# Patient Record
Sex: FEMALE | Race: WHITE | Employment: OTHER | URBAN - METROPOLITAN AREA
[De-identification: names, ages, dates, MRNs, and addresses within clinical notes are randomized per-mention and may not be internally consistent; named-entity substitution may affect disease eponyms.]

---

## 2019-05-09 RX ORDER — LISINOPRIL 5 MG/1
5 TABLET ORAL NIGHTLY
COMMUNITY
End: 2021-03-29 | Stop reason: SDUPTHER

## 2019-05-09 NOTE — H&P
Not on file    Transportation needs:     Medical: Not on file     Non-medical: Not on file   Tobacco Use    Smoking status: Current Every Day Smoker     Packs/day: 1.00    Smokeless tobacco: Never Used   Substance and Sexual Activity    Alcohol use: Yes     Comment: moderate    Drug use: No     Types: Marijuana    Sexual activity: Not on file   Lifestyle    Physical activity:     Days per week: Not on file     Minutes per session: Not on file    Stress: Not on file   Relationships    Social connections:     Talks on phone: Not on file     Gets together: Not on file     Attends Worship service: Not on file     Active member of club or organization: Not on file     Attends meetings of clubs or organizations: Not on file     Relationship status: Not on file    Intimate partner violence:     Fear of current or ex partner: Not on file     Emotionally abused: Not on file     Physically abused: Not on file     Forced sexual activity: Not on file   Other Topics Concern    Not on file   Social History Narrative    Not on file       Review of Systems:   CONSTITUTIONAL: Oriented to person, place and time  EYES:  Mature right cataract with decreased vision effecting reading, driving and all routine home activities. Visual acuity is 20/60  HEENT:  negative  RESPIRATORY:  negative  CARDIOVASCULAR:  negative  GASTROINTESTINAL:  negative  GENITOURINARY:  negative  INTEGUMENT/BREAST:  negative  HEMATOLOGIC/LYMPHATIC:  negative  ALLERGIC/IMMUNOLOGIC:  negative  ENDOCRINE:  negative  MUSCULOSKELETAL:  negative  NEUROLOGICAL:  negative  BEHAVIOR/PSYCH:  negative    Physical Exam:  There were no vitals filed for this visit.     CONSTITUTIONAL:  awake, alert, cooperative, no apparent distress, and appears stated age  EYES:  Lids and lashes normal, pupils equal, round and reactive to light, extra ocular muscles intact, sclera clear, conjunctiva normal  ENT:  Normocephalic, without obvious abnormality, atraumatic, sinuses nontender on palpation, external ears without lesions, oral pharynx with moist mucus membranes, tonsils without erythema or exudates, gums normal and good dentition. NECK:  Supple, symmetrical, trachea midline, no adenopathy, thyroid symmetric, not enlarged and no tenderness, skin normal  HEMATOLOGIC/LYMPHATICS:  no cervical lymphadenopathy and no supraclavicular lymphadenopathy  BACK:  Symmetric, no curvature, spinous processes are non-tender on palpation, paraspinous muscles are non-tender on palpation, no costal vertebral tenderness  LUNGS:  No increased work of breathing, good air exchange, clear to auscultation bilaterally, no crackles or wheezing  CARDIOVASCULAR:  Normal apical impulse, regular rate and rhythm, normal S1 and S2, no S3 or S4, and no murmur noted  ABDOMEN:  No scars, normal bowel sounds, soft, non-distended, non-tender, no masses palpated, no hepatosplenomegally  CHEST/BREASTS:  Breasts symmetrical, skin without lesion(s), no nipple retraction or dimpling, no nipple discharge, no masses palpated, no axillary or supraclavicular adenopathy  GENITAL/URINARY: Deferred   MUSCULOSKELETAL:  There is no redness, warmth, or swelling of the joints. Full range of motion noted. Motor strength is 5 out of 5 all extremities bilaterally. Tone is normal.  NEUROLOGIC:  Awake, alert, oriented to name, place and time. Cranial nerves II-XII are grossly intact. Motor is 5 out of 5 bilaterally. Cerebellar finger to nose, heel to shin intact. Sensory is intact. Babinski down going, Romberg negative, and gait is normal.  SKIN:  no bruising or bleeding, normal skin color, texture, turgor and no redness, warmth, or swelling    Assessment:  Active Problems:    * No active hospital problems. *  Resolved Problems:    * No resolved hospital problems. *      Plan:  1.  Right cataract extraction with intra ocular lens implant    Electronically signed by Reed Soto MD on 5/8/19 at 8:15 PM

## 2019-05-10 ENCOUNTER — ANESTHESIA EVENT (OUTPATIENT)
Dept: OPERATING ROOM | Age: 64
End: 2019-05-10
Payer: MEDICARE

## 2019-05-10 ASSESSMENT — LIFESTYLE VARIABLES: SMOKING_STATUS: 1

## 2019-05-10 NOTE — ANESTHESIA PRE PROCEDURE
Department of Anesthesiology  Preprocedure Note       Name:  Yolanda Kirk   Age:  71 y.o.  :  1955                                          MRN:  [id number]         Date:  5/10/2019      Surgeon: Quinton Davidsonal):  Juan Mack MD    Procedure: RIGHT EYE CATARACT EMULSIFICATION IOL IMPLANT (Right )    Medications prior to admission:   Prior to Admission medications    Medication Sig Start Date End Date Taking? Authorizing Provider   lisinopril (PRINIVIL;ZESTRIL) 5 MG tablet Take 5 mg by mouth nightly    Yes Historical Provider, MD   rOPINIRole (REQUIP) 0.25 MG tablet Take 1 tablet by mouth daily  Patient taking differently: Take 0.5 mg by mouth nightly  9/18/15  Yes Balbir Cram, DO   simvastatin (ZOCOR) 40 MG tablet Take 1 tablet by mouth nightly 9/18/15  Yes Balbir Cram, DO   ibuprofen (ADVIL;MOTRIN) 800 MG tablet Take 1 tablet by mouth every 8 hours as needed for Pain 9/18/15  Yes Balbir Cram, DO   metoprolol (LOPRESSOR) 50 MG tablet Take 1 tablet by mouth daily  Patient taking differently: Take 50 mg by mouth 2 times daily Takes in the afternoon & in the PM 9/18/15  Yes Balbir Cram, DO   Cholecalciferol (VITAMIN D) 2000 UNITS CAPS capsule Take  by mouth daily. Yes Historical Provider, MD   aspirin 81 MG tablet Take 81 mg by mouth daily. Yes Historical Provider, MD       Current medications:    No current facility-administered medications for this encounter.       Current Outpatient Medications   Medication Sig Dispense Refill    lisinopril (PRINIVIL;ZESTRIL) 5 MG tablet Take 5 mg by mouth nightly       rOPINIRole (REQUIP) 0.25 MG tablet Take 1 tablet by mouth daily (Patient taking differently: Take 0.5 mg by mouth nightly ) 90 tablet 3    simvastatin (ZOCOR) 40 MG tablet Take 1 tablet by mouth nightly 90 tablet 3    ibuprofen (ADVIL;MOTRIN) 800 MG tablet Take 1 tablet by mouth every 8 hours as needed for Pain 30 tablet 3    metoprolol (LOPRESSOR) 50 MG tablet Take 1 tablet by mouth daily (Patient taking differently: Take 50 mg by mouth 2 times daily Takes in the afternoon & in the PM) 90 tablet 3    Cholecalciferol (VITAMIN D) 2000 UNITS CAPS capsule Take  by mouth daily.  aspirin 81 MG tablet Take 81 mg by mouth daily. Allergies: Allergies   Allergen Reactions    Biaxin [Clarithromycin] Nausea And Vomiting    Naproxen Nausea And Vomiting       Problem List:    Patient Active Problem List   Diagnosis Code    Memory loss R41.3    Cerebral atherosclerosis I67.2    Hypercholesteremia improved E78.00       Past Medical History:        Diagnosis Date    Hyperlipidemia     Hypertension     Sleep apnea     doesnt use cpap       Past Surgical History:        Procedure Laterality Date    CAROTID ENDARTERECTOMY Right      SECTION      COLONOSCOPY         Social History:    Social History     Tobacco Use    Smoking status: Current Every Day Smoker     Packs/day: 2.00     Years: 40.00     Pack years: 80.00     Types: Cigarettes    Smokeless tobacco: Never Used   Substance Use Topics    Alcohol use: Yes     Comment: beer or wine daily                                Ready to quit: Not Answered  Counseling given: Not Answered      Vital Signs (Current):   Vitals:    19 1317   Weight: 185 lb (83.9 kg)   Height: 5' 5\" (1.651 m)                                              BP Readings from Last 3 Encounters:   09/18/15 120/80   03/19/15 (!) 147/91   01/14/15 (!) 144/92       NPO Status:  >8.H                                                                               BMI:   Wt Readings from Last 3 Encounters:   09/18/15 176 lb (79.8 kg)   03/19/15 170 lb (77.1 kg)   01/14/15 172 lb (78 kg)     Body mass index is 30.79 kg/m².     CBC:   Lab Results   Component Value Date    WBC 5.8 09/10/2015    RBC 4.84 09/10/2015    HGB 15.1 09/10/2015    HCT 46.5 09/10/2015    MCV 96.2 09/10/2015    RDW 13.4 09/10/2015     09/10/2015       CMP:   Lab Results   Component Value Date     09/10/2015    K 4.6 09/10/2015     09/10/2015    CO2 30 09/10/2015    BUN 15 09/10/2015    CREATININE 0.7 09/10/2015    GFRAA >60 09/10/2015    LABGLOM >60 09/10/2015    GLUCOSE 101 09/10/2015    GLUCOSE 87 01/26/2012    PROT 7.1 09/10/2015    CALCIUM 9.6 09/10/2015    BILITOT 0.4 09/10/2015    ALKPHOS 67 09/10/2015    AST 17 09/10/2015    ALT 17 09/10/2015       POC Tests: No results for input(s): POCGLU, POCNA, POCK, POCCL, POCBUN, POCHEMO, POCHCT in the last 72 hours. Coags: No results found for: PROTIME, INR, APTT    HCG (If Applicable): No results found for: PREGTESTUR, PREGSERUM, HCG, HCGQUANT     ABGs: No results found for: PHART, PO2ART, RSP0KJW, WDW5GBC, BEART, L1XENCXM     Type & Screen (If Applicable):  No results found for: [facility]    Anesthesia Evaluation  Patient summary reviewed no history of anesthetic complications:   Airway: Mallampati: III  TM distance: >3 FB   Neck ROM: full  Mouth opening: > = 3 FB Dental:    (+) upper dentures, lower dentures and edentulous      Pulmonary: breath sounds clear to auscultation  (+) sleep apnea: on noncompliant,  current smoker (80 pk yrs)          Patient did not smoke on day of surgery. Cardiovascular:  Exercise tolerance: good (>4 METS),   (+) hypertension:, hyperlipidemia        Rhythm: regular  Rate: normal           Beta Blocker:  Dose within 24 Hrs         Neuro/Psych:               GI/Hepatic/Renal:             Endo/Other:                     Abdominal:   (+) obese,         Vascular:   + PVD, aortic or cerebral, . ROS comment: Hx CEA. Anesthesia Plan      MAC     ASA 3       Induction: intravenous. Anesthetic plan and risks discussed with patient. Plan discussed with CRNA.                 Neida Carter MD   5/10/2019

## 2019-05-14 ENCOUNTER — ANESTHESIA (OUTPATIENT)
Dept: OPERATING ROOM | Age: 64
End: 2019-05-14
Payer: MEDICARE

## 2019-05-14 ENCOUNTER — HOSPITAL ENCOUNTER (OUTPATIENT)
Age: 64
Setting detail: OUTPATIENT SURGERY
Discharge: HOME | End: 2019-05-14
Attending: OPHTHALMOLOGY | Admitting: OPHTHALMOLOGY
Payer: MEDICARE

## 2019-05-14 VITALS
WEIGHT: 190 LBS | TEMPERATURE: 98 F | OXYGEN SATURATION: 96 % | HEIGHT: 65 IN | SYSTOLIC BLOOD PRESSURE: 124 MMHG | RESPIRATION RATE: 16 BRPM | HEART RATE: 70 BPM | DIASTOLIC BLOOD PRESSURE: 76 MMHG | BODY MASS INDEX: 31.65 KG/M2

## 2019-05-14 VITALS — SYSTOLIC BLOOD PRESSURE: 118 MMHG | DIASTOLIC BLOOD PRESSURE: 64 MMHG | OXYGEN SATURATION: 99 %

## 2019-05-14 PROBLEM — H26.9 RIGHT CATARACT: Status: ACTIVE | Noted: 2019-05-14

## 2019-05-14 PROCEDURE — 2500000003 HC RX 250 WO HCPCS: Performed by: OPHTHALMOLOGY

## 2019-05-14 PROCEDURE — 6370000000 HC RX 637 (ALT 250 FOR IP): Performed by: OPHTHALMOLOGY

## 2019-05-14 PROCEDURE — 2500000003 HC RX 250 WO HCPCS: Performed by: NURSE ANESTHETIST, CERTIFIED REGISTERED

## 2019-05-14 PROCEDURE — 3600000003 HC SURGERY LEVEL 3 BASE: Performed by: OPHTHALMOLOGY

## 2019-05-14 PROCEDURE — V2632 POST CHMBR INTRAOCULAR LENS: HCPCS | Performed by: OPHTHALMOLOGY

## 2019-05-14 PROCEDURE — 2580000003 HC RX 258: Performed by: ANESTHESIOLOGY

## 2019-05-14 PROCEDURE — 6360000002 HC RX W HCPCS: Performed by: NURSE ANESTHETIST, CERTIFIED REGISTERED

## 2019-05-14 PROCEDURE — 3700000000 HC ANESTHESIA ATTENDED CARE: Performed by: OPHTHALMOLOGY

## 2019-05-14 PROCEDURE — 2709999900 HC NON-CHARGEABLE SUPPLY: Performed by: OPHTHALMOLOGY

## 2019-05-14 PROCEDURE — 7100000010 HC PHASE II RECOVERY - FIRST 15 MIN: Performed by: OPHTHALMOLOGY

## 2019-05-14 PROCEDURE — 7100000011 HC PHASE II RECOVERY - ADDTL 15 MIN: Performed by: OPHTHALMOLOGY

## 2019-05-14 DEVICE — LENS INTOCU +18.0 DIOPT A CONSTANT 118.8 L13MM DIA6MM 0DEG: Type: IMPLANTABLE DEVICE | Site: EYE | Status: FUNCTIONAL

## 2019-05-14 RX ORDER — ALFENTANIL HYDROCHLORIDE 500 UG/ML
INJECTION INTRAVENOUS PRN
Status: DISCONTINUED | OUTPATIENT
Start: 2019-05-14 | End: 2019-05-14 | Stop reason: SDUPTHER

## 2019-05-14 RX ORDER — HYDROMORPHONE HYDROCHLORIDE 1 MG/ML
0.25 INJECTION, SOLUTION INTRAMUSCULAR; INTRAVENOUS; SUBCUTANEOUS EVERY 5 MIN PRN
Status: DISCONTINUED | OUTPATIENT
Start: 2019-05-14 | End: 2019-05-14 | Stop reason: HOSPADM

## 2019-05-14 RX ORDER — PROMETHAZINE HYDROCHLORIDE 25 MG/ML
25 INJECTION, SOLUTION INTRAMUSCULAR; INTRAVENOUS
Status: DISCONTINUED | OUTPATIENT
Start: 2019-05-14 | End: 2019-05-14 | Stop reason: HOSPADM

## 2019-05-14 RX ORDER — FENTANYL CITRATE 50 UG/ML
50 INJECTION, SOLUTION INTRAMUSCULAR; INTRAVENOUS EVERY 5 MIN PRN
Status: DISCONTINUED | OUTPATIENT
Start: 2019-05-14 | End: 2019-05-14 | Stop reason: HOSPADM

## 2019-05-14 RX ORDER — CYCLOPENTOLATE HYDROCHLORIDE 10 MG/ML
1 SOLUTION/ DROPS OPHTHALMIC
Status: COMPLETED | OUTPATIENT
Start: 2019-05-14 | End: 2019-05-14

## 2019-05-14 RX ORDER — BALANCED SALT SOLUTION 6.4; .75; .48; .3; 3.9; 1.7 MG/ML; MG/ML; MG/ML; MG/ML; MG/ML; MG/ML
SOLUTION OPHTHALMIC PRN
Status: DISCONTINUED | OUTPATIENT
Start: 2019-05-14 | End: 2019-05-14 | Stop reason: ALTCHOICE

## 2019-05-14 RX ORDER — DIPHENHYDRAMINE HYDROCHLORIDE 50 MG/ML
12.5 INJECTION INTRAMUSCULAR; INTRAVENOUS
Status: DISCONTINUED | OUTPATIENT
Start: 2019-05-14 | End: 2019-05-14 | Stop reason: HOSPADM

## 2019-05-14 RX ORDER — HYDROCODONE BITARTRATE AND ACETAMINOPHEN 5; 325 MG/1; MG/1
2 TABLET ORAL PRN
Status: DISCONTINUED | OUTPATIENT
Start: 2019-05-14 | End: 2019-05-14 | Stop reason: HOSPADM

## 2019-05-14 RX ORDER — TETRACAINE HYDROCHLORIDE 5 MG/ML
SOLUTION OPHTHALMIC PRN
Status: DISCONTINUED | OUTPATIENT
Start: 2019-05-14 | End: 2019-05-14 | Stop reason: ALTCHOICE

## 2019-05-14 RX ORDER — PHENYLEPHRINE HCL 2.5 %
1 DROPS OPHTHALMIC (EYE)
Status: COMPLETED | OUTPATIENT
Start: 2019-05-14 | End: 2019-05-14

## 2019-05-14 RX ORDER — HYDROCODONE BITARTRATE AND ACETAMINOPHEN 5; 325 MG/1; MG/1
1 TABLET ORAL PRN
Status: DISCONTINUED | OUTPATIENT
Start: 2019-05-14 | End: 2019-05-14 | Stop reason: HOSPADM

## 2019-05-14 RX ORDER — MIDAZOLAM HYDROCHLORIDE 1 MG/ML
INJECTION INTRAMUSCULAR; INTRAVENOUS PRN
Status: DISCONTINUED | OUTPATIENT
Start: 2019-05-14 | End: 2019-05-14 | Stop reason: SDUPTHER

## 2019-05-14 RX ORDER — MEPERIDINE HYDROCHLORIDE 50 MG/ML
12.5 INJECTION INTRAMUSCULAR; INTRAVENOUS; SUBCUTANEOUS EVERY 5 MIN PRN
Status: DISCONTINUED | OUTPATIENT
Start: 2019-05-14 | End: 2019-05-14 | Stop reason: HOSPADM

## 2019-05-14 RX ORDER — FLURBIPROFEN SODIUM 0.3 MG/ML
1 SOLUTION/ DROPS OPHTHALMIC
Status: COMPLETED | OUTPATIENT
Start: 2019-05-14 | End: 2019-05-14

## 2019-05-14 RX ORDER — PHENYLEPHRINE HYDROCHLORIDE 100 MG/ML
1 SOLUTION/ DROPS OPHTHALMIC PRN
Status: DISCONTINUED | OUTPATIENT
Start: 2019-05-14 | End: 2019-05-14 | Stop reason: HOSPADM

## 2019-05-14 RX ORDER — LABETALOL HYDROCHLORIDE 5 MG/ML
5 INJECTION, SOLUTION INTRAVENOUS EVERY 10 MIN PRN
Status: DISCONTINUED | OUTPATIENT
Start: 2019-05-14 | End: 2019-05-14 | Stop reason: HOSPADM

## 2019-05-14 RX ORDER — SODIUM CHLORIDE, SODIUM LACTATE, POTASSIUM CHLORIDE, CALCIUM CHLORIDE 600; 310; 30; 20 MG/100ML; MG/100ML; MG/100ML; MG/100ML
INJECTION, SOLUTION INTRAVENOUS CONTINUOUS
Status: DISCONTINUED | OUTPATIENT
Start: 2019-05-14 | End: 2019-05-14 | Stop reason: HOSPADM

## 2019-05-14 RX ORDER — MORPHINE SULFATE 2 MG/ML
1 INJECTION, SOLUTION INTRAMUSCULAR; INTRAVENOUS EVERY 5 MIN PRN
Status: DISCONTINUED | OUTPATIENT
Start: 2019-05-14 | End: 2019-05-14 | Stop reason: HOSPADM

## 2019-05-14 RX ORDER — HYDRALAZINE HYDROCHLORIDE 20 MG/ML
5 INJECTION INTRAMUSCULAR; INTRAVENOUS EVERY 10 MIN PRN
Status: DISCONTINUED | OUTPATIENT
Start: 2019-05-14 | End: 2019-05-14 | Stop reason: HOSPADM

## 2019-05-14 RX ORDER — TETRACAINE HYDROCHLORIDE 5 MG/ML
1 SOLUTION OPHTHALMIC ONCE
Status: COMPLETED | OUTPATIENT
Start: 2019-05-14 | End: 2019-05-14

## 2019-05-14 RX ADMIN — CYCLOPENTOLATE HYDROCHLORIDE 1 DROP: 10 SOLUTION/ DROPS OPHTHALMIC at 06:40

## 2019-05-14 RX ADMIN — SODIUM CHLORIDE, POTASSIUM CHLORIDE, SODIUM LACTATE AND CALCIUM CHLORIDE: 600; 310; 30; 20 INJECTION, SOLUTION INTRAVENOUS at 06:55

## 2019-05-14 RX ADMIN — MIDAZOLAM 1 MG: 1 INJECTION INTRAMUSCULAR; INTRAVENOUS at 07:35

## 2019-05-14 RX ADMIN — MIDAZOLAM 1 MG: 1 INJECTION INTRAMUSCULAR; INTRAVENOUS at 07:37

## 2019-05-14 RX ADMIN — ALFENTANIL HYDROCHLORIDE 250 MCG: 500 INJECTION INTRAVENOUS at 07:35

## 2019-05-14 RX ADMIN — FLURBIPROFEN SODIUM 1 DROP: 0.3 SOLUTION/ DROPS OPHTHALMIC at 06:50

## 2019-05-14 RX ADMIN — ALFENTANIL HYDROCHLORIDE 250 MCG: 500 INJECTION INTRAVENOUS at 07:37

## 2019-05-14 RX ADMIN — CYCLOPENTOLATE HYDROCHLORIDE 1 DROP: 10 SOLUTION/ DROPS OPHTHALMIC at 06:45

## 2019-05-14 RX ADMIN — ALFENTANIL HYDROCHLORIDE 250 MCG: 500 INJECTION INTRAVENOUS at 07:43

## 2019-05-14 RX ADMIN — PHENYLEPHRINE HYDROCHLORIDE 1 DROP: 25 SOLUTION/ DROPS OPHTHALMIC at 06:40

## 2019-05-14 RX ADMIN — FLURBIPROFEN SODIUM 1 DROP: 0.3 SOLUTION/ DROPS OPHTHALMIC at 06:40

## 2019-05-14 RX ADMIN — TETRACAINE HYDROCHLORIDE 1 DROP: 25 LIQUID OPHTHALMIC at 07:00

## 2019-05-14 RX ADMIN — PHENYLEPHRINE HYDROCHLORIDE 1 DROP: 25 SOLUTION/ DROPS OPHTHALMIC at 06:45

## 2019-05-14 RX ADMIN — FLURBIPROFEN SODIUM 1 DROP: 0.3 SOLUTION/ DROPS OPHTHALMIC at 06:45

## 2019-05-14 RX ADMIN — PHENYLEPHRINE HYDROCHLORIDE 1 DROP: 25 SOLUTION/ DROPS OPHTHALMIC at 06:50

## 2019-05-14 RX ADMIN — CYCLOPENTOLATE HYDROCHLORIDE 1 DROP: 10 SOLUTION/ DROPS OPHTHALMIC at 06:50

## 2019-05-14 ASSESSMENT — PULMONARY FUNCTION TESTS
PIF_VALUE: 0
PIF_VALUE: 1
PIF_VALUE: 0
PIF_VALUE: 0

## 2019-05-14 ASSESSMENT — PAIN - FUNCTIONAL ASSESSMENT: PAIN_FUNCTIONAL_ASSESSMENT: 0-10

## 2019-05-14 ASSESSMENT — PAIN SCALES - GENERAL
PAINLEVEL_OUTOF10: 0
PAINLEVEL_OUTOF10: 0

## 2019-05-14 NOTE — ANESTHESIA POSTPROCEDURE EVALUATION
Department of Anesthesiology  Postprocedure Note    Patient: Tyler Rapp  MRN: [id number]  YOB: 1955  Date of evaluation: 5/14/2019  Time:  10:36 AM     Procedure Summary     Date:  05/14/19 Room / Location:  [address] [place] [address] 02 / [facility]    Anesthesia Start:  5476 Anesthesia Stop:  8369    Procedure:  RIGHT EYE CATARACT EMULSIFICATION IOL IMPLANT (Right Eye) Diagnosis:  (RIGHT EYE CATARACT)    Surgeon:  Geo Robledo MD Responsible Provider:  Wilberto Pendleton MD    Anesthesia Type:  MAC ASA Status:  3          Anesthesia Type: MAC    Jose Miguel Phase I: Jose Miguel Score: 10    Jose Miguel Phase II: Jose Miguel Score: 10    Last vitals: Reviewed and per EMR flowsheets.        Anesthesia Post Evaluation    Patient location during evaluation: PACU  Patient participation: complete - patient participated  Level of consciousness: awake and alert  Airway patency: patent  Nausea & Vomiting: no nausea and no vomiting  Complications: no  Cardiovascular status: hemodynamically stable  Respiratory status: room air and spontaneous ventilation  Hydration status: stable

## 2019-05-14 NOTE — H&P
Update History & Physical    The patient's History and Physical of 5 / 8 / 2019 was reviewed with the patient and there were no significant changes. I examined the patient and there were no significant changes from the previous History and Physical.    Plan: The risk, benefits, expected outcome, and alternative to the recommended procedure have been discussed with the patient. Patient understands and wants to proceed with the procedure.     Electronically signed by Donna Collins MD on 5/14/19 at 7:41 AM

## 2019-05-14 NOTE — OP NOTE
PREOPERATIVE DIAGNOSIS:  Right Cataract    POSTOPERATIVE DIAGNOSIS:  Right Cataract    PROCEDURE:  Right phacoemulsification with intraocular lens implant. ANESTHESIA:  Local Mac    ESTIMATED BLOOD LOSS:  Minimal    COMPLICATIONS:  None    DESCRIPTION OF PROCEDURE:  The patient was brought into the operating room. The operative eye was marked, which was the right eye. The right eye was then prepped with a full-strength Betadine preparation. The periorbital area was copiously washed with Betadine. The lashes were washed with Betadine. Dilute Betadine was then also put in the inferior and superior fornices and left in place for approximately a minute or 2. This was then irrigated with sterile water. The face was then wiped and the preparation was repeated 1 more time. The drape was then placed over the operative eye with the sticky adhesive placed at the lid margins so that it draped the lashes out of the operative field superiorly and inferiorly, as well as isolated the meibomian glands behind the drape. This cleared the operative field of any meibomian gland secretions and eyelashes. Next, a lid speculum was positioned in the right eye. A super sharp blade was used to make a side-port incision at the 2 o'clock position. A clear corneal incision was fashioned over the 12 o;clock meridian with a 2.3mm keratome at the limbus. Healon was used to reform the anterior chamber. A continuous tear anterior capsulotomy was then performed with a bent needle cystotome. Hydrodissection was performed by irrigating with balanced salt solution through a syringe underneath the anterior capsular flap to loosen and allow free rotation of the lens nucleus. Phacoemulsification was used and the entire lens nucleus was removed. The I A unit was instilled in the eye, and the remaining cortex was removed. Healon was used to separate the anterior and posterior capsular flaps.   The PCBOO 18.0 diopter lens was then instilled into the capsular bag and dialed to 3 and 9 o'clock positions. Healon was removed from in front of and behind the lens. The lens was found to be well centered, well positioned in the bag and stable. The wound was checked and found to be airtight and watertight. The lid speculum was removed and the drape was removed. The patient was brought to the recovery room in excellent condition, given postoperative instructions, and discharge in excellent condition.

## 2019-10-14 ENCOUNTER — ANESTHESIA EVENT (OUTPATIENT)
Dept: OPERATING ROOM | Age: 64
End: 2019-10-14
Payer: MEDICARE

## 2019-10-14 ASSESSMENT — LIFESTYLE VARIABLES: SMOKING_STATUS: 1

## 2019-10-15 ENCOUNTER — ANESTHESIA (OUTPATIENT)
Dept: OPERATING ROOM | Age: 64
End: 2019-10-15
Payer: MEDICARE

## 2019-10-15 ENCOUNTER — HOSPITAL ENCOUNTER (OUTPATIENT)
Age: 64
Setting detail: OUTPATIENT SURGERY
Discharge: HOME | End: 2019-10-15
Attending: OPHTHALMOLOGY | Admitting: OPHTHALMOLOGY
Payer: MEDICARE

## 2019-10-15 VITALS
TEMPERATURE: 98 F | OXYGEN SATURATION: 95 % | WEIGHT: 180 LBS | HEIGHT: 65 IN | HEART RATE: 77 BPM | RESPIRATION RATE: 16 BRPM | SYSTOLIC BLOOD PRESSURE: 126 MMHG | BODY MASS INDEX: 29.99 KG/M2 | DIASTOLIC BLOOD PRESSURE: 74 MMHG

## 2019-10-15 VITALS
DIASTOLIC BLOOD PRESSURE: 72 MMHG | SYSTOLIC BLOOD PRESSURE: 126 MMHG | OXYGEN SATURATION: 100 % | RESPIRATION RATE: 19 BRPM

## 2019-10-15 PROBLEM — H26.9 LEFT CATARACT: Status: ACTIVE | Noted: 2019-10-15

## 2019-10-15 PROCEDURE — 2500000003 HC RX 250 WO HCPCS: Performed by: NURSE ANESTHETIST, CERTIFIED REGISTERED

## 2019-10-15 PROCEDURE — 3700000000 HC ANESTHESIA ATTENDED CARE: Performed by: OPHTHALMOLOGY

## 2019-10-15 PROCEDURE — 2580000003 HC RX 258: Performed by: ANESTHESIOLOGY

## 2019-10-15 PROCEDURE — 2500000003 HC RX 250 WO HCPCS: Performed by: OPHTHALMOLOGY

## 2019-10-15 PROCEDURE — 7100000011 HC PHASE II RECOVERY - ADDTL 15 MIN: Performed by: OPHTHALMOLOGY

## 2019-10-15 PROCEDURE — 6370000000 HC RX 637 (ALT 250 FOR IP): Performed by: OPHTHALMOLOGY

## 2019-10-15 PROCEDURE — 2709999900 HC NON-CHARGEABLE SUPPLY: Performed by: OPHTHALMOLOGY

## 2019-10-15 PROCEDURE — V2632 POST CHMBR INTRAOCULAR LENS: HCPCS | Performed by: OPHTHALMOLOGY

## 2019-10-15 PROCEDURE — 3600000003 HC SURGERY LEVEL 3 BASE: Performed by: OPHTHALMOLOGY

## 2019-10-15 PROCEDURE — 7100000010 HC PHASE II RECOVERY - FIRST 15 MIN: Performed by: OPHTHALMOLOGY

## 2019-10-15 PROCEDURE — 6360000002 HC RX W HCPCS: Performed by: NURSE ANESTHETIST, CERTIFIED REGISTERED

## 2019-10-15 DEVICE — LENS INTOCU +18.5 DIOPT A CONSTANT 118.8 L13MM DIA6MM 0DEG: Type: IMPLANTABLE DEVICE | Site: EYE | Status: FUNCTIONAL

## 2019-10-15 RX ORDER — ALFENTANIL HYDROCHLORIDE 500 UG/ML
INJECTION INTRAVENOUS PRN
Status: DISCONTINUED | OUTPATIENT
Start: 2019-10-15 | End: 2019-10-15 | Stop reason: SDUPTHER

## 2019-10-15 RX ORDER — BALANCED SALT SOLUTION 6.4; .75; .48; .3; 3.9; 1.7 MG/ML; MG/ML; MG/ML; MG/ML; MG/ML; MG/ML
SOLUTION OPHTHALMIC PRN
Status: DISCONTINUED | OUTPATIENT
Start: 2019-10-15 | End: 2019-10-15 | Stop reason: ALTCHOICE

## 2019-10-15 RX ORDER — PHENYLEPHRINE HCL 2.5 %
1 DROPS OPHTHALMIC (EYE)
Status: COMPLETED | OUTPATIENT
Start: 2019-10-15 | End: 2019-10-15

## 2019-10-15 RX ORDER — TETRACAINE HYDROCHLORIDE 5 MG/ML
1 SOLUTION OPHTHALMIC ONCE
Status: COMPLETED | OUTPATIENT
Start: 2019-10-15 | End: 2019-10-15

## 2019-10-15 RX ORDER — TETRACAINE HYDROCHLORIDE 5 MG/ML
SOLUTION OPHTHALMIC PRN
Status: DISCONTINUED | OUTPATIENT
Start: 2019-10-15 | End: 2019-10-15 | Stop reason: ALTCHOICE

## 2019-10-15 RX ORDER — CYCLOPENTOLATE HYDROCHLORIDE 10 MG/ML
1 SOLUTION/ DROPS OPHTHALMIC
Status: COMPLETED | OUTPATIENT
Start: 2019-10-15 | End: 2019-10-15

## 2019-10-15 RX ORDER — MEPERIDINE HYDROCHLORIDE 50 MG/ML
12.5 INJECTION INTRAMUSCULAR; INTRAVENOUS; SUBCUTANEOUS EVERY 5 MIN PRN
Status: DISCONTINUED | OUTPATIENT
Start: 2019-10-15 | End: 2019-10-15 | Stop reason: HOSPADM

## 2019-10-15 RX ORDER — PROMETHAZINE HYDROCHLORIDE 25 MG/ML
6.25 INJECTION, SOLUTION INTRAMUSCULAR; INTRAVENOUS
Status: DISCONTINUED | OUTPATIENT
Start: 2019-10-15 | End: 2019-10-15 | Stop reason: HOSPADM

## 2019-10-15 RX ORDER — FLURBIPROFEN SODIUM 0.3 MG/ML
1 SOLUTION/ DROPS OPHTHALMIC
Status: COMPLETED | OUTPATIENT
Start: 2019-10-15 | End: 2019-10-15

## 2019-10-15 RX ORDER — MIDAZOLAM HYDROCHLORIDE 1 MG/ML
INJECTION INTRAMUSCULAR; INTRAVENOUS PRN
Status: DISCONTINUED | OUTPATIENT
Start: 2019-10-15 | End: 2019-10-15 | Stop reason: SDUPTHER

## 2019-10-15 RX ORDER — SODIUM CHLORIDE, SODIUM LACTATE, POTASSIUM CHLORIDE, CALCIUM CHLORIDE 600; 310; 30; 20 MG/100ML; MG/100ML; MG/100ML; MG/100ML
INJECTION, SOLUTION INTRAVENOUS CONTINUOUS
Status: DISCONTINUED | OUTPATIENT
Start: 2019-10-15 | End: 2019-10-15 | Stop reason: HOSPADM

## 2019-10-15 RX ORDER — PHENYLEPHRINE HYDROCHLORIDE 100 MG/ML
1 SOLUTION/ DROPS OPHTHALMIC PRN
Status: DISCONTINUED | OUTPATIENT
Start: 2019-10-15 | End: 2019-10-15 | Stop reason: HOSPADM

## 2019-10-15 RX ORDER — LABETALOL 20 MG/4 ML (5 MG/ML) INTRAVENOUS SYRINGE
5 EVERY 10 MIN PRN
Status: DISCONTINUED | OUTPATIENT
Start: 2019-10-15 | End: 2019-10-15 | Stop reason: HOSPADM

## 2019-10-15 RX ORDER — HYDRALAZINE HYDROCHLORIDE 20 MG/ML
5 INJECTION INTRAMUSCULAR; INTRAVENOUS EVERY 10 MIN PRN
Status: DISCONTINUED | OUTPATIENT
Start: 2019-10-15 | End: 2019-10-15 | Stop reason: HOSPADM

## 2019-10-15 RX ADMIN — PHENYLEPHRINE HYDROCHLORIDE 1 DROP: 25 SOLUTION/ DROPS OPHTHALMIC at 05:50

## 2019-10-15 RX ADMIN — FLURBIPROFEN SODIUM 1 DROP: 0.3 SOLUTION/ DROPS OPHTHALMIC at 05:50

## 2019-10-15 RX ADMIN — ALFENTANIL HYDROCHLORIDE 250 MCG: 500 INJECTION INTRAVENOUS at 06:40

## 2019-10-15 RX ADMIN — CYCLOPENTOLATE HYDROCHLORIDE 1 DROP: 10 SOLUTION/ DROPS OPHTHALMIC at 05:40

## 2019-10-15 RX ADMIN — FLURBIPROFEN SODIUM 1 DROP: 0.3 SOLUTION/ DROPS OPHTHALMIC at 05:45

## 2019-10-15 RX ADMIN — MIDAZOLAM 2 MG: 1 INJECTION INTRAMUSCULAR; INTRAVENOUS at 06:34

## 2019-10-15 RX ADMIN — ALFENTANIL HYDROCHLORIDE 250 MCG: 500 INJECTION INTRAVENOUS at 06:35

## 2019-10-15 RX ADMIN — PHENYLEPHRINE HYDROCHLORIDE 1 DROP: 25 SOLUTION/ DROPS OPHTHALMIC at 05:45

## 2019-10-15 RX ADMIN — ALFENTANIL HYDROCHLORIDE 250 MCG: 500 INJECTION INTRAVENOUS at 06:42

## 2019-10-15 RX ADMIN — ALFENTANIL HYDROCHLORIDE 250 MCG: 500 INJECTION INTRAVENOUS at 06:37

## 2019-10-15 RX ADMIN — TETRACAINE HYDROCHLORIDE 1 DROP: 5 SOLUTION OPHTHALMIC at 06:15

## 2019-10-15 RX ADMIN — CYCLOPENTOLATE HYDROCHLORIDE 1 DROP: 10 SOLUTION/ DROPS OPHTHALMIC at 05:50

## 2019-10-15 RX ADMIN — SODIUM CHLORIDE, POTASSIUM CHLORIDE, SODIUM LACTATE AND CALCIUM CHLORIDE: 600; 310; 30; 20 INJECTION, SOLUTION INTRAVENOUS at 06:12

## 2019-10-15 RX ADMIN — FLURBIPROFEN SODIUM 1 DROP: 0.3 SOLUTION/ DROPS OPHTHALMIC at 05:40

## 2019-10-15 RX ADMIN — CYCLOPENTOLATE HYDROCHLORIDE 1 DROP: 10 SOLUTION/ DROPS OPHTHALMIC at 05:45

## 2019-10-15 RX ADMIN — PHENYLEPHRINE HYDROCHLORIDE 1 DROP: 25 SOLUTION/ DROPS OPHTHALMIC at 05:40

## 2019-10-15 ASSESSMENT — PAIN SCALES - GENERAL
PAINLEVEL_OUTOF10: 0

## 2019-10-15 ASSESSMENT — PULMONARY FUNCTION TESTS
PIF_VALUE: 0

## 2019-10-15 ASSESSMENT — PAIN - FUNCTIONAL ASSESSMENT: PAIN_FUNCTIONAL_ASSESSMENT: 0-10

## 2020-02-10 ENCOUNTER — HOSPITAL ENCOUNTER (OUTPATIENT)
Dept: NON INVASIVE DIAGNOSTICS | Age: 65
Discharge: HOME | End: 2020-02-10
Payer: MEDICARE

## 2020-02-10 LAB
LV EF: 78 %
LVEF MODALITY: NORMAL

## 2020-02-10 PROCEDURE — 93306 TTE W/DOPPLER COMPLETE: CPT

## 2020-03-09 ENCOUNTER — TELEPHONE (OUTPATIENT)
Dept: ADMINISTRATIVE | Age: 65
End: 2020-03-09

## 2020-04-15 ENCOUNTER — TELEPHONE (OUTPATIENT)
Dept: CARDIOLOGY CLINIC | Age: 65
End: 2020-04-15

## 2020-04-22 ENCOUNTER — VIRTUAL VISIT (OUTPATIENT)
Dept: CARDIOLOGY CLINIC | Age: 65
End: 2020-04-22
Payer: MEDICARE

## 2020-04-22 VITALS
DIASTOLIC BLOOD PRESSURE: 80 MMHG | WEIGHT: 178 LBS | HEIGHT: 65 IN | SYSTOLIC BLOOD PRESSURE: 136 MMHG | BODY MASS INDEX: 29.66 KG/M2

## 2020-04-22 PROCEDURE — 99442 PR PHYS/QHP TELEPHONE EVALUATION 11-20 MIN: CPT | Performed by: INTERNAL MEDICINE

## 2020-04-22 RX ORDER — POTASSIUM CHLORIDE 750 MG/1
10 TABLET, EXTENDED RELEASE ORAL DAILY
Qty: 90 TABLET | Refills: 1 | Status: SHIPPED
Start: 2020-04-22 | End: 2020-10-12 | Stop reason: SDUPTHER

## 2020-04-22 RX ORDER — FUROSEMIDE 20 MG/1
20 TABLET ORAL DAILY
Qty: 60 TABLET | Refills: 3 | Status: SHIPPED
Start: 2020-04-22 | End: 2020-05-05 | Stop reason: SDUPTHER

## 2020-04-22 NOTE — PROGRESS NOTES
activity: Not on file   Other Topics Concern    Not on file   Social History Narrative    Not on file       Family History   Problem Relation Age of Onset    Other Mother         complication of surgery       REVIEW OF SYSTEMS:     CONSTITUTIONAL:  negative for  fevers, chills, sweats, + fatigue  EYES:  negative for  double vision, blurred vision and blind spots  HEENT:  negative for  tinnitus, earaches, nasal congestion and epistaxis  RESPIRATORY:  negative for  dry cough, cough with sputum, wheezing and hemoptysis  CARDIOVASCULAR: as per HPI  GASTROINTESTINAL:  negative for nausea, vomiting, diarrhea, constipation, pruritus and jaundice  GENITOURINARY:  negative for frequency, dysuria, nocturia, urinary incontinence and hesitancy  HEMATOLOGIC/LYMPHATIC:  negative for easy bruising, bleeding, lymphadenopathy and petechiae  ALLERGIC/IMMUNOLOGIC:  negative for urticaria, hay fever and angioedema  ENDOCRINE:  negative for heat intolerance, cold intolerance, tremor, hair loss and diabetic symptoms including neither polyuria nor polydipsia nor blurred vision  MUSCULOSKELETAL:  negative for  myalgias, arthralgias, joint swelling, stiff joints and decreased range of motion  NEUROLOGICAL:  negative for memory problems, speech problems, visual disturbance, dysphagia, weakness and numbness      PHYSICAL EXAMINATION:   Was not performed since it was a phone visit    /80 (Site: Left Upper Arm, Position: Sitting, Cuff Size: Medium Adult)   Ht 5' 5\" (1.651 m)   Wt 178 lb (80.7 kg)   BMI 29.62 kg/m²     DATA:   EKG 1/16/20 Sinus rhythm Anteroseptal infarct age undetermined    ECHO: 2/10/20 Summary   Mild left ventricular concentric hypertrophy with septal thickening   causing mild LV outflow tract obstruction. Ejection fraction is visually estimated at 78%. Overall ejection fraction is normal .   The left atrium is mildly dilated. Borderline dilated right ventricle. Mildly enlarged right atrium size.    Trace Hypertension:  5. Tobacco abuse      RECOMMENDATIONS:   1. Lasix 20 mg daily  2. CTA of the chest (if not done already) to rule out chronic pulmonary emboli  3. Sleep apnea studies  4. Continue current treatment  5. Smoking cessation  6. Follow-up both Dr. Mo Grewal as scheduled  7. Follow-up with Dr. Eva East in 1 month, sooner if symptomatic for any reason  I have reviewed my findings and recommendations with patient    Thank you for the consult  Electronically signed by India Salmon MD on 4/22/2020 at 11:47 AM  NOTE: This report was transcribed using voice recognition software.  Every effort was made to ensure accuracy; however, inadvertent computerized transcription errors may be present

## 2020-04-29 ENCOUNTER — HOSPITAL ENCOUNTER (OUTPATIENT)
Age: 65
Discharge: HOME | End: 2020-04-29
Payer: MEDICARE

## 2020-04-29 ENCOUNTER — HOSPITAL ENCOUNTER (OUTPATIENT)
Dept: CT IMAGING | Age: 65
Discharge: HOME | End: 2020-04-29
Payer: MEDICARE

## 2020-04-29 LAB
BUN BLDV-MCNC: 12 MG/DL (ref 8–23)
CREAT SERPL-MCNC: 0.7 MG/DL (ref 0.5–1)
GFR AFRICAN AMERICAN: >60
GFR NON-AFRICAN AMERICAN: >60 ML/MIN/1.73

## 2020-04-29 PROCEDURE — 6360000004 HC RX CONTRAST MEDICATION: Performed by: RADIOLOGY

## 2020-04-29 PROCEDURE — 84520 ASSAY OF UREA NITROGEN: CPT

## 2020-04-29 PROCEDURE — 82565 ASSAY OF CREATININE: CPT

## 2020-04-29 PROCEDURE — 71275 CT ANGIOGRAPHY CHEST: CPT

## 2020-04-29 PROCEDURE — 36415 COLL VENOUS BLD VENIPUNCTURE: CPT

## 2020-04-29 RX ADMIN — IOPAMIDOL 75 ML: 755 INJECTION, SOLUTION INTRAVENOUS at 13:16

## 2020-05-01 ENCOUNTER — TELEPHONE (OUTPATIENT)
Dept: CARDIOLOGY CLINIC | Age: 65
End: 2020-05-01

## 2020-05-01 NOTE — TELEPHONE ENCOUNTER
Patient called in for results of  CT chest you ordered    She is still having the shortness of breath, she has taken the lasix you prescribed for about 8 days and has not noticed a difference in the swelling

## 2020-05-04 NOTE — TELEPHONE ENCOUNTER
Patient called in with an update - she is 80% better with the swelling and her shortness of breath has improved.   She still has a little bit of swelling, please advise on lasix

## 2020-05-06 RX ORDER — FUROSEMIDE 20 MG/1
TABLET ORAL
Qty: 90 TABLET | Refills: 1 | Status: SHIPPED
Start: 2020-05-06 | End: 2020-06-01

## 2020-05-30 NOTE — PROGRESS NOTES
no crackles or wheezing  CARDIOVASCULAR:  Normal apical impulse, regular rate and rhythm, normal S1 and S2, no S3 or S4, and no murmur noted and no JVD, no carotid bruit, no pedal edema, good carotid upstroke bilaterally. ABDOMEN:  Soft, nontender, no masses, no hepatomegaly or splenomegaly, BS+  CHEST: nontender to palpation, expands symmetrically  MUSCULOSKELETAL:  No clubbing no cyanosis. there is no redness, warmth, or swelling of the joints  full range of motion noted  NEUROLOGIC:  Alert, awake,oriented x3. SKIN:  no bruising or bleeding, normal skin color, texture, turgor and no redness, warmth, or swelling      /72 (Site: Left Upper Arm, Position: Sitting, Cuff Size: Large Adult)   Pulse 83   Ht 5' 5\" (1.651 m)   Wt 174 lb (78.9 kg)   BMI 28.96 kg/m²     DATA:   I personally reviewed the visit EKG with the following interpretation: Sinus rhythm with PACs, possible old septal wall MI age undetermined    ECHO: 2/10/20 Summary   Mild left ventricular concentric hypertrophy with septal thickening   causing mild LV outflow tract obstruction. Ejection fraction is visually estimated at 78%. Overall ejection fraction is normal .   The left atrium is mildly dilated. Borderline dilated right ventricle. Mildly enlarged right atrium size. Trace to mild mitral regurgitation is present. Mild to moderate tricuspid regurgitation. There is moderate to severe pulmonary hypertension. A pericardial clear space is present suggesting a pericardial fat pad or   hemodynamically insignificant pericardial effusion.     Cardiology Labs: BMP:    Lab Results   Component Value Date     09/10/2015    K 4.6 09/10/2015     09/10/2015    CO2 30 09/10/2015    BUN 12 04/29/2020    CREATININE 0.7 04/29/2020     CMP:    Lab Results   Component Value Date     09/10/2015    K 4.6 09/10/2015     09/10/2015    CO2 30 09/10/2015    BUN 12 04/29/2020    CREATININE 0.7 04/29/2020    PROT 7.1 09/10/2015

## 2020-06-01 ENCOUNTER — OFFICE VISIT (OUTPATIENT)
Dept: CARDIOLOGY CLINIC | Age: 65
End: 2020-06-01
Payer: MEDICARE

## 2020-06-01 VITALS
DIASTOLIC BLOOD PRESSURE: 72 MMHG | HEIGHT: 65 IN | WEIGHT: 174 LBS | BODY MASS INDEX: 28.99 KG/M2 | SYSTOLIC BLOOD PRESSURE: 124 MMHG | HEART RATE: 83 BPM

## 2020-06-01 PROCEDURE — 99203 OFFICE O/P NEW LOW 30 MIN: CPT | Performed by: INTERNAL MEDICINE

## 2020-06-01 PROCEDURE — 93000 ELECTROCARDIOGRAM COMPLETE: CPT | Performed by: INTERNAL MEDICINE

## 2020-06-01 RX ORDER — FUROSEMIDE 20 MG/1
TABLET ORAL
Qty: 270 TABLET | Refills: 1 | Status: SHIPPED
Start: 2020-06-01 | End: 2021-03-29 | Stop reason: DRUGHIGH

## 2020-06-29 ENCOUNTER — HOSPITAL ENCOUNTER (OUTPATIENT)
Dept: NON INVASIVE DIAGNOSTICS | Age: 65
Discharge: HOME | End: 2020-06-29
Payer: MEDICARE

## 2020-06-29 ENCOUNTER — HOSPITAL ENCOUNTER (OUTPATIENT)
Dept: NUCLEAR MEDICINE | Age: 65
Discharge: HOME | End: 2020-06-29
Payer: MEDICARE

## 2020-06-29 VITALS — HEIGHT: 65 IN | BODY MASS INDEX: 27.82 KG/M2 | WEIGHT: 167 LBS

## 2020-06-29 LAB
LV EF: 79 %
LVEF MODALITY: NORMAL

## 2020-06-29 PROCEDURE — 78452 HT MUSCLE IMAGE SPECT MULT: CPT

## 2020-06-29 PROCEDURE — A9500 TC99M SESTAMIBI: HCPCS | Performed by: RADIOLOGY

## 2020-06-29 PROCEDURE — 93018 CV STRESS TEST I&R ONLY: CPT | Performed by: INTERNAL MEDICINE

## 2020-06-29 PROCEDURE — 93016 CV STRESS TEST SUPVJ ONLY: CPT | Performed by: INTERNAL MEDICINE

## 2020-06-29 PROCEDURE — 6360000002 HC RX W HCPCS: Performed by: INTERNAL MEDICINE

## 2020-06-29 PROCEDURE — 3430000000 HC RX DIAGNOSTIC RADIOPHARMACEUTICAL: Performed by: RADIOLOGY

## 2020-06-29 PROCEDURE — 93017 CV STRESS TEST TRACING ONLY: CPT

## 2020-06-29 RX ADMIN — Medication 30 MILLICURIE: at 14:20

## 2020-06-29 RX ADMIN — REGADENOSON 0.4 MG: 0.08 INJECTION, SOLUTION INTRAVENOUS at 13:20

## 2020-06-29 RX ADMIN — Medication 10 MILLICURIE: at 11:33

## 2020-06-29 NOTE — PROCEDURES
Lexiscan Stress Test:    Lexiscan stress completed. No chest pain, no ischemia on ECG. occ PACs noted at rest and during stress. Nuclear SPECT images pending.        Electronically signed by Ginny Herndon MD on 6/29/2020 at 1:16 PM

## 2020-07-01 ENCOUNTER — TELEPHONE (OUTPATIENT)
Dept: CARDIOLOGY CLINIC | Age: 65
End: 2020-07-01

## 2020-07-16 NOTE — PROGRESS NOTES
[facility] Cardiology consult  Dr. Nelson Martinez      Reason for Consult: Abnormal echocardiogram  Referring Physician: Gaurav Cleveland DO     CHIEF COMPLAINT:   Chief Complaint   Patient presents with    Shortness of Breath     Still having shortness of breath with activity. Does not have any chest pain. Some swelling in feet and ankles and states that they are somewhat better with the Lasix.  Hypertension       HISTORY OF PRESENT ILLNESS:   Patient is 76years old female with history of hypertension, hyperlipidemia, smoking, who was referred to cardiology because of abnormal echocardiogram.  shortness of breath is better after she changed the type cigarettes to smoke, occasional pedal edema, denies any PND orthopnea, no palpitations, no lightheadedness or dizziness, no syncope, no presyncopal episodes. Prior to Visit Medications    Medication Sig Taking?  Authorizing Provider   furosemide (LASIX) 20 MG tablet Take one tablet by mouth twice daily except on M+F take one tablet three times daily Yes Nelson Martinez MD   Multiple Vitamins-Minerals (SENIOR MULTIVITAMIN PLUS PO) Take 1 tablet by mouth daily Yes Historical Provider, MD   potassium chloride (KLOR-CON M) 10 MEQ extended release tablet Take 1 tablet by mouth daily Take with Lasix Yes Nelson Martinez MD   lisinopril (PRINIVIL;ZESTRIL) 5 MG tablet Take 5 mg by mouth nightly  Yes Historical Provider, MD   rOPINIRole (REQUIP) 0.25 MG tablet Take 1 tablet by mouth daily  Patient taking differently: Take 0.5 mg by mouth nightly  Yes Gaurav Cleveland DO   simvastatin (ZOCOR) 40 MG tablet Take 1 tablet by mouth nightly Yes Gaurav Cleveland DO   ibuprofen (ADVIL;MOTRIN) 800 MG tablet Take 1 tablet by mouth every 8 hours as needed for Pain Yes Gaurav Cleveland DO   metoprolol (LOPRESSOR) 50 MG tablet Take 1 tablet by mouth daily  Patient taking differently: Take 50 mg by mouth daily  Yes Gaurav Cleveland DO   Cholecalciferol (VITAMIN D) 2000 UNITS CAPS capsule Take  by mouth daily. Yes Historical Provider, MD   aspirin 81 MG tablet Take 81 mg by mouth 2 times daily  Yes Historical Provider, MD       Social History     Tobacco Use    Smoking status: Current Every Day Smoker     Packs/day: 1.50     Years: 40.00     Pack years: 60.00     Types: Cigarettes    Smokeless tobacco: Never Used    Tobacco comment:  she has decreased to 1ppd   Substance Use Topics    Alcohol use:  Yes     Alcohol/week: 10.0 standard drinks     Types: 10 Glasses of wine per week     Comment: 2-3 glasses wine every other day; 4-5 cups coffee daily    Drug use: No          Past Medical History:   Diagnosis Date    Cerebral artery occlusion with cerebral infarction ([facility] [address]     \"mini stroke\" 10/2014    H/O cardiovascular stress test 2020    Lexiscan    Hyperlipidemia     Hypertension     Sleep apnea     doesnt use cpap         Past Surgical History:   Procedure Laterality Date    CAROTID ENDARTERECTOMY Right     CATARACT REMOVAL WITH IMPLANT Right 2019    intraocular lens    CATARACT REMOVAL WITH IMPLANT Left 10/15/2019     SECTION      COLONOSCOPY      INTRACAPSULAR CATARACT EXTRACTION Right 2019    RIGHT EYE CATARACT EMULSIFICATION IOL IMPLANT performed by Irina Benson MD at [place] [id number] Left 10/15/2019    LEFT EYE CATARACT EMULSIFICATION IOL IMPLANT performed by Irina Benson MD at [address] [place] [address]         Current Outpatient Medications   Medication Sig Dispense Refill    furosemide (LASIX) 20 MG tablet Take one tablet by mouth twice daily except on M+F take one tablet three times daily 270 tablet 1    Multiple Vitamins-Minerals (SENIOR MULTIVITAMIN PLUS PO) Take 1 tablet by mouth daily      potassium chloride (KLOR-CON M) 10 MEQ extended release tablet Take 1 tablet by mouth daily Take with Lasix 90 tablet 1    lisinopril (PRINIVIL;ZESTRIL) 5 MG tablet Take 5 mg by mouth nightly       rOPINIRole (REQUIP) 0.25 MG Talks on phone: Not on file     Gets together: Not on file     Attends [employer] service: Not on file     Active member of club or organization: Not on file     Attends meetings of clubs or organizations: Not on file     Relationship status: Not on file    Intimate partner violence     Fear of current or ex partner: Not on file     Emotionally abused: Not on file     Physically abused: Not on file     Forced sexual activity: Not on file   Other Topics Concern    Not on file   Social History Narrative    Not on file       Family History   Problem Relation Age of Onset    Other Mother         complication of surgery    Heart Disease Brother        REVIEW OF SYSTEMS:     CONSTITUTIONAL:  negative for  fevers, chills, sweats, + fatigue  EYES:  negative for  double vision, blurred vision and blind spots  HEENT:  negative for  tinnitus, earaches, nasal congestion and epistaxis  RESPIRATORY:  negative for  dry cough, cough with sputum, wheezing and hemoptysis  CARDIOVASCULAR: as per HPI  GASTROINTESTINAL:  negative for nausea, vomiting, diarrhea, constipation, pruritus and jaundice  GENITOURINARY:  negative for frequency, dysuria, nocturia, urinary incontinence and hesitancy  HEMATOLOGIC/LYMPHATIC:  negative for easy bruising, bleeding, lymphadenopathy and petechiae  ALLERGIC/IMMUNOLOGIC:  negative for urticaria, hay fever and angioedema  ENDOCRINE:  negative for heat intolerance, cold intolerance, tremor, hair loss and diabetic symptoms including neither polyuria nor polydipsia nor blurred vision  MUSCULOSKELETAL:  negative for  myalgias, arthralgias, joint swelling, stiff joints and decreased range of motion  NEUROLOGICAL:  negative for memory problems, speech problems, visual disturbance, dysphagia, weakness and numbness      PHYSICAL EXAMINATION:   CONSTITUTIONAL:  awake, alert, cooperative, no apparent distress, and appears stated age  HEAD:  normocepalic, without obvious abnormality, atraumatic  NECK:  Supple, symmetrical, trachea midline, no adenopathy, thyroid symmetric, not enlarged and no tenderness, skin normal  LUNGS:  No increased work of breathing, decreased air exchange, scattered wheezing  CARDIOVASCULAR:  Normal apical impulse, regular rate and rhythm, normal S1 and S2, no S3 or S4, 3/6 systolic murmur at the left lower sternal border, no edema, no JVD, no carotid bruit. ABDOMEN:  Soft, nontender, no masses, no hepatomegaly, no splenomegaly, BS+  MUSCULOSKELETAL:  No clubbing no cyanosis. there is no redness, warmth, or swelling of the joints  full range of motion noted  NEUROLOGIC:  Alert, awake,oriented x3  SKIN:  no bruising or bleeding, normal skin color, texture, turgor and no redness, warmth, or swelling    /64   Pulse 94   Ht 5' 5\" (1.651 m)   Wt 165 lb 12.8 oz (75.2 kg)   SpO2 98%   BMI 27.59 kg/m²     DATA:   I personally reviewed the visit EKG with the following interpretation: Sinus rhythm, PACs, old septal wall MI age undetermined    EKG 6/1/20 Sinus rhythm with PACs, possible old septal wall MI age undetermined    ECHO: 2/10/20 Summary   Mild left ventricular concentric hypertrophy with septal thickening   causing mild LV outflow tract obstruction. Ejection fraction is visually estimated at 78%. Overall ejection fraction is normal .   The left atrium is mildly dilated. Borderline dilated right ventricle. Mildly enlarged right atrium size. Trace to mild mitral regurgitation is present. Mild to moderate tricuspid regurgitation. There is moderate to severe pulmonary hypertension. A pericardial clear space is present suggesting a pericardial fat pad or   hemodynamically insignificant pericardial effusion. Stress Test:  6/29/20        FINDINGS:    There is normal accumulation of tracer throughout the myocardium on rest    images and stress images.  There are no fixed or reversible defects.         End diastolic volume is 37 ml.  The ejection fraction equals 79% with no focal wall motion abnormalities. There is no left ventricular dilatation.              Impression    1. No pharmacologically induced reversible perfusion defects to suggest    ischemia    2. Ejection fraction of 79%    3. No focal wall motion abnormality         Cardiology Labs: BMP:    Lab Results   Component Value Date     09/10/2015    K 4.6 09/10/2015     09/10/2015    CO2 30 09/10/2015    BUN 12 04/29/2020    CREATININE 0.7 04/29/2020     CMP:    Lab Results   Component Value Date     09/10/2015    K 4.6 09/10/2015     09/10/2015    CO2 30 09/10/2015    BUN 12 04/29/2020    CREATININE 0.7 04/29/2020    PROT 7.1 09/10/2015     CBC:    Lab Results   Component Value Date    WBC 5.8 09/10/2015    RBC 4.84 09/10/2015    HGB 15.1 09/10/2015    HCT 46.5 09/10/2015    MCV 96.2 09/10/2015    RDW 13.4 09/10/2015     09/10/2015     PT/INR:  No results found for: PTINR  PT/INR Warfarin:  No components found for: PTPATWAR, PTINRWAR  PTT:  No results found for: APTT  PTT Heparin:  No components found for: APTTHEP  Magnesium:  No results found for: MG  TSH:    Lab Results   Component Value Date    TSH 3.030 09/10/2015     TROPONIN:  No components found for: TROP  BNP:  No results found for: BNP  FASTING LIPID PANEL:    Lab Results   Component Value Date    CHOL 217 09/10/2015    HDL 73 09/10/2015    TRIG 117 09/10/2015     No orders to display     I have personally reviewed the laboratory, cardiac diagnostic and radiographic testing as outlined above:      ASSESSMENT:  1. Shortness of breath: Suspect mostly secondary to COPD, patient still smokes, stated her shortness of breath might be low but better after she switched to different kind of cigarettes. 2.  Chronic diastolic congestive heart failure: Compensated, will continue current treatment  3.   Moderate to severe pulmonary hypertension: She stated she had CAT scan of the chest done in December of last year, will try to obtain records, will

## 2020-07-17 ENCOUNTER — OFFICE VISIT (OUTPATIENT)
Dept: CARDIOLOGY CLINIC | Age: 65
End: 2020-07-17
Payer: MEDICARE

## 2020-07-17 VITALS
BODY MASS INDEX: 27.63 KG/M2 | HEIGHT: 65 IN | DIASTOLIC BLOOD PRESSURE: 64 MMHG | WEIGHT: 165.8 LBS | HEART RATE: 94 BPM | OXYGEN SATURATION: 98 % | SYSTOLIC BLOOD PRESSURE: 122 MMHG

## 2020-07-17 PROCEDURE — 99214 OFFICE O/P EST MOD 30 MIN: CPT | Performed by: INTERNAL MEDICINE

## 2020-07-17 PROCEDURE — 93000 ELECTROCARDIOGRAM COMPLETE: CPT | Performed by: INTERNAL MEDICINE

## 2020-10-12 RX ORDER — POTASSIUM CHLORIDE 750 MG/1
10 TABLET, EXTENDED RELEASE ORAL DAILY
Qty: 90 TABLET | Refills: 1 | Status: SHIPPED
Start: 2020-10-12 | End: 2021-03-29 | Stop reason: SDUPTHER

## 2021-01-22 NOTE — PROGRESS NOTES
status: Former Smoker     Packs/day: 1.50     Years: 40.00     Pack years: 60.00     Types: Cigarettes     Quit date: 2020     Years since quittin.4    Smokeless tobacco: Never Used    Tobacco comment:  she has decreased to 1ppd   Substance Use Topics    Alcohol use: Not Currently     Alcohol/week: 10.0 standard drinks     Types: 10 Glasses of wine per week     Comment: green tea 2 cups a day     Drug use: No          Past Medical History:   Diagnosis Date    Cerebral artery occlusion with cerebral infarction ([address]     \"mini stroke\" 10/2014    H/O cardiovascular stress test 2020    Lexiscan    Hyperlipidemia     Hypertension     Sleep apnea     doesnt use cpap         Past Surgical History:   Procedure Laterality Date    CAROTID ENDARTERECTOMY Right     CATARACT REMOVAL WITH IMPLANT Right 2019    intraocular lens    CATARACT REMOVAL WITH IMPLANT Left 10/15/2019     SECTION      COLONOSCOPY      INTRACAPSULAR CATARACT EXTRACTION Right 2019    RIGHT EYE CATARACT EMULSIFICATION IOL IMPLANT performed by Jose Enrique Delacruz MD at [place] [address] Left 10/15/2019    LEFT EYE CATARACT EMULSIFICATION IOL IMPLANT performed by Jose Enrique Delacruz MD at [address] [place] [address]         Current Outpatient Medications   Medication Sig Dispense Refill    levothyroxine (SYNTHROID) 25 MCG tablet Take 25 mcg by mouth Daily      potassium chloride (KLOR-CON M) 10 MEQ extended release tablet Take 1 tablet by mouth daily Take with Lasix 90 tablet 1    furosemide (LASIX) 20 MG tablet Take one tablet by mouth twice daily except on M+F take one tablet three times daily 270 tablet 1    Multiple Vitamins-Minerals (SENIOR MULTIVITAMIN PLUS PO) Take 1 tablet by mouth daily      lisinopril (PRINIVIL;ZESTRIL) 5 MG tablet Take 5 mg by mouth nightly       rOPINIRole (REQUIP) 0.25 MG tablet Take 1 tablet by mouth daily (Patient taking differently: Take 0.5 mg by mouth nightly ) 90 tablet 3    simvastatin (ZOCOR) 40 MG tablet Take 1 tablet by mouth nightly 90 tablet 3    metoprolol (LOPRESSOR) 50 MG tablet Take 1 tablet by mouth daily (Patient taking differently: Take 50 mg by mouth daily ) 90 tablet 3    Cholecalciferol (VITAMIN D) 2000 UNITS CAPS capsule Take  by mouth daily.  aspirin 81 MG tablet Take 81 mg by mouth 2 times daily        No current facility-administered medications for this visit.           Allergies as of 2021 - Review Complete 2020   Allergen Reaction Noted    Biaxin [clarithromycin] Nausea And Vomiting 2012    Naproxen Nausea And Vomiting 2012       Social History     Socioeconomic History    Marital status:      Spouse name: Not on file    Number of children: Not on file    Years of education: Not on file    Highest education level: Not on file   Occupational History    Not on file   Social Needs    Financial resource strain: Not on file    Food insecurity     Worry: Not on file     Inability: Not on file   [employer] needs     Medical: Not on file     Non-medical: Not on file   Tobacco Use    Smoking status: Former Smoker     Packs/day: 1.50     Years: 40.00     Pack years: 60.00     Types: Cigarettes     Quit date: 2020     Years since quittin.4    Smokeless tobacco: Never Used    Tobacco comment:  she has decreased to 1ppd   Substance and Sexual Activity    Alcohol use: Not Currently     Alcohol/week: 10.0 standard drinks     Types: 10 Glasses of wine per week     Comment: green tea 2 cups a day     Drug use: No    Sexual activity: Not on file   Lifestyle    Physical activity     Days per week: Not on file     Minutes per session: Not on file    Stress: Not on file   Relationships    Social connections     Talks on phone: Not on file     Gets together: Not on file     Attends Roman [employer] service: Not on file     Active member of club or organization: Not on file     Attends meetings of clubs or organizations: Not on file     Relationship status: Not on file    Intimate partner violence     Fear of current or ex partner: Not on file     Emotionally abused: Not on file     Physically abused: Not on file     Forced sexual activity: Not on file   Other Topics Concern    Not on file   Social History Narrative    Not on file       Family History   Problem Relation Age of Onset    Other Mother         complication of surgery    Heart Disease Brother        REVIEW OF SYSTEMS:     CONSTITUTIONAL:  negative for  fevers, chills, sweats, + fatigue  EYES:  negative for  double vision, blurred vision and blind spots  HEENT:  negative for  tinnitus, earaches, nasal congestion and epistaxis  RESPIRATORY:  negative for  dry cough, cough with sputum, wheezing and hemoptysis  CARDIOVASCULAR: as per HPI  GASTROINTESTINAL:  negative for nausea, vomiting, diarrhea, constipation, pruritus and jaundice  GENITOURINARY:  negative for frequency, dysuria, nocturia, urinary incontinence and hesitancy  HEMATOLOGIC/LYMPHATIC:  negative for easy bruising, bleeding, lymphadenopathy and petechiae  ALLERGIC/IMMUNOLOGIC:  negative for urticaria, hay fever and angioedema  ENDOCRINE:  negative for heat intolerance, cold intolerance, tremor, hair loss and diabetic symptoms including neither polyuria nor polydipsia nor blurred vision  MUSCULOSKELETAL:  negative for  myalgias, arthralgias, joint swelling, stiff joints and decreased range of motion  NEUROLOGICAL:  negative for memory problems, speech problems, visual disturbance, dysphagia, weakness and numbness      PHYSICAL EXAMINATION:   CONSTITUTIONAL:  awake, alert, cooperative, no apparent distress, and appears stated age  HEAD:  normocepalic, without obvious abnormality, atraumatic  NECK:  Supple, symmetrical, trachea midline, no adenopathy, thyroid symmetric, not enlarged and no tenderness, skin normal  LUNGS:  No increased work of breathing, decreased air exchange, scattered wheezing  CARDIOVASCULAR:  Normal apical impulse, regular rate and rhythm, normal S1 and S2, no S3 or S4, 3/6 systolic murmur at the left lower sternal border, no edema, no JVD, no carotid bruit. ABDOMEN:  Soft, nontender, no masses, no hepatomegaly, no splenomegaly, BS+  MUSCULOSKELETAL:  No clubbing no cyanosis. there is no redness, warmth, or swelling of the joints  full range of motion noted  NEUROLOGIC:  Alert, awake,oriented x3  SKIN:  no bruising or bleeding, normal skin color, texture, turgor and no redness, warmth, or swelling    /66 (Site: Right Upper Arm, Position: Sitting, Cuff Size: Large Adult)   Pulse 86   Ht 5' 5\" (1.651 m)   Wt 166 lb (75.3 kg)   BMI 27.62 kg/m²     DATA:   I personally reviewed the visit EKG with the following interpretation: Sinus rhythm with PACs, old septal wall MI age undetermined, normal axis. EKG 7/17/20 Sinus rhythm, PACs, old septal wall MI age undetermined    ECHO: 2/10/20 Summary   Mild left ventricular concentric hypertrophy with septal thickening   causing mild LV outflow tract obstruction. Ejection fraction is visually estimated at 78%. Overall ejection fraction is normal .   The left atrium is mildly dilated. Borderline dilated right ventricle. Mildly enlarged right atrium size. Trace to mild mitral regurgitation is present. Mild to moderate tricuspid regurgitation. There is moderate to severe pulmonary hypertension. A pericardial clear space is present suggesting a pericardial fat pad or   hemodynamically insignificant pericardial effusion. Stress Test:  6/29/20        FINDINGS:    There is normal accumulation of tracer throughout the myocardium on rest    images and stress images.  There are no fixed or reversible defects.         End diastolic volume is 37 ml. The ejection fraction equals 79% with no focal    wall motion abnormalities. There is no left ventricular dilatation.              Impression    1.  No more than 2-3 pounds in 24 hours, compliance with diuretics, low-salt diet were all advised. 3.  Echocardiogram to reevaluate pulmonary hypertension  4. Follow-up both Dr. Monse Alonzo as scheduled  5. Follow-up with Dr. Neal Griffith in 6 month, sooner if symptomatic for any reason    I have reviewed my findings and recommendations with patient      Electronically signed by Carolyn Hilliard MD on 2/14/2021 at 6:59 PM  NOTE: This report was transcribed using voice recognition software.  Every effort was made to ensure accuracy; however, inadvertent computerized transcription errors may be present

## 2021-02-02 ENCOUNTER — OFFICE VISIT (OUTPATIENT)
Dept: CARDIOLOGY CLINIC | Age: 66
End: 2021-02-02
Payer: MEDICARE

## 2021-02-02 VITALS
SYSTOLIC BLOOD PRESSURE: 128 MMHG | HEIGHT: 65 IN | BODY MASS INDEX: 27.66 KG/M2 | DIASTOLIC BLOOD PRESSURE: 66 MMHG | HEART RATE: 86 BPM | WEIGHT: 166 LBS

## 2021-02-02 DIAGNOSIS — I27.20 PULMONARY HTN (HCC): ICD-10-CM

## 2021-02-02 DIAGNOSIS — R06.09 DOE (DYSPNEA ON EXERTION): Primary | ICD-10-CM

## 2021-02-02 PROCEDURE — 93000 ELECTROCARDIOGRAM COMPLETE: CPT | Performed by: INTERNAL MEDICINE

## 2021-02-02 PROCEDURE — G8484 FLU IMMUNIZE NO ADMIN: HCPCS | Performed by: INTERNAL MEDICINE

## 2021-02-02 PROCEDURE — G8427 DOCREV CUR MEDS BY ELIG CLIN: HCPCS | Performed by: INTERNAL MEDICINE

## 2021-02-02 PROCEDURE — G8417 CALC BMI ABV UP PARAM F/U: HCPCS | Performed by: INTERNAL MEDICINE

## 2021-02-02 PROCEDURE — 1036F TOBACCO NON-USER: CPT | Performed by: INTERNAL MEDICINE

## 2021-02-02 PROCEDURE — 4040F PNEUMOC VAC/ADMIN/RCVD: CPT | Performed by: INTERNAL MEDICINE

## 2021-02-02 PROCEDURE — 1090F PRES/ABSN URINE INCON ASSESS: CPT | Performed by: INTERNAL MEDICINE

## 2021-02-02 PROCEDURE — 1123F ACP DISCUSS/DSCN MKR DOCD: CPT | Performed by: INTERNAL MEDICINE

## 2021-02-02 PROCEDURE — 99214 OFFICE O/P EST MOD 30 MIN: CPT | Performed by: INTERNAL MEDICINE

## 2021-02-02 PROCEDURE — G8400 PT W/DXA NO RESULTS DOC: HCPCS | Performed by: INTERNAL MEDICINE

## 2021-02-02 PROCEDURE — 3017F COLORECTAL CA SCREEN DOC REV: CPT | Performed by: INTERNAL MEDICINE

## 2021-02-02 RX ORDER — LEVOTHYROXINE SODIUM 0.03 MG/1
25 TABLET ORAL DAILY
COMMUNITY
End: 2021-12-13

## 2021-02-23 ENCOUNTER — HOSPITAL ENCOUNTER (OUTPATIENT)
Dept: ULTRASOUND IMAGING | Age: 66
Discharge: HOME | End: 2021-02-23
Payer: MEDICARE

## 2021-02-23 DIAGNOSIS — J37.0 CHRONIC LARYNGITIS: ICD-10-CM

## 2021-02-23 PROCEDURE — 76536 US EXAM OF HEAD AND NECK: CPT

## 2021-02-25 ENCOUNTER — HOSPITAL ENCOUNTER (OUTPATIENT)
Dept: CARDIOLOGY | Age: 66
Discharge: HOME | End: 2021-02-25
Payer: MEDICARE

## 2021-02-25 DIAGNOSIS — I27.20 PULMONARY HTN (HCC): ICD-10-CM

## 2021-02-25 LAB
LV EF: 65 %
LVEF MODALITY: NORMAL

## 2021-02-25 PROCEDURE — 93306 TTE W/DOPPLER COMPLETE: CPT

## 2021-03-02 ENCOUNTER — TELEPHONE (OUTPATIENT)
Dept: CARDIOLOGY CLINIC | Age: 66
End: 2021-03-02

## 2021-03-28 NOTE — PROGRESS NOTES
[address] [facility] Cardiology consult  Dr. Diego Howard      Reason for Consult: Abnormal echocardiogram  Referring Physician: Harvey Ivan DO     CHIEF COMPLAINT:   Chief Complaint   Patient presents with    Results     Talk re:  echo       HISTORY OF PRESENT ILLNESS:   Patient is 78years old female with history of congestive heart failure, pulmonary hypertension, hypertension, hyperlipidemia, is here for follow-up appointment. Shortness of breath is at baseline, occasional pedal edema, no chest pain, no palpitations, no syncope, no presyncopal episodes. Functional capacity is at baseline      Prior to Visit Medications    Medication Sig Taking? Authorizing Provider   furosemide (LASIX) 20 MG tablet Take one tablet by mouth daily Yes Diego Howard MD   metoprolol tartrate (LOPRESSOR) 50 MG tablet Take 1 tablet by mouth daily Yes Diego Howard MD   potassium chloride (KLOR-CON M) 10 MEQ extended release tablet Take 1 tablet by mouth daily Take with Lasix Yes Diego Howard MD   simvastatin (ZOCOR) 40 MG tablet Take 1 tablet by mouth nightly Yes Diego Howard MD   lisinopril (PRINIVIL;ZESTRIL) 5 MG tablet Take 1 tablet by mouth nightly Yes Diego Howard MD   levothyroxine (SYNTHROID) 25 MCG tablet Take 25 mcg by mouth Daily Yes Historical Provider, MD   Multiple Vitamins-Minerals (SENIOR MULTIVITAMIN PLUS PO) Take 1 tablet by mouth daily Yes Historical Provider, MD   rOPINIRole (REQUIP) 0.25 MG tablet Take 1 tablet by mouth daily  Patient taking differently: Take 0.5 mg by mouth nightly  Yes Harvey Ivan DO   Cholecalciferol (VITAMIN D) 2000 UNITS CAPS capsule Take  by mouth daily.  Yes Historical Provider, MD   aspirin 81 MG tablet Take 81 mg by mouth 2 times daily  Yes Historical Provider, MD       Social History     Tobacco Use    Smoking status: Former Smoker     Packs/day: 1.50     Years: 40.00     Pack years: 60.00     Types: Cigarettes     Quit date: 2020     Years since quittin.5    Smokeless tobacco: Never Used    Tobacco comment:  she has decreased to 1ppd   Substance Use Topics    Alcohol use: Not Currently     Alcohol/week: 10.0 standard drinks     Types: 10 Glasses of wine per week     Comment: green tea 2 cups a day     Drug use: No          Past Medical History:   Diagnosis Date    Cerebral artery occlusion with cerebral infarction ([address]     \"mini stroke\" 10/2014    H/O cardiovascular stress test 2020    Lexiscan    Hyperlipidemia     Hypertension     Sleep apnea     doesnt use cpap         Past Surgical History:   Procedure Laterality Date    CAROTID ENDARTERECTOMY Right     CATARACT REMOVAL WITH IMPLANT Right 2019    intraocular lens    CATARACT REMOVAL WITH IMPLANT Left 10/15/2019     SECTION      COLONOSCOPY      INTRACAPSULAR CATARACT EXTRACTION Right 2019    RIGHT EYE CATARACT EMULSIFICATION IOL IMPLANT performed by Aneta Sanchez MD at [place] [id number] Left 10/15/2019    LEFT EYE CATARACT EMULSIFICATION IOL IMPLANT performed by Aneta Sanchez MD at [address] [place] [address]         Current Outpatient Medications   Medication Sig Dispense Refill    furosemide (LASIX) 20 MG tablet Take one tablet by mouth daily 90 tablet 1    metoprolol tartrate (LOPRESSOR) 50 MG tablet Take 1 tablet by mouth daily 90 tablet 2    potassium chloride (KLOR-CON M) 10 MEQ extended release tablet Take 1 tablet by mouth daily Take with Lasix 90 tablet 1    simvastatin (ZOCOR) 40 MG tablet Take 1 tablet by mouth nightly 90 tablet 3    lisinopril (PRINIVIL;ZESTRIL) 5 MG tablet Take 1 tablet by mouth nightly 90 tablet 2    levothyroxine (SYNTHROID) 25 MCG tablet Take 25 mcg by mouth Daily      Multiple Vitamins-Minerals (SENIOR MULTIVITAMIN PLUS PO) Take 1 tablet by mouth daily      rOPINIRole (REQUIP) 0.25 MG tablet Take 1 tablet by mouth daily (Patient taking differently: Take 0.5 mg by mouth nightly ) 90 tablet 3    Cholecalciferol (VITAMIN D) 2000 UNITS CAPS capsule Take  by mouth daily.  aspirin 81 MG tablet Take 81 mg by mouth 2 times daily        No current facility-administered medications for this visit.           Allergies as of 2021 - Review Complete 2021   Allergen Reaction Noted    Biaxin [clarithromycin] Nausea And Vomiting 2012    Naproxen Nausea And Vomiting 2012       Social History     Socioeconomic History    Marital status:      Spouse name: Not on file    Number of children: Not on file    Years of education: Not on file    Highest education level: Not on file   Occupational History    Not on file   Social Needs    Financial resource strain: Not on file    Food insecurity     Worry: Not on file     Inability: Not on file   [employer] needs     Medical: Not on file     Non-medical: Not on file   Tobacco Use    Smoking status: Former Smoker     Packs/day: 1.50     Years: 40.00     Pack years: 60.00     Types: Cigarettes     Quit date: 2020     Years since quittin.5    Smokeless tobacco: Never Used    Tobacco comment:  she has decreased to 1ppd   Substance and Sexual Activity    Alcohol use: Not Currently     Alcohol/week: 10.0 standard drinks     Types: 10 Glasses of wine per week     Comment: green tea 2 cups a day     Drug use: No    Sexual activity: Not on file   Lifestyle    Physical activity     Days per week: Not on file     Minutes per session: Not on file    Stress: Not on file   Relationships    Social connections     Talks on phone: Not on file     Gets together: Not on file     Attends Gnosticism service: Not on file     Active member of club or organization: Not on file     Attends meetings of clubs or organizations: Not on file     Relationship status: Not on file    Intimate partner violence     Fear of current or ex partner: Not on file     Emotionally abused: Not on file     Physically abused: Not on file     Forced sexual activity: Not on file   Other Topics Concern    Not on file   Social History Narrative    Not on file       Family History   Problem Relation Age of Onset    Other Mother         complication of surgery    Heart Disease Brother        REVIEW OF SYSTEMS:     CONSTITUTIONAL:  negative for  fevers, chills, sweats, + fatigue  HEENT:  negative for  tinnitus, earaches, nasal congestion and epistaxis  RESPIRATORY:  negative for  dry cough, cough with sputum, wheezing and hemoptysis  GASTROINTESTINAL:  negative for nausea, vomiting, diarrhea, constipation, pruritus and jaundice  HEMATOLOGIC/LYMPHATIC:  negative for easy bruising, bleeding, lymphadenopathy and petechiae  ENDOCRINE:  negative for heat intolerance, cold intolerance, tremor, hair loss and diabetic symptoms including neither polyuria nor polydipsia nor blurred vision  MUSCULOSKELETAL:  negative for  myalgias, arthralgias, joint swelling, stiff joints and decreased range of motion  NEUROLOGICAL:  negative for memory problems, speech problems, visual disturbance, dysphagia, weakness and numbness      PHYSICAL EXAMINATION:   CONSTITUTIONAL:  awake, alert, cooperative, no apparent distress, and appears stated age  HEAD:  normocepalic, without obvious abnormality, atraumatic  NECK:  Supple, symmetrical, trachea midline, no adenopathy, thyroid symmetric, not enlarged and no tenderness, skin normal  LUNGS:  No increased work of breathing, decreased air exchange, no rales no wheezing  CARDIOVASCULAR:  Normal apical impulse, regular rate and rhythm, normal S1 and S2, no S3 or S4, 3/6 systolic murmur at the left lower sternal border, no edema, no JVD, no carotid bruit. ABDOMEN:  Soft, nontender, no masses, no hepatomegaly, no splenomegaly, BS+  MUSCULOSKELETAL:  No clubbing no cyanosis. there is no redness, warmth, or swelling of the joints  full range of motion noted  NEUROLOGIC:  Alert, awake,oriented x3  SKIN:  no bruising or bleeding, normal skin color, texture, turgor and no redness, warmth, or swelling    /64 (Site: Left Upper Arm, Position: Sitting, Cuff Size: Medium Adult)   Pulse 88   Ht 5' 5\" (1.651 m)   Wt 162 lb (73.5 kg)   SpO2 98%   BMI 26.96 kg/m²     DATA:   I personally reviewed the visit EKG with the following interpretation:     EKG 2/2/21 Sinus rhythm with PACs, old septal wall MI age undetermined, normal axis. ECHO: 2/25/21 Summary   No comparison study available. Technically adequate study. Left ventricle size is normal.   Mild asymmetric septal hypertrophy. Ejection fraction is visually estimated at 65%. Overall ejection fraction increased (hyperdynamic), with complete   obliteration of the LV cavity during systole. No regional wall motion abnormalities seen. There is doppler evidence of stage III diastolic dysfunction. Mildly enlarged right ventricle cavity. Right ventricle global systolic function is moderately reduced . Moderate tricuspid regurgitation. RVSP is 72 mmHg. Pulmonary hypertension is severe . Stress Test:  6/29/20        FINDINGS:    There is normal accumulation of tracer throughout the myocardium on rest    images and stress images.  There are no fixed or reversible defects.         End diastolic volume is 37 ml. The ejection fraction equals 79% with no focal    wall motion abnormalities. There is no left ventricular dilatation.              Impression    1. No pharmacologically induced reversible perfusion defects to suggest    ischemia    2. Ejection fraction of 79%    3.  No focal wall motion abnormality         Cardiology Labs: BMP:    Lab Results   Component Value Date     09/10/2015    K 4.6 09/10/2015     09/10/2015    CO2 30 09/10/2015    BUN 12 04/29/2020    CREATININE 0.7 04/29/2020     CMP:    Lab Results   Component Value Date     09/10/2015    K 4.6 09/10/2015     09/10/2015    CO2 30 09/10/2015    BUN 12 04/29/2020    CREATININE 0.7 04/29/2020    PROT 7.1 09/10/2015     CBC: Lab Results   Component Value Date    WBC 5.8 09/10/2015    RBC 4.84 09/10/2015    HGB 15.1 09/10/2015    HCT 46.5 09/10/2015    MCV 96.2 09/10/2015    RDW 13.4 09/10/2015     09/10/2015     PT/INR:  No results found for: PTINR  PT/INR Warfarin:  No components found for: PTPATWAR, PTINRWAR  PTT:  No results found for: APTT  PTT Heparin:  No components found for: APTTHEP  Magnesium:  No results found for: MG  TSH:    Lab Results   Component Value Date    TSH 3.030 09/10/2015     TROPONIN:  No components found for: TROP  BNP:  No results found for: BNP  FASTING LIPID PANEL:    Lab Results   Component Value Date    CHOL 217 09/10/2015    HDL 73 09/10/2015    TRIG 117 09/10/2015     XR CHEST STANDARD (2 VW)    (Results Pending)     I have personally reviewed the laboratory, cardiac diagnostic and radiographic testing as outlined above:      ASSESSMENT:  1. Shortness of breath: Suspect mostly secondary to COPD, possibly pulmonary hypertension, has severe pulmonary hypertension on echocardiogram, will schedule for right heart catheterization for more definitive evaluation, procedure, alternatives, risks, benefits, discussed with her, she is willing to proceed  2. Chronic diastolic congestive heart failure: Compensated, will continue current treatment  3. Severe pulmonary hypertension: Will repeat echocardiogram for further evaluation  4. Moderate tricuspid valve regurgitation  5. Hypertension: Controlled    RECOMMENDATIONS:   1. Right heart catheterization for evaluation of pulmonary hypertension  2. Continue current treatment  3. CHF: Daily weight, take an extra Lasix for weight gain of more than 2-3 pounds in 24 hours, compliance with diuretics, low-salt diet were all advised. 4.  Follow-up both Dr. Burnadette Claude as scheduled  5.   Follow-up with Dr. David Wilson after right heart catheterization    I have reviewed my findings and recommendations with patient      Electronically signed by Bj Wilkerson MD on 3/29/2021 at 7:14 PM  NOTE: This report was transcribed using voice recognition software.  Every effort was made to ensure accuracy; however, inadvertent computerized transcription errors may be present

## 2021-03-29 ENCOUNTER — OFFICE VISIT (OUTPATIENT)
Dept: CARDIOLOGY CLINIC | Age: 66
End: 2021-03-29
Payer: MEDICARE

## 2021-03-29 VITALS
BODY MASS INDEX: 26.99 KG/M2 | DIASTOLIC BLOOD PRESSURE: 64 MMHG | HEIGHT: 65 IN | WEIGHT: 162 LBS | SYSTOLIC BLOOD PRESSURE: 126 MMHG | OXYGEN SATURATION: 98 % | HEART RATE: 88 BPM

## 2021-03-29 DIAGNOSIS — R06.02 SHORTNESS OF BREATH: Primary | ICD-10-CM

## 2021-03-29 DIAGNOSIS — I10 ESSENTIAL HYPERTENSION: ICD-10-CM

## 2021-03-29 DIAGNOSIS — I10 HYPERTENSION, UNSPECIFIED TYPE: ICD-10-CM

## 2021-03-29 DIAGNOSIS — E78.5 HYPERLIPIDEMIA, UNSPECIFIED HYPERLIPIDEMIA TYPE: ICD-10-CM

## 2021-03-29 DIAGNOSIS — I27.20 PULMONARY HTN (HCC): ICD-10-CM

## 2021-03-29 PROCEDURE — G8417 CALC BMI ABV UP PARAM F/U: HCPCS | Performed by: INTERNAL MEDICINE

## 2021-03-29 PROCEDURE — 1123F ACP DISCUSS/DSCN MKR DOCD: CPT | Performed by: INTERNAL MEDICINE

## 2021-03-29 PROCEDURE — G8484 FLU IMMUNIZE NO ADMIN: HCPCS | Performed by: INTERNAL MEDICINE

## 2021-03-29 PROCEDURE — G8427 DOCREV CUR MEDS BY ELIG CLIN: HCPCS | Performed by: INTERNAL MEDICINE

## 2021-03-29 PROCEDURE — 3017F COLORECTAL CA SCREEN DOC REV: CPT | Performed by: INTERNAL MEDICINE

## 2021-03-29 PROCEDURE — G8400 PT W/DXA NO RESULTS DOC: HCPCS | Performed by: INTERNAL MEDICINE

## 2021-03-29 PROCEDURE — 99214 OFFICE O/P EST MOD 30 MIN: CPT | Performed by: INTERNAL MEDICINE

## 2021-03-29 PROCEDURE — 1036F TOBACCO NON-USER: CPT | Performed by: INTERNAL MEDICINE

## 2021-03-29 PROCEDURE — 4040F PNEUMOC VAC/ADMIN/RCVD: CPT | Performed by: INTERNAL MEDICINE

## 2021-03-29 PROCEDURE — 1090F PRES/ABSN URINE INCON ASSESS: CPT | Performed by: INTERNAL MEDICINE

## 2021-03-29 RX ORDER — SIMVASTATIN 40 MG
40 TABLET ORAL NIGHTLY
Qty: 90 TABLET | Refills: 3 | Status: SHIPPED | OUTPATIENT
Start: 2021-03-29

## 2021-03-29 RX ORDER — LISINOPRIL 5 MG/1
5 TABLET ORAL NIGHTLY
Qty: 90 TABLET | Refills: 2 | Status: SHIPPED
Start: 2021-03-29 | End: 2021-09-20

## 2021-03-29 RX ORDER — POTASSIUM CHLORIDE 750 MG/1
10 TABLET, EXTENDED RELEASE ORAL DAILY
Qty: 90 TABLET | Refills: 1 | Status: SHIPPED
Start: 2021-03-29 | End: 2021-09-24 | Stop reason: SDUPTHER

## 2021-03-29 RX ORDER — FUROSEMIDE 20 MG/1
TABLET ORAL
Qty: 90 TABLET | Refills: 1 | Status: SHIPPED
Start: 2021-03-29 | End: 2021-03-30 | Stop reason: SDUPTHER

## 2021-03-29 RX ORDER — METOPROLOL TARTRATE 50 MG/1
50 TABLET, FILM COATED ORAL DAILY
Qty: 90 TABLET | Refills: 2 | Status: SHIPPED
Start: 2021-03-29 | End: 2021-12-15 | Stop reason: DRUGHIGH

## 2021-03-30 DIAGNOSIS — R06.02 SHORTNESS OF BREATH: ICD-10-CM

## 2021-03-30 RX ORDER — FUROSEMIDE 20 MG/1
TABLET ORAL
Qty: 90 TABLET | Refills: 1 | Status: SHIPPED
Start: 2021-03-30 | End: 2021-09-22 | Stop reason: SDUPTHER

## 2021-04-01 ENCOUNTER — HOSPITAL ENCOUNTER (OUTPATIENT)
Age: 66
Discharge: HOME | End: 2021-04-03
Payer: MEDICARE

## 2021-04-01 DIAGNOSIS — R06.02 SHORTNESS OF BREATH: ICD-10-CM

## 2021-04-01 PROCEDURE — U0005 INFEC AGEN DETEC AMPLI PROBE: HCPCS

## 2021-04-01 PROCEDURE — U0003 INFECTIOUS AGENT DETECTION BY NUCLEIC ACID (DNA OR RNA); SEVERE ACUTE RESPIRATORY SYNDROME CORONAVIRUS 2 (SARS-COV-2) (CORONAVIRUS DISEASE [COVID-19]), AMPLIFIED PROBE TECHNIQUE, MAKING USE OF HIGH THROUGHPUT TECHNOLOGIES AS DESCRIBED BY CMS-2020-01-R: HCPCS

## 2021-04-02 LAB — SARS-COV-2, PCR: NOT DETECTED

## 2021-04-05 ENCOUNTER — HOSPITAL ENCOUNTER (OUTPATIENT)
Age: 66
Discharge: HOME | End: 2021-04-05
Payer: MEDICARE

## 2021-04-05 ENCOUNTER — TELEPHONE (OUTPATIENT)
Dept: CARDIAC CATH/INVASIVE PROCEDURES | Age: 66
End: 2021-04-05

## 2021-04-05 ENCOUNTER — HOSPITAL ENCOUNTER (OUTPATIENT)
Dept: GENERAL RADIOLOGY | Age: 66
Discharge: HOME | End: 2021-04-07
Payer: MEDICARE

## 2021-04-05 ENCOUNTER — HOSPITAL ENCOUNTER (OUTPATIENT)
Age: 66
Discharge: HOME | End: 2021-04-07
Payer: MEDICARE

## 2021-04-05 DIAGNOSIS — I10 HYPERTENSION, UNSPECIFIED TYPE: ICD-10-CM

## 2021-04-05 DIAGNOSIS — R06.02 SHORTNESS OF BREATH: ICD-10-CM

## 2021-04-05 LAB
ANION GAP SERPL CALCULATED.3IONS-SCNC: 8 MMOL/L (ref 7–16)
APTT: 35.6 SEC (ref 24.5–35.1)
BACTERIA: ABNORMAL /HPF
BASOPHILS ABSOLUTE: 0.06 E9/L (ref 0–0.2)
BASOPHILS ABSOLUTE: 0.06 E9/L (ref 0–0.2)
BASOPHILS RELATIVE PERCENT: 0.9 % (ref 0–2)
BASOPHILS RELATIVE PERCENT: 0.9 % (ref 0–2)
BILIRUBIN URINE: NEGATIVE
BLOOD, URINE: NEGATIVE
BUN BLDV-MCNC: 13 MG/DL (ref 8–23)
CALCIUM SERPL-MCNC: 9.7 MG/DL (ref 8.6–10.2)
CHLORIDE BLD-SCNC: 102 MMOL/L (ref 98–107)
CLARITY: CLEAR
CO2: 28 MMOL/L (ref 22–29)
COLOR: YELLOW
CREAT SERPL-MCNC: 0.6 MG/DL (ref 0.5–1)
EOSINOPHILS ABSOLUTE: 0.2 E9/L (ref 0.05–0.5)
EOSINOPHILS ABSOLUTE: 0.21 E9/L (ref 0.05–0.5)
EOSINOPHILS RELATIVE PERCENT: 3.1 % (ref 0–6)
EOSINOPHILS RELATIVE PERCENT: 3.3 % (ref 0–6)
EPITHELIAL CELLS, UA: ABNORMAL /HPF
GFR AFRICAN AMERICAN: >60
GFR NON-AFRICAN AMERICAN: >60 ML/MIN/1.73
GLUCOSE BLD-MCNC: 103 MG/DL (ref 74–99)
GLUCOSE URINE: NEGATIVE MG/DL
HCT VFR BLD CALC: 37.5 % (ref 34–48)
HCT VFR BLD CALC: 38.2 % (ref 34–48)
HEMOGLOBIN: 12 G/DL (ref 11.5–15.5)
HEMOGLOBIN: 12.1 G/DL (ref 11.5–15.5)
IMMATURE GRANULOCYTES #: 0.01 E9/L
IMMATURE GRANULOCYTES #: 0.02 E9/L
IMMATURE GRANULOCYTES %: 0.2 % (ref 0–5)
IMMATURE GRANULOCYTES %: 0.3 % (ref 0–5)
INR BLD: 1.3
KETONES, URINE: NEGATIVE MG/DL
LEUKOCYTE ESTERASE, URINE: ABNORMAL
LYMPHOCYTES ABSOLUTE: 1.4 E9/L (ref 1.5–4)
LYMPHOCYTES ABSOLUTE: 1.48 E9/L (ref 1.5–4)
LYMPHOCYTES RELATIVE PERCENT: 22 % (ref 20–42)
LYMPHOCYTES RELATIVE PERCENT: 23.2 % (ref 20–42)
MCH RBC QN AUTO: 29.2 PG (ref 26–35)
MCH RBC QN AUTO: 29.3 PG (ref 26–35)
MCHC RBC AUTO-ENTMCNC: 31.7 % (ref 32–34.5)
MCHC RBC AUTO-ENTMCNC: 32 % (ref 32–34.5)
MCV RBC AUTO: 91.7 FL (ref 80–99.9)
MCV RBC AUTO: 92.3 FL (ref 80–99.9)
MONOCYTES ABSOLUTE: 0.57 E9/L (ref 0.1–0.95)
MONOCYTES ABSOLUTE: 0.63 E9/L (ref 0.1–0.95)
MONOCYTES RELATIVE PERCENT: 9 % (ref 2–12)
MONOCYTES RELATIVE PERCENT: 9.9 % (ref 2–12)
NEUTROPHILS ABSOLUTE: 3.99 E9/L (ref 1.8–7.3)
NEUTROPHILS ABSOLUTE: 4.11 E9/L (ref 1.8–7.3)
NEUTROPHILS RELATIVE PERCENT: 62.4 % (ref 43–80)
NEUTROPHILS RELATIVE PERCENT: 64.8 % (ref 43–80)
NITRITE, URINE: NEGATIVE
PDW BLD-RTO: 14.9 FL (ref 11.5–15)
PDW BLD-RTO: 14.9 FL (ref 11.5–15)
PH UA: 6 (ref 5–9)
PLATELET # BLD: 273 E9/L (ref 130–450)
PLATELET # BLD: 296 E9/L (ref 130–450)
PMV BLD AUTO: 10.6 FL (ref 7–12)
PMV BLD AUTO: 9.3 FL (ref 7–12)
POTASSIUM SERPL-SCNC: 4.5 MMOL/L (ref 3.5–5)
PROTEIN UA: NEGATIVE MG/DL
PROTHROMBIN TIME: 14.8 SEC (ref 9.3–12.4)
RBC # BLD: 4.09 E12/L (ref 3.5–5.5)
RBC # BLD: 4.14 E12/L (ref 3.5–5.5)
RBC UA: ABNORMAL /HPF (ref 0–2)
SODIUM BLD-SCNC: 138 MMOL/L (ref 132–146)
SPECIFIC GRAVITY UA: 1.02 (ref 1–1.03)
UROBILINOGEN, URINE: 0.2 E.U./DL
WBC # BLD: 6.4 E9/L (ref 4.5–11.5)
WBC # BLD: 6.4 E9/L (ref 4.5–11.5)
WBC UA: ABNORMAL /HPF (ref 0–5)

## 2021-04-05 PROCEDURE — 71046 X-RAY EXAM CHEST 2 VIEWS: CPT

## 2021-04-05 PROCEDURE — 85730 THROMBOPLASTIN TIME PARTIAL: CPT

## 2021-04-05 PROCEDURE — 36415 COLL VENOUS BLD VENIPUNCTURE: CPT

## 2021-04-05 PROCEDURE — 85025 COMPLETE CBC W/AUTO DIFF WBC: CPT

## 2021-04-05 PROCEDURE — 81001 URINALYSIS AUTO W/SCOPE: CPT

## 2021-04-05 PROCEDURE — 80048 BASIC METABOLIC PNL TOTAL CA: CPT

## 2021-04-05 PROCEDURE — 85610 PROTHROMBIN TIME: CPT

## 2021-04-05 NOTE — TELEPHONE ENCOUNTER
Reminded  of patient's scheduled procedure on 4/6/21. Instructions given and COVID questionnaire completed.

## 2021-04-06 ENCOUNTER — HOSPITAL ENCOUNTER (OUTPATIENT)
Dept: CARDIAC CATH/INVASIVE PROCEDURES | Age: 66
Discharge: HOME | End: 2021-04-06
Payer: MEDICARE

## 2021-04-06 VITALS
DIASTOLIC BLOOD PRESSURE: 76 MMHG | TEMPERATURE: 98.5 F | SYSTOLIC BLOOD PRESSURE: 167 MMHG | HEART RATE: 76 BPM | WEIGHT: 160 LBS | OXYGEN SATURATION: 100 % | HEIGHT: 65 IN | BODY MASS INDEX: 26.66 KG/M2 | RESPIRATION RATE: 18 BRPM

## 2021-04-06 DIAGNOSIS — I25.10 CAD IN NATIVE ARTERY: ICD-10-CM

## 2021-04-06 LAB
ABO/RH: NORMAL
ANTIBODY SCREEN: NORMAL

## 2021-04-06 PROCEDURE — 93451 RIGHT HEART CATH: CPT | Performed by: INTERNAL MEDICINE

## 2021-04-06 PROCEDURE — C1894 INTRO/SHEATH, NON-LASER: HCPCS

## 2021-04-06 PROCEDURE — C1751 CATH, INF, PER/CENT/MIDLINE: HCPCS

## 2021-04-06 PROCEDURE — 2709999900 HC NON-CHARGEABLE SUPPLY

## 2021-04-06 PROCEDURE — 6360000002 HC RX W HCPCS

## 2021-04-06 PROCEDURE — C1769 GUIDE WIRE: HCPCS

## 2021-04-06 PROCEDURE — 2500000003 HC RX 250 WO HCPCS

## 2021-04-06 RX ORDER — LEVOTHYROXINE SODIUM 0.03 MG/1
25 TABLET ORAL DAILY
Status: DISCONTINUED | OUTPATIENT
Start: 2021-04-06 | End: 2021-04-06 | Stop reason: HOSPADM

## 2021-04-06 RX ORDER — SODIUM CHLORIDE 9 MG/ML
INJECTION, SOLUTION INTRAVENOUS ONCE
Status: DISCONTINUED | OUTPATIENT
Start: 2021-04-06 | End: 2021-04-07 | Stop reason: HOSPADM

## 2021-04-06 RX ORDER — ROPINIROLE 0.5 MG/1
0.5 TABLET, FILM COATED ORAL NIGHTLY
Status: DISCONTINUED | OUTPATIENT
Start: 2021-04-06 | End: 2021-04-06 | Stop reason: HOSPADM

## 2021-04-06 RX ORDER — LISINOPRIL 5 MG/1
5 TABLET ORAL NIGHTLY
Status: DISCONTINUED | OUTPATIENT
Start: 2021-04-06 | End: 2021-04-06 | Stop reason: HOSPADM

## 2021-04-06 RX ORDER — SODIUM CHLORIDE 9 MG/ML
INJECTION, SOLUTION INTRAVENOUS CONTINUOUS
Status: CANCELLED | OUTPATIENT
Start: 2021-04-06 | End: 2021-04-07

## 2021-04-06 RX ORDER — SODIUM CHLORIDE 0.9 % (FLUSH) 0.9 %
10 SYRINGE (ML) INJECTION EVERY 12 HOURS SCHEDULED
Status: CANCELLED | OUTPATIENT
Start: 2021-04-06

## 2021-04-06 RX ORDER — FUROSEMIDE 20 MG/1
20 TABLET ORAL DAILY
Status: DISCONTINUED | OUTPATIENT
Start: 2021-04-07 | End: 2021-04-06 | Stop reason: HOSPADM

## 2021-04-06 RX ORDER — METOPROLOL TARTRATE 50 MG/1
50 TABLET, FILM COATED ORAL DAILY
Status: DISCONTINUED | OUTPATIENT
Start: 2021-04-06 | End: 2021-04-06 | Stop reason: HOSPADM

## 2021-04-06 RX ORDER — POTASSIUM CHLORIDE 20 MEQ/1
10 TABLET, EXTENDED RELEASE ORAL DAILY
Status: DISCONTINUED | OUTPATIENT
Start: 2021-04-06 | End: 2021-04-06 | Stop reason: HOSPADM

## 2021-04-06 RX ORDER — ACETAMINOPHEN 325 MG/1
650 TABLET ORAL EVERY 4 HOURS PRN
Status: CANCELLED | OUTPATIENT
Start: 2021-04-06

## 2021-04-06 RX ORDER — ALPRAZOLAM 0.25 MG/1
0.5 TABLET ORAL 4 TIMES DAILY PRN
Status: DISCONTINUED | OUTPATIENT
Start: 2021-04-06 | End: 2021-04-06 | Stop reason: HOSPADM

## 2021-04-06 RX ORDER — ATORVASTATIN CALCIUM 40 MG/1
40 TABLET, FILM COATED ORAL NIGHTLY
Status: DISCONTINUED | OUTPATIENT
Start: 2021-04-06 | End: 2021-04-06 | Stop reason: HOSPADM

## 2021-04-06 RX ORDER — SODIUM CHLORIDE 0.9 % (FLUSH) 0.9 %
10 SYRINGE (ML) INJECTION PRN
Status: CANCELLED | OUTPATIENT
Start: 2021-04-06

## 2021-04-06 NOTE — PROCEDURES
[address]                  [address] [facility]  [facility]                            CARDIAC CATHETERIZATION    PATIENT NAME: Beob Bush                    :        1955  MED REC NO:   [id number]                            ROOM:  ACCOUNT NO:   [de-identified]                           ADMIT DATE: 2021  PROVIDER:     Marito Ram MD    DATE OF PROCEDURE:  2021    PROCEDURES:  1. Right heart catheterization. 2.  Conscious sedation using Versed and fentanyl. INDICATIONS FOR PROCEDURE:  The patient is a 67-year-old female with  shortness of breath, who was found to have elevated RVSP on  echocardiogram.  The patient was brought to the cath lab for right heart  catheterization for further evaluation. DESCRIPTION OF PROCEDURE:  After the appropriate informed consent, the  right groin area was prepped and draped in the usual sterile fashion. A  timeout was called. A Cook needle was used to access the right common  femoral vein under real-time ultrasound guidance. Attempts to advance  catheter into the pulmonary artery and pulmonary wedge pressure was  difficult and we were able to do that with support of floppy PCI wire. Pressures were measured. In the wedge position, pulmonary artery RV and  RA and blood samples were obtained for O2 saturation. Thermodilution  evaluation of cardiac output and cardiac index was not performed due to  instability of the catheter in the pulmonary artery. At the end of the procedure, the catheter was pulled out from the body. The sheath was pulled out from the body and manual pressure was applied  to the right groin area until effective hemostasis was achieved. COMPLICATIONS:  None. ESTIMATED TOTAL BLOOD LOSS:  10 to 15 mL. CONSCIOUS SEDATION:  We used Versed and fentanyl for conscious sedation. MEASUREMENTS:  1.  RA 12/12 (9). 2.  RV 68/3 (12).   3.  Pulmonary artery pressure 63/23 (41).  4.  Pulmonary wedge pressure 14/15 (13). 5.  Pulmonary capillary wedge pressure saturation 70.9.  6.  Pulmonary artery O2 saturation was 62.6.  7.  RA saturation was 65.5.  8.  RV saturation was 62.9.  9.  Cardiac output by Charles was 3.29, with cardiac index of 1.8. IMPRESSION:  Moderately elevated pulmonary arterial pressure with normal  LV filling pressures. RECOMMENDATIONS:  The patient will be observed for three hours,  discharge home later on today if she meets discharge criteria.         Mayte Bruno MD    D: 04/06/2021 14:45:14       T: 04/06/2021 16:54:03     [email or web address]  Job#: [id number]     Doc#: [id number]    CC:

## 2021-04-07 ENCOUNTER — TELEPHONE (OUTPATIENT)
Dept: CARDIOLOGY CLINIC | Age: 66
End: 2021-04-07

## 2021-04-13 ENCOUNTER — TELEPHONE (OUTPATIENT)
Dept: CARDIOLOGY CLINIC | Age: 66
End: 2021-04-13

## 2021-07-23 ENCOUNTER — OFFICE VISIT (OUTPATIENT)
Dept: PHYSICAL MEDICINE AND REHAB | Age: 66
End: 2021-07-23
Payer: MEDICARE

## 2021-07-23 VITALS
HEIGHT: 64 IN | SYSTOLIC BLOOD PRESSURE: 125 MMHG | DIASTOLIC BLOOD PRESSURE: 76 MMHG | BODY MASS INDEX: 27.83 KG/M2 | WEIGHT: 163 LBS | HEART RATE: 84 BPM

## 2021-07-23 DIAGNOSIS — R53.1 WEAKNESS: ICD-10-CM

## 2021-07-23 DIAGNOSIS — R29.6 FALLS FREQUENTLY: ICD-10-CM

## 2021-07-23 DIAGNOSIS — M62.81 PROXIMAL LIMB MUSCLE WEAKNESS: Primary | ICD-10-CM

## 2021-07-23 DIAGNOSIS — M25.511 CHRONIC PAIN OF BOTH SHOULDERS: ICD-10-CM

## 2021-07-23 DIAGNOSIS — M25.559 HIP PAIN: ICD-10-CM

## 2021-07-23 DIAGNOSIS — M25.512 CHRONIC PAIN OF BOTH SHOULDERS: ICD-10-CM

## 2021-07-23 DIAGNOSIS — G89.29 CHRONIC PAIN OF BOTH SHOULDERS: ICD-10-CM

## 2021-07-23 LAB
FOLATE: 12.7 NG/ML (ref 4.8–24.2)
LACTATE DEHYDROGENASE: 255 U/L (ref 135–214)
TOTAL CK: 132 U/L (ref 20–180)
VITAMIN B-12: 341 PG/ML (ref 211–946)

## 2021-07-23 PROCEDURE — 99204 OFFICE O/P NEW MOD 45 MIN: CPT | Performed by: PHYSICAL MEDICINE & REHABILITATION

## 2021-07-23 PROCEDURE — G8417 CALC BMI ABV UP PARAM F/U: HCPCS | Performed by: PHYSICAL MEDICINE & REHABILITATION

## 2021-07-23 PROCEDURE — 1090F PRES/ABSN URINE INCON ASSESS: CPT | Performed by: PHYSICAL MEDICINE & REHABILITATION

## 2021-07-23 PROCEDURE — G8427 DOCREV CUR MEDS BY ELIG CLIN: HCPCS | Performed by: PHYSICAL MEDICINE & REHABILITATION

## 2021-07-23 NOTE — PROGRESS NOTES
Willam Montenegro D.O. [facility]   [address] [facility] [place]  Phone: [phone]  Fax: [phone]      PCP: Manjeet Snowden DO  Date of visit: 21    Chief Complaint   Patient presents with    Extremity Weakness     arms and legs       Dear Dr. Joni Blackman,    As you know,  Lennie Taveras is a 78 y.o.  right hand dominant woman with gradual onset of arm and leg weakness after no injury about a year ago. She has difficulty rising from a chair, climbing stairs, getting up off floor. She has fallen twice. She feels like she is more sedentary since she retired. She can't walk farther than 1/3 of mile. Now, the pain is intermittent and occurs daily. The pain is rated Pain Score:   0 - No pain, is described as heavy, and is located in the bilateral arms and legs without radiation. The symptoms have been worse since onset. The pain is better with rest.  The pain is worse with activity. There is no associated paresthesias. The prior workup has included: none. The prior treatment has included: none.      Past Medical History:   Diagnosis Date    CAD in native artery 2021    Cerebral artery occlusion with cerebral infarction ([address]     \"mini stroke\" 10/2014    H/O cardiovascular stress test 2020    Lexiscan    Hyperlipidemia     Hypertension     Sleep apnea     doesnt use cpap     Past Surgical History:   Procedure Laterality Date    CARDIAC CATHETERIZATION  2012    Right heart cath by Dr Chun Appl Right 2019    intraocular lens    CATARACT REMOVAL WITH IMPLANT Left 10/15/2019     SECTION      COLONOSCOPY      INTRACAPSULAR CATARACT EXTRACTION Right 2019    RIGHT EYE CATARACT EMULSIFICATION IOL IMPLANT performed by Godfrey Allen MD at [place] [address] Left 10/15/2019    LEFT EYE CATARACT EMULSIFICATION IOL IMPLANT performed by Fadumo Avitia MD at [address] History     Tobacco Use    Smoking status: Former Smoker     Packs/day: 1.50     Years: 40.00     Pack years: 60.00     Types: Cigarettes     Quit date: 2020     Years since quittin.8    Smokeless tobacco: Never Used    Tobacco comment:  she has decreased to 1ppd   Vaping Use    Vaping Use: Never assessed   Substance Use Topics    Alcohol use: Not Currently     Alcohol/week: 10.0 standard drinks     Types: 10 Glasses of wine per week     Comment: green tea 2 cups a day     Drug use: No   Retired . Family History   Problem Relation Age of Onset    Other Mother         complication of surgery    Heart Disease Brother      Allergies   Allergen Reactions    Biaxin [Clarithromycin] Nausea And Vomiting    Naproxen Nausea And Vomiting       Current Outpatient Medications   Medication Sig Dispense Refill    furosemide (LASIX) 20 MG tablet Take one tablet by mouth daily 90 tablet 1    metoprolol tartrate (LOPRESSOR) 50 MG tablet Take 1 tablet by mouth daily 90 tablet 2    potassium chloride (KLOR-CON M) 10 MEQ extended release tablet Take 1 tablet by mouth daily Take with Lasix 90 tablet 1    simvastatin (ZOCOR) 40 MG tablet Take 1 tablet by mouth nightly 90 tablet 3    lisinopril (PRINIVIL;ZESTRIL) 5 MG tablet Take 1 tablet by mouth nightly 90 tablet 2    levothyroxine (SYNTHROID) 25 MCG tablet Take 25 mcg by mouth Daily      rOPINIRole (REQUIP) 0.25 MG tablet Take 1 tablet by mouth daily (Patient taking differently: Take 0.5 mg by mouth nightly ) 90 tablet 3    Cholecalciferol (VITAMIN D) 2000 UNITS CAPS capsule Take  by mouth daily.  aspirin 81 MG tablet Take 81 mg by mouth 2 times daily       Multiple Vitamins-Minerals (SENIOR MULTIVITAMIN PLUS PO) Take 1 tablet by mouth daily (Patient not taking: Reported on 2021)       No current facility-administered medications for this visit. Plan:   Orders Placed This Encounter   Procedures    XR HIP BILATERAL W AP PELVIS (2 VIEWS)     Standing Status:   Future     Number of Occurrences:   1     Standing Expiration Date:   7/24/2022     Order Specific Question:   Reason for exam:     Answer:   hip pain    XR SHOULDER LEFT (MIN 2 VIEWS)     Standing Status:   Future     Number of Occurrences:   1     Standing Expiration Date:   7/24/2022     Order Specific Question:   Reason for exam:     Answer:   left shoulder pain    XR SHOULDER RIGHT (MIN 2 VIEWS)     Standing Status:   Future     Number of Occurrences:   1     Standing Expiration Date:   7/24/2022     Order Specific Question:   Reason for exam:     Answer:   right shoulder pain    Aldolase     Standing Status:   Future     Number of Occurrences:   1     Standing Expiration Date:   7/24/2022    CK     Standing Status:   Future     Number of Occurrences:   1     Standing Expiration Date:   7/24/2022    Lactate Dehydrogenase     Standing Status:   Future     Number of Occurrences:   1     Standing Expiration Date:   7/24/2022    Vitamin B12 & Folate     Standing Status:   Future     Number of Occurrences:   1     Standing Expiration Date:   7/24/2022    Methylmalonic Acid, Serum     Standing Status:   Future     Number of Occurrences:   1     Standing Expiration Date:   7/24/2022    EMG     Order Specific Question:   Which body part? Answer:   one arm and one leg for myopathy     Discussed empiric steroids pending testing. Patient wants to wait for definitive diagnosis.  The patient was educated about the diagnosis, prognosis, indications, risks and benefits of treatment. An opportunity to ask questions was given to the patient and questions were answered. The patient agreed to proceed with the recommended treatment as described above.  Follow up at EMG    Thank you for the consultation and for allowing me to participate in the care of this patient.       Sincerely, James Snyder D.O., P.T.   Board Certified Physical Medicine and Rehabilitation  Board Certified Electrodiagnostic Medicine

## 2021-07-26 ENCOUNTER — TELEPHONE (OUTPATIENT)
Dept: PHYSICAL MEDICINE AND REHAB | Age: 66
End: 2021-07-26

## 2021-07-26 LAB — ALDOLASE: 3.5 U/L (ref 1.5–8.1)

## 2021-07-26 NOTE — TELEPHONE ENCOUNTER
Called and left message on patient voicemail the I was calling regarding her xray results. Will wait for return call from patient.

## 2021-07-26 NOTE — TELEPHONE ENCOUNTER
----- Message from Lauren Foss DO sent at 7/23/2021  4:52 PM EDT -----  Reviewed test abnormal, inform patient that it showed very mild arthritis left AC joint and left hip. This would not explain the extent of weakness she is experiencing. Await EMG.

## 2021-07-28 ENCOUNTER — TELEPHONE (OUTPATIENT)
Dept: PHYSICAL MEDICINE AND REHAB | Age: 66
End: 2021-07-28

## 2021-07-28 LAB — METHYLMALONIC ACID: 0.37 UMOL/L (ref 0–0.4)

## 2021-07-28 NOTE — TELEPHONE ENCOUNTER
----- Message from [employer], DO sent at 7/28/2021  3:54 PM EDT -----  LDH was elevated, everything else so far looks good. Await EMG.

## 2021-07-29 ENCOUNTER — OFFICE VISIT (OUTPATIENT)
Dept: PHYSICAL MEDICINE AND REHAB | Age: 66
End: 2021-07-29
Payer: MEDICARE

## 2021-07-29 VITALS — WEIGHT: 162 LBS | HEIGHT: 64 IN | BODY MASS INDEX: 27.66 KG/M2

## 2021-07-29 DIAGNOSIS — G72.9 MYOPATHY: Primary | ICD-10-CM

## 2021-07-29 PROCEDURE — 95886 MUSC TEST DONE W/N TEST COMP: CPT | Performed by: PHYSICAL MEDICINE & REHABILITATION

## 2021-07-29 PROCEDURE — 95912 NRV CNDJ TEST 11-12 STUDIES: CPT | Performed by: PHYSICAL MEDICINE & REHABILITATION

## 2021-07-29 NOTE — Clinical Note
Hello, referring you this patient. Looks like a myopathic process clinically and by EMG. I started some basic labs but her CK is not elevated. I was empirically going to start her on Prednisone but she wants a definitive diagnosis first.   Appreciate your opinion.

## 2021-07-29 NOTE — PATIENT INSTRUCTIONS
[facility]  Accredited [facility] the [facility] with Exempl[facility] status  RACHID Villa D.O. [facility]  [address] [facility] [place]  Phone: [phone]  Fax: [phone]        Today you had an electrodiagnostic exam which included nerve conduction studies (NCS) and electromyography (EMG). This test evaluated the electrical activity of your nerves and muscles to help determine if you have a nerve or muscle disease. This test can help determine the location and type of a nerve or muscle problem. This will help your referring doctor diagnose your condition and determine the appropriate next step in your treatment plan. After your test:    1. There are no long lasting side effects of the test.     2. You may resume your normal activities without restrictions. 3.  Resume any medications that were stopped for the test.     4  If you have sore areas or bruising in your muscles where the needle was placed, apply a cold pack to the sore area for 15-20 minutes three to four times a day as needed for pain. The soreness should go away in about 1-2 days. 5. Your results were provided  Briefly at the end of your test and the final detailed report will be provided to your referring physician, and/or primary care physician and any other parties you requested within 1-2 days of the examination. You may wish to contact your referring provider after a few days to determine what they would like you to do next. 6.  Please call [phone] with any questions or concerns and if you develop increased body temperature/fever, swelling, tenderness, increased pain and/or drainage from the sites where the needle was placed. Thank you for choosing us for your health care needs.

## 2021-07-29 NOTE — PROGRESS NOTES
[address] [facility]  Electrodiagnostic Laboratory  *Accredited by the [address] [place] [address] with exemplary status  [address] [facility] [facility]  Phone: [phone]  Fax: [phone]    Referring Provider: Abiel Maqruez DO  Primary Care Physician: Edwin Mullins DO  Patient Name: Giorgi Cousin  Patient YOB: 1955  Gender: female  BMI: Body mass index is 27.81 kg/m². Height 5' 4\" (1.626 m), weight 162 lb (73.5 kg), not currently breastfeeding. 7/29/2021    Description of clinical problem:   Chief Complaint   Patient presents with    Extremity Pain     when using there is pain in arms and legs (ex. walking) 5/10 pain. 10/10 pain when getting up from a squatting position. 1+ year of symp    Numbness     none.  Extremity Weakness     muscle weakness mostly in legs but arms are well. Sensory NCS      Nerve / Sites Rec. Site Peak Lat PP Amp Segments Distance Velocity Temp. ms µV  cm m/s °C   L Median - Digit II (Antidromic)      Palm Dig II 2.24 87.3 Palm - Dig II 7 48 32      Wrist Dig II 3.59 68.5 Wrist - Dig II 14 50 32   L Ulnar - Digit V (Antidromic)      Wrist Dig V 3.91 42.9 Wrist - Dig V 14 46 32   L Radial - Anatomical snuff box (Forearm)      Forearm Wrist 2.60 21.4 Forearm - Wrist 10 51 32   L Sural - Ankle (Calf)      Calf Ankle 4.27 10.7 Calf - Ankle 14 41 31   L Superficial peroneal - Ankle      Lat leg Ankle 2.76 5.0 Lat leg - Ankle 10 43 31.1       Motor NCS      Nerve / Sites Muscle Onset Amplitude Segments Distance Velocity Temp.     ms mV  cm m/s °C   L Median - APB      Palm APB 1.67 5.2 Palm - APB   32      Wrist APB 3.28 4.4 Wrist - Palm 8 50 32      Elbow APB 6.93 3.9 Elbow - Wrist 19 52 32   L Ulnar - ADM      Wrist ADM 3.23 8.0 Wrist - ADM 8  32      B. Elbow ADM 7.03 4.9 B. Elbow - Wrist 20 53 32      A. Elbow ADM 9.06 4.7 A. Elbow - B. Elbow 10 49 32   L Peroneal - EDB      Ankle EDB 4.27 3.0 Ankle - EDB 8  31.1      Fib head EDB 11.77 3.1 Fib head - Ankle 29 39 31.1      Above Knee EDB 14.01 2.9 Above Knee - Fib head 10 45 31.1   L Tibial - AH      Ankle AH 5.52 12.1 Ankle - AH 8  31.2      Pop fossa AH 15.36 7.6 Pop fossa - Ankle 42 43 31.2       F Wave      Nerve Fmin % F    ms %   L Median - APB 25.83 100   L Ulnar - ADM 29.95 80   L Peroneal - EDB 51.30 90   L Tibial - AH 46.82 100       H Reflex      Nerve H Lat    ms   L Tibial - Soleus 34.48   R Tibial - Soleus 34.11       EMG      EMG Summary Table     Spontaneous MUAP Recruitment   Muscle Nerve Roots IA Fib PSW Fasc Amp Dur. PPP Pattern   L. Deltoid Axillary C5-C6 N None None None 1- 1- 1+ 1+   L. Biceps brachii Musculocutaneous C5-C6 N None None None 1- 1- 1+ 1+   L. Triceps brachii Radial C6-C8 N None None None N N N N   L. Pronator teres Median C6-C7 N None None None N N N N   L. First dorsal interosseous Ulnar C8-T1 N None None None N N N N   L. Abductor pollicis brevis Median S5-J8 N None None None N N N N   L. Vastus medialis Femoral L2-L4 N 1+ 1+ None N N N N   L. Tibialis anterior Deep peroneal (Fibular) L4-L5 N None None None N N N N   L. Gastrocnemius (Medial head) Tibial S1-S2 N None None None N N N N   L. Extensor hallucis longus Deep peroneal (Fibular) L5-S1 N None None None N N N 1+   L. Cervical paraspinals (low)  - N None None None N N N N   L. Cervical paraspinals (mid)  - N None None None N N N N   L. Lumbar paraspinals (low)  - N None None None N N N N   L. Lumbar paraspinals (mid)  - N None None None N N N N   L. Gluteus jerzy Inferior gluteal L5-S2 N None None None N N N N   L. Gluteus medius Superior gluteal L4-S1 N None None None N N 1+ 1+     Study Limitations:  none    Summary of Findings:   Nerve conduction studies:   · All nerve conduction studies listed in the table above were normal in latency, amplitude and conduction velocity. Needle EMG:   · Needle EMG was performed using a concentric needle.   · The following abnormalities were seen on needle EMG: early recruitment of low amplitude short duration polyphasic motor units particularly in the proximal upper extremity. There was also early recruitment int he left gluteus medius and left extensor hallucis longus. Polyphasic motor units were present in the left gluteus medius. Positive sharp waves and fibrillation potentials were present in the left vastus medialis.  All other muscles tested, as listed in the table above demonstrated normal amplitude, duration, phases and recruitment and no active denervation signs were seen.  The right side was not studied due to possibility of requiring a muscle biopsy based on EMG results. Diagnostic Interpretation: This study was abnormal.     · Electrodiagnosis: There is electrodiagnostic evidence of a myopathic process affecting the proximal muscles in the upper greater than lower extremities with fibrillations and positive sharp waves in the left vastus medialis. Previous Study: no      Follow up EMG is recommended if clinically indicated. Recommend neurology consultation. Technologist: Jimenez Levy  Physician:  Ezekiel Balalrd DO      Nerve conduction studies and electromyography were performed according to our laboratory policies and procedures which can be provided upon request. All abnormal values are identified in the table.  Laboratory normal values can also be provided upon request.       Cc: DO Tommy Holt DO

## 2021-09-20 ENCOUNTER — OFFICE VISIT (OUTPATIENT)
Dept: NEUROLOGY | Age: 66
End: 2021-09-20
Payer: MEDICARE

## 2021-09-20 VITALS
WEIGHT: 162 LBS | TEMPERATURE: 98.1 F | DIASTOLIC BLOOD PRESSURE: 79 MMHG | RESPIRATION RATE: 16 BRPM | SYSTOLIC BLOOD PRESSURE: 141 MMHG | HEART RATE: 82 BPM | BODY MASS INDEX: 27.66 KG/M2 | HEIGHT: 64 IN

## 2021-09-20 DIAGNOSIS — K70.0 ALCOHOLIC FATTY LIVER: ICD-10-CM

## 2021-09-20 DIAGNOSIS — R79.89 ABNORMAL LFTS: ICD-10-CM

## 2021-09-20 DIAGNOSIS — M62.81 MUSCLE WEAKNESS: ICD-10-CM

## 2021-09-20 DIAGNOSIS — G72.9 MYOPATHY: Primary | ICD-10-CM

## 2021-09-20 PROCEDURE — G8427 DOCREV CUR MEDS BY ELIG CLIN: HCPCS | Performed by: PSYCHIATRY & NEUROLOGY

## 2021-09-20 PROCEDURE — 99204 OFFICE O/P NEW MOD 45 MIN: CPT | Performed by: PSYCHIATRY & NEUROLOGY

## 2021-09-20 PROCEDURE — 1123F ACP DISCUSS/DSCN MKR DOCD: CPT | Performed by: PSYCHIATRY & NEUROLOGY

## 2021-09-20 PROCEDURE — 3017F COLORECTAL CA SCREEN DOC REV: CPT | Performed by: PSYCHIATRY & NEUROLOGY

## 2021-09-20 PROCEDURE — 1090F PRES/ABSN URINE INCON ASSESS: CPT | Performed by: PSYCHIATRY & NEUROLOGY

## 2021-09-20 PROCEDURE — 1036F TOBACCO NON-USER: CPT | Performed by: PSYCHIATRY & NEUROLOGY

## 2021-09-20 PROCEDURE — G8417 CALC BMI ABV UP PARAM F/U: HCPCS | Performed by: PSYCHIATRY & NEUROLOGY

## 2021-09-20 PROCEDURE — 4040F PNEUMOC VAC/ADMIN/RCVD: CPT | Performed by: PSYCHIATRY & NEUROLOGY

## 2021-09-20 PROCEDURE — G8400 PT W/DXA NO RESULTS DOC: HCPCS | Performed by: PSYCHIATRY & NEUROLOGY

## 2021-09-20 RX ORDER — SILDENAFIL CITRATE 20 MG/1
TABLET ORAL
COMMUNITY
Start: 2021-09-10 | End: 2021-12-13

## 2021-09-20 ASSESSMENT — ENCOUNTER SYMPTOMS
PHOTOPHOBIA: 0
SHORTNESS OF BREATH: 0
VOMITING: 0
TROUBLE SWALLOWING: 0
NAUSEA: 0

## 2021-09-20 NOTE — PROGRESS NOTES
COLONOSCOPY      INTRACAPSULAR CATARACT EXTRACTION Right 2019    RIGHT EYE CATARACT EMULSIFICATION IOL IMPLANT performed by Hima Meredith MD at [place] [address] Left 10/15/2019    LEFT EYE CATARACT EMULSIFICATION IOL IMPLANT performed by Hima Meredith MD at [address] [place] [address] St:   Family History   Problem Relation Age of Onset    Other Mother         complication of surgery    Heart Disease Brother      SOCIAL HISTORY:   Social History     Socioeconomic History    Marital status:      Spouse name: None    Number of children: None    Years of education: None    Highest education level: None   Occupational History    None   Tobacco Use    Smoking status: Former Smoker     Packs/day: 1.50     Years: 40.00     Pack years: 60.00     Types: Cigarettes     Quit date: 2020     Years since quittin.0    Smokeless tobacco: Never Used    Tobacco comment:  she has decreased to 1ppd   Vaping Use    Vaping Use: Never used   Substance and Sexual Activity    Alcohol use: Not Currently     Alcohol/week: 10.0 standard drinks     Types: 10 Glasses of wine per week     Comment: green tea 2 cups a day     Drug use: No    Sexual activity: None   Other Topics Concern    None   Social History Narrative    None     Social Determinants of Health     Financial Resource Strain:     Difficulty of Paying Living Expenses:    Food Insecurity:     Worried About Running Out of Food in the Last Year:     Ran Out of Food in the Last Year:    Transportation Needs:     Lack of Transportation (Medical):      Lack of Transportation (Non-Medical):    Physical Activity:     Days of Exercise per Week:     Minutes of Exercise per Session:    Stress:     Feeling of Stress :    Social Connections:     Frequency of Communication with Friends and Family:     Frequency of Social Gatherings with Friends and Family:     Attends Hoahaoism Services:  Active Member of Clubs or Organizations:     Attends Club or Organization Meetings:     Marital Status:    Intimate Partner Violence:     Fear of Current or Ex-Partner:     Emotionally Abused:     Physically Abused:     Sexually Abused:       E-Cigarettes/Vaping Use     Questions Responses    E-Cigarette/Vaping Use Never User    Start Date     Passive Exposure     Quit Date     Counseling Given     Comments          Allergy:   Allergies   Allergen Reactions    Biaxin [Clarithromycin] Nausea And Vomiting    Naproxen Nausea And Vomiting     MEDS:   Current Outpatient Medications:     sildenafil (REVATIO) 20 MG tablet, TAKE ONE TABLET BY MOUTH THREE TIMES A DAY, Disp: , Rfl:     furosemide (LASIX) 20 MG tablet, Take one tablet by mouth daily, Disp: 90 tablet, Rfl: 1    metoprolol tartrate (LOPRESSOR) 50 MG tablet, Take 1 tablet by mouth daily, Disp: 90 tablet, Rfl: 2    potassium chloride (KLOR-CON M) 10 MEQ extended release tablet, Take 1 tablet by mouth daily Take with Lasix, Disp: 90 tablet, Rfl: 1    simvastatin (ZOCOR) 40 MG tablet, Take 1 tablet by mouth nightly, Disp: 90 tablet, Rfl: 3    levothyroxine (SYNTHROID) 25 MCG tablet, Take 25 mcg by mouth Daily, Disp: , Rfl:     rOPINIRole (REQUIP) 0.25 MG tablet, Take 1 tablet by mouth daily (Patient taking differently: Take 0.5 mg by mouth nightly ), Disp: 90 tablet, Rfl: 3    Cholecalciferol (VITAMIN D) 2000 UNITS CAPS capsule, Take  by mouth daily. , Disp: , Rfl:     aspirin 81 MG tablet, Take 81 mg by mouth 2 times daily , Disp: , Rfl:     REVIEW OF SYSTEMS  Review of Systems   Constitutional: Negative for appetite change, fatigue and unexpected weight change. HENT: Negative for drooling, hearing loss, tinnitus and trouble swallowing. Eyes: Negative for photophobia and visual disturbance. Respiratory: Negative for shortness of breath. Cardiovascular: Negative for palpitations. Gastrointestinal: Negative for nausea and vomiting. Endocrine: Negative for polyuria. Genitourinary: Negative for flank pain. Musculoskeletal: Negative for neck pain and neck stiffness. Skin: Negative for rash. Allergic/Immunologic: Negative for food allergies. Neurological: Positive for weakness. Negative for dizziness, tremors, seizures, syncope, speech difficulty, light-headedness, numbness and headaches. Hematological: Negative for adenopathy. Psychiatric/Behavioral: Negative for agitation, behavioral problems and sleep disturbance. PHYSICAL EXAM:   BP (!) 141/79   Pulse 82   Temp 98.1 °F (36.7 °C) (Temporal)   Resp 16   Ht 5' 4\" (1.626 m)   Wt 162 lb (73.5 kg)   BMI 27.81 kg/m²     Neurological examination     MENTAL STATUS: Patient awake and oriented to time, place, and person. Recent/remote memory normal. Attention span/concentration normal. Speech fluent. Good comprehension, naming, and repetition. Fund of knowledge appropriate for patient's level of education. Affect normal.    CRANIAL NERVES:  CN I: Not tested. CN II: Fundoscopic exam not performed. CN III, IV, VI: Pupils equal, round and reactive to light; extra ocular movements full and intact. CN V: Facial sensation normal.  CN VII: No facial asymmetry. CN VIII:  Hearing grossly normal bilaterally. No pathologic nystagmus or skew deviation. CN IX, X: Palate elevates symmetrically. CN XI: Shoulder shrug and chin rotation equal with intact strength. CN XII: Tongue protrusion midline. MOTOR: Normal bulk. Tone normal and symmetric throughout. Bicep Tricep Delt Grasp IPsoas Quads Hams DFlex PFlex EHL   Left 5 5 4- 5 4- 4- 5 5 5 5   Right 5 5 4- 5 4- 4- 5 5 5 5       ABNORMAL MOVEMENTS/TREMORS: No     REFLEXES: DTRs 2+; normal and symmetric throughout. Plantar response downgoing. SENSATION: Sensation grossly intact to fine touch, pain/temperature, vibration and position. COORDINATION: Finger-to-nose and heel to shin normal for age and symmetric.  Finger tapping and alternating movements normal.    STATION: Negative Romberg. GAIT:  Normal heel, toe and tandem; no ataxia. DIAGNOSTIC TESTS:     I have personally reviewed the most recent lab results:    Sodium   Date Value Ref Range Status   04/05/2021 138 132 - 146 mmol/L Final   09/10/2015 140 132 - 146 mmol/L Final   08/15/2014 140 132 - 146 mmol/L Final     Potassium   Date Value Ref Range Status   04/05/2021 4.5 3.5 - 5.0 mmol/L Final   09/10/2015 4.6 3.5 - 5.0 mmol/L Final   08/15/2014 4.5 3.5 - 5.0 mmol/L Final     Chloride   Date Value Ref Range Status   04/05/2021 102 98 - 107 mmol/L Final   09/10/2015 101 98 - 107 mmol/L Final   08/15/2014 104 98 - 107 mmol/L Final     CO2   Date Value Ref Range Status   04/05/2021 28 22 - 29 mmol/L Final   09/10/2015 30 (H) 22 - 29 mmol/L Final   08/15/2014 25 22 - 29 mmol/L Final     BUN   Date Value Ref Range Status   04/05/2021 13 8 - 23 mg/dL Final   04/29/2020 12 8 - 23 mg/dL Final   09/10/2015 15 8 - 23 mg/dL Final     CREATININE   Date Value Ref Range Status   04/05/2021 0.6 0.5 - 1.0 mg/dL Final   04/29/2020 0.7 0.5 - 1.0 mg/dL Final   09/10/2015 0.7 0.5 - 1.0 mg/dL Final     GFR Non-   Date Value Ref Range Status   04/05/2021 >60 >=60 mL/min/1.73 Final     Comment:     Chronic Kidney Disease: less than 60 ml/min/1.73 sq.m. Kidney Failure: less than 15 ml/min/1.73 sq.m. Results valid for patients 18 years and older. 04/29/2020 >60 >=60 mL/min/1.73 Final     Comment:     Chronic Kidney Disease: less than 60 ml/min/1.73 sq.m. Kidney Failure: less than 15 ml/min/1.73 sq.m. Results valid for patients 18 years and older. 09/10/2015 >60 >=60 mL/min/1.73 Final     Comment:     Chronic Kidney Disease: less than 60 ml/min/1.73 sq.m. Kidney Failure: less than 15 ml/min/1.73 sq.m. Results valid for patients 18 years and older.        Calcium   Date Value Ref Range Status   04/05/2021 9.7 8.6 - 10.2 mg/dL Final   09/10/2015 9.6 8.6 - 10.2 mg/dL Final   08/15/2014 9.8 8.6 - 10.2 mg/dL Final     WBC   Date Value Ref Range Status   04/05/2021 6.4 4.5 - 11.5 E9/L Final   04/05/2021 6.4 4.5 - 11.5 E9/L Final   09/10/2015 5.8 4.5 - 11.5 E9/L Final     Hemoglobin   Date Value Ref Range Status   04/05/2021 12.0 11.5 - 15.5 g/dL Final   04/05/2021 12.1 11.5 - 15.5 g/dL Final   09/10/2015 15.1 11.5 - 15.5 g/dL Final     Hematocrit   Date Value Ref Range Status   04/05/2021 37.5 34.0 - 48.0 % Final   04/05/2021 38.2 34.0 - 48.0 % Final   09/10/2015 46.5 34.0 - 48.0 % Final     Platelets   Date Value Ref Range Status   04/05/2021 273 130 - 450 E9/L Final   04/05/2021 296 130 - 450 E9/L Final   09/10/2015 233 130 - 450 E9/L Final     Neutrophils %   Date Value Ref Range Status   04/05/2021 64.8 43.0 - 80.0 % Final   04/05/2021 62.4 43.0 - 80.0 % Final   09/10/2015 62 43 - 80 % Final     Monocytes %   Date Value Ref Range Status   04/05/2021 9.0 2.0 - 12.0 % Final   04/05/2021 9.9 2.0 - 12.0 % Final   09/10/2015 6 2 - 12 % Final     Total Protein   Date Value Ref Range Status   09/10/2015 7.1 6.4 - 8.3 g/dL Final   08/15/2014 7.0 6.4 - 8.3 g/dL Final   02/10/2014 7.0 6.4 - 8.3 g/dL Final     Total Bilirubin   Date Value Ref Range Status   09/10/2015 0.4 0.0 - 1.2 mg/dL Final   08/15/2014 0.4 0.0 - 1.2 mg/dL Final   02/10/2014 0.3 0.0 - 1.2 mg/dL Final     Alkaline Phosphatase   Date Value Ref Range Status   09/10/2015 67 35 - 104 U/L Final   08/15/2014 74 35 - 104 U/L Final   02/10/2014 65 35 - 104 U/L Final     ALT   Date Value Ref Range Status   09/10/2015 17 0 - 32 U/L Final   08/15/2014 35 (H) 0 - 32 U/L Final   02/10/2014 21 0 - 32 U/L Final     AST   Date Value Ref Range Status   09/10/2015 17 0 - 31 U/L Final   08/15/2014 25 0 - 31 U/L Final   02/10/2014 18 0 - 31 U/L Final     Lab Results   Component Value Date    INR 1.3 04/05/2021     Lab Results   Component Value Date    TRIG 117 09/10/2015    HDL 73 09/10/2015     No components found for: HGBA1C  No results found for: PROTEINCSF, GLUCCSF, WBCCSF    Controlled Substance Monitoring:    Acute and Chronic Pain Monitoring:   No flowsheet data found. MEDICAL DECISION MAKING  ASSESSMENT/PLAN    Lisa Wheat was seen today for extremity weakness. Diagnoses and all orders for this visit:    Myopathy    Muscle weakness    -     CK; Future  -     EMG; Future  -     Sedimentation Rate; Future  -     C-Reactive Protein; Future  -     GAMMA GT; Future  -     Hepatic Function Panel; Future  -     MISCELLANEOUS SENDOUT 1; Future  -     MISCELLANEOUS SENDOUT 2; Future  -     MISCELLANEOUS SENDOUT 3; Future  -     TSH without Reflex; Future  -     ANTI-SCLERODERMA ANTIBODY; Future  -     ANTI-RNP (SUKI AB); Future       · The patient is coming in with ongoing muscle weakness getting worse for about 1 year. EMG done about 2 months ago revealed myopathic potentials. Currently she is being worked up for scleroderma and pulmonary fibrosis. The patient had a CK level done which was normal, and had elevated LDH. She also has a history of alcohol use in the past about 6 oz, 6 drinks, 3 times a week for 40 years quit about 1 year ago. At this time I recommend doing a repeat EMG to evaluate for evolution of the myopathic changes. I also recommend doing a repeat CK level and myopathy work-up including liver function tests and GGT levels. We will also check scleroderma antibodies, anti-Malinda 1 antibody, inclusion body myositis antibody and GAA enzyme activity. Given the normal CK levels in the past, I suspect underlying myopathy in the setting of scleroderma versus secondary to chronic alcohol use. · Etiology management diagnosis natural history course of disease were discussed with the patient. All questions were answered. Return in about 8 weeks (around 11/15/2021). Thank you for involving me in the care of your patient.       Patient's current medication list, allergies, problem list and results of all previously ordered testing and scans were reviewed at today's visit.       Bryan Juárez MD    [facility]  [address] [place] [address]

## 2021-09-22 DIAGNOSIS — I10 ESSENTIAL HYPERTENSION: ICD-10-CM

## 2021-09-22 DIAGNOSIS — R06.02 SHORTNESS OF BREATH: ICD-10-CM

## 2021-09-24 RX ORDER — FUROSEMIDE 20 MG/1
TABLET ORAL
Qty: 124 TABLET | Refills: 1 | Status: SHIPPED
Start: 2021-09-24 | End: 2021-11-15 | Stop reason: SDUPTHER

## 2021-09-24 RX ORDER — POTASSIUM CHLORIDE 750 MG/1
10 TABLET, EXTENDED RELEASE ORAL DAILY
Qty: 90 TABLET | Refills: 1 | Status: SHIPPED | OUTPATIENT
Start: 2021-09-24

## 2021-09-28 ENCOUNTER — HOSPITAL ENCOUNTER (OUTPATIENT)
Age: 66
Discharge: HOME | End: 2021-09-28
Payer: MEDICARE

## 2021-09-28 LAB
ALBUMIN SERPL-MCNC: 4.1 G/DL (ref 3.5–5.2)
ALP BLD-CCNC: 88 U/L (ref 35–104)
ALT SERPL-CCNC: 19 U/L (ref 0–32)
AST SERPL-CCNC: 21 U/L (ref 0–31)
BILIRUB SERPL-MCNC: 0.4 MG/DL (ref 0–1.2)
BILIRUBIN DIRECT: <0.2 MG/DL (ref 0–0.3)
BILIRUBIN, INDIRECT: NORMAL MG/DL (ref 0–1)
C-REACTIVE PROTEIN: 0.3 MG/DL (ref 0–0.4)
GAMMA GLUTAMYL TRANSFERASE: 39 U/L (ref 6–42)
SEDIMENTATION RATE, ERYTHROCYTE: 23 MM/HR (ref 0–20)
TOTAL CK: 120 U/L (ref 20–180)
TOTAL PROTEIN: 7.9 G/DL (ref 6.4–8.3)
TSH SERPL DL<=0.05 MIU/L-ACNC: 6.45 UIU/ML (ref 0.27–4.2)

## 2021-09-28 PROCEDURE — 84443 ASSAY THYROID STIM HORMONE: CPT

## 2021-09-28 PROCEDURE — 80076 HEPATIC FUNCTION PANEL: CPT

## 2021-09-28 PROCEDURE — 86235 NUCLEAR ANTIGEN ANTIBODY: CPT

## 2021-09-28 PROCEDURE — 82657 ENZYME CELL ACTIVITY: CPT

## 2021-09-28 PROCEDURE — 86140 C-REACTIVE PROTEIN: CPT

## 2021-09-28 PROCEDURE — 83516 IMMUNOASSAY NONANTIBODY: CPT

## 2021-09-28 PROCEDURE — 82550 ASSAY OF CK (CPK): CPT

## 2021-09-28 PROCEDURE — 36415 COLL VENOUS BLD VENIPUNCTURE: CPT

## 2021-09-28 PROCEDURE — 85651 RBC SED RATE NONAUTOMATED: CPT

## 2021-09-28 PROCEDURE — 82977 ASSAY OF GGT: CPT

## 2021-09-29 ENCOUNTER — TELEPHONE (OUTPATIENT)
Dept: NEUROLOGY | Age: 66
End: 2021-09-29

## 2021-09-30 ENCOUNTER — OFFICE VISIT (OUTPATIENT)
Dept: NEUROLOGY | Age: 66
End: 2021-09-30
Payer: MEDICARE

## 2021-09-30 VITALS
RESPIRATION RATE: 16 BRPM | DIASTOLIC BLOOD PRESSURE: 71 MMHG | TEMPERATURE: 98.2 F | HEART RATE: 65 BPM | WEIGHT: 162 LBS | BODY MASS INDEX: 27.66 KG/M2 | SYSTOLIC BLOOD PRESSURE: 127 MMHG | HEIGHT: 64 IN

## 2021-09-30 DIAGNOSIS — G72.9 MYOPATHY: Primary | ICD-10-CM

## 2021-09-30 LAB
ANTI JO-1 IGG: NEGATIVE
ENA TO SSA (RO) ANTIBODY: NEGATIVE
ENA TO SSB (LA) ANTIBODY: POSITIVE

## 2021-09-30 PROCEDURE — 95885 MUSC TST DONE W/NERV TST LIM: CPT | Performed by: PSYCHIATRY & NEUROLOGY

## 2021-09-30 PROCEDURE — 95913 NRV CNDJ TEST 13/> STUDIES: CPT | Performed by: PSYCHIATRY & NEUROLOGY

## 2021-09-30 PROCEDURE — 95886 MUSC TEST DONE W/N TEST COMP: CPT | Performed by: PSYCHIATRY & NEUROLOGY

## 2021-09-30 NOTE — PROGRESS NOTES
[address]  Electrodiagnostic Laboratory  *Accredited by the [address] [place] [address] with exemplary status  [address] [facility], [facility]  Phone: [phone]  Fax: [phone]    Referring Provider: Beau Beaulieu MD  Primary Care Physician: Ligia Mcdonald DO  Patient Name: Inez Soares  Patient YOB: 1955  Gender: female  BMI: Body mass index is 27.81 kg/m². Blood pressure 127/71, pulse 65, temperature 98.2 °F (36.8 °C), temperature source Temporal, resp. rate 16, height 5' 4\" (1.626 m), weight 162 lb (73.5 kg), not currently breastfeeding. 9/30/2021    Description of clinical problem: The patient is coming with complaints of generalized weakness and fatigue. Chief Complaint   Patient presents with    Procedure     EMG all fours     Pain No   ; Numbness/tingling  No; Weakness  Yes       Brief physical exam:   Sensory deficit No; Weakness Yes; Atrophy  No; Reflex abnormality No      Nerve / Sites Latency Amplitude Amp. 1-2 Distance Lat Diff Velocity Temp.    ms mV % cm ms m/s °C   R Median - APB      Wrist 3.33 4.6 100 8   32.5      Elbow 7.34 2.8 62.2 21 4.01 52 32.5   R Ulnar - ADM      Wrist 3.28 6.6 100 8   32.4      B. Elbow 6.88 5.3 80.3 19 3.59 53 32.4      A. Elbow 8.44 5.5 82.3 10 1.56 64 32.4   R Peroneal - EDB      Ankle 4.38 0.6 100 8   31.7      Fib head 11.72 0.3 46.4 28 7.34 38 31      Pop fossa 13.65 0.7 133 10 1.93 52 31   L Peroneal - EDB      Ankle 5.10 2.4 100 8   31      Fib head 10.89 2.4 100 26 5.78 45 31      Pop fossa 13.33 2.3 96.9 10 2.45 41 31   R Tibial - AH      Ankle 4.17 8.9 100 8   31.6      Pop fossa 13.96 6.4 71.5 40 9.79 41 31.7     Sensory NCS      Nerve / Sites Onset Lat Peak Lat PP Amp Amp. 1-2 Distance Velocity Temp.    ms ms µV % cm m/s °C   R Median - Digit II (Antidromic)      Mid Palm 1.46 2.24 41.2 100 7 48 32.4      Wrist 2.92 3.96 34.7 91.5 14 48 32.4   R Ulnar - Digit V (Antidromic)      Wrist 3.02 3.85 23.8 100 14 46 32.4   R Radial - Anatomical  (Forearm)      Forearm 1.82 2.45 26.3 100 10 55 32.5   R Sural - Ankle (Calf)      Calf 3.49 4.32 6.4 100 14 40 31.6   L Sural - Ankle (Calf)      Calf 3.70 4.38 10.5 100 14 48 31.4   R Superficial peroneal - Ankle      Lat leg NR NR NR NR 10 NR 31.4   L Superficial peroneal - Ankle      Lat leg NR NR NR NR 10 NR 30.3     F  Wave      Nerve F Lat M Lat F-M Lat    ms ms ms   R Tibial - AH 60.3 4.6 55.7   L Peroneal - EDB 46.0 4.5 41.6   R Median - APB 26.7 3.3 23.4   R Ulnar - ADM 30.6 3.1 27.4       H Reflex      Nerve Lat Hmax    ms   R Tibial - Soleus 32.0   L Tibial - Soleus 33. 1       EMG         EMG Summary Table     Spontaneous MUAP Recruitment   Muscle IA Fib PSW Fasc H.F. Amp Dur. PPP Pattern   R. Vastus lateralis Normal None None None None Normal Normal None Normal   R. Vastus medialis Normal None None None None Normal Normal None Normal   R. Tibialis anterior Normal None None None None Normal Normal None Normal   R. Peroneus longus Normal None None None None Normal Normal None Normal   R. Gastrocnemius (Medial head) Normal None None None None Normal Normal None Normal   R. First dorsal interosseous Normal None None None None Normal Normal None Normal   R. Pronator teres Normal None None None None Normal Normal None Normal   R. Biceps brachii Normal None None None None Normal Normal None Normal   R. Triceps brachii Normal None None None None Normal Normal None Normal   R. Deltoid Normal None None None None Normal Normal None Normal   L. Vastus medialis Normal None None None None Normal Normal None Normal     Study Limitations:  None    Summary of Findings:   Nerve conduction studies:   · The following nerve conduction studies were abnormal:   · Mildly reduced conduction velocity was seen in the right median sensory nerve. · There were absent responses noted in the bilateral superficial peroneal nerves, this was due to technical reasons.   · All other nerve conduction studies listed in the table above were normal in latency, amplitude and conduction velocity. Needle EMG:   · Needle EMG was performed using a concentric needle.  All the muscles tested, as listed in the table above demonstrated normal amplitude, duration, phases and recruitment and no active denervation signs were seen. Diagnostic Interpretation: This study was abnormal.     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 There is no evidence of an underlying myopathy, radiculopathy or a large fiber polyneuropathy. Previous Study: NA      Follow up EMG is recommended if clinically indicated. Technologist: [address]    Physician: Rachel Rasmussen MD    Nerve conduction studies and electromyography were performed according to our laboratory policies and procedures which can be provided upon request. All abnormal values are identified in the table.  Laboratory normal values can also be provided upon request.       Cc: MD Enrique Goins DO

## 2021-10-01 ENCOUNTER — TELEPHONE (OUTPATIENT)
Dept: NEUROLOGY | Age: 66
End: 2021-10-01

## 2021-10-01 NOTE — TELEPHONE ENCOUNTER
Left message for patient and informed her of her positive Sjogren's antibody. Results forwarded to PCP.

## 2021-10-01 NOTE — TELEPHONE ENCOUNTER
----- Message from Josie Grove MD sent at 9/30/2021  1:46 PM EDT -----  Please inform the patient that her Sjogren's antibody is positive.   Follow-up with rheumatology

## 2021-10-08 LAB
Lab: NORMAL
REPORT: NORMAL
THIS TEST SENT TO: NORMAL

## 2021-10-12 LAB
Lab: NORMAL
REPORT: NORMAL
THIS TEST SENT TO: NORMAL

## 2021-11-15 ENCOUNTER — OFFICE VISIT (OUTPATIENT)
Dept: NEUROLOGY | Age: 66
End: 2021-11-15
Payer: MEDICARE

## 2021-11-15 VITALS
TEMPERATURE: 97.6 F | WEIGHT: 160 LBS | DIASTOLIC BLOOD PRESSURE: 73 MMHG | HEART RATE: 60 BPM | SYSTOLIC BLOOD PRESSURE: 121 MMHG | BODY MASS INDEX: 27.31 KG/M2 | HEIGHT: 64 IN | OXYGEN SATURATION: 96 %

## 2021-11-15 DIAGNOSIS — G72.9 MYOPATHY: Primary | ICD-10-CM

## 2021-11-15 DIAGNOSIS — R06.02 SHORTNESS OF BREATH: ICD-10-CM

## 2021-11-15 DIAGNOSIS — M62.81 MUSCLE WEAKNESS: ICD-10-CM

## 2021-11-15 PROCEDURE — 1036F TOBACCO NON-USER: CPT | Performed by: PSYCHIATRY & NEUROLOGY

## 2021-11-15 PROCEDURE — 1090F PRES/ABSN URINE INCON ASSESS: CPT | Performed by: PSYCHIATRY & NEUROLOGY

## 2021-11-15 PROCEDURE — 4040F PNEUMOC VAC/ADMIN/RCVD: CPT | Performed by: PSYCHIATRY & NEUROLOGY

## 2021-11-15 PROCEDURE — G8427 DOCREV CUR MEDS BY ELIG CLIN: HCPCS | Performed by: PSYCHIATRY & NEUROLOGY

## 2021-11-15 PROCEDURE — 99214 OFFICE O/P EST MOD 30 MIN: CPT | Performed by: PSYCHIATRY & NEUROLOGY

## 2021-11-15 PROCEDURE — G8417 CALC BMI ABV UP PARAM F/U: HCPCS | Performed by: PSYCHIATRY & NEUROLOGY

## 2021-11-15 PROCEDURE — G8400 PT W/DXA NO RESULTS DOC: HCPCS | Performed by: PSYCHIATRY & NEUROLOGY

## 2021-11-15 PROCEDURE — 1123F ACP DISCUSS/DSCN MKR DOCD: CPT | Performed by: PSYCHIATRY & NEUROLOGY

## 2021-11-15 PROCEDURE — 3017F COLORECTAL CA SCREEN DOC REV: CPT | Performed by: PSYCHIATRY & NEUROLOGY

## 2021-11-15 PROCEDURE — G8484 FLU IMMUNIZE NO ADMIN: HCPCS | Performed by: PSYCHIATRY & NEUROLOGY

## 2021-11-15 RX ORDER — MYCOPHENOLATE MOFETIL 500 MG/1
1000 TABLET ORAL 2 TIMES DAILY
COMMUNITY
Start: 2021-11-12 | End: 2022-05-10

## 2021-11-15 RX ORDER — LISINOPRIL 5 MG/1
5 TABLET ORAL DAILY
COMMUNITY
Start: 2021-09-27

## 2021-11-15 RX ORDER — AMLODIPINE BESYLATE 5 MG/1
5 TABLET ORAL DAILY
COMMUNITY
Start: 2021-10-05 | End: 2022-05-10

## 2021-11-15 ASSESSMENT — ENCOUNTER SYMPTOMS
VOMITING: 0
NAUSEA: 0
PHOTOPHOBIA: 0
SHORTNESS OF BREATH: 0
TROUBLE SWALLOWING: 0

## 2021-11-15 NOTE — PROGRESS NOTES
NEUROLOGY FOLLOW UP NOTE     Date: 11/15/2021  Name: Anthony Vasques  MRN: <[id number]>  Patient's PCP: Arlington Canavan, DO     REASON FOR VISIT/CHIEF COMPLAINT: Muscle weakness     Interval history:  I have personally reviewed her medical records. Clinic  The patient is coming in for follow-up visit reports generalized muscle aches and weakness  Weakness are mostly located in the proximal arm and leg muscles. She has difficulty getting up from the chair, combing hair, walking, difficulty going up and down the stairs. No falls however stumbles while walking. Denies any blurred vision, speech or swallowing problems. The patient is currently undergoing evaluation for interstitial fibrosis  Musculoskeletal MRI of the bilateral upper leg and arm showed mild edema/myositis in abductors and vastus musculature bilaterally, myositis in the biceps brachii brachialis and deltoid and subscapularis muscle bilaterally.   Extensive evaluation for myopathy has been normal including HMG CR antibody, GAA enzyme activity, anti-Malinda 1 antibody, NT 5 C1 a antibody  Ck Levels have been normal  TSH levels are high, follows up with primary care physician, currently on Synthroid  PAST MEDICAL HISTORY:   Past Medical History:   Diagnosis Date    CAD in native artery 2021    Cerebral artery occlusion with cerebral infarction ([address]     \"mini stroke\" 10/2014    H/O cardiovascular stress test 2020    Lexiscan    Hyperlipidemia     Hypertension     Sleep apnea     doesnt use cpap     PAST SURGICAL HISTORY:   Past Surgical History:   Procedure Laterality Date    CARDIAC CATHETERIZATION  2012    Right heart cath by Dr Radha Keita Right 2019    intraocular lens    CATARACT REMOVAL WITH IMPLANT Left 10/15/2019     SECTION      COLONOSCOPY      INTRACAPSULAR CATARACT EXTRACTION Right 2019    RIGHT EYE CATARACT EMULSIFICATION IOL IMPLANT performed by Carlo Desai MD at [facility] Left 10/15/2019    LEFT EYE CATARACT EMULSIFICATION IOL IMPLANT performed by Carlo Desai MD at [facility] [address]:   Family History   Problem Relation Age of Onset    Other Mother         complication of surgery    Heart Disease Brother      SOCIAL HISTORY:   Social History     Socioeconomic History    Marital status:      Spouse name: None    Number of children: None    Years of education: None    Highest education level: None   Occupational History    None   Tobacco Use    Smoking status: Former Smoker     Packs/day: 1.50     Years: 40.00     Pack years: 60.00     Types: Cigarettes     Quit date: 2020     Years since quittin.1    Smokeless tobacco: Never Used    Tobacco comment:  she has decreased to 1ppd   Vaping Use    Vaping Use: Never used   Substance and Sexual Activity    Alcohol use: Not Currently     Alcohol/week: 10.0 standard drinks     Types: 10 Glasses of wine per week     Comment: green tea 2 cups a day     Drug use: No    Sexual activity: None   Other Topics Concern    None   Social History Narrative    None     Social Determinants of Health     Financial Resource Strain:     Difficulty of Paying Living Expenses: Not on file   Food Insecurity:     Worried About Running Out of Food in the Last Year: Not on file    Ion of Food in the Last Year: Not on file   Transportation Needs:     Lack of Transportation (Medical): Not on file    Lack of Transportation (Non-Medical):  Not on file   Physical Activity:     Days of Exercise per Week: Not on file    Minutes of Exercise per Session: Not on file   Stress:     Feeling of Stress : Not on file   Social Connections:     Frequency of Communication with Friends and Family: Not on file    Frequency of Social Gatherings with Friends and Family: Not on file    Attends Catholic Services: Not on file   [facility] or Organizations: Not on file    Attends Club or Organization Meetings: Not on file    Marital Status: Not on file   Intimate Partner Violence:     Fear of Current or Ex-Partner: Not on file    Emotionally Abused: Not on file    Physically Abused: Not on file    Sexually Abused: Not on file   Housing Stability:     Unable to Pay for Housing in the Last Year: Not on file    Number of [place] in the Last Year: Not on file    Unstable Housing in the Last Year: Not on file      E-Cigarettes/Vaping Use     Questions Responses    E-Cigarette/Vaping Use Never User    Start Date     Passive Exposure     Quit Date     Counseling Given     Comments          Allergy:   Allergies   Allergen Reactions    Biaxin [Clarithromycin] Nausea And Vomiting    Naproxen Nausea And Vomiting     MEDS:   Current Outpatient Medications:     lisinopril (PRINIVIL;ZESTRIL) 5 MG tablet, Take 5 mg by mouth daily, Disp: , Rfl:     amLODIPine (NORVASC) 5 MG tablet, Take 5 mg by mouth daily, Disp: , Rfl:     mycophenolate (CELLCEPT) 500 MG tablet, Take 1,000 mg by mouth 2 times daily, Disp: , Rfl:     potassium chloride (KLOR-CON M) 10 MEQ extended release tablet, Take 1 tablet by mouth daily Take with Lasix, Disp: 90 tablet, Rfl: 1    furosemide (LASIX) 20 MG tablet, Take one tablet by mouth twice daily except M + F take one tablet three times daily, Disp: 124 tablet, Rfl: 1    metoprolol tartrate (LOPRESSOR) 50 MG tablet, Take 1 tablet by mouth daily, Disp: 90 tablet, Rfl: 2    simvastatin (ZOCOR) 40 MG tablet, Take 1 tablet by mouth nightly, Disp: 90 tablet, Rfl: 3    levothyroxine (SYNTHROID) 25 MCG tablet, Take 25 mcg by mouth Daily, Disp: , Rfl:     rOPINIRole (REQUIP) 0.25 MG tablet, Take 1 tablet by mouth daily (Patient taking differently: Take 0.5 mg by mouth nightly ), Disp: 90 tablet, Rfl: 3    Cholecalciferol (VITAMIN D) 2000 UNITS CAPS capsule, Take  by mouth daily. , Disp: , Rfl:   aspirin 81 MG tablet, Take 81 mg by mouth 2 times daily , Disp: , Rfl:     sildenafil (REVATIO) 20 MG tablet, TAKE ONE TABLET BY MOUTH THREE TIMES A DAY (Patient not taking: Reported on 11/15/2021), Disp: , Rfl:     REVIEW OF SYSTEMS  Review of Systems   Constitutional: Negative for appetite change, fatigue and unexpected weight change. HENT: Negative for drooling, hearing loss, tinnitus and trouble swallowing. Eyes: Negative for photophobia and visual disturbance. Respiratory: Negative for shortness of breath. Cardiovascular: Negative for palpitations. Gastrointestinal: Negative for nausea and vomiting. Endocrine: Negative for polyuria. Genitourinary: Negative for flank pain. Musculoskeletal: Negative for neck pain and neck stiffness. Skin: Negative for rash. Allergic/Immunologic: Negative for food allergies. Neurological: Positive for weakness. Negative for dizziness, tremors, seizures, syncope, speech difficulty, light-headedness, numbness and headaches. Hematological: Negative for adenopathy. Psychiatric/Behavioral: Negative for agitation, behavioral problems and sleep disturbance. PHYSICAL EXAM:   /73 (Site: Right Upper Arm)   Pulse 60   Temp 97.6 °F (36.4 °C)   Ht 5' 4\" (1.626 m)   Wt 160 lb (72.6 kg)   SpO2 96%   BMI 27.46 kg/m²     Neurological examination       MOTOR: Normal bulk. Tone normal and symmetric throughout. Bicep Tricep Delt Grasp IPsoas Quads Hams DFlex PFlex EHL   Left 5 5 4- 5 4- 4- 5 5 5 5   Right 5 5 4- 5 4- 4- 5 5 5 5     Difficulty getting up from the chair,    Pushes her torso up with the bilateral arms to get up from the chair.     DIAGNOSTIC TESTS:     I have personally reviewed the most recent lab results:    Sodium   Date Value Ref Range Status   04/05/2021 138 132 - 146 mmol/L Final   09/10/2015 140 132 - 146 mmol/L Final   08/15/2014 140 132 - 146 mmol/L Final     Potassium   Date Value Ref Range Status   04/05/2021 4.5 3.5 - 5.0 mmol/L Final   09/10/2015 4.6 3.5 - 5.0 mmol/L Final   08/15/2014 4.5 3.5 - 5.0 mmol/L Final     Chloride   Date Value Ref Range Status   04/05/2021 102 98 - 107 mmol/L Final   09/10/2015 101 98 - 107 mmol/L Final   08/15/2014 104 98 - 107 mmol/L Final     CO2   Date Value Ref Range Status   04/05/2021 28 22 - 29 mmol/L Final   09/10/2015 30 (H) 22 - 29 mmol/L Final   08/15/2014 25 22 - 29 mmol/L Final     BUN   Date Value Ref Range Status   04/05/2021 13 8 - 23 mg/dL Final   04/29/2020 12 8 - 23 mg/dL Final   09/10/2015 15 8 - 23 mg/dL Final     CREATININE   Date Value Ref Range Status   04/05/2021 0.6 0.5 - 1.0 mg/dL Final   04/29/2020 0.7 0.5 - 1.0 mg/dL Final   09/10/2015 0.7 0.5 - 1.0 mg/dL Final     GFR Non-   Date Value Ref Range Status   04/05/2021 >60 >=60 mL/min/1.73 Final     Comment:     Chronic Kidney Disease: less than 60 ml/min/1.73 sq.m. Kidney Failure: less than 15 ml/min/1.73 sq.m. Results valid for patients 18 years and older. 04/29/2020 >60 >=60 mL/min/1.73 Final     Comment:     Chronic Kidney Disease: less than 60 ml/min/1.73 sq.m. Kidney Failure: less than 15 ml/min/1.73 sq.m. Results valid for patients 18 years and older. 09/10/2015 >60 >=60 mL/min/1.73 Final     Comment:     Chronic Kidney Disease: less than 60 ml/min/1.73 sq.m. Kidney Failure: less than 15 ml/min/1.73 sq.m. Results valid for patients 18 years and older.        Calcium   Date Value Ref Range Status   04/05/2021 9.7 8.6 - 10.2 mg/dL Final   09/10/2015 9.6 8.6 - 10.2 mg/dL Final   08/15/2014 9.8 8.6 - 10.2 mg/dL Final     WBC   Date Value Ref Range Status   04/05/2021 6.4 4.5 - 11.5 E9/L Final   04/05/2021 6.4 4.5 - 11.5 E9/L Final   09/10/2015 5.8 4.5 - 11.5 E9/L Final     Hemoglobin   Date Value Ref Range Status   04/05/2021 12.0 11.5 - 15.5 g/dL Final   04/05/2021 12.1 11.5 - 15.5 g/dL Final   09/10/2015 15.1 11.5 - 15.5 g/dL Final     Hematocrit   Date Value Ref Range Status   04/05/2021 37.5 34.0 - 48.0 % Final   04/05/2021 38.2 34.0 - 48.0 % Final   09/10/2015 46.5 34.0 - 48.0 % Final     Platelets   Date Value Ref Range Status   04/05/2021 273 130 - 450 E9/L Final   04/05/2021 296 130 - 450 E9/L Final   09/10/2015 233 130 - 450 E9/L Final     Neutrophils %   Date Value Ref Range Status   04/05/2021 64.8 43.0 - 80.0 % Final   04/05/2021 62.4 43.0 - 80.0 % Final   09/10/2015 62 43 - 80 % Final     Monocytes %   Date Value Ref Range Status   04/05/2021 9.0 2.0 - 12.0 % Final   04/05/2021 9.9 2.0 - 12.0 % Final   09/10/2015 6 2 - 12 % Final     Total Protein   Date Value Ref Range Status   09/28/2021 7.9 6.4 - 8.3 g/dL Final   09/10/2015 7.1 6.4 - 8.3 g/dL Final   08/15/2014 7.0 6.4 - 8.3 g/dL Final     Total Bilirubin   Date Value Ref Range Status   09/28/2021 0.4 0.0 - 1.2 mg/dL Final   09/10/2015 0.4 0.0 - 1.2 mg/dL Final   08/15/2014 0.4 0.0 - 1.2 mg/dL Final     Alkaline Phosphatase   Date Value Ref Range Status   09/28/2021 88 35 - 104 U/L Final   09/10/2015 67 35 - 104 U/L Final   08/15/2014 74 35 - 104 U/L Final     ALT   Date Value Ref Range Status   09/28/2021 19 0 - 32 U/L Final   09/10/2015 17 0 - 32 U/L Final   08/15/2014 35 (H) 0 - 32 U/L Final     AST   Date Value Ref Range Status   09/28/2021 21 0 - 31 U/L Final   09/10/2015 17 0 - 31 U/L Final   08/15/2014 25 0 - 31 U/L Final     Lab Results   Component Value Date    INR 1.3 04/05/2021     Lab Results   Component Value Date    TRIG 117 09/10/2015    HDL 73 09/10/2015     No components found for: HGBA1C  No results found for: PROTEINCSF, GLUCCSF, WBCCSF    Controlled Substance Monitoring:    Acute and Chronic Pain Monitoring:   No flowsheet data found. MEDICAL DECISION MAKING  ASSESSMENT/PLAN    Geraldine Lovell was seen today for extremity weakness.     Diagnoses and all orders for this visit:    Myopathy    Muscle weakness       · Extensive work-up including [facility] in [facility] clinic has been nonrevealing, currently she was diagnosed with scleroderma and is going to start treatment with mycophenolate. · MRI of the muscles showed edema and myositis in multiple muscles in upper and lower extremities. · At this time the myositis could be secondary to underlying scleroderma however myositis secondary to chronic alcohol use versus hypothyroidism cannot be entirely ruled out. · Follows up with primary care physician for management of hypothyroidism currently on Synthroid. · Patient is scheduled to undergo muscle biopsy at the [facility] clinic. Will await results. · U3 RNP fibrillation antibody antibody: Positive  · Follow-up after muscle biopsy. Thank you for involving me in the care of your patient. Patient's current medication list, allergies, problem list and results of all previously ordered testing and scans were reviewed at today's visit.       Ines Venegas MD    [facility]  [address] [place] [address]

## 2021-11-16 RX ORDER — FUROSEMIDE 20 MG/1
TABLET ORAL
Qty: 204 TABLET | Refills: 3 | Status: SHIPPED
Start: 2021-11-16 | End: 2022-02-08 | Stop reason: SDUPTHER

## 2021-12-12 NOTE — PROGRESS NOTES
[id number] [facility] Cardiology consult  Dr. Angle Montgomery      Reason for Consult: Abnormal echocardiogram  Referring Physician: Naresh Gonzalez DO     CHIEF COMPLAINT:   Chief Complaint   Patient presents with    Shortness of Breath     6 MONTH CHECK . Patient is having CP and SOb on exertion. Jing Gil had a right heart cath at [facility] from her [facility] hypertension physician       HISTORY OF PRESENT ILLNESS:   Patient is 78years old female with history of paroxysmal atrial fibrillation, congestive heart failure, pulmonary hypertension, hypertension, hyperlipidemia, is here for follow-up appointment. Shortness of breath is at baseline, occasional pedal edema, no chest pain, no palpitations, no syncope, no presyncopal episodes. Functional capacity is at baseline      Prior to Visit Medications    Medication Sig Taking? Authorizing Provider   levothyroxine (SYNTHROID) 50 MCG tablet TAKE ONE TABLET BY MOUTH EVERY DAY Yes Historical Provider, MD   furosemide (LASIX) 20 MG tablet Take one tablet by mouth twice daily except M + F take one tablet three times daily Yes Angle Montgomery MD   lisinopril (PRINIVIL;ZESTRIL) 5 MG tablet Take 5 mg by mouth daily Yes Historical Provider, MD   amLODIPine (NORVASC) 5 MG tablet Take 5 mg by mouth daily Yes Historical Provider, MD   mycophenolate (CELLCEPT) 500 MG tablet Take 1,000 mg by mouth 2 times daily Yes Historical Provider, MD   potassium chloride (KLOR-CON M) 10 MEQ extended release tablet Take 1 tablet by mouth daily Take with Lasix Yes Angle Montgomery MD   metoprolol tartrate (LOPRESSOR) 50 MG tablet Take 1 tablet by mouth daily Yes Angle Montgomery MD   simvastatin (ZOCOR) 40 MG tablet Take 1 tablet by mouth nightly Yes Angle Montgomery MD   rOPINIRole (REQUIP) 0.25 MG tablet Take 1 tablet by mouth daily  Patient taking differently: Take 0.5 mg by mouth nightly  Yes Naresh Gonzalez DO   Cholecalciferol (VITAMIN D) 2000 UNITS CAPS capsule Take  by mouth daily.  Yes Historical Provider, MD aspirin 81 MG tablet Take 81 mg by mouth 2 times daily  Yes Historical Provider, MD       Social History     Tobacco Use    Smoking status: Former Smoker     Packs/day: 1.50     Years: 40.00     Pack years: 60.00     Types: Cigarettes     Quit date: 2020     Years since quittin.2    Smokeless tobacco: Never Used    Tobacco comment:  she has decreased to 1ppd   Vaping Use    Vaping Use: Never used   Substance Use Topics    Alcohol use: Not Currently     Alcohol/week: 10.0 standard drinks     Types: 10 Glasses of wine per week     Comment: green tea 2 cups a day     Drug use: No          Past Medical History:   Diagnosis Date    CAD in native artery 2021    Cerebral artery occlusion with cerebral infarction ([facility] [address]     \"mini stroke\" 10/2014    H/O cardiovascular stress test 2020    Lexiscan    Hyperlipidemia     Hypertension     Sleep apnea     doesnt use cpap         Past Surgical History:   Procedure Laterality Date    CARDIAC CATHETERIZATION  2012    Right heart cath by Dr Vandana Alcantar Right 2019    intraocular lens    CATARACT REMOVAL WITH IMPLANT Left 10/15/2019     SECTION      COLONOSCOPY      INTRACAPSULAR CATARACT EXTRACTION Right 2019    RIGHT EYE CATARACT EMULSIFICATION IOL IMPLANT performed by Adam Jain MD at [place] [address] Left 10/15/2019    LEFT EYE CATARACT EMULSIFICATION IOL IMPLANT performed by Adam Jain MD at [address] [place] [address]         Current Outpatient Medications   Medication Sig Dispense Refill    levothyroxine (SYNTHROID) 50 MCG tablet TAKE ONE TABLET BY MOUTH EVERY DAY      furosemide (LASIX) 20 MG tablet Take one tablet by mouth twice daily except M + F take one tablet three times daily 204 tablet 3    lisinopril (PRINIVIL;ZESTRIL) 5 MG tablet Take 5 mg by mouth daily      amLODIPine (NORVASC) 5 MG tablet Take 5 mg by mouth daily      mycophenolate (CELLCEPT) 500 MG tablet Take 1,000 mg by mouth 2 times daily      potassium chloride (KLOR-CON M) 10 MEQ extended release tablet Take 1 tablet by mouth daily Take with Lasix 90 tablet 1    metoprolol tartrate (LOPRESSOR) 50 MG tablet Take 1 tablet by mouth daily 90 tablet 2    simvastatin (ZOCOR) 40 MG tablet Take 1 tablet by mouth nightly 90 tablet 3    rOPINIRole (REQUIP) 0.25 MG tablet Take 1 tablet by mouth daily (Patient taking differently: Take 0.5 mg by mouth nightly ) 90 tablet 3    Cholecalciferol (VITAMIN D) 2000 UNITS CAPS capsule Take  by mouth daily.  aspirin 81 MG tablet Take 81 mg by mouth 2 times daily        No current facility-administered medications for this visit.          Allergies as of 2021 - Fully Reviewed 2021   Allergen Reaction Noted    Biaxin [clarithromycin] Nausea And Vomiting 2012    Naproxen Nausea And Vomiting 2012       Social History     Socioeconomic History    Marital status:      Spouse name: Not on file    Number of children: Not on file    Years of education: Not on file    Highest education level: Not on file   Occupational History    Not on file   Tobacco Use    Smoking status: Former Smoker     Packs/day: 1.50     Years: 40.00     Pack years: 60.00     Types: Cigarettes     Quit date: 2020     Years since quittin.2    Smokeless tobacco: Never Used    Tobacco comment:  she has decreased to 1ppd   Vaping Use    Vaping Use: Never used   Substance and Sexual Activity    Alcohol use: Not Currently     Alcohol/week: 10.0 standard drinks     Types: 10 Glasses of wine per week     Comment: green tea 2 cups a day     Drug use: No    Sexual activity: Not on file   Other Topics Concern    Not on file   Social History Narrative    Not on file     Social Determinants of Health     Financial Resource Strain:     Difficulty of Paying Living Expenses: Not on file   Food Insecurity:     Worried About Running Out of Food in the Last Year: Not on file    Ion of Food in the Last Year: Not on file   Transportation Needs:     Lack of Transportation (Medical): Not on file    Lack of Transportation (Non-Medical):  Not on file   Physical Activity:     Days of Exercise per Week: Not on file    Minutes of Exercise per Session: Not on file   Stress:     Feeling of Stress : Not on file   Social Connections:     Frequency of Communication with Friends and Family: Not on file    Frequency of Social Gatherings with Friends and Family: Not on file    Attends Faith Services: Not on file    Active Member of [address] [place] [address] [employer] or Organizations: Not on file    Attends Club or Organization Meetings: Not on file    Marital Status: Not on file   Intimate Partner Violence:     Fear of Current or Ex-Partner: Not on file    Emotionally Abused: Not on file    Physically Abused: Not on file    Sexually Abused: Not on file   Housing Stability:     Unable to Pay for Housing in the Last Year: Not on file    Number of [place] in the Last Year: Not on file    Unstable Housing in the Last Year: Not on file       Family History   Problem Relation Age of Onset    Other Mother         complication of surgery    Heart Disease Brother        REVIEW OF SYSTEMS:     CONSTITUTIONAL:  negative for  fevers, chills, sweats, + fatigue  HEENT:  negative for  tinnitus, earaches, nasal congestion and epistaxis  RESPIRATORY:  negative for  dry cough, cough with sputum, wheezing and hemoptysis  GASTROINTESTINAL:  negative for nausea, vomiting, diarrhea, constipation, pruritus and jaundice  HEMATOLOGIC/LYMPHATIC:  negative for easy bruising, bleeding, lymphadenopathy and petechiae  ENDOCRINE:  negative for heat intolerance, cold intolerance, tremor, hair loss and diabetic symptoms including neither polyuria nor polydipsia nor blurred vision  MUSCULOSKELETAL:  negative for  myalgias, arthralgias, joint swelling, stiff joints and decreased range of motion  NEUROLOGICAL:  negative for memory problems, speech problems, visual disturbance, dysphagia, weakness and numbness      PHYSICAL EXAMINATION:   CONSTITUTIONAL:  awake, alert, cooperative, no apparent distress, and appears stated age  HEAD:  normocepalic, without obvious abnormality, atraumatic  NECK:  Supple, symmetrical, trachea midline, no adenopathy, thyroid symmetric, not enlarged and no tenderness, skin normal  LUNGS:  No increased work of breathing, decreased air exchange, no rales no wheezing  CARDIOVASCULAR:  Normal apical impulse, irregularly irregular, normal S1 and S2, no S3 or S4, 3/6 systolic murmur at the left lower sternal border, no edema, no JVD, no carotid bruit. ABDOMEN:  Soft, nontender, no masses, no hepatomegaly, no splenomegaly, BS+  MUSCULOSKELETAL:  No clubbing no cyanosis. there is no redness, warmth, or swelling of the joints  full range of motion noted  NEUROLOGIC:  Alert, awake,oriented x3  SKIN:  no bruising or bleeding, normal skin color, texture, turgor and no redness, warmth, or swelling    /68 (Site: Right Upper Arm, Position: Sitting, Cuff Size: Medium Adult)   Pulse 110   Ht 5' 4\" (1.626 m)   Wt 164 lb (74.4 kg)   BMI 28.15 kg/m²     DATA:   I personally reviewed the visit EKG with the following interpretation: Atrial fibrillation/flutter with rapid ventricular response, nonspecific T wave changes, PVCs versus aberrantly conducted beats    EKG 2/2/21 Sinus rhythm with PACs, old septal wall MI age undetermined, normal axis. ECHO: 2/25/21 Summary   No comparison study available. Technically adequate study. Left ventricle size is normal.   Mild asymmetric septal hypertrophy. Ejection fraction is visually estimated at 65%. Overall ejection fraction increased (hyperdynamic), with complete   obliteration of the LV cavity during systole. No regional wall motion abnormalities seen.    There is doppler evidence of stage III diastolic dysfunction. Mildly enlarged right ventricle cavity. Right ventricle global systolic function is moderately reduced . Moderate tricuspid regurgitation. RVSP is 72 mmHg. Pulmonary hypertension is severe . Stress Test:  6/29/20        FINDINGS:    There is normal accumulation of tracer throughout the myocardium on rest    images and stress images.  There are no fixed or reversible defects.         End diastolic volume is 37 ml. The ejection fraction equals 79% with no focal    wall motion abnormalities. There is no left ventricular dilatation.              Impression    1. No pharmacologically induced reversible perfusion defects to suggest    ischemia    2. Ejection fraction of 79%    3. No focal wall motion abnormality     Angiography: 4/6/21 IMPRESSION:  Moderately elevated pulmonary arterial pressure with normal  LV filling pressures.     Cardiology Labs: BMP:    Lab Results   Component Value Date     04/05/2021    K 4.5 04/05/2021     04/05/2021    CO2 28 04/05/2021    BUN 13 04/05/2021    CREATININE 0.6 04/05/2021     CMP:    Lab Results   Component Value Date     04/05/2021    K 4.5 04/05/2021     04/05/2021    CO2 28 04/05/2021    BUN 13 04/05/2021    CREATININE 0.6 04/05/2021    PROT 7.9 09/28/2021     CBC:    Lab Results   Component Value Date    WBC 6.4 04/05/2021    WBC 6.4 04/05/2021    RBC 4.09 04/05/2021    RBC 4.14 04/05/2021    HGB 12.0 04/05/2021    HGB 12.1 04/05/2021    HCT 37.5 04/05/2021    HCT 38.2 04/05/2021    MCV 91.7 04/05/2021    MCV 92.3 04/05/2021    RDW 14.9 04/05/2021    RDW 14.9 04/05/2021     04/05/2021     04/05/2021     PT/INR:  No results found for: PTINR  PT/INR Warfarin:  No components found for: PTPATWAR, PTINRWAR  PTT:    Lab Results   Component Value Date    APTT 35.6 04/05/2021     PTT Heparin:  No components found for: APTTHEP  Magnesium:  No results found for: MG  TSH:    Lab Results   Component Value Date    TSH 6.450 09/28/2021     TROPONIN:  No components found for: TROP  BNP:  No results found for: BNP  FASTING LIPID PANEL:    Lab Results   Component Value Date    CHOL 217 09/10/2015    HDL 73 09/10/2015    TRIG 117 09/10/2015     No orders to display     I have personally reviewed the laboratory, cardiac diagnostic and radiographic testing as outlined above:      ASSESSMENT:  1. Atrial fibrillation/flutter: Rapid ventricular response, will start on Toprol-XL 50 mg by mouth daily, [id number] score of 3, will benefit from chronic anticoagulation for primary prevention against a thromboembolic event, will start on Eliquis  2. Chronic diastolic congestive heart failure: Compensated, will continue current treatment  3. Severe pulmonary hypertension: Follows with pulmonary hypertension clinic at [facility]  4. Moderate tricuspid valve regurgitation  5. Hypertension: Controlled    RECOMMENDATIONS:   1. Toprol-XL 50 mg 1 by mouth daily  2.  will change Eliquis to Coumadin since it is not covered by her health insurance plan  3. Continue the rest of treatment  4. CHF: Daily weight, take an extra Lasix for weight gain of more than 2-3 pounds in 24 hours, compliance with diuretics, low-salt diet were all advised. 5.  Follow-up both Dr. Yehuda Cortez as scheduled  6. Follow-up with Dr. Renea Fontaine in 1 year, sooner if symptomatic for any reason    I have reviewed my findings and recommendations with patient      Electronically signed by Leonardo Boyd MD on 12/13/2021 at 4:33 PM   NOTE: This report was transcribed using voice recognition software.  Every effort was made to ensure accuracy; however, inadvertent computerized transcription errors may be present

## 2021-12-13 ENCOUNTER — OFFICE VISIT (OUTPATIENT)
Dept: CARDIOLOGY CLINIC | Age: 66
End: 2021-12-13
Payer: MEDICARE

## 2021-12-13 VITALS
DIASTOLIC BLOOD PRESSURE: 68 MMHG | HEIGHT: 64 IN | WEIGHT: 164 LBS | SYSTOLIC BLOOD PRESSURE: 128 MMHG | BODY MASS INDEX: 28 KG/M2 | HEART RATE: 110 BPM

## 2021-12-13 DIAGNOSIS — R06.02 SHORTNESS OF BREATH: Primary | ICD-10-CM

## 2021-12-13 DIAGNOSIS — I10 ESSENTIAL HYPERTENSION: ICD-10-CM

## 2021-12-13 PROCEDURE — 1123F ACP DISCUSS/DSCN MKR DOCD: CPT | Performed by: INTERNAL MEDICINE

## 2021-12-13 PROCEDURE — 1090F PRES/ABSN URINE INCON ASSESS: CPT | Performed by: INTERNAL MEDICINE

## 2021-12-13 PROCEDURE — 93000 ELECTROCARDIOGRAM COMPLETE: CPT | Performed by: INTERNAL MEDICINE

## 2021-12-13 PROCEDURE — 99214 OFFICE O/P EST MOD 30 MIN: CPT | Performed by: INTERNAL MEDICINE

## 2021-12-13 PROCEDURE — 3017F COLORECTAL CA SCREEN DOC REV: CPT | Performed by: INTERNAL MEDICINE

## 2021-12-13 PROCEDURE — 4040F PNEUMOC VAC/ADMIN/RCVD: CPT | Performed by: INTERNAL MEDICINE

## 2021-12-13 PROCEDURE — G8484 FLU IMMUNIZE NO ADMIN: HCPCS | Performed by: INTERNAL MEDICINE

## 2021-12-13 PROCEDURE — G8417 CALC BMI ABV UP PARAM F/U: HCPCS | Performed by: INTERNAL MEDICINE

## 2021-12-13 PROCEDURE — 1036F TOBACCO NON-USER: CPT | Performed by: INTERNAL MEDICINE

## 2021-12-13 PROCEDURE — G8427 DOCREV CUR MEDS BY ELIG CLIN: HCPCS | Performed by: INTERNAL MEDICINE

## 2021-12-13 PROCEDURE — G8400 PT W/DXA NO RESULTS DOC: HCPCS | Performed by: INTERNAL MEDICINE

## 2021-12-13 RX ORDER — LEVOTHYROXINE SODIUM 0.07 MG/1
75 TABLET ORAL DAILY
COMMUNITY
Start: 2021-12-01

## 2021-12-15 ENCOUNTER — TELEPHONE (OUTPATIENT)
Dept: CARDIOLOGY CLINIC | Age: 66
End: 2021-12-15

## 2021-12-15 RX ORDER — METOPROLOL TARTRATE 50 MG/1
50 TABLET, FILM COATED ORAL 2 TIMES DAILY
Qty: 180 TABLET | Refills: 2 | Status: SHIPPED | OUTPATIENT
Start: 2021-12-15

## 2021-12-15 NOTE — TELEPHONE ENCOUNTER
Patient wants to have a repeat EKG prior to starting on a blood thinner.   She wasn't comfortable with starting a new medication stating when they were trying to perform the EKG they were having a lot of problems in the room    She did price the Eliquis and it's going to cost her over $300 per month - she asked why you picked the Eliquis vs coumadin

## 2021-12-16 NOTE — TELEPHONE ENCOUNTER
Can stop aspirin while on Eliquis
Since patient was put on Eliquis - is she to remain on aspirin?
ideal

## 2021-12-16 NOTE — TELEPHONE ENCOUNTER
Eliquis is more convenient and some insurance companies cover it, however, it is not worth $300 a month for that convenience, I think she would do well with the warfarin

## 2021-12-22 ENCOUNTER — NURSE ONLY (OUTPATIENT)
Dept: CARDIOLOGY CLINIC | Age: 66
End: 2021-12-22
Payer: MEDICARE

## 2021-12-22 DIAGNOSIS — I25.10 CAD IN NATIVE ARTERY: Primary | ICD-10-CM

## 2021-12-22 PROCEDURE — 93000 ELECTROCARDIOGRAM COMPLETE: CPT | Performed by: INTERNAL MEDICINE

## 2021-12-23 RX ORDER — WARFARIN SODIUM 5 MG/1
5 TABLET ORAL DAILY
Qty: 30 TABLET | Refills: 3 | Status: SHIPPED
Start: 2021-12-23 | End: 2022-02-08 | Stop reason: SDUPTHER

## 2021-12-23 NOTE — TELEPHONE ENCOUNTER
Information left on voicemail. INR visit scheduled for Tuesday.   RX routed to Dr Eva Voss for coumadin

## 2021-12-27 ENCOUNTER — TELEPHONE (OUTPATIENT)
Dept: CARDIOLOGY CLINIC | Age: 66
End: 2021-12-27

## 2021-12-27 NOTE — TELEPHONE ENCOUNTER
Joel Montejo got the VM that was left for her. She had a coupon for Eliquis and has enough until the end of January. Wants to wait to start Coumadin. INR appt canceled. Also for her PHTN, Lasix was increased to 3 daily and in January will restart sildenafil 20 mg 3x daily. She would like to speak with Jonna Varela before starting Coumadin. Advised to call Jonna Varela 1/10/22.

## 2022-02-08 ENCOUNTER — ANTI-COAG VISIT (OUTPATIENT)
Dept: CARDIOLOGY CLINIC | Age: 67
End: 2022-02-08
Payer: MEDICARE

## 2022-02-08 DIAGNOSIS — I48.91 ATRIAL FIBRILLATION, UNSPECIFIED TYPE (HCC): ICD-10-CM

## 2022-02-08 DIAGNOSIS — R06.02 SHORTNESS OF BREATH: ICD-10-CM

## 2022-02-08 LAB
INTERNATIONAL NORMALIZATION RATIO, POC: 4.9
PROTHROMBIN TIME, POC: ABNORMAL

## 2022-02-08 PROCEDURE — 85610 PROTHROMBIN TIME: CPT | Performed by: INTERNAL MEDICINE

## 2022-02-08 PROCEDURE — 93793 ANTICOAG MGMT PT WARFARIN: CPT | Performed by: INTERNAL MEDICINE

## 2022-02-08 RX ORDER — WARFARIN SODIUM 5 MG/1
5 TABLET ORAL DAILY
Qty: 90 TABLET | Refills: 3 | Status: SHIPPED | OUTPATIENT
Start: 2022-02-08

## 2022-02-08 RX ORDER — FUROSEMIDE 20 MG/1
TABLET ORAL
Qty: 270 TABLET | Refills: 3 | Status: SHIPPED | OUTPATIENT
Start: 2022-02-08

## 2022-02-08 NOTE — PROGRESS NOTES
INR today in 4.9. Hold today & tomorrow, then start taking 2.5 mg daily.  Check back in 2 weeks per Dr. Jeanna Stout

## 2022-02-11 ENCOUNTER — TELEPHONE (OUTPATIENT)
Dept: CARDIOLOGY CLINIC | Age: 67
End: 2022-02-11

## 2022-02-11 NOTE — TELEPHONE ENCOUNTER
Patient will be having a muscle biopsy with Dr Santos Velasco [facility] needing an OK to hold her thinner for 5-7 days    Fax [phone]

## 2022-02-22 ENCOUNTER — ANTI-COAG VISIT (OUTPATIENT)
Dept: CARDIOLOGY CLINIC | Age: 67
End: 2022-02-22
Payer: MEDICARE

## 2022-02-22 DIAGNOSIS — I48.91 ATRIAL FIBRILLATION, UNSPECIFIED TYPE (HCC): ICD-10-CM

## 2022-02-22 LAB
INTERNATIONAL NORMALIZATION RATIO, POC: 1.5
PROTHROMBIN TIME, POC: ABNORMAL

## 2022-02-22 PROCEDURE — 85610 PROTHROMBIN TIME: CPT | Performed by: INTERNAL MEDICINE

## 2022-02-22 PROCEDURE — 93793 ANTICOAG MGMT PT WARFARIN: CPT | Performed by: INTERNAL MEDICINE

## 2022-03-15 ENCOUNTER — ANTI-COAG VISIT (OUTPATIENT)
Dept: CARDIOLOGY CLINIC | Age: 67
End: 2022-03-15
Payer: MEDICARE

## 2022-03-15 DIAGNOSIS — I48.91 ATRIAL FIBRILLATION, UNSPECIFIED TYPE (HCC): ICD-10-CM

## 2022-03-15 LAB
INTERNATIONAL NORMALIZATION RATIO, POC: 1.8
PROTHROMBIN TIME, POC: ABNORMAL

## 2022-03-15 PROCEDURE — 93793 ANTICOAG MGMT PT WARFARIN: CPT | Performed by: INTERNAL MEDICINE

## 2022-03-15 PROCEDURE — 85610 PROTHROMBIN TIME: CPT | Performed by: INTERNAL MEDICINE

## 2022-03-22 ENCOUNTER — ANTI-COAG VISIT (OUTPATIENT)
Dept: CARDIOLOGY CLINIC | Age: 67
End: 2022-03-22
Payer: MEDICARE

## 2022-03-22 DIAGNOSIS — I48.91 ATRIAL FIBRILLATION, UNSPECIFIED TYPE (HCC): ICD-10-CM

## 2022-03-22 LAB
INTERNATIONAL NORMALIZATION RATIO, POC: 3.7
PROTHROMBIN TIME, POC: ABNORMAL

## 2022-03-22 PROCEDURE — 85610 PROTHROMBIN TIME: CPT | Performed by: INTERNAL MEDICINE

## 2022-03-22 NOTE — PROGRESS NOTES
INR today in 3.7 today. Hold Coumadin for 2 days and then start alternating, 5mg, 5mg, 2.5mg every 3 days. Recheck INR in 2 weeks.

## 2022-03-22 NOTE — PROGRESS NOTES
INR today in 3.7 today. Patient is to hold for 2 days and then start alternating, 5mg, 5mg, 2.5mg every 3 days. Check back in 2 weeks.

## 2022-04-07 ENCOUNTER — ANTI-COAG VISIT (OUTPATIENT)
Dept: CARDIOLOGY CLINIC | Age: 67
End: 2022-04-07
Payer: MEDICARE

## 2022-04-07 DIAGNOSIS — I48.91 ATRIAL FIBRILLATION, UNSPECIFIED TYPE (HCC): ICD-10-CM

## 2022-04-07 LAB
INTERNATIONAL NORMALIZATION RATIO, POC: 2.6
PROTHROMBIN TIME, POC: NORMAL

## 2022-04-07 PROCEDURE — 93793 ANTICOAG MGMT PT WARFARIN: CPT | Performed by: INTERNAL MEDICINE

## 2022-04-07 PROCEDURE — 85610 PROTHROMBIN TIME: CPT | Performed by: INTERNAL MEDICINE

## 2022-04-07 RX ORDER — TORSEMIDE 20 MG/1
TABLET ORAL
COMMUNITY
Start: 2022-04-04

## 2022-04-07 RX ORDER — SPIRONOLACTONE 25 MG/1
TABLET ORAL
COMMUNITY
Start: 2022-04-04

## 2022-04-07 NOTE — PROGRESS NOTES
INR today is 2.6. Patient has been taking 5 mg daily since leaving the hospital Monday 4/4/22. Continue 5 mg daily, check back in 2 weeks.

## 2022-04-21 ENCOUNTER — ANTI-COAG VISIT (OUTPATIENT)
Dept: CARDIOLOGY CLINIC | Age: 67
End: 2022-04-21
Payer: MEDICARE

## 2022-04-21 DIAGNOSIS — I48.91 ATRIAL FIBRILLATION, UNSPECIFIED TYPE (HCC): ICD-10-CM

## 2022-04-21 LAB
INTERNATIONAL NORMALIZATION RATIO, POC: 5.3
PROTHROMBIN TIME, POC: ABNORMAL

## 2022-04-21 PROCEDURE — 85610 PROTHROMBIN TIME: CPT | Performed by: INTERNAL MEDICINE

## 2022-04-21 PROCEDURE — 93793 ANTICOAG MGMT PT WARFARIN: CPT | Performed by: INTERNAL MEDICINE

## 2022-04-21 NOTE — PROGRESS NOTES
INR today is 5.3. Patient is to hold today, tomorrow and Saturday and begin taking 2.5 mg every day, except Mon and Fri 5 mg per Dr. Sophia Aviles. Patient is to check back next Monday.

## 2022-05-02 ENCOUNTER — ANTI-COAG VISIT (OUTPATIENT)
Dept: CARDIOLOGY CLINIC | Age: 67
End: 2022-05-02
Payer: MEDICARE

## 2022-05-02 DIAGNOSIS — I48.91 ATRIAL FIBRILLATION, UNSPECIFIED TYPE (HCC): ICD-10-CM

## 2022-05-02 DIAGNOSIS — I25.10 CAD IN NATIVE ARTERY: ICD-10-CM

## 2022-05-02 LAB
INTERNATIONAL NORMALIZATION RATIO, POC: 2.7
PROTHROMBIN TIME, POC: NORMAL

## 2022-05-02 PROCEDURE — 93793 ANTICOAG MGMT PT WARFARIN: CPT | Performed by: INTERNAL MEDICINE

## 2022-05-02 PROCEDURE — 85610 PROTHROMBIN TIME: CPT | Performed by: INTERNAL MEDICINE

## 2022-05-02 NOTE — PROGRESS NOTES
INR today is 2.7. Continue taking 2.5 mg every day, except Mon and Fri 5 mg per Dr. Red Cox. Patient is to check back in 4 weeks.

## 2022-05-09 NOTE — PROGRESS NOTES
[facility] Cardiology consult  Dr. Nathaniel Schwarz      Reason for Consult: Abnormal echocardiogram  Referring Physician: Kim Lemon DO     CHIEF COMPLAINT:   Chief Complaint   Patient presents with    Coronary Artery Disease     post hospital f/u- pt has complaints of sob, dizziness       HISTORY OF PRESENT ILLNESS:   Patient is 75years old female with history of paroxysmal atrial fibrillation, congestive heart failure, pulmonary hypertension, hypertension, hyperlipidemia, is here for follow-up appointment. Shortness of breath is at baseline, occasional pedal edema, no chest pain, no palpitations, no syncope, no presyncopal episodes. Functional capacity is at baseline      Prior to Visit Medications    Medication Sig Taking?  Authorizing Provider   pantoprazole (PROTONIX) 40 MG tablet Take 40 mg by mouth daily Yes Historical Provider, MD   sildenafil (REVATIO) 20 MG tablet Take 20 mg by mouth in the morning and at bedtime Yes Historical Provider, MD   fluticasone (FLOVENT HFA) 110 MCG/ACT inhaler Inhale 1 puff into the lungs 2 times daily Yes Historical Provider, MD   Tiotropium Bromide Monohydrate (SPIRIVA RESPIMAT IN) Inhale into the lungs daily Yes Historical Provider, MD RENAE DUCKWORTH Yes Historical Provider, MD   Multiple Vitamins-Minerals (THERAPEUTIC MULTIVITAMIN-MINERALS) tablet Take 1 tablet by mouth daily Yes Historical Provider, MD   vitamin B-12 (CYANOCOBALAMIN) 1000 MCG tablet Take 1,000 mcg by mouth daily Yes Historical Provider, MD   calcium carbonate (TUMS) 500 MG chewable tablet Take 1 tablet by mouth daily Yes Historical Provider, MD   torsemide (DEMADEX) 20 MG tablet TAKE ONE TABLET BY MOUTH DAILY Yes Historical Provider, MD   spironolactone (ALDACTONE) 25 MG tablet TAKE ONE TABLET BY MOUTH DAILY Yes Historical Provider, MD   warfarin (COUMADIN) 5 MG tablet Take 1 tablet by mouth daily Yes Nathaniel Schwarz MD   metoprolol tartrate (LOPRESSOR) 50 MG tablet Take 1 tablet by mouth 2 times daily Yes Edison Becerra MD   levothyroxine (SYNTHROID) 75 MCG tablet 75 mcg Daily  Yes Historical Provider, MD   mycophenolate (CELLCEPT) 500 MG tablet Take 1,000 mg by mouth 2 times daily Yes Historical Provider, MD   potassium chloride (KLOR-CON M) 10 MEQ extended release tablet Take 1 tablet by mouth daily Take with Lasix  Patient taking differently: Take 20 mEq by mouth 2 times daily Take with Lasix Yes Edison Becerra MD   simvastatin (ZOCOR) 40 MG tablet Take 1 tablet by mouth nightly Yes Edison Becerra MD   rOPINIRole (REQUIP) 0.25 MG tablet Take 1 tablet by mouth daily  Patient taking differently: Take 0.5 mg by mouth nightly  Yes Luis Antonio Burns DO   Cholecalciferol (VITAMIN D) 2000 UNITS CAPS capsule Take  by mouth daily.  Yes Historical Provider, MD   furosemide (LASIX) 20 MG tablet take one tablet three times daily  Patient not taking: Reported on 2022  Edison Becerra MD   lisinopril (PRINIVIL;ZESTRIL) 5 MG tablet Take 5 mg by mouth daily  Patient not taking: Reported on 5/10/2022  Historical Provider, MD   amLODIPine (NORVASC) 5 MG tablet Take 5 mg by mouth daily  Patient not taking: Reported on 5/10/2022  Historical Provider, MD   aspirin 81 MG tablet Take 81 mg by mouth 2 times daily   Patient not taking: Reported on 5/10/2022  Historical Provider, MD       Social History     Tobacco Use    Smoking status: Former Smoker     Packs/day: 1.50     Years: 40.00     Pack years: 60.00     Types: Cigarettes     Quit date: 2020     Years since quittin.6    Smokeless tobacco: Never Used    Tobacco comment:  she has decreased to 1ppd   Vaping Use    Vaping Use: Never used   Substance Use Topics    Alcohol use: Not Currently     Alcohol/week: 10.0 standard drinks     Types: 10 Glasses of wine per week     Comment: green tea 2 cups a day     Drug use: No          Past Medical History:   Diagnosis Date    CAD in native artery 2021    Cerebral artery occlusion with cerebral infarction ([facility] [address] \"mini stroke\" 10/2014    H/O cardiovascular stress test 2020    Lexiscan    Hyperlipidemia     Hypertension     Sleep apnea     doesnt use cpap         Past Surgical History:   Procedure Laterality Date    CARDIAC CATHETERIZATION  2012    Right heart cath by Dr Samantha Javed Right 2019    intraocular lens    CATARACT REMOVAL WITH IMPLANT Left 10/15/2019     SECTION      COLONOSCOPY      INTRACAPSULAR CATARACT EXTRACTION Right 2019    RIGHT EYE CATARACT EMULSIFICATION IOL IMPLANT performed by Mynor Sanchez MD at [place] [address] Left 10/15/2019    LEFT EYE CATARACT EMULSIFICATION IOL IMPLANT performed by Mynor Sanchez MD at [address] [place] [address]         Current Outpatient Medications   Medication Sig Dispense Refill    pantoprazole (PROTONIX) 40 MG tablet Take 40 mg by mouth daily      sildenafil (REVATIO) 20 MG tablet Take 20 mg by mouth in the morning and at bedtime      fluticasone (FLOVENT HFA) 110 MCG/ACT inhaler Inhale 1 puff into the lungs 2 times daily      Tiotropium Bromide Monohydrate (SPIRIVA RESPIMAT IN) Inhale into the lungs daily      NONFORMULARY TREVASO      Multiple Vitamins-Minerals (THERAPEUTIC MULTIVITAMIN-MINERALS) tablet Take 1 tablet by mouth daily      vitamin B-12 (CYANOCOBALAMIN) 1000 MCG tablet Take 1,000 mcg by mouth daily      calcium carbonate (TUMS) 500 MG chewable tablet Take 1 tablet by mouth daily      torsemide (DEMADEX) 20 MG tablet TAKE ONE TABLET BY MOUTH DAILY      spironolactone (ALDACTONE) 25 MG tablet TAKE ONE TABLET BY MOUTH DAILY      warfarin (COUMADIN) 5 MG tablet Take 1 tablet by mouth daily 90 tablet 3    metoprolol tartrate (LOPRESSOR) 50 MG tablet Take 1 tablet by mouth 2 times daily 180 tablet 2    levothyroxine (SYNTHROID) 75 MCG tablet 75 mcg Daily       mycophenolate (CELLCEPT) 500 MG tablet Take 1,000 mg by mouth 2 times daily      potassium chloride (KLOR-CON M) 10 MEQ extended release tablet Take 1 tablet by mouth daily Take with Lasix (Patient taking differently: Take 20 mEq by mouth 2 times daily Take with Lasix) 90 tablet 1    simvastatin (ZOCOR) 40 MG tablet Take 1 tablet by mouth nightly 90 tablet 3    rOPINIRole (REQUIP) 0.25 MG tablet Take 1 tablet by mouth daily (Patient taking differently: Take 0.5 mg by mouth nightly ) 90 tablet 3    Cholecalciferol (VITAMIN D) 2000 UNITS CAPS capsule Take  by mouth daily.  furosemide (LASIX) 20 MG tablet take one tablet three times daily (Patient not taking: Reported on 2022) 270 tablet 3    lisinopril (PRINIVIL;ZESTRIL) 5 MG tablet Take 5 mg by mouth daily (Patient not taking: Reported on 5/10/2022)      amLODIPine (NORVASC) 5 MG tablet Take 5 mg by mouth daily (Patient not taking: Reported on 5/10/2022)      aspirin 81 MG tablet Take 81 mg by mouth 2 times daily  (Patient not taking: Reported on 5/10/2022)       No current facility-administered medications for this visit.          Allergies as of 05/10/2022 - Fully Reviewed 05/10/2022   Allergen Reaction Noted    Biaxin [clarithromycin] Nausea And Vomiting 2012    Naproxen Nausea And Vomiting 2012       Social History     Socioeconomic History    Marital status:      Spouse name: Not on file    Number of children: Not on file    Years of education: Not on file    Highest education level: Not on file   Occupational History    Not on file   Tobacco Use    Smoking status: Former Smoker     Packs/day: 1.50     Years: 40.00     Pack years: 60.00     Types: Cigarettes     Quit date: 2020     Years since quittin.6    Smokeless tobacco: Never Used    Tobacco comment:  she has decreased to 1ppd   Vaping Use    Vaping Use: Never used   Substance and Sexual Activity    Alcohol use: Not Currently     Alcohol/week: 10.0 standard drinks     Types: 10 Glasses of wine per week     Comment: green tea 2 cups a day     Drug use: No    Sexual activity: Not on file   Other Topics Concern    Not on file   Social History Narrative    Not on file     Social Determinants of Health     Financial Resource Strain:     Difficulty of Paying Living Expenses: Not on file   Food Insecurity:     Worried About Running Out of Food in the Last Year: Not on file    Ion of Food in the Last Year: Not on file   Transportation Needs:     Lack of Transportation (Medical): Not on file    Lack of Transportation (Non-Medical):  Not on file   Physical Activity:     Days of Exercise per Week: Not on file    Minutes of Exercise per Session: Not on file   Stress:     Feeling of Stress : Not on file   Social Connections:     Frequency of Communication with Friends and Family: Not on file    Frequency of Social Gatherings with Friends and Family: Not on file    Attends Evangelical Services: Not on file    Active Member of [address] [place] [address] or Organizations: Not on file    Attends Club or Organization Meetings: Not on file    Marital Status: Not on file   Intimate Partner Violence:     Fear of Current or Ex-Partner: Not on file    Emotionally Abused: Not on file    Physically Abused: Not on file    Sexually Abused: Not on file   Housing Stability:     Unable to Pay for Housing in the Last Year: Not on file    Number of [place] in the Last Year: Not on file    Unstable Housing in the Last Year: Not on file       Family History   Problem Relation Age of Onset    Other Mother         complication of surgery    Heart Disease Brother        REVIEW OF SYSTEMS:     CONSTITUTIONAL:  negative for  fevers, chills, sweats, + fatigue  HEENT:  negative for  tinnitus, earaches, nasal congestion and epistaxis  RESPIRATORY:  negative for  dry cough, cough with sputum, wheezing and hemoptysis  GASTROINTESTINAL:  negative for nausea, vomiting, diarrhea, constipation, pruritus and jaundice  HEMATOLOGIC/LYMPHATIC: negative for easy bruising, bleeding, lymphadenopathy and petechiae  ENDOCRINE:  negative for heat intolerance, cold intolerance, tremor, hair loss and diabetic symptoms including neither polyuria nor polydipsia nor blurred vision  MUSCULOSKELETAL:  negative for  myalgias, arthralgias, joint swelling, stiff joints and decreased range of motion  NEUROLOGICAL:  negative for memory problems, speech problems, visual disturbance, dysphagia, weakness and numbness      PHYSICAL EXAMINATION:   CONSTITUTIONAL:  awake, alert, cooperative, no apparent distress, and appears stated age  HEAD:  normocepalic, without obvious abnormality, atraumatic  NECK:  Supple, symmetrical, trachea midline, no adenopathy, thyroid symmetric, not enlarged and no tenderness, skin normal  LUNGS:  No increased work of breathing, decreased air exchange, no rales no wheezing  CARDIOVASCULAR:  Normal apical impulse, irregularly irregular, normal S1 and S2, no S3 or S4, 3/6 systolic murmur at the left lower sternal border, no edema, no JVD, no carotid bruit. ABDOMEN:  Soft, nontender, no masses, no hepatomegaly, no splenomegaly, BS+  MUSCULOSKELETAL:  No clubbing no cyanosis. there is no redness, warmth, or swelling of the joints  full range of motion noted  NEUROLOGIC:  Alert, awake,oriented x3  SKIN:  no bruising or bleeding, normal skin color, texture, turgor and no redness, warmth, or swelling    /80   Pulse 128   Resp 18   Ht 5' 4\" (1.626 m)   Wt 148 lb 6.4 oz (67.3 kg)   SpO2 91%   BMI 25.47 kg/m²     DATA:   I personally reviewed the visit EKG with the following interpretation: Atrial fibrillation, rapid ventricular response, right bundle branch block with right axis deviation, occasional PVCs versus aberrantly conducted beats    EKG 12/23/21 Atrial fibrillation/flutter with rapid ventricular response, nonspecific T wave changes, PVCs versus aberrantly conducted beats      ECHO: [facility] 4/1/22   3/30/22       2/25/21 Summary   No comparison study available. Technically adequate study. Left ventricle size is normal.   Mild asymmetric septal hypertrophy. Ejection fraction is visually estimated at 65%. Overall ejection fraction increased (hyperdynamic), with complete   obliteration of the LV cavity during systole. No regional wall motion abnormalities seen. There is doppler evidence of stage III diastolic dysfunction. Mildly enlarged right ventricle cavity. Right ventricle global systolic function is moderately reduced . Moderate tricuspid regurgitation. RVSP is 72 mmHg. Pulmonary hypertension is severe . Stress Test:  6/29/20        FINDINGS:    There is normal accumulation of tracer throughout the myocardium on rest    images and stress images.  There are no fixed or reversible defects.         End diastolic volume is 37 ml. The ejection fraction equals 79% with no focal    wall motion abnormalities. There is no left ventricular dilatation.              Impression    1. No pharmacologically induced reversible perfusion defects to suggest    ischemia    2. Ejection fraction of 79%    3. No focal wall motion abnormality     Angiography: 4/6/21 IMPRESSION:  Moderately elevated pulmonary arterial pressure with normal  LV filling pressures.     Cardiology Labs: BMP:    Lab Results   Component Value Date     04/05/2021    K 4.5 04/05/2021     04/05/2021    CO2 28 04/05/2021    BUN 13 04/05/2021    CREATININE 0.6 04/05/2021     CMP:    Lab Results   Component Value Date     04/05/2021    K 4.5 04/05/2021     04/05/2021    CO2 28 04/05/2021    BUN 13 04/05/2021    CREATININE 0.6 04/05/2021    PROT 7.9 09/28/2021     CBC:    Lab Results   Component Value Date    WBC 6.4 04/05/2021    WBC 6.4 04/05/2021    RBC 4.09 04/05/2021    RBC 4.14 04/05/2021    HGB 12.0 04/05/2021    HGB 12.1 04/05/2021    HCT 37.5 04/05/2021    HCT 38.2 04/05/2021    MCV 91.7 04/05/2021    MCV 92.3 04/05/2021    RDW 14.9 04/05/2021 RDW 14.9 04/05/2021     04/05/2021     04/05/2021     PT/INR:  No results found for: PTINR  PT/INR Warfarin:  No components found for: PTPATWAR, PTINRWAR  PTT:    Lab Results   Component Value Date    APTT 35.6 04/05/2021     PTT Heparin:  No components found for: APTTHEP  Magnesium:  No results found for: MG  TSH:    Lab Results   Component Value Date    TSH 6.450 09/28/2021     TROPONIN:  No components found for: TROP  BNP:  No results found for: BNP  FASTING LIPID PANEL:    Lab Results   Component Value Date    CHOL 217 09/10/2015    HDL 73 09/10/2015    TRIG 117 09/10/2015     No orders to display     I have personally reviewed the laboratory, cardiac diagnostic and radiographic testing as outlined above:      ASSESSMENT:  1. Atrial fibrillation/flutter: Rapid ventricular response, will start on Toprol-XL 50 mg by mouth daily, [id number] score of 3, will benefit from chronic anticoagulation for primary prevention against a thromboembolic event, will start on Eliquis  2. Chronic diastolic congestive heart failure: Compensated, will continue current treatment  3. Severe pulmonary hypertension: Follows with pulmonary hypertension clinic at [facility] [employer] [facility] clinic  4. Moderate tricuspid valve regurgitation  5. Hypertension: Controlled    RECOMMENDATIONS:   1. We will start amiodarone  2. We will continue the rest of medications  3. EKG in 1 week  4. CHF: Daily weight, take an extra Lasix for weight gain of more than 2-3 pounds in 24 hours, compliance with diuretics, low-salt diet were all advised. 5.  Follow-up both Dr. Sukhwinder Holland as scheduled  6. Follow-up with Dr. Sophia Aviles in 6 months, sooner if symptomatic for any reason    I have reviewed my findings and recommendations with patient      Electronically signed by Marquis Luann MD on 5/10/2022 at 12:29 PM   NOTE: This report was transcribed using voice recognition software.  Every effort was made to ensure accuracy; however, inadvertent computerized transcription errors may be present

## 2022-05-10 ENCOUNTER — OFFICE VISIT (OUTPATIENT)
Dept: CARDIOLOGY CLINIC | Age: 67
End: 2022-05-10
Payer: MEDICARE

## 2022-05-10 VITALS
SYSTOLIC BLOOD PRESSURE: 112 MMHG | WEIGHT: 148.4 LBS | HEIGHT: 64 IN | HEART RATE: 128 BPM | RESPIRATION RATE: 18 BRPM | BODY MASS INDEX: 25.33 KG/M2 | DIASTOLIC BLOOD PRESSURE: 80 MMHG | OXYGEN SATURATION: 91 %

## 2022-05-10 DIAGNOSIS — I25.10 CAD IN NATIVE ARTERY: Primary | ICD-10-CM

## 2022-05-10 PROCEDURE — 1090F PRES/ABSN URINE INCON ASSESS: CPT | Performed by: INTERNAL MEDICINE

## 2022-05-10 PROCEDURE — 3017F COLORECTAL CA SCREEN DOC REV: CPT | Performed by: INTERNAL MEDICINE

## 2022-05-10 PROCEDURE — G8427 DOCREV CUR MEDS BY ELIG CLIN: HCPCS | Performed by: INTERNAL MEDICINE

## 2022-05-10 PROCEDURE — G8400 PT W/DXA NO RESULTS DOC: HCPCS | Performed by: INTERNAL MEDICINE

## 2022-05-10 PROCEDURE — 93000 ELECTROCARDIOGRAM COMPLETE: CPT | Performed by: INTERNAL MEDICINE

## 2022-05-10 PROCEDURE — 1036F TOBACCO NON-USER: CPT | Performed by: INTERNAL MEDICINE

## 2022-05-10 PROCEDURE — G8417 CALC BMI ABV UP PARAM F/U: HCPCS | Performed by: INTERNAL MEDICINE

## 2022-05-10 PROCEDURE — 99214 OFFICE O/P EST MOD 30 MIN: CPT | Performed by: INTERNAL MEDICINE

## 2022-05-10 PROCEDURE — 1123F ACP DISCUSS/DSCN MKR DOCD: CPT | Performed by: INTERNAL MEDICINE

## 2022-05-10 RX ORDER — LANOLIN ALCOHOL/MO/W.PET/CERES
1000 CREAM (GRAM) TOPICAL DAILY
COMMUNITY

## 2022-05-10 RX ORDER — AMIODARONE HYDROCHLORIDE 200 MG/1
200 TABLET ORAL 2 TIMES DAILY
Qty: 30 TABLET | Refills: 0 | Status: SHIPPED | OUTPATIENT
Start: 2022-05-10 | End: 2022-05-17

## 2022-05-10 RX ORDER — SILDENAFIL CITRATE 20 MG/1
20 TABLET ORAL 2 TIMES DAILY
COMMUNITY

## 2022-05-10 RX ORDER — CALCIUM CARBONATE 200(500)MG
1 TABLET,CHEWABLE ORAL DAILY
COMMUNITY

## 2022-05-10 RX ORDER — M-VIT,TX,IRON,MINS/CALC/FOLIC 27MG-0.4MG
1 TABLET ORAL DAILY
COMMUNITY

## 2022-05-10 RX ORDER — PANTOPRAZOLE SODIUM 40 MG/1
40 TABLET, DELAYED RELEASE ORAL DAILY
COMMUNITY

## 2022-05-10 RX ORDER — FLUTICASONE PROPIONATE 110 UG/1
1 AEROSOL, METERED RESPIRATORY (INHALATION) 2 TIMES DAILY
COMMUNITY

## 2022-05-17 ENCOUNTER — NURSE ONLY (OUTPATIENT)
Dept: CARDIOLOGY CLINIC | Age: 67
End: 2022-05-17
Payer: MEDICARE

## 2022-05-17 DIAGNOSIS — I48.91 ATRIAL FIBRILLATION, UNSPECIFIED TYPE (HCC): Primary | ICD-10-CM

## 2022-05-17 PROCEDURE — 93000 ELECTROCARDIOGRAM COMPLETE: CPT | Performed by: INTERNAL MEDICINE

## 2022-05-31 ENCOUNTER — ANTI-COAG VISIT (OUTPATIENT)
Dept: CARDIOLOGY CLINIC | Age: 67
End: 2022-05-31
Payer: MEDICARE

## 2022-05-31 DIAGNOSIS — I48.91 ATRIAL FIBRILLATION, UNSPECIFIED TYPE (HCC): ICD-10-CM

## 2022-05-31 LAB
INTERNATIONAL NORMALIZATION RATIO, POC: 2.8
PROTHROMBIN TIME, POC: NORMAL

## 2022-05-31 PROCEDURE — 93793 ANTICOAG MGMT PT WARFARIN: CPT | Performed by: INTERNAL MEDICINE

## 2022-05-31 PROCEDURE — 85610 PROTHROMBIN TIME: CPT | Performed by: INTERNAL MEDICINE

## 2022-05-31 NOTE — PROGRESS NOTES
INR today is 2.8. Continue taking 2.5 mg every day, except Mon and Fri 5 mg per Dr. Red Cox. Patient is to check back in 4 weeks.

## 2022-07-08 ENCOUNTER — ANTI-COAG VISIT (OUTPATIENT)
Dept: CARDIOLOGY CLINIC | Age: 67
End: 2022-07-08
Payer: MEDICARE

## 2022-07-08 DIAGNOSIS — I48.91 ATRIAL FIBRILLATION, UNSPECIFIED TYPE (HCC): ICD-10-CM

## 2022-07-08 LAB
INTERNATIONAL NORMALIZATION RATIO, POC: 2.6
PROTHROMBIN TIME, POC: 0

## 2022-07-08 PROCEDURE — 93793 ANTICOAG MGMT PT WARFARIN: CPT | Performed by: INTERNAL MEDICINE

## 2022-07-08 PROCEDURE — 85610 PROTHROMBIN TIME: CPT | Performed by: INTERNAL MEDICINE

## 2022-07-08 NOTE — PROGRESS NOTES
INR today is 2.6. Continue taking 2.5 mg every day, except Mon and Fri 5 mg per Dr. Veena Mccall. Patient is to check back in 4 weeks.

## 2022-07-08 NOTE — PROGRESS NOTES
INR today is 2.6. Continue taking 2.5 mg every day, except Mon and Fri take 5 mg   Recheck INR in 4 weeks.

## 2022-08-05 ENCOUNTER — ANTI-COAG VISIT (OUTPATIENT)
Dept: CARDIOLOGY CLINIC | Age: 67
End: 2022-08-05
Payer: MEDICARE

## 2022-08-05 DIAGNOSIS — I48.91 ATRIAL FIBRILLATION, UNSPECIFIED TYPE (HCC): ICD-10-CM

## 2022-08-05 LAB
INTERNATIONAL NORMALIZATION RATIO, POC: 1.6
PROTHROMBIN TIME, POC: 0

## 2022-08-05 PROCEDURE — 93793 ANTICOAG MGMT PT WARFARIN: CPT | Performed by: INTERNAL MEDICINE

## 2022-08-05 PROCEDURE — 85610 PROTHROMBIN TIME: CPT | Performed by: INTERNAL MEDICINE

## 2022-08-05 NOTE — PROGRESS NOTES
INR today is 1.6. Take 5mg today, tomorrow, & Sunday, then continue taking 2.5 mg every day, except Mon and Fri 5 mg per Dr. Jem Pacheco. Patient is to check back in 4 weeks.

## 2022-08-17 ENCOUNTER — APPOINTMENT (OUTPATIENT)
Dept: GENERAL RADIOLOGY | Age: 67
End: 2022-08-17
Payer: MEDICARE

## 2022-08-17 ENCOUNTER — HOSPITAL ENCOUNTER (EMERGENCY)
Age: 67
Discharge: HOME | End: 2022-08-18
Attending: EMERGENCY MEDICINE
Payer: MEDICARE

## 2022-08-17 ENCOUNTER — APPOINTMENT (OUTPATIENT)
Dept: CT IMAGING | Age: 67
End: 2022-08-17
Payer: MEDICARE

## 2022-08-17 DIAGNOSIS — I50.9 ACUTE ON CHRONIC CONGESTIVE HEART FAILURE, UNSPECIFIED HEART FAILURE TYPE (HCC): Primary | ICD-10-CM

## 2022-08-17 DIAGNOSIS — J90 BILATERAL PLEURAL EFFUSION: ICD-10-CM

## 2022-08-17 LAB
ANION GAP SERPL CALCULATED.3IONS-SCNC: 12 MMOL/L (ref 7–16)
BASOPHILS ABSOLUTE: 0.05 E9/L (ref 0–0.2)
BASOPHILS RELATIVE PERCENT: 0.7 % (ref 0–2)
BILIRUBIN URINE: NEGATIVE
BLOOD, URINE: NEGATIVE
BUN BLDV-MCNC: 30 MG/DL (ref 6–23)
CALCIUM SERPL-MCNC: 10.3 MG/DL (ref 8.6–10.2)
CHLORIDE BLD-SCNC: 99 MMOL/L (ref 98–107)
CLARITY: CLEAR
CO2: 27 MMOL/L (ref 22–29)
COLOR: YELLOW
CREAT SERPL-MCNC: 0.8 MG/DL (ref 0.5–1)
EOSINOPHILS ABSOLUTE: 0.06 E9/L (ref 0.05–0.5)
EOSINOPHILS RELATIVE PERCENT: 0.8 % (ref 0–6)
GFR AFRICAN AMERICAN: >60
GFR NON-AFRICAN AMERICAN: >60 ML/MIN/1.73
GLUCOSE BLD-MCNC: 92 MG/DL (ref 74–99)
GLUCOSE URINE: NEGATIVE MG/DL
HCT VFR BLD CALC: 35.9 % (ref 34–48)
HEMOGLOBIN: 11.4 G/DL (ref 11.5–15.5)
IMMATURE GRANULOCYTES #: 0.04 E9/L
IMMATURE GRANULOCYTES %: 0.5 % (ref 0–5)
INR BLD: 2.4
KETONES, URINE: NEGATIVE MG/DL
LEUKOCYTE ESTERASE, URINE: NEGATIVE
LYMPHOCYTES ABSOLUTE: 1.05 E9/L (ref 1.5–4)
LYMPHOCYTES RELATIVE PERCENT: 14 % (ref 20–42)
MCH RBC QN AUTO: 29.5 PG (ref 26–35)
MCHC RBC AUTO-ENTMCNC: 31.8 % (ref 32–34.5)
MCV RBC AUTO: 93 FL (ref 80–99.9)
MONOCYTES ABSOLUTE: 0.67 E9/L (ref 0.1–0.95)
MONOCYTES RELATIVE PERCENT: 8.9 % (ref 2–12)
NEUTROPHILS ABSOLUTE: 5.65 E9/L (ref 1.8–7.3)
NEUTROPHILS RELATIVE PERCENT: 75.1 % (ref 43–80)
NITRITE, URINE: NEGATIVE
PDW BLD-RTO: 15.5 FL (ref 11.5–15)
PH UA: 5 (ref 5–9)
PLATELET # BLD: 268 E9/L (ref 130–450)
PMV BLD AUTO: 10.1 FL (ref 7–12)
POTASSIUM REFLEX MAGNESIUM: 4 MMOL/L (ref 3.5–5)
PRO-BNP: 1622 PG/ML (ref 0–125)
PROTEIN UA: NEGATIVE MG/DL
PROTHROMBIN TIME: 27.7 SEC (ref 9.3–12.4)
RBC # BLD: 3.86 E12/L (ref 3.5–5.5)
SARS-COV-2, NAAT: NOT DETECTED
SODIUM BLD-SCNC: 138 MMOL/L (ref 132–146)
SPECIFIC GRAVITY UA: 1.01 (ref 1–1.03)
TROPONIN, HIGH SENSITIVITY: 40 NG/L (ref 0–9)
TROPONIN, HIGH SENSITIVITY: 40 NG/L (ref 0–9)
UROBILINOGEN, URINE: 0.2 E.U./DL
WBC # BLD: 7.5 E9/L (ref 4.5–11.5)

## 2022-08-17 PROCEDURE — 94664 DEMO&/EVAL PT USE INHALER: CPT

## 2022-08-17 PROCEDURE — 81003 URINALYSIS AUTO W/O SCOPE: CPT

## 2022-08-17 PROCEDURE — 96374 THER/PROPH/DIAG INJ IV PUSH: CPT

## 2022-08-17 PROCEDURE — 71275 CT ANGIOGRAPHY CHEST: CPT

## 2022-08-17 PROCEDURE — 6360000004 HC RX CONTRAST MEDICATION: Performed by: RADIOLOGY

## 2022-08-17 PROCEDURE — 87635 SARS-COV-2 COVID-19 AMP PRB: CPT

## 2022-08-17 PROCEDURE — 80048 BASIC METABOLIC PNL TOTAL CA: CPT

## 2022-08-17 PROCEDURE — 6370000000 HC RX 637 (ALT 250 FOR IP): Performed by: STUDENT IN AN ORGANIZED HEALTH CARE EDUCATION/TRAINING PROGRAM

## 2022-08-17 PROCEDURE — 94640 AIRWAY INHALATION TREATMENT: CPT

## 2022-08-17 PROCEDURE — 2580000003 HC RX 258

## 2022-08-17 PROCEDURE — 84484 ASSAY OF TROPONIN QUANT: CPT

## 2022-08-17 PROCEDURE — 83880 ASSAY OF NATRIURETIC PEPTIDE: CPT

## 2022-08-17 PROCEDURE — 85025 COMPLETE CBC W/AUTO DIFF WBC: CPT

## 2022-08-17 PROCEDURE — 71045 X-RAY EXAM CHEST 1 VIEW: CPT

## 2022-08-17 PROCEDURE — 93005 ELECTROCARDIOGRAM TRACING: CPT | Performed by: STUDENT IN AN ORGANIZED HEALTH CARE EDUCATION/TRAINING PROGRAM

## 2022-08-17 PROCEDURE — 85610 PROTHROMBIN TIME: CPT

## 2022-08-17 PROCEDURE — 99285 EMERGENCY DEPT VISIT HI MDM: CPT

## 2022-08-17 RX ORDER — SIMVASTATIN 40 MG
40 TABLET ORAL NIGHTLY
Status: DISCONTINUED | OUTPATIENT
Start: 2022-08-17 | End: 2022-08-17 | Stop reason: CLARIF

## 2022-08-17 RX ORDER — METHYLPREDNISOLONE SODIUM SUCCINATE 125 MG/2ML
125 INJECTION, POWDER, LYOPHILIZED, FOR SOLUTION INTRAMUSCULAR; INTRAVENOUS ONCE
Status: DISCONTINUED | OUTPATIENT
Start: 2022-08-17 | End: 2022-08-18 | Stop reason: HOSPADM

## 2022-08-17 RX ORDER — ATORVASTATIN CALCIUM 20 MG/1
20 TABLET, FILM COATED ORAL NIGHTLY
Status: DISCONTINUED | OUTPATIENT
Start: 2022-08-17 | End: 2022-08-18 | Stop reason: HOSPADM

## 2022-08-17 RX ORDER — 0.9 % SODIUM CHLORIDE 0.9 %
500 INTRAVENOUS SOLUTION INTRAVENOUS ONCE
Status: DISCONTINUED | OUTPATIENT
Start: 2022-08-17 | End: 2022-08-17

## 2022-08-17 RX ORDER — POTASSIUM CHLORIDE 750 MG/1
10 TABLET, EXTENDED RELEASE ORAL ONCE
Status: COMPLETED | OUTPATIENT
Start: 2022-08-17 | End: 2022-08-17

## 2022-08-17 RX ORDER — ROPINIROLE 1 MG/1
1 TABLET, FILM COATED ORAL 3 TIMES DAILY
Status: DISCONTINUED | OUTPATIENT
Start: 2022-08-17 | End: 2022-08-18 | Stop reason: HOSPADM

## 2022-08-17 RX ORDER — FLECAINIDE ACETATE 50 MG/1
50 TABLET ORAL ONCE
Status: COMPLETED | OUTPATIENT
Start: 2022-08-17 | End: 2022-08-17

## 2022-08-17 RX ORDER — SODIUM CHLORIDE 0.9 % (FLUSH) 0.9 %
SYRINGE (ML) INJECTION
Status: DISPENSED
Start: 2022-08-17 | End: 2022-08-18

## 2022-08-17 RX ORDER — IPRATROPIUM BROMIDE AND ALBUTEROL SULFATE 2.5; .5 MG/3ML; MG/3ML
3 SOLUTION RESPIRATORY (INHALATION) ONCE
Status: COMPLETED | OUTPATIENT
Start: 2022-08-17 | End: 2022-08-17

## 2022-08-17 RX ORDER — SODIUM CHLORIDE 9 MG/ML
INJECTION, SOLUTION INTRAVENOUS
Status: COMPLETED
Start: 2022-08-17 | End: 2022-08-17

## 2022-08-17 RX ADMIN — Medication 500 ML: at 20:45

## 2022-08-17 RX ADMIN — SODIUM CHLORIDE 500 ML: 9 INJECTION, SOLUTION INTRAVENOUS at 20:45

## 2022-08-17 RX ADMIN — POTASSIUM CHLORIDE 10 MEQ: 750 TABLET, EXTENDED RELEASE ORAL at 23:16

## 2022-08-17 RX ADMIN — IPRATROPIUM BROMIDE AND ALBUTEROL SULFATE 3 AMPULE: .5; 2.5 SOLUTION RESPIRATORY (INHALATION) at 18:15

## 2022-08-17 RX ADMIN — FLECAINIDE ACETATE 50 MG: 50 TABLET ORAL at 23:16

## 2022-08-17 RX ADMIN — ROPINIROLE HYDROCHLORIDE 1 MG: 1 TABLET, FILM COATED ORAL at 23:15

## 2022-08-17 RX ADMIN — ATORVASTATIN CALCIUM 20 MG: 20 TABLET, FILM COATED ORAL at 23:16

## 2022-08-17 RX ADMIN — IOPAMIDOL 60 ML: 755 INJECTION, SOLUTION INTRAVENOUS at 18:12

## 2022-08-17 ASSESSMENT — ENCOUNTER SYMPTOMS
ABDOMINAL DISTENTION: 0
BLOOD IN STOOL: 0
PHOTOPHOBIA: 0
COLOR CHANGE: 0
CHEST TIGHTNESS: 0
VOMITING: 0
COUGH: 0
RHINORRHEA: 0
BACK PAIN: 0
SHORTNESS OF BREATH: 1
CONSTIPATION: 0
SORE THROAT: 0
ABDOMINAL PAIN: 0
DIARRHEA: 0
EYE PAIN: 0
NAUSEA: 0

## 2022-08-17 ASSESSMENT — PAIN SCALES - GENERAL: PAINLEVEL_OUTOF10: 4

## 2022-08-17 ASSESSMENT — PAIN - FUNCTIONAL ASSESSMENT: PAIN_FUNCTIONAL_ASSESSMENT: 0-10

## 2022-08-17 NOTE — ED PROVIDER NOTES
She has a 60.00 pack-year smoking history. She has never used smokeless tobacco. She reports that she does not currently use alcohol after a past usage of about 10.0 standard drinks per week. She reports that she does not use drugs. Family History: family history includes Heart Disease in her brother; Other in her mother. Allergies: Biaxin [clarithromycin] and Naproxen    The patients past medical history has been reviewed. -------------------- Current Meds --------------------  Meds: No current facility-administered medications for this encounter.     Current Outpatient Medications:     pantoprazole (PROTONIX) 40 MG tablet, Take 40 mg by mouth daily, Disp: , Rfl:     sildenafil (REVATIO) 20 MG tablet, Take 20 mg by mouth in the morning and at bedtime, Disp: , Rfl:     fluticasone (FLOVENT HFA) 110 MCG/ACT inhaler, Inhale 1 puff into the lungs 2 times daily, Disp: , Rfl:     Tiotropium Bromide Monohydrate (SPIRIVA RESPIMAT IN), Inhale into the lungs daily, Disp: , Rfl:     NONFORMULARY, TREVASO, Disp: , Rfl:     Multiple Vitamins-Minerals (THERAPEUTIC MULTIVITAMIN-MINERALS) tablet, Take 1 tablet by mouth daily, Disp: , Rfl:     vitamin B-12 (CYANOCOBALAMIN) 1000 MCG tablet, Take 1,000 mcg by mouth daily, Disp: , Rfl:     calcium carbonate (TUMS) 500 MG chewable tablet, Take 1 tablet by mouth daily, Disp: , Rfl:     amiodarone (CORDARONE) 200 MG tablet, Take 1 tablet by mouth 2 times daily for 14 doses, Disp: 30 tablet, Rfl: 0    torsemide (DEMADEX) 20 MG tablet, TAKE ONE TABLET BY MOUTH DAILY, Disp: , Rfl:     spironolactone (ALDACTONE) 25 MG tablet, TAKE ONE TABLET BY MOUTH DAILY, Disp: , Rfl:     furosemide (LASIX) 20 MG tablet, take one tablet three times daily (Patient not taking: Reported on 4/7/2022), Disp: 270 tablet, Rfl: 3    warfarin (COUMADIN) 5 MG tablet, Take 1 tablet by mouth daily, Disp: 90 tablet, Rfl: 3    metoprolol tartrate (LOPRESSOR) 50 MG tablet, Take 1 tablet by mouth 2 times daily, Disp: 180 tablet, Rfl: 2    levothyroxine (SYNTHROID) 75 MCG tablet, 75 mcg Daily , Disp: , Rfl:     lisinopril (PRINIVIL;ZESTRIL) 5 MG tablet, Take 5 mg by mouth daily (Patient not taking: Reported on 5/10/2022), Disp: , Rfl:     mycophenolate (CELLCEPT) 500 MG tablet, Take 1,000 mg by mouth 2 times daily, Disp: , Rfl:     potassium chloride (KLOR-CON M) 10 MEQ extended release tablet, Take 1 tablet by mouth daily Take with Lasix (Patient taking differently: Take 20 mEq by mouth 2 times daily Take with Lasix), Disp: 90 tablet, Rfl: 1    simvastatin (ZOCOR) 40 MG tablet, Take 1 tablet by mouth nightly, Disp: 90 tablet, Rfl: 3    rOPINIRole (REQUIP) 0.25 MG tablet, Take 1 tablet by mouth daily (Patient taking differently: Take 0.5 mg by mouth nightly ), Disp: 90 tablet, Rfl: 3    Cholecalciferol (VITAMIN D) 2000 UNITS CAPS capsule, Take  by mouth daily. , Disp: , Rfl:     aspirin 81 MG tablet, Take 81 mg by mouth 2 times daily  (Patient not taking: Reported on 5/10/2022), Disp: , Rfl:      The patients home medications have been reviewed. -------------------- ROS --------------------  Review of Systems   Constitutional:  Negative for chills, fatigue and fever. HENT:  Negative for congestion, rhinorrhea and sore throat. Eyes:  Negative for photophobia, pain and visual disturbance. Respiratory:  Positive for shortness of breath. Negative for cough and chest tightness. Cardiovascular:  Positive for chest pain. Negative for palpitations and leg swelling. Gastrointestinal:  Negative for abdominal distention, abdominal pain, blood in stool, constipation, diarrhea, nausea and vomiting. Genitourinary:  Negative for difficulty urinating, dysuria, hematuria, vaginal bleeding and vaginal discharge. Musculoskeletal:  Negative for back pain, neck pain and neck stiffness. Skin:  Negative for color change, rash and wound. Neurological:  Negative for dizziness, syncope, light-headedness and headaches. Psychiatric/Behavioral:  Negative for agitation, behavioral problems and confusion.       -------------------- PE --------------------  Physical Exam  Constitutional:       General: She is not in acute distress. Appearance: Normal appearance. She is normal weight. She is not ill-appearing, toxic-appearing or diaphoretic. HENT:      Head: Normocephalic and atraumatic. Right Ear: External ear normal.      Left Ear: External ear normal.      Nose: Nose normal. No rhinorrhea. Mouth/Throat:      Pharynx: Oropharynx is clear. Eyes:      General: No scleral icterus. Right eye: No discharge. Left eye: No discharge. Extraocular Movements: Extraocular movements intact. Conjunctiva/sclera: Conjunctivae normal.      Pupils: Pupils are equal, round, and reactive to light. Cardiovascular:      Rate and Rhythm: Normal rate and regular rhythm. Pulses: Normal pulses. Pulmonary:      Effort: Pulmonary effort is normal. No tachypnea, accessory muscle usage or respiratory distress. Breath sounds: Normal breath sounds. No stridor. No decreased breath sounds, wheezing, rhonchi or rales. Abdominal:      General: Abdomen is flat. There is no distension. Palpations: Abdomen is soft. Tenderness: There is no abdominal tenderness. There is no guarding. Musculoskeletal:         General: No swelling or tenderness. Normal range of motion. Cervical back: Normal range of motion. Right lower leg: No edema. Left lower leg: No edema. Skin:     General: Skin is warm and dry. Capillary Refill: Capillary refill takes less than 2 seconds. Coloration: Skin is not jaundiced. Findings: No erythema or rash. Neurological:      General: No focal deficit present. Mental Status: She is alert and oriented to person, place, and time.    Psychiatric:         Mood and Affect: Mood normal.         Behavior: Behavior normal.        -------------------- Procedures --------------------  Procedures     MDM  Number of Diagnoses or Management Options  Acute on chronic congestive heart failure, unspecified heart failure type (HCC)  Bilateral pleural effusion  Diagnosis management comments: Ms. Jason De La Cruz is a 30-year-old female with history of pulmonary hypertension, A. Fib, on warfarin, , COPD presents today for shortness of breath worsened on exertion as well as some mild midsternal pleuritic chest pain worse on deep breathing. Patient was recently started on flecainide last Thursday. /58 other vitals within normal limits. Respirations 18 nonlabored SPO2 97% on room air. Lung sounds clear and equal bilaterally. No lower extremity edema appreciated. Exam was rather unremarkable. Duo nebs were given. Lab work was remarkable for moderately elevated BNP, chest x-ray was negative. INR 2.4. CT scan was ordered for further evaluation and was remarkable for small bilateral pleural effusions as well as pulmonary edema however no evidence of PE. Patient elevated BNP as well as CT findings were consistent with CHF exacerbation. Her blood pressure is on the softer side therefore not given diuretics at this time. Pt stated that she spoke with her pulmonologist office this morning who recommended she come to the nearest hospital, however stated if she were to be admitted, they would prefer her at [facility] where her pulmonology and cardiology teams are. Spoke with Dr. Baljinder Bourne, pulmonologist on for Dr. Avril Green (pts pulmonologist at [facility]), who was made aware of pt and will also reach out to Dr. Avril Green about her transfer there. Spoke with Dr. Kamla Akbar, hospitalist service, who will admit pt to [facility] pulmonary team pending transfer. Pt is amenable with this plan. Pts evening medications were given, however withheld dose of warfarin as her INR is 2.4 and withheld sildinafil as she is a bit hypotensive. Pt did have meal and is tolerating po well.  No distress on re-eval. EKG Interpretation  Interpreted by emergency department physician. 8/17/22  Time: 1445    Rate: 53  Axis: normal  AZ: 204  QRS: 138  Qtc: 452  Rhythm: regular  Clinical Impression: sinus sai, RBBB  Comparison to old EKG: Was in a-flutter / a-fib 5/17/22      -------------------- ED COURSE & MEDS GIVEN --------------------  ED Course as of 08/18/22 2015   Wed Aug 17, 2022   1456 Patient is a 59-year-old female with history of CHF, COPD, emphysema, previous smoker states quit 2 years ago, A. fib who presents today for shortness of breath since Friday, 4 days ago. Patient relates she was seen at [facility] clinic on Thursday and had a cardioversion done for A. fib with RVR. She was started on flecainide Thursday which she has been taking. She states her symptoms started Friday. She is on warfarin currently and states has not missed any doses. She states her shortness of breath feels similar to a year ago when she had a pleural effusion which had to be drained. [PW]   1545 WBC: 7.5  No leukocytosis. [PW]   1545 Hemoglobin Quant(!): 11.4  Mild anemia. [PW]   0632 Troponin, High Sensitivity(!): 40  Second pending [PW]   7607 Pro-BNP(!): 1,622 [PW]   1715 XR CHEST 1 VIEW  IMPRESSION:  No acute process. [PW]   1935 INR: 2.4 [PW]   8158 Patient still feels a bit short of breath and has intermittent chest pain on deep inspiration. Lung sounds are clear and equal bilaterally. She does not appear to be in any distress. We will try some DuoNeb's as well as steroid, and further eval with [facility] pulm to assess for PE. [PW]   1907 [facility] PULMONARY W CONTRAST  IMPRESSION:  No evidence of pulmonary embolism. Cardiomegaly with pulmonary edema and bilateral small pleural effusions  suggestive of congestive heart failure. There is also reflux of injected  contrast into dilated hepatic veins, which suggest a component of right heart  dysfunction. Mild centrilobular emphysema.  [PW]   1954 Shared decision making was made with patient. From her lab work does appear she may be a bit fluid overloaded as her BNP is a bit elevated and she has small bilateral pleural effusions. She states she follows with a pulmonologist at [facility] for her pulmonary hypertension whom she speaks with regularly. Patient states she does not have any shortness of breath at this time and usually only a short of breath while active. Patient states she feels well enough to go home and follow-up with her pulmonologist.  Return precautions were discussed and she was recommended to return to the hospital should she worsen or anything change. There is also recommended that she double her dose of Lasix over the next 3 to 5 days. [PW]   1955 Last Echo  LVEF ECHO TRANSESOPHAGEAL (08/11/2022 12:28 PM EDT)  ECG Results - LVEF ECHO TRANSESOPHAGEAL (08/11/2022 12:28 PM EDT)  Component Value Ref Range Test Method Analysis Time Performed At Pathologist Signature  LV Ejection Fraction 55 %     [facility]    Comment:   (visual est.) EF > 54  An LV Ejection Fraction of > 50% is normal       [PW]   2029 Spoke with pt again and now recommended admission as her BP is a bit low presently and can no longer recommend her to increase her lasix dose at home. She is amenable to admission. [PW]   2030 500 ml fluids ordered. [PW]   2031 Spoke with Dr. Xiang Coleman, on for Dr. Krzysztof Montiel and basal.  Dr. Xiang Coleman recommended to touch base with her pulmonologist to see if she recommended possible transfer versus if she felt staying here would be okay. Consult placed for Dr. Marietta Kwan  [PW]   2052 Spoke with Dr. Felicia Emery, pulmonology on for Dr. Kimberly Vallecillo, [facility]. She was made aware of pt and will send a message to her pulmonologist as well. [PW]   2126 Call placed for transfer center. Pt is amenable to being transferred. [PW]   2134 Fluids d/lisa. Pt had sandwich and a bit to drink and BP has improved, now 105/56. Pt only received about 100 ml fluids.   [PW]   4139 Ordered patient's nighttime medicine including simvastatin, ropinirole, potassium, flecainide. Holding sildenafil at this time as her blood pressure is soft. Holding Coumadin at this time as her INR is 2.4 [PW]   1 Spoke with Dr. Alison Santana, hospitalist at [address] [place] [address] with pulmonary team, they will accept pt pending transfer via ALS transport. Pt amenable to plan. [PW]   Thu Aug 18, 2022   4274 Dr. Jesus Brooms cardiology in the ER and evaluate the patient, deems patient stable and appropriate to be admitted at [address] [place] [address]. We have checked, including clinic has no beds available and do not give us any estimated time on bed availability. [NC]   0803 Case discussed with Dr. Denis Nielson for admission, detailed overview given, he is willing to meet the patient however we are also having bed issues here and is recommending trying patient with a dose of IV Lasix and rechecking in a couple hours and assessing for improvement, decision to dispo at that time. [NC]   0365 Nursing staff reports patient up and ambulating back and forth to the bathroom on her own all morning on room air. Nursing staff ambulated the patient around the department on room air, they states she did very well with no dyspnea. Patient states that she did very well with no dyspnea. She is very comfortable in home states this is near her baseline. She has diuretics at home and will increase the use for the next day or 2 and call her pulmonologist.  I examined the patient at this time she is sitting up in the bed playing on an iPad on room air with no distress with full conversational and paragraphs. Lungs have trace basilar crackles with good air exchange and otherwise clear lung fields. She has no pretibial edema or calf pain. She does prefer to go home at this time with outpatient follow-up.  [NC]      ED Course User Index  [NC] Carmen Us,   [PW] Gregg Multani, DO        Medications   sodium chloride flush 0.9 % injection (has no administration in time range)   ipratropium-albuterol (DUONEB) nebulizer solution 3 ampule (3 ampules Inhalation Given 8/17/22 1815)   iopamidol (ISOVUE-370) 76 % injection 60 mL (60 mLs IntraVENous Given 8/17/22 1812)   potassium chloride (KLOR-CON M) extended release tablet 10 mEq (10 mEq Oral Given 8/17/22 2316)   flecainide (TAMBOCOR) tablet 50 mg (50 mg Oral Given 8/17/22 2316)   furosemide (LASIX) injection 40 mg (40 mg IntraVENous Given 8/18/22 0813)   levothyroxine (SYNTHROID) tablet 75 mcg (75 mcg Oral Given 8/18/22 0957)     -------------------- RESULTS --------------------    LABS:  Results for orders placed or performed during the hospital encounter of 08/17/22   COVID-19, Rapid    Specimen: Nasopharyngeal Swab   Result Value Ref Range    SARS-CoV-2, NAAT Not Detected Not Detected   CBC with Auto Differential   Result Value Ref Range    WBC 7.5 4.5 - 11.5 E9/L    RBC 3.86 3.50 - 5.50 E12/L    Hemoglobin 11.4 (L) 11.5 - 15.5 g/dL    Hematocrit 35.9 34.0 - 48.0 %    MCV 93.0 80.0 - 99.9 fL    MCH 29.5 26.0 - 35.0 pg    MCHC 31.8 (L) 32.0 - 34.5 %    RDW 15.5 (H) 11.5 - 15.0 fL    Platelets 722 440 - 237 E9/L    MPV 10.1 7.0 - 12.0 fL    Neutrophils % 75.1 43.0 - 80.0 %    Immature Granulocytes % 0.5 0.0 - 5.0 %    Lymphocytes % 14.0 (L) 20.0 - 42.0 %    Monocytes % 8.9 2.0 - 12.0 %    Eosinophils % 0.8 0.0 - 6.0 %    Basophils % 0.7 0.0 - 2.0 %    Neutrophils Absolute 5.65 1.80 - 7.30 E9/L    Immature Granulocytes # 0.04 E9/L    Lymphocytes Absolute 1.05 (L) 1.50 - 4.00 E9/L    Monocytes Absolute 0.67 0.10 - 0.95 E9/L    Eosinophils Absolute 0.06 0.05 - 0.50 E9/L    Basophils Absolute 0.05 0.00 - 0.20 E2/Z   Basic Metabolic Panel w/ Reflex to MG   Result Value Ref Range    Sodium 138 132 - 146 mmol/L    Potassium reflex Magnesium 4.0 3.5 - 5.0 mmol/L    Chloride 99 98 - 107 mmol/L    CO2 27 22 - 29 mmol/L    Anion Gap 12 7 - 16 mmol/L    Glucose 92 74 - 99 mg/dL    BUN 30 (H) 6 - 23 mg/dL    Creatinine 0.8 0.5 - 1.0 mg/dL GFR Non-African American >60 >=60 mL/min/1.73    GFR African American >60     Calcium 10.3 (H) 8.6 - 10.2 mg/dL   Brain Natriuretic Peptide   Result Value Ref Range    Pro-BNP 1,622 (H) 0 - 125 pg/mL   Troponin   Result Value Ref Range    Troponin, High Sensitivity 40 (H) 0 - 9 ng/L   Protime-INR   Result Value Ref Range    Protime 27.7 (H) 9.3 - 12.4 sec    INR 2.4    Urinalysis   Result Value Ref Range    Color, UA Yellow Straw/Yellow    Clarity, UA Clear Clear    Glucose, Ur Negative Negative mg/dL    Bilirubin Urine Negative Negative    Ketones, Urine Negative Negative mg/dL    Specific Gravity, UA 1.010 1.005 - 1.030    Blood, Urine Negative Negative    pH, UA 5.0 5.0 - 9.0    Protein, UA Negative Negative mg/dL    Urobilinogen, Urine 0.2 <2.0 E.U./dL    Nitrite, Urine Negative Negative    Leukocyte Esterase, Urine Negative Negative   Troponin   Result Value Ref Range    Troponin, High Sensitivity 40 (H) 0 - 9 ng/L   EKG 12 Lead   Result Value Ref Range    Ventricular Rate 53 BPM    Atrial Rate 53 BPM    P-R Interval 204 ms    QRS Duration 138 ms    Q-T Interval 482 ms    QTc Calculation (Bazett) 452 ms    P Axis 81 degrees    R Axis 62 degrees    T Axis 8 degrees       RADIOLOGY:  [facility] PULMONARY W CONTRAST   Final Result   No evidence of pulmonary embolism. Cardiomegaly with pulmonary edema and bilateral small pleural effusions   suggestive of congestive heart failure. There is also reflux of injected   contrast into dilated hepatic veins, which suggest a component of right heart   dysfunction. Mild centrilobular emphysema. XR CHEST 1 VIEW   Final Result   No acute process. -------------------- NURSING NOTES & VITALS REVIEWED --------------------    The nursing notes within the ED encounter and vital signs have been reviewed. No data found.       Oxygen Saturation Interpretation: Normal    -------------------- PROGRESS NOTES --------------------  Counseling:  I have spoken with the patient and discussed todays results, in addition to providing specific details for the plan of care and counseling regarding the diagnosis and prognosis. Their questions are answered at this time and they are agreeable with the plan of admission.    -------------------- ADDITIONAL PROVIDER NOTES --------------------    Consultations:  Time: 2210. Spoke with Dr. Michelle Goncalves, hospitalist CC, they will admit pt to pulmonary team pending [place] [facility] Discussed case. This patient's ED course included: a personal history and physicial examination, re-evaluation prior to disposition, multiple bedside re-evaluations, IV medications, cardiac monitoring, continuous pulse oximetry, and complex medical decision making and emergency management      Diagnosis:  1. Acute on chronic congestive heart failure, unspecified heart failure type ([facility] [address]    2. Bilateral pleural effusion        Disposition:  Patient's disposition: Transfer to [address] [place] [address] pending transport  Patient's condition is fair. Ernst Sanchez MD      *NOTE: This report was transcribed using voice recognition software. Every effort was made to ensure accuracy; however, inadvertent computerized transcription errors may be present.        Nay Walker DO  Resident  08/17/22 [id number]       Ernst Sanchez MD  08/18/22 2016

## 2022-08-18 VITALS
HEART RATE: 70 BPM | DIASTOLIC BLOOD PRESSURE: 55 MMHG | TEMPERATURE: 97.3 F | RESPIRATION RATE: 20 BRPM | HEIGHT: 63 IN | SYSTOLIC BLOOD PRESSURE: 117 MMHG | OXYGEN SATURATION: 100 % | WEIGHT: 154 LBS | BODY MASS INDEX: 27.29 KG/M2

## 2022-08-18 LAB
EKG ATRIAL RATE: 53 BPM
EKG P AXIS: 81 DEGREES
EKG P-R INTERVAL: 204 MS
EKG Q-T INTERVAL: 482 MS
EKG QRS DURATION: 138 MS
EKG QTC CALCULATION (BAZETT): 452 MS
EKG R AXIS: 62 DEGREES
EKG T AXIS: 8 DEGREES
EKG VENTRICULAR RATE: 53 BPM

## 2022-08-18 PROCEDURE — 6370000000 HC RX 637 (ALT 250 FOR IP): Performed by: EMERGENCY MEDICINE

## 2022-08-18 PROCEDURE — 99285 EMERGENCY DEPT VISIT HI MDM: CPT | Performed by: INTERNAL MEDICINE

## 2022-08-18 PROCEDURE — 6360000002 HC RX W HCPCS: Performed by: EMERGENCY MEDICINE

## 2022-08-18 RX ORDER — SILDENAFIL CITRATE 20 MG/1
60 TABLET ORAL 3 TIMES DAILY
Status: DISCONTINUED | OUTPATIENT
Start: 2022-08-18 | End: 2022-08-18 | Stop reason: HOSPADM

## 2022-08-18 RX ORDER — ACETAMINOPHEN 325 MG/1
650 TABLET ORAL EVERY 6 HOURS PRN
Status: DISCONTINUED | OUTPATIENT
Start: 2022-08-18 | End: 2022-08-18 | Stop reason: HOSPADM

## 2022-08-18 RX ORDER — ACETAMINOPHEN 650 MG/1
650 SUPPOSITORY RECTAL EVERY 6 HOURS PRN
Status: DISCONTINUED | OUTPATIENT
Start: 2022-08-18 | End: 2022-08-18

## 2022-08-18 RX ORDER — SILDENAFIL CITRATE 20 MG/1
20 TABLET ORAL 3 TIMES DAILY
Status: DISCONTINUED | OUTPATIENT
Start: 2022-08-18 | End: 2022-08-18

## 2022-08-18 RX ORDER — PANTOPRAZOLE SODIUM 40 MG/1
40 TABLET, DELAYED RELEASE ORAL
Status: DISCONTINUED | OUTPATIENT
Start: 2022-08-18 | End: 2022-08-18 | Stop reason: HOSPADM

## 2022-08-18 RX ORDER — FUROSEMIDE 10 MG/ML
40 INJECTION INTRAMUSCULAR; INTRAVENOUS ONCE
Status: COMPLETED | OUTPATIENT
Start: 2022-08-18 | End: 2022-08-18

## 2022-08-18 RX ORDER — FLECAINIDE ACETATE 50 MG/1
50 TABLET ORAL 2 TIMES DAILY
Status: DISCONTINUED | OUTPATIENT
Start: 2022-08-18 | End: 2022-08-18 | Stop reason: HOSPADM

## 2022-08-18 RX ADMIN — ACETAMINOPHEN 650 MG: 325 TABLET ORAL at 09:58

## 2022-08-18 RX ADMIN — SILDENAFIL CITRATE 60 MG: 20 TABLET ORAL at 09:57

## 2022-08-18 RX ADMIN — PANTOPRAZOLE SODIUM 40 MG: 40 TABLET, DELAYED RELEASE ORAL at 09:57

## 2022-08-18 RX ADMIN — LEVOTHYROXINE SODIUM 75 MCG: 25 TABLET ORAL at 09:57

## 2022-08-18 RX ADMIN — FUROSEMIDE 40 MG: 10 INJECTION, SOLUTION INTRAMUSCULAR; INTRAVENOUS at 08:13

## 2022-08-18 RX ADMIN — FLECAINIDE ACETATE 50 MG: 50 TABLET ORAL at 09:57

## 2022-08-18 ASSESSMENT — PAIN SCALES - GENERAL: PAINLEVEL_OUTOF10: 2

## 2022-08-18 ASSESSMENT — PAIN DESCRIPTION - LOCATION: LOCATION: CHEST

## 2022-08-18 NOTE — CONSULTS
INPATIENT CARDIOLOGY CONSULT    Name: Ofelia Brown    Age: 75 y.o. Date of Admission: 8/17/2022  2:41 PM    Date of Service: 8/18/2022    Reason for Consultation: CHF/pulmonary hypertension    Referring Physician: No admitting provider for patient encounter. Primary Cardiologist: Dr. Rich Ascencio, also follows with [facility] cardiology and pulmonary hypertension    History of Present Illness:   Ofelia Brown is a 75 y.o. female who presented on 8/17/2022 for further evaluation of shortness of breath. She has multifactorial pre and postcapillary pulmonary hypertension as well as paroxysmal atrial fibrillation. She just underwent DC cardioversion a week ago Thursday with initiation of flecainide. Starting the day afterward she has had shortness of breath, substernal chest tightness which is gotten worse and she came to the ER last night. She was about to be discharged home but then her blood pressure was soft so she was kept here and then decisions were made to transfer her to [facility] where she was accepted but is still awaiting a bed. Denies shortness of breath at rest but continues to have chest pain which is worse when she is lying down or resting. She has not received any diuretics due to low blood pressure. She is also on sildenafil which has not yet been given. Review of Systems:   Complete review of systems negative except as described above.     Past Medical History:  Past Medical History:   Diagnosis Date    CAD in native artery 4/6/2021    Cerebral artery occlusion with cerebral infarction ([facility] [address]     \"mini stroke\" 10/2014    H/O cardiovascular stress test 06/29/2020    Lexiscan    Hyperlipidemia     Hypertension     Sleep apnea     doesnt use cpap       Past Surgical History:  Past Surgical History:   Procedure Laterality Date    CARDIAC CATHETERIZATION  04/06/2012    Right heart cath by Dr Davina Jo Right 2019    intraocular lens    CATARACT EXTRACTION W/  INTRAOCULAR LENS IMPLANT Left 10/15/2019     SECTION      COLONOSCOPY      INTRACAPSULAR CATARACT EXTRACTION Right 2019    RIGHT EYE CATARACT EMULSIFICATION IOL IMPLANT performed by Zoya Reich MD at [facility] Left 10/15/2019    LEFT EYE CATARACT EMULSIFICATION IOL IMPLANT performed by Zoya Reich MD at [address] [place] [address]       Family History:  Family History   Problem Relation Age of Onset    Other Mother         complication of surgery    Heart Disease Brother        Social History:  Social History     Tobacco Use    Smoking status: Former     Packs/day: 1.50     Years: 40.00     Pack years: 60.00     Types: Cigarettes     Quit date: 2020     Years since quittin.9    Smokeless tobacco: Never    Tobacco comments:      she has decreased to 1ppd   Vaping Use    Vaping Use: Never used   Substance Use Topics    Alcohol use: Not Currently     Alcohol/week: 10.0 standard drinks     Types: 10 Glasses of wine per week     Comment: green tea 2 cups a day     Drug use: No       Allergies: Allergies   Allergen Reactions    Biaxin [Clarithromycin] Nausea And Vomiting    Naproxen Nausea And Vomiting       Home Medications:  Prior to Admission medications    Medication Sig Start Date End Date Taking?  Authorizing Provider   pantoprazole (PROTONIX) 40 MG tablet Take 40 mg by mouth daily    Historical Provider, MD   sildenafil (REVATIO) 20 MG tablet Take 20 mg by mouth in the morning and at bedtime    Historical Provider, MD   fluticasone (FLOVENT HFA) 110 MCG/ACT inhaler Inhale 1 puff into the lungs 2 times daily    Historical Provider, MD   Tiotropium Bromide Monohydrate (SPIRIVA RESPIMAT IN) Inhale into the lungs daily    Historical Provider, MD RENAE DUCKWORTH    Historical Provider, MD   Multiple Vitamins-Minerals (THERAPEUTIC MULTIVITAMIN-MINERALS) tablet Take 1 tablet by mouth daily    Historical Provider, MD   vitamin B-12 (CYANOCOBALAMIN) 1000 MCG tablet Take 1,000 mcg by mouth daily    Historical Provider, MD   calcium carbonate (TUMS) 500 MG chewable tablet Take 1 tablet by mouth daily    Historical Provider, MD   amiodarone (CORDARONE) 200 MG tablet Take 1 tablet by mouth 2 times daily for 14 doses 5/10/22 5/17/22  Bruce Mondragon MD   torsemide (DEMADEX) 20 MG tablet TAKE ONE TABLET BY MOUTH DAILY 4/4/22   Historical Provider, MD   spironolactone (ALDACTONE) 25 MG tablet TAKE ONE TABLET BY MOUTH DAILY 4/4/22   Historical Provider, MD   furosemide (LASIX) 20 MG tablet take one tablet three times daily  Patient not taking: Reported on 4/7/2022 2/8/22   Bruce Mondragon MD   warfarin (COUMADIN) 5 MG tablet Take 1 tablet by mouth daily 2/8/22   Bruce Mondragon MD   metoprolol tartrate (LOPRESSOR) 50 MG tablet Take 1 tablet by mouth 2 times daily 12/15/21   Bruce Mondragon MD   levothyroxine (SYNTHROID) 75 MCG tablet 75 mcg Daily  12/1/21   Historical Provider, MD   lisinopril (PRINIVIL;ZESTRIL) 5 MG tablet Take 5 mg by mouth daily  Patient not taking: Reported on 5/10/2022 9/27/21   Historical Provider, MD   mycophenolate (CELLCEPT) 500 MG tablet Take 1,000 mg by mouth 2 times daily 11/12/21 5/10/22  Historical Provider, MD   potassium chloride (KLOR-CON M) 10 MEQ extended release tablet Take 1 tablet by mouth daily Take with Lasix  Patient taking differently: Take 20 mEq by mouth 2 times daily Take with Lasix 9/24/21   Bruce Mondragon MD   simvastatin (ZOCOR) 40 MG tablet Take 1 tablet by mouth nightly 3/29/21   Bruce Mondragon MD   rOPINIRole (REQUIP) 0.25 MG tablet Take 1 tablet by mouth daily  Patient taking differently: Take 0.5 mg by mouth nightly  9/18/15   Sadia Douglas DO   Cholecalciferol (VITAMIN D) 2000 UNITS CAPS capsule Take  by mouth daily.     Historical Provider, MD   aspirin 81 MG tablet Take 81 mg by mouth 2 times daily   Patient not taking: Reported on 5/10/2022 Historical Provider, MD       Current Medications:    Current Facility-Administered Medications:     pantoprazole (PROTONIX) tablet 40 mg, 40 mg, Oral, QA AC, [address] [facility] [address], DO, 40 mg at 08/18/22 0957    flecainide (TAMBOCOR) tablet 50 mg, 50 mg, Oral, BID, Barbara Bakari Cardinal, DO, 50 mg at 08/18/22 8314    acetaminophen (TYLENOL) tablet 650 mg, 650 mg, Oral, Q6H PRN, Barbara Bakari Cardinal, DO, 650 mg at 08/18/22 2842    sildenafil (REVATIO) tablet 60 mg, 60 mg, Oral, TID, Barbara Bakari Cardinal, DO, 60 mg at 08/18/22 0957    methylPREDNISolone sodium (SOLU-MEDROL) injection 125 mg, 125 mg, IntraVENous, Once, Volney Pih, DO    rOPINIRole (REQUIP) tablet 1 mg, 1 mg, Oral, TID, VolFrankfort Pih, DO, 1 mg at 08/17/22 2315    atorvastatin (LIPITOR) tablet 20 mg, 20 mg, Oral, Nightly, Volney Pih, DO, 20 mg at 08/17/22 2316    Current Outpatient Medications:     pantoprazole (PROTONIX) 40 MG tablet, Take 40 mg by mouth daily, Disp: , Rfl:     sildenafil (REVATIO) 20 MG tablet, Take 20 mg by mouth in the morning and at bedtime, Disp: , Rfl:     fluticasone (FLOVENT HFA) 110 MCG/ACT inhaler, Inhale 1 puff into the lungs 2 times daily, Disp: , Rfl:     Tiotropium Bromide Monohydrate (SPIRIVA RESPIMAT IN), Inhale into the lungs daily, Disp: , Rfl:     NONFORMULARY, TREVASO, Disp: , Rfl:     Multiple Vitamins-Minerals (THERAPEUTIC MULTIVITAMIN-MINERALS) tablet, Take 1 tablet by mouth daily, Disp: , Rfl:     vitamin B-12 (CYANOCOBALAMIN) 1000 MCG tablet, Take 1,000 mcg by mouth daily, Disp: , Rfl:     calcium carbonate (TUMS) 500 MG chewable tablet, Take 1 tablet by mouth daily, Disp: , Rfl:     amiodarone (CORDARONE) 200 MG tablet, Take 1 tablet by mouth 2 times daily for 14 doses, Disp: 30 tablet, Rfl: 0    torsemide (DEMADEX) 20 MG tablet, TAKE ONE TABLET BY MOUTH DAILY, Disp: , Rfl:     spironolactone (ALDACTONE) 25 MG tablet, TAKE ONE TABLET BY MOUTH DAILY, Disp: , Rfl:     furosemide (LASIX) 20 MG tablet, take one tablet three times daily (Patient not taking: Reported on 4/7/2022), Disp: 270 tablet, Rfl: 3    warfarin (COUMADIN) 5 MG tablet, Take 1 tablet by mouth daily, Disp: 90 tablet, Rfl: 3    metoprolol tartrate (LOPRESSOR) 50 MG tablet, Take 1 tablet by mouth 2 times daily, Disp: 180 tablet, Rfl: 2    levothyroxine (SYNTHROID) 75 MCG tablet, 75 mcg Daily , Disp: , Rfl:     lisinopril (PRINIVIL;ZESTRIL) 5 MG tablet, Take 5 mg by mouth daily (Patient not taking: Reported on 5/10/2022), Disp: , Rfl:     mycophenolate (CELLCEPT) 500 MG tablet, Take 1,000 mg by mouth 2 times daily, Disp: , Rfl:     potassium chloride (KLOR-CON M) 10 MEQ extended release tablet, Take 1 tablet by mouth daily Take with Lasix (Patient taking differently: Take 20 mEq by mouth 2 times daily Take with Lasix), Disp: 90 tablet, Rfl: 1    simvastatin (ZOCOR) 40 MG tablet, Take 1 tablet by mouth nightly, Disp: 90 tablet, Rfl: 3    rOPINIRole (REQUIP) 0.25 MG tablet, Take 1 tablet by mouth daily (Patient taking differently: Take 0.5 mg by mouth nightly ), Disp: 90 tablet, Rfl: 3    Cholecalciferol (VITAMIN D) 2000 UNITS CAPS capsule, Take  by mouth daily. , Disp: , Rfl:     aspirin 81 MG tablet, Take 81 mg by mouth 2 times daily  (Patient not taking: Reported on 5/10/2022), Disp: , Rfl:     Physical Exam:  BP (!) 117/55   Pulse 70   Temp 97.3 °F (36.3 °C) (Oral)   Resp 20   Ht 5' 3\" (1.6 m)   Wt 154 lb (69.9 kg)   SpO2 100%   BMI 27.28 kg/m²   Wt Readings from Last 3 Encounters:   08/17/22 154 lb (69.9 kg)   05/10/22 148 lb 6.4 oz (67.3 kg)   12/13/21 164 lb (74.4 kg)     Appearance: Awake, alert, on nasal cannula  Skin: Intact, no rash  Head: Normocephalic, atraumatic  Eyes: EOMI, no conjunctival erythema  ENMT: No pharyngeal erythema, MMM, no rhinorrhea  Neck: Supple, no elevated JVP, no carotid bruits  Lungs: Diminished  Cardiac: PMI nondisplaced, regular rhythm with a bradycardic rate, normal S1 & S2, no murmurs  Abdomen: Soft, nontender, +bowel sounds  Extremities: Moves all extremities x 4, no lower extremity edema  Neurologic: No focal motor deficits apparent, normal mood and affect  Peripheral Pulses: Intact posterior tibial pulses bilaterally    Intake/Output:    Intake/Output Summary (Last 24 hours) at 8/18/2022 1116  Last data filed at 8/17/2022 2244  Gross per 24 hour   Intake 50 ml   Output --   Net 50 ml     No intake/output data recorded. Laboratory Tests:  Recent Labs     08/17/22  1501      K 4.0   CL 99   CO2 27   BUN 30*   CREATININE 0.8   GLUCOSE 92   CALCIUM 10.3*     No results found for: MG  No results for input(s): ALKPHOS, ALT, AST, PROT, BILITOT, BILIDIR, LABALBU in the last 72 hours.   Recent Labs     08/17/22  1501   WBC 7.5   RBC 3.86   HGB 11.4*   HCT 35.9   MCV 93.0   MCH 29.5   MCHC 31.8*   RDW 15.5*      MPV 10.1     Lab Results   Component Value Date    CKTOTAL 120 09/28/2021     Lab Results   Component Value Date    INR 2.4 08/17/2022    INR 1.6 08/05/2022    INR 2.6 07/08/2022    PROTIME 27.7 (H) 08/17/2022    PROTIME 0.0 08/05/2022    PROTIME 0.0 07/08/2022     Lab Results   Component Value Date    TSH 6.450 (H) 09/28/2021     Lab Results   Component Value Date    LABA1C 5.2 08/26/2013     No results found for: EAG  Lab Results   Component Value Date    CHOL 217 (H) 09/10/2015    CHOL 177 02/28/2015    CHOL 227 (H) 08/15/2014     Lab Results   Component Value Date    TRIG 117 09/10/2015    TRIG 69 02/28/2015    TRIG 140 08/15/2014     Lab Results   Component Value Date    HDL 73 09/10/2015    HDL 66 02/28/2015    HDL 61 08/15/2014     Lab Results   Component Value Date    LDLCALC 121 (H) 09/10/2015    LDLCALC 97 02/28/2015    LDLCALC 138 (H) 08/15/2014     Lab Results   Component Value Date    LABVLDL 23 09/10/2015    LABVLDL 14 02/28/2015    LABVLDL 28 08/15/2014     No results found for: CHOLHDLRATIO  Recent Labs     08/17/22  1501   PROBNP 1,622*       Cardiac Tests:  EKG: Sinus bradycardia 50 with right bundle branch block    Telemetry: Sinus 50s    Chest X-ray:   8/17/22    Impression   No acute process. CTA chest 8/17/22  Impression   No evidence of pulmonary embolism. Cardiomegaly with pulmonary edema and bilateral small pleural effusions   suggestive of congestive heart failure. There is also reflux of injected   contrast into dilated hepatic veins, which suggest a component of right heart   dysfunction. Mild centrilobular emphysema. Echocardiogram:   [facility] 8/11/22 [facility] pre [facility]  CONCLUSIONS:   - Exam indication: Pre Cardioversion, Pre AF Ablation   - The left ventricle is normal in size. Left ventricular systolic function is   normal. EF = 55 ± 5% (visual est.)   - The right ventricle is dilated. Right ventricular systolic function is   moderately decreased. - The left atrial cavity is dilated. There is no evidence of thrombus in the left   atrium or its appendage.   - The right atrial cavity is dilated. - Exam was compared with the prior  echocardiographic exam performed on   4/1/2022. There has been no significant interval change. TTE 3/30/22 [facility]  CONCLUSIONS:   - Technically difficult exam due to Tachycardia.   - Exam indication: atrial fib   - The left ventricle is small. Left ventricular systolic function is mildly   decreased. EF = 52 ± 5% (2D 4-ch.)   - The right ventricle is severely dilated. Right ventricular systolic function is severely decreased. - The left atrial cavity is dilated. - The right atrial cavity is dilated. - Exam was compared with the prior  echocardiographic exam performed on 8/25/21. RV function worse on current study     Stress test:    Pharmacologic stress 2/6776  End diastolic volume is 37 ml. The ejection fraction equals 79% with no focal   wall motion abnormalities. There is no left ventricular dilatation. Impression   1. No pharmacologically induced reversible perfusion defects to suggest   ischemia   2. Ejection fraction of 79%   3. No focal wall motion abnormality       Cardiac catheterization:   [facility] at [facility] 8/2021  Right atrial pressure (mean) 15.00 mmHg   Mean pulmonary artery pressure 48.00 mmHg   Pulmonary artery occlusion pressure (end-expiration)  19.00 mmHg   Cardiac index (thermodilution) 2.40 L/min/m2   Pulmonary vascular resistance 6.90 Wood Units   Mixed venous oxyhemoglobin 62.00 %     Summary: Mixed pre and postcapillary PH with preserved cardiac index. Negative nitric oxide response. Negative NO test; however there was drop in PVR from 6.9 to 5.35 Wood units. Minimal increase in PAWP with inhaled nitric oxide from 19 to 21 mmHg. With fluid challenge the mPAP increased from 48 to 53 and the PAWP from 19 to25 mmHg, supportive of postcapillary PH.     -------------------------------------------------------------------------------------------------------------------------------------------------------------  IMPRESSION:  Acute on chronic HFpEF/right heart failure. proBNP 1600  Multifactorial pulmonary hypertension WHO 1, 2, and 3 mixed pre and postcapillary  Paroxysmal atrial fibrillation. ELMER/DCCV 4/2022; ELMER/DCC 8/11/2022 with flecainide initiation. AC with warfarin INR 2.4  Minimally elevated hs-cTnT: 48-40 ng/L. Flat pattern not consistent with ACS. Likely chronic myocardial injury  Scleroderma  COPD  Hypertension  Hypothyroidism    RECOMMENDATIONS:    Gentle diuresis  Resume sildenafil  Continue flecainide  Resume beta-blocker at lower dose  Continue macitentan  Hold spironolactone  Okay to admit here pending transfer to [facility] to see her pulmonary hypertension and cardiology teams    Thank you for allowing me to participate in your patient's care. Please feel free to contact me if you have any questions or concerns. Nan Amezcua MD, [facility] Cardiology    NOTE: This report was transcribed using voice recognition software.  Every effort was made to ensure accuracy; however, inadvertent computerized transcription errors may be present.

## 2022-08-18 NOTE — ED NOTES
Pt states she wears 2L NC while she sleeps, put on 2L NC at this time      [address] Jose Araujo RN  08/18/22 4495

## 2022-08-18 NOTE — ED NOTES
Ambulated pt. W/ pulse ox. 02 sat 95%, HR 70. Pt.  Tolerated well with no complaints of SOB      [facility]  08/18/22 4177

## 2022-08-18 NOTE — ED PROVIDER NOTES
ED Course as of 08/18/22 1129   Wed Aug 17, 2022   3756 Patient is a 70-year-old female with history of CHF, COPD, emphysema, previous smoker states quit 2 years ago, FREDY reina who presents today for shortness of breath since Friday, 4 days ago. Patient relates she was seen at [facility] on Thursday and had a cardioversion done for GEOVANNI. latosha with RVR. She was started on flecainide Thursday which she has been taking. She states her symptoms started Friday. She is on warfarin currently and states has not missed any doses. She states her shortness of breath feels similar to a year ago when she had a pleural effusion which had to be drained. [PW]   1545 WBC: 7.5  No leukocytosis. [PW]   1545 Hemoglobin Quant(!): 11.4  Mild anemia. [PW]   8328 Troponin, High Sensitivity(!): 40  Second pending [PW]   8319 Pro-BNP(!): 1,622 [PW]   1715 XR CHEST 1 VIEW  IMPRESSION:  No acute process. [PW]   7377 INR: 2.4 [PW]   9823 Patient still feels a bit short of breath and has intermittent chest pain on deep inspiration. Lung sounds are clear and equal bilaterally. She does not appear to be in any distress. We will try some DuoNeb's as well as steroid, and further eval with CTA pulm to assess for PE. [PW]   1907 CTA PULMONARY W CONTRAST  IMPRESSION:  No evidence of pulmonary embolism.     Cardiomegaly with pulmonary edema and bilateral small pleural effusions  suggestive of congestive heart failure. There is also reflux of injected  contrast into dilated hepatic veins, which suggest a component of right heart  dysfunction.     Mild centrilobular emphysema. [PW]   1954 Shared decision making was made with patient. From her lab work does appear she may be a bit fluid overloaded as her BNP is a bit elevated and she has small bilateral pleural effusions. She states she follows with a pulmonologist at [facility] for her pulmonary hypertension whom she speaks with regularly.   Patient states she does not have any shortness of breath at this time and usually only a short of breath while active. Patient states she feels well enough to go home and follow-up with her pulmonologist.  Return precautions were discussed and she was recommended to return to the hospital should she worsen or anything change. There is also recommended that she double her dose of Lasix over the next 3 to 5 days. [PW]   1955 Last Echo  LVEF ECHO TRANSESOPHAGEAL (08/11/2022 12:28 PM EDT)  ECG Results - LVEF ECHO TRANSESOPHAGEAL (08/11/2022 12:28 PM EDT)  Component Value Ref Range Test Method Analysis Time Performed At Pathologist Signature  LV Ejection Fraction 55 %     [facility] CARDIOPULMONARY    Comment:   (visual est.) EF > 54  An LV Ejection Fraction of > 50% is normal       [PW]   2029 Spoke with pt again and now recommended admission as her BP is a bit low presently and can no longer recommend her to increase her lasix dose at home. She is amenable to admission. [PW]   2030 500 ml fluids ordered. [PW]   2031 Spoke with Dr. Derrick Hays, on for Dr. Iliana Villatoro and basal.  Dr. Derrick Hays recommended to touch base with her pulmonologist to see if she recommended possible transfer versus if she felt staying here would be okay. Consult placed for Dr. Kashmir Pruitt  [PW]   2052 Spoke with Dr. Femi García, pulmonology on for Dr. Nuzhat Nelson, [address] [place] [address]. She was made aware of pt and will send a message to her pulmonologist as well. [PW]   2126 Call placed for transfer center. Pt is amenable to being transferred. [PW]   2134 Fluids d/lisa. Pt had sandwich and a bit to drink and BP has improved, now 105/56. Pt only received about 100 ml fluids. [PW]   9168 Ordered patient's nighttime medicine including simvastatin, ropinirole, potassium, flecainide. Holding sildenafil at this time as her blood pressure is soft. Holding Coumadin at this time as her INR is 2.4 [PW]   1 Spoke with Dr. Madison Rodrigues, hospitalist at [address] [place] [address] with pulmonary team, they will accept pt pending transfer via ALS transport.  Pt amenable to plan. [PW]   Thu Aug 18, 2022   1084 Dr. Dalia Hampton cardiology in the ER and evaluate the patient, deems patient stable and appropriate to be admitted at [address] [place] [address]. We have checked, including clinic has no beds available and do not give us any estimated time on bed availability. [NC]   0803 Case discussed with Dr. Merline East for admission, detailed overview given, he is willing to meet the patient however we are also having bed issues here and is recommending trying patient with a dose of IV Lasix and rechecking in a couple hours and assessing for improvement, decision to dispo at that time. [NC]   8676 Nursing staff reports patient up and ambulating back and forth to the bathroom on her own all morning on room air. Nursing staff ambulated the patient around the department on room air, they states she did very well with no dyspnea. Patient states that she did very well with no dyspnea. She is very comfortable in home states this is near her baseline. She has diuretics at home and will increase the use for the next day or 2 and call her pulmonologist.  I examined the patient at this time she is sitting up in the bed playing on an iPad on room air with no distress with full conversational and paragraphs. Lungs have trace basilar crackles with good air exchange and otherwise clear lung fields. She has no pretibial edema or calf pain. She does prefer to go home at this time with outpatient follow-up. [NC]      ED Course User Index  [NC] Carolina Sanchez DO  [PW] Bunny Regan DO   11:28 AM EDT  ER charting from yesterday and patient's course have all been reviewed, they have also been reviewed with the patient. While patient was not particularly signed out to me and she was slated for transfer, I was asked to get involved due to lack of beds for the transferring facility and cardiology is subsequent evaluation here in the ER.   No particular signout note will be performed however I do review the previous physicians work-up. For full chief complaint, history of present nose, review of systems and evaluation please see ER work-up from same visit.    --------------------------------------------- PAST HISTORY ---------------------------------------------  Past Medical History:  has a past medical history of CAD in native artery, Cerebral artery occlusion with cerebral infarction [facility]), H/O cardiovascular stress test, Hyperlipidemia, Hypertension, and Sleep apnea. Past Surgical History:  has a past surgical history that includes Carotid endarterectomy (Right);  section; Colonoscopy; Cataract removal with implant (Right, 2019); Intracapsular cataract extraction (Right, 2019); Cataract removal with implant (Left, 10/15/2019); Intracapsular cataract extraction (Left, 10/15/2019); and Cardiac catheterization (2012). Social History:  reports that she quit smoking about 23 months ago. Her smoking use included cigarettes. She has a 60.00 pack-year smoking history. She has never used smokeless tobacco. She reports that she does not currently use alcohol after a past usage of about 10.0 standard drinks per week. She reports that she does not use drugs. Family History: family history includes Heart Disease in her brother; Other in her mother. The patients home medications have been reviewed.     Allergies: Biaxin [clarithromycin] and Naproxen    -------------------------------------------------- RESULTS -------------------------------------------------  Labs:  Results for orders placed or performed during the hospital encounter of 22   COVID-19, Rapid    Specimen: Nasopharyngeal Swab   Result Value Ref Range    SARS-CoV-2, NAAT Not Detected Not Detected   CBC with Auto Differential   Result Value Ref Range    WBC 7.5 4.5 - 11.5 E9/L    RBC 3.86 3.50 - 5.50 E12/L    Hemoglobin 11.4 (L) 11.5 - 15.5 g/dL    Hematocrit 35.9 34.0 - 48.0 %    MCV 93.0 80.0 - 99.9 fL    MCH 29.5 26.0 - 35.0 pg    MCHC 31.8 (L) 32.0 - 34.5 %    RDW 15.5 (H) 11.5 - 15.0 fL    Platelets 715 873 - 729 E9/L    MPV 10.1 7.0 - 12.0 fL    Neutrophils % 75.1 43.0 - 80.0 %    Immature Granulocytes % 0.5 0.0 - 5.0 %    Lymphocytes % 14.0 (L) 20.0 - 42.0 %    Monocytes % 8.9 2.0 - 12.0 %    Eosinophils % 0.8 0.0 - 6.0 %    Basophils % 0.7 0.0 - 2.0 %    Neutrophils Absolute 5.65 1.80 - 7.30 E9/L    Immature Granulocytes # 0.04 E9/L    Lymphocytes Absolute 1.05 (L) 1.50 - 4.00 E9/L    Monocytes Absolute 0.67 0.10 - 0.95 E9/L    Eosinophils Absolute 0.06 0.05 - 0.50 E9/L    Basophils Absolute 0.05 0.00 - 0.20 X3/J   Basic Metabolic Panel w/ Reflex to MG   Result Value Ref Range    Sodium 138 132 - 146 mmol/L    Potassium reflex Magnesium 4.0 3.5 - 5.0 mmol/L    Chloride 99 98 - 107 mmol/L    CO2 27 22 - 29 mmol/L    Anion Gap 12 7 - 16 mmol/L    Glucose 92 74 - 99 mg/dL    BUN 30 (H) 6 - 23 mg/dL    Creatinine 0.8 0.5 - 1.0 mg/dL    GFR Non-African American >60 >=60 mL/min/1.73    GFR African American >60     Calcium 10.3 (H) 8.6 - 10.2 mg/dL   Brain Natriuretic Peptide   Result Value Ref Range    Pro-BNP 1,622 (H) 0 - 125 pg/mL   Troponin   Result Value Ref Range    Troponin, High Sensitivity 40 (H) 0 - 9 ng/L   Protime-INR   Result Value Ref Range    Protime 27.7 (H) 9.3 - 12.4 sec    INR 2.4    Urinalysis   Result Value Ref Range    Color, UA Yellow Straw/Yellow    Clarity, UA Clear Clear    Glucose, Ur Negative Negative mg/dL    Bilirubin Urine Negative Negative    Ketones, Urine Negative Negative mg/dL    Specific Gravity, UA 1.010 1.005 - 1.030    Blood, Urine Negative Negative    pH, UA 5.0 5.0 - 9.0    Protein, UA Negative Negative mg/dL    Urobilinogen, Urine 0.2 <2.0 E.U./dL    Nitrite, Urine Negative Negative    Leukocyte Esterase, Urine Negative Negative   Troponin   Result Value Ref Range    Troponin, High Sensitivity 40 (H) 0 - 9 ng/L   EKG 12 Lead   Result Value Ref Range    Ventricular Rate 53 BPM    Atrial Rate 53 BPM    P-R Interval 204 ms    QRS Duration 138 ms    Q-T Interval 482 ms    QTc Calculation (Bazett) 452 ms    P Axis 81 degrees    R Axis 62 degrees    T Axis 8 degrees       Radiology:  CTA PULMONARY W CONTRAST   Final Result   No evidence of pulmonary embolism. Cardiomegaly with pulmonary edema and bilateral small pleural effusions   suggestive of congestive heart failure. There is also reflux of injected   contrast into dilated hepatic veins, which suggest a component of right heart   dysfunction. Mild centrilobular emphysema. XR CHEST 1 VIEW   Final Result   No acute process. ------------------------- NURSING NOTES AND VITALS REVIEWED ---------------------------  Date / Time Roomed:  8/17/2022  2:41 PM  ED Bed Assignment:  07/07    The nursing notes within the ED encounter and vital signs as below have been reviewed. BP (!) 117/55   Pulse 70   Temp 97.3 °F (36.3 °C) (Oral)   Resp 20   Ht 5' 3\" (1.6 m)   Wt 154 lb (69.9 kg)   SpO2 100%   BMI 27.28 kg/m²   Oxygen Saturation Interpretation: Normal      ------------------------------------------ PROGRESS NOTES ------------------------------------------  I have spoken with the patient and discussed todays results, in addition to providing specific details for the plan of care and counseling regarding the diagnosis and prognosis. Their questions are answered at this time and they are agreeable with the plan. I discussed at length with them reasons for immediate return here for re evaluation. They will followup with primary care by calling their office tomorrow. --------------------------------- ADDITIONAL PROVIDER NOTES ---------------------------------  At this time the patient is without objective evidence of an acute process requiring hospitalization or inpatient management. They have remained hemodynamically stable throughout their entire ED visit and are stable for discharge with outpatient follow-up. The plan has been discussed in detail and they are aware of the specific conditions for emergent return, as well as the importance of follow-up. New Prescriptions    No medications on file       Diagnosis:  1. Acute on chronic congestive heart failure, unspecified heart failure type ([facility] [address]    2. Bilateral pleural effusion        Disposition:  Patient's disposition: Discharge to home  Patient's condition is stable.           Ramiro Powers, DO  08/18/22 [address], DO  08/18/22 1126

## 2022-08-25 ENCOUNTER — TELEPHONE (OUTPATIENT)
Dept: CARDIOLOGY CLINIC | Age: 67
End: 2022-08-25

## 2022-11-23 ENCOUNTER — HOSPITAL ENCOUNTER (EMERGENCY)
Age: 67
Discharge: ANOTHER HOSPITAL | End: 2022-11-24
Attending: EMERGENCY MEDICINE
Payer: MEDICARE

## 2022-11-23 ENCOUNTER — APPOINTMENT (OUTPATIENT)
Dept: GENERAL RADIOLOGY | Age: 67
End: 2022-11-23
Payer: MEDICARE

## 2022-11-23 DIAGNOSIS — I44.39 HIGH DEGREE ATRIOVENTRICULAR BLOCK: Primary | ICD-10-CM

## 2022-11-23 LAB
ALBUMIN SERPL-MCNC: 4.3 G/DL (ref 3.5–5.2)
ALP BLD-CCNC: 73 U/L (ref 35–104)
ALT SERPL-CCNC: 17 U/L (ref 0–32)
ANION GAP SERPL CALCULATED.3IONS-SCNC: 13 MMOL/L (ref 7–16)
AST SERPL-CCNC: 19 U/L (ref 0–31)
BASOPHILS ABSOLUTE: 0.08 E9/L (ref 0–0.2)
BASOPHILS RELATIVE PERCENT: 1.1 % (ref 0–2)
BILIRUB SERPL-MCNC: 0.4 MG/DL (ref 0–1.2)
BUN BLDV-MCNC: 25 MG/DL (ref 6–23)
CALCIUM SERPL-MCNC: 9.6 MG/DL (ref 8.6–10.2)
CHLORIDE BLD-SCNC: 96 MMOL/L (ref 98–107)
CO2: 25 MMOL/L (ref 22–29)
CREAT SERPL-MCNC: 1 MG/DL (ref 0.5–1)
EOSINOPHILS ABSOLUTE: 0.05 E9/L (ref 0.05–0.5)
EOSINOPHILS RELATIVE PERCENT: 0.7 % (ref 0–6)
GFR SERPL CREATININE-BSD FRML MDRD: >60 ML/MIN/1.73
GLUCOSE BLD-MCNC: 108 MG/DL (ref 74–99)
HCT VFR BLD CALC: 34.2 % (ref 34–48)
HEMOGLOBIN: 10.5 G/DL (ref 11.5–15.5)
IMMATURE GRANULOCYTES #: 0.03 E9/L
IMMATURE GRANULOCYTES %: 0.4 % (ref 0–5)
INFLUENZA A BY PCR: NOT DETECTED
INFLUENZA B BY PCR: NOT DETECTED
LYMPHOCYTES ABSOLUTE: 1.04 E9/L (ref 1.5–4)
LYMPHOCYTES RELATIVE PERCENT: 13.7 % (ref 20–42)
MCH RBC QN AUTO: 30.4 PG (ref 26–35)
MCHC RBC AUTO-ENTMCNC: 30.7 % (ref 32–34.5)
MCV RBC AUTO: 99.1 FL (ref 80–99.9)
MONOCYTES ABSOLUTE: 0.8 E9/L (ref 0.1–0.95)
MONOCYTES RELATIVE PERCENT: 10.6 % (ref 2–12)
NEUTROPHILS ABSOLUTE: 5.58 E9/L (ref 1.8–7.3)
NEUTROPHILS RELATIVE PERCENT: 73.5 % (ref 43–80)
PDW BLD-RTO: 16.2 FL (ref 11.5–15)
PLATELET # BLD: 306 E9/L (ref 130–450)
PMV BLD AUTO: 9.7 FL (ref 7–12)
POTASSIUM SERPL-SCNC: 4.5 MMOL/L (ref 3.5–5)
PRO-BNP: 2972 PG/ML (ref 0–125)
RBC # BLD: 3.45 E12/L (ref 3.5–5.5)
SARS-COV-2, NAAT: NOT DETECTED
SODIUM BLD-SCNC: 134 MMOL/L (ref 132–146)
TOTAL PROTEIN: 6.9 G/DL (ref 6.4–8.3)
TROPONIN, HIGH SENSITIVITY: 42 NG/L (ref 0–9)
WBC # BLD: 7.6 E9/L (ref 4.5–11.5)

## 2022-11-23 PROCEDURE — 6360000002 HC RX W HCPCS: Performed by: STUDENT IN AN ORGANIZED HEALTH CARE EDUCATION/TRAINING PROGRAM

## 2022-11-23 PROCEDURE — 87502 INFLUENZA DNA AMP PROBE: CPT

## 2022-11-23 PROCEDURE — 87635 SARS-COV-2 COVID-19 AMP PRB: CPT

## 2022-11-23 PROCEDURE — 71046 X-RAY EXAM CHEST 2 VIEWS: CPT

## 2022-11-23 PROCEDURE — 80053 COMPREHEN METABOLIC PANEL: CPT

## 2022-11-23 PROCEDURE — 99285 EMERGENCY DEPT VISIT HI MDM: CPT

## 2022-11-23 PROCEDURE — 93005 ELECTROCARDIOGRAM TRACING: CPT | Performed by: EMERGENCY MEDICINE

## 2022-11-23 PROCEDURE — 36415 COLL VENOUS BLD VENIPUNCTURE: CPT

## 2022-11-23 PROCEDURE — 84484 ASSAY OF TROPONIN QUANT: CPT

## 2022-11-23 PROCEDURE — 93005 ELECTROCARDIOGRAM TRACING: CPT | Performed by: STUDENT IN AN ORGANIZED HEALTH CARE EDUCATION/TRAINING PROGRAM

## 2022-11-23 PROCEDURE — 96374 THER/PROPH/DIAG INJ IV PUSH: CPT

## 2022-11-23 PROCEDURE — 83880 ASSAY OF NATRIURETIC PEPTIDE: CPT

## 2022-11-23 PROCEDURE — 85025 COMPLETE CBC W/AUTO DIFF WBC: CPT

## 2022-11-23 RX ORDER — ATROPINE SULFATE 0.1 MG/ML
1 INJECTION INTRAVENOUS ONCE
Status: COMPLETED | OUTPATIENT
Start: 2022-11-23 | End: 2022-11-23

## 2022-11-23 RX ADMIN — ATROPINE SULFATE 1 MG: 0.1 INJECTION, SOLUTION INTRAVENOUS at 22:39

## 2022-11-23 ASSESSMENT — PAIN SCALES - GENERAL: PAINLEVEL_OUTOF10: 2

## 2022-11-23 ASSESSMENT — LIFESTYLE VARIABLES: HOW OFTEN DO YOU HAVE A DRINK CONTAINING ALCOHOL: NEVER

## 2022-11-23 ASSESSMENT — ENCOUNTER SYMPTOMS
COUGH: 0
VOMITING: 0
TROUBLE SWALLOWING: 0
DIARRHEA: 0
VOICE CHANGE: 0
PHOTOPHOBIA: 0
SHORTNESS OF BREATH: 1
ABDOMINAL PAIN: 0
RHINORRHEA: 0
NAUSEA: 0

## 2022-11-23 ASSESSMENT — PAIN DESCRIPTION - LOCATION: LOCATION: SHOULDER

## 2022-11-23 ASSESSMENT — PAIN - FUNCTIONAL ASSESSMENT: PAIN_FUNCTIONAL_ASSESSMENT: 0-10

## 2022-11-24 ENCOUNTER — HOSPITAL ENCOUNTER (INPATIENT)
Age: 67
LOS: 5 days | Discharge: HOME | DRG: 242 | End: 2022-11-29
Attending: FAMILY MEDICINE | Admitting: FAMILY MEDICINE
Payer: MEDICARE

## 2022-11-24 VITALS
DIASTOLIC BLOOD PRESSURE: 50 MMHG | HEART RATE: 42 BPM | WEIGHT: 148 LBS | OXYGEN SATURATION: 95 % | SYSTOLIC BLOOD PRESSURE: 124 MMHG | TEMPERATURE: 98 F | RESPIRATION RATE: 19 BRPM | BODY MASS INDEX: 26.22 KG/M2

## 2022-11-24 PROBLEM — I44.39 HIGH-GRADE ATRIOVENTRICULAR BLOCK: Status: ACTIVE | Noted: 2022-11-24

## 2022-11-24 PROBLEM — R00.1 BRADYCARDIA: Status: ACTIVE | Noted: 2022-11-24

## 2022-11-24 PROBLEM — I48.92 ATRIAL FLUTTER (HCC): Status: ACTIVE | Noted: 2022-11-24

## 2022-11-24 LAB
ALBUMIN SERPL-MCNC: 4.3 G/DL (ref 3.5–5.2)
ALP BLD-CCNC: 71 U/L (ref 35–104)
ALT SERPL-CCNC: 16 U/L (ref 0–32)
ANION GAP SERPL CALCULATED.3IONS-SCNC: 13 MMOL/L (ref 7–16)
AST SERPL-CCNC: 20 U/L (ref 0–31)
BASOPHILS ABSOLUTE: 0.07 E9/L (ref 0–0.2)
BASOPHILS RELATIVE PERCENT: 1 % (ref 0–2)
BILIRUB SERPL-MCNC: 0.5 MG/DL (ref 0–1.2)
BUN BLDV-MCNC: 25 MG/DL (ref 6–23)
CALCIUM IONIZED: 1.26 MMOL/L (ref 1.15–1.33)
CALCIUM SERPL-MCNC: 10.4 MG/DL (ref 8.6–10.2)
CHLORIDE BLD-SCNC: 98 MMOL/L (ref 98–107)
CO2: 25 MMOL/L (ref 22–29)
CREAT SERPL-MCNC: 0.8 MG/DL (ref 0.5–1)
EOSINOPHILS ABSOLUTE: 0.03 E9/L (ref 0.05–0.5)
EOSINOPHILS RELATIVE PERCENT: 0.4 % (ref 0–6)
GFR SERPL CREATININE-BSD FRML MDRD: >60 ML/MIN/1.73
GLUCOSE BLD-MCNC: 107 MG/DL (ref 74–99)
HCT VFR BLD CALC: 32.9 % (ref 34–48)
HEMOGLOBIN: 10.2 G/DL (ref 11.5–15.5)
IMMATURE GRANULOCYTES #: 0.02 E9/L
IMMATURE GRANULOCYTES %: 0.3 % (ref 0–5)
LYMPHOCYTES ABSOLUTE: 1.16 E9/L (ref 1.5–4)
LYMPHOCYTES RELATIVE PERCENT: 15.8 % (ref 20–42)
MAGNESIUM: 1.6 MG/DL (ref 1.6–2.6)
MCH RBC QN AUTO: 30.6 PG (ref 26–35)
MCHC RBC AUTO-ENTMCNC: 31 % (ref 32–34.5)
MCV RBC AUTO: 98.8 FL (ref 80–99.9)
MONOCYTES ABSOLUTE: 0.7 E9/L (ref 0.1–0.95)
MONOCYTES RELATIVE PERCENT: 9.5 % (ref 2–12)
NEUTROPHILS ABSOLUTE: 5.35 E9/L (ref 1.8–7.3)
NEUTROPHILS RELATIVE PERCENT: 73 % (ref 43–80)
PDW BLD-RTO: 16.2 FL (ref 11.5–15)
PHOSPHORUS: 3.6 MG/DL (ref 2.5–4.5)
PLATELET # BLD: 300 E9/L (ref 130–450)
PMV BLD AUTO: 9.9 FL (ref 7–12)
POTASSIUM SERPL-SCNC: 4.4 MMOL/L (ref 3.5–5)
RBC # BLD: 3.33 E12/L (ref 3.5–5.5)
SODIUM BLD-SCNC: 136 MMOL/L (ref 132–146)
T4 FREE: 1.33 NG/DL (ref 0.93–1.7)
TOTAL PROTEIN: 7.2 G/DL (ref 6.4–8.3)
TROPONIN, HIGH SENSITIVITY: 41 NG/L (ref 0–9)
TROPONIN, HIGH SENSITIVITY: 42 NG/L (ref 0–9)
TROPONIN, HIGH SENSITIVITY: 43 NG/L (ref 0–9)
TSH SERPL DL<=0.05 MIU/L-ACNC: 8.57 UIU/ML (ref 0.27–4.2)
WBC # BLD: 7.3 E9/L (ref 4.5–11.5)

## 2022-11-24 PROCEDURE — 96375 TX/PRO/DX INJ NEW DRUG ADDON: CPT

## 2022-11-24 PROCEDURE — 6370000000 HC RX 637 (ALT 250 FOR IP): Performed by: INTERNAL MEDICINE

## 2022-11-24 PROCEDURE — 83735 ASSAY OF MAGNESIUM: CPT

## 2022-11-24 PROCEDURE — 80053 COMPREHEN METABOLIC PANEL: CPT

## 2022-11-24 PROCEDURE — 6360000002 HC RX W HCPCS: Performed by: FAMILY MEDICINE

## 2022-11-24 PROCEDURE — 84439 ASSAY OF FREE THYROXINE: CPT

## 2022-11-24 PROCEDURE — 84484 ASSAY OF TROPONIN QUANT: CPT

## 2022-11-24 PROCEDURE — 84443 ASSAY THYROID STIM HORMONE: CPT

## 2022-11-24 PROCEDURE — 2000000000 HC ICU R&B

## 2022-11-24 PROCEDURE — 87081 CULTURE SCREEN ONLY: CPT

## 2022-11-24 PROCEDURE — 99291 CRITICAL CARE FIRST HOUR: CPT | Performed by: STUDENT IN AN ORGANIZED HEALTH CARE EDUCATION/TRAINING PROGRAM

## 2022-11-24 PROCEDURE — 82330 ASSAY OF CALCIUM: CPT

## 2022-11-24 PROCEDURE — 2700000000 HC OXYGEN THERAPY PER DAY

## 2022-11-24 PROCEDURE — 6370000000 HC RX 637 (ALT 250 FOR IP)

## 2022-11-24 PROCEDURE — 99291 CRITICAL CARE FIRST HOUR: CPT | Performed by: INTERNAL MEDICINE

## 2022-11-24 PROCEDURE — 6360000002 HC RX W HCPCS: Performed by: STUDENT IN AN ORGANIZED HEALTH CARE EDUCATION/TRAINING PROGRAM

## 2022-11-24 PROCEDURE — 2580000003 HC RX 258

## 2022-11-24 PROCEDURE — 85025 COMPLETE CBC W/AUTO DIFF WBC: CPT

## 2022-11-24 PROCEDURE — 94664 DEMO&/EVAL PT USE INHALER: CPT

## 2022-11-24 PROCEDURE — 6360000002 HC RX W HCPCS

## 2022-11-24 PROCEDURE — 93005 ELECTROCARDIOGRAM TRACING: CPT

## 2022-11-24 PROCEDURE — 99292 CRITICAL CARE ADDL 30 MIN: CPT | Performed by: INTERNAL MEDICINE

## 2022-11-24 PROCEDURE — 84100 ASSAY OF PHOSPHORUS: CPT

## 2022-11-24 PROCEDURE — 36415 COLL VENOUS BLD VENIPUNCTURE: CPT

## 2022-11-24 PROCEDURE — 94640 AIRWAY INHALATION TREATMENT: CPT

## 2022-11-24 RX ORDER — MAGNESIUM SULFATE IN WATER 40 MG/ML
2000 INJECTION, SOLUTION INTRAVENOUS ONCE
Status: COMPLETED | OUTPATIENT
Start: 2022-11-24 | End: 2022-11-24

## 2022-11-24 RX ORDER — POTASSIUM CHLORIDE 20 MEQ/1
20 TABLET, EXTENDED RELEASE ORAL 2 TIMES DAILY
Status: DISCONTINUED | OUTPATIENT
Start: 2022-11-24 | End: 2022-11-29 | Stop reason: HOSPADM

## 2022-11-24 RX ORDER — FLUTICASONE PROPIONATE 110 UG/1
1 AEROSOL, METERED RESPIRATORY (INHALATION) 2 TIMES DAILY
Status: DISCONTINUED | OUTPATIENT
Start: 2022-11-24 | End: 2022-11-24 | Stop reason: CLARIF

## 2022-11-24 RX ORDER — FUROSEMIDE 20 MG/1
20 TABLET ORAL DAILY
Status: DISCONTINUED | OUTPATIENT
Start: 2022-11-24 | End: 2022-11-29 | Stop reason: HOSPADM

## 2022-11-24 RX ORDER — SODIUM CHLORIDE 0.9 % (FLUSH) 0.9 %
5-40 SYRINGE (ML) INJECTION EVERY 12 HOURS SCHEDULED
Status: DISCONTINUED | OUTPATIENT
Start: 2022-11-24 | End: 2022-11-29 | Stop reason: HOSPADM

## 2022-11-24 RX ORDER — LANOLIN ALCOHOL/MO/W.PET/CERES
1000 CREAM (GRAM) TOPICAL DAILY
Status: DISCONTINUED | OUTPATIENT
Start: 2022-11-24 | End: 2022-11-29 | Stop reason: HOSPADM

## 2022-11-24 RX ORDER — BUDESONIDE 0.5 MG/2ML
0.5 INHALANT ORAL 2 TIMES DAILY
Status: DISCONTINUED | OUTPATIENT
Start: 2022-11-24 | End: 2022-11-29 | Stop reason: HOSPADM

## 2022-11-24 RX ORDER — METOCLOPRAMIDE 5 MG/1
5 TABLET ORAL 4 TIMES DAILY
COMMUNITY

## 2022-11-24 RX ORDER — SODIUM CHLORIDE 9 MG/ML
INJECTION, SOLUTION INTRAVENOUS PRN
Status: DISCONTINUED | OUTPATIENT
Start: 2022-11-24 | End: 2022-11-25 | Stop reason: SDUPTHER

## 2022-11-24 RX ORDER — ATORVASTATIN CALCIUM 40 MG/1
40 TABLET, FILM COATED ORAL DAILY
Status: DISCONTINUED | OUTPATIENT
Start: 2022-11-24 | End: 2022-11-29 | Stop reason: HOSPADM

## 2022-11-24 RX ORDER — MYCOPHENOLATE MOFETIL 250 MG/1
1500 CAPSULE ORAL 2 TIMES DAILY
Status: DISCONTINUED | OUTPATIENT
Start: 2022-11-24 | End: 2022-11-29 | Stop reason: HOSPADM

## 2022-11-24 RX ORDER — SILDENAFIL CITRATE 20 MG/1
20 TABLET ORAL 2 TIMES DAILY
Status: DISCONTINUED | OUTPATIENT
Start: 2022-11-24 | End: 2022-11-24

## 2022-11-24 RX ORDER — SILDENAFIL CITRATE 20 MG/1
60 TABLET ORAL 3 TIMES DAILY
Status: DISCONTINUED | OUTPATIENT
Start: 2022-11-24 | End: 2022-11-29 | Stop reason: HOSPADM

## 2022-11-24 RX ORDER — ONDANSETRON 2 MG/ML
4 INJECTION INTRAMUSCULAR; INTRAVENOUS ONCE
Status: COMPLETED | OUTPATIENT
Start: 2022-11-24 | End: 2022-11-24

## 2022-11-24 RX ORDER — FLECAINIDE ACETATE 50 MG/1
50 TABLET ORAL 2 TIMES DAILY
Status: ON HOLD | COMMUNITY
End: 2022-11-29 | Stop reason: HOSPADM

## 2022-11-24 RX ORDER — PANTOPRAZOLE SODIUM 40 MG/1
40 TABLET, DELAYED RELEASE ORAL DAILY
Status: DISCONTINUED | OUTPATIENT
Start: 2022-11-24 | End: 2022-11-29 | Stop reason: HOSPADM

## 2022-11-24 RX ORDER — ACETAMINOPHEN 650 MG/1
650 SUPPOSITORY RECTAL EVERY 6 HOURS PRN
Status: DISCONTINUED | OUTPATIENT
Start: 2022-11-24 | End: 2022-11-29 | Stop reason: HOSPADM

## 2022-11-24 RX ORDER — LEVOTHYROXINE SODIUM 0.07 MG/1
75 TABLET ORAL DAILY
Status: DISCONTINUED | OUTPATIENT
Start: 2022-11-24 | End: 2022-11-29 | Stop reason: HOSPADM

## 2022-11-24 RX ORDER — ACETAMINOPHEN 325 MG/1
650 TABLET ORAL EVERY 6 HOURS PRN
Status: DISCONTINUED | OUTPATIENT
Start: 2022-11-24 | End: 2022-11-25 | Stop reason: SDUPTHER

## 2022-11-24 RX ORDER — SPIRONOLACTONE 25 MG/1
25 TABLET ORAL DAILY
Status: DISCONTINUED | OUTPATIENT
Start: 2022-11-24 | End: 2022-11-29 | Stop reason: HOSPADM

## 2022-11-24 RX ORDER — MACITENTAN 10 MG/1
10 TABLET, FILM COATED ORAL DAILY
COMMUNITY

## 2022-11-24 RX ORDER — SODIUM CHLORIDE 0.9 % (FLUSH) 0.9 %
5-40 SYRINGE (ML) INJECTION PRN
Status: DISCONTINUED | OUTPATIENT
Start: 2022-11-24 | End: 2022-11-29 | Stop reason: HOSPADM

## 2022-11-24 RX ORDER — ROPINIROLE 0.5 MG/1
0.5 TABLET, FILM COATED ORAL NIGHTLY
Status: DISCONTINUED | OUTPATIENT
Start: 2022-11-24 | End: 2022-11-29 | Stop reason: HOSPADM

## 2022-11-24 RX ORDER — METOCLOPRAMIDE 5 MG/1
5 TABLET ORAL 4 TIMES DAILY
Status: DISCONTINUED | OUTPATIENT
Start: 2022-11-24 | End: 2022-11-29 | Stop reason: HOSPADM

## 2022-11-24 RX ORDER — POLYETHYLENE GLYCOL 3350 17 G/17G
17 POWDER, FOR SOLUTION ORAL DAILY PRN
Status: DISCONTINUED | OUTPATIENT
Start: 2022-11-24 | End: 2022-11-29 | Stop reason: HOSPADM

## 2022-11-24 RX ORDER — ENOXAPARIN SODIUM 100 MG/ML
40 INJECTION SUBCUTANEOUS DAILY
Status: DISCONTINUED | OUTPATIENT
Start: 2022-11-24 | End: 2022-11-24

## 2022-11-24 RX ORDER — MYCOPHENOLATE MOFETIL 250 MG/1
1000 CAPSULE ORAL 2 TIMES DAILY
Status: DISCONTINUED | OUTPATIENT
Start: 2022-11-24 | End: 2022-11-24

## 2022-11-24 RX ORDER — HYOSCYAMINE SULFATE 0.125 MG
125 TABLET ORAL EVERY 4 HOURS PRN
COMMUNITY

## 2022-11-24 RX ADMIN — METOCLOPRAMIDE 5 MG: 5 TABLET ORAL at 20:01

## 2022-11-24 RX ADMIN — SODIUM CHLORIDE, PRESERVATIVE FREE 10 ML: 5 INJECTION INTRAVENOUS at 08:06

## 2022-11-24 RX ADMIN — POTASSIUM CHLORIDE 20 MEQ: 20 TABLET, EXTENDED RELEASE ORAL at 20:01

## 2022-11-24 RX ADMIN — BUDESONIDE 500 MCG: 0.5 SUSPENSION RESPIRATORY (INHALATION) at 07:49

## 2022-11-24 RX ADMIN — IPRATROPIUM BROMIDE 0.5 MG: 0.5 SOLUTION RESPIRATORY (INHALATION) at 07:49

## 2022-11-24 RX ADMIN — ATORVASTATIN CALCIUM 40 MG: 40 TABLET, FILM COATED ORAL at 20:02

## 2022-11-24 RX ADMIN — SILDENAFIL 60 MG: 20 TABLET ORAL at 20:01

## 2022-11-24 RX ADMIN — MAGNESIUM SULFATE HEPTAHYDRATE 2000 MG: 40 INJECTION, SOLUTION INTRAVENOUS at 08:14

## 2022-11-24 RX ADMIN — FUROSEMIDE 20 MG: 40 TABLET ORAL at 10:19

## 2022-11-24 RX ADMIN — METOCLOPRAMIDE 5 MG: 5 TABLET ORAL at 18:11

## 2022-11-24 RX ADMIN — BUDESONIDE 500 MCG: 0.5 SUSPENSION RESPIRATORY (INHALATION) at 19:39

## 2022-11-24 RX ADMIN — APIXABAN 5 MG: 5 TABLET, FILM COATED ORAL at 08:05

## 2022-11-24 RX ADMIN — CYANOCOBALAMIN TAB 1000 MCG 1000 MCG: 1000 TAB at 10:19

## 2022-11-24 RX ADMIN — SILDENAFIL 60 MG: 20 TABLET ORAL at 14:47

## 2022-11-24 RX ADMIN — METOCLOPRAMIDE 5 MG: 5 TABLET ORAL at 12:34

## 2022-11-24 RX ADMIN — SPIRONOLACTONE 25 MG: 25 TABLET ORAL at 10:18

## 2022-11-24 RX ADMIN — IPRATROPIUM BROMIDE 0.5 MG: 0.5 SOLUTION RESPIRATORY (INHALATION) at 15:27

## 2022-11-24 RX ADMIN — IPRATROPIUM BROMIDE 0.5 MG: 0.5 SOLUTION RESPIRATORY (INHALATION) at 19:39

## 2022-11-24 RX ADMIN — ROPINIROLE HYDROCHLORIDE 0.5 MG: 0.5 TABLET, FILM COATED ORAL at 20:02

## 2022-11-24 RX ADMIN — LEVOTHYROXINE SODIUM 75 MCG: 0.07 TABLET ORAL at 10:19

## 2022-11-24 RX ADMIN — MYCOPHENOLATE MOFETIL 1500 MG: 250 CAPSULE ORAL at 11:34

## 2022-11-24 RX ADMIN — SODIUM CHLORIDE, PRESERVATIVE FREE 10 ML: 5 INJECTION INTRAVENOUS at 20:02

## 2022-11-24 RX ADMIN — POTASSIUM CHLORIDE 20 MEQ: 20 TABLET, EXTENDED RELEASE ORAL at 10:18

## 2022-11-24 RX ADMIN — MYCOPHENOLATE MOFETIL 1500 MG: 250 CAPSULE ORAL at 20:01

## 2022-11-24 RX ADMIN — ONDANSETRON 4 MG: 2 INJECTION INTRAMUSCULAR; INTRAVENOUS at 02:20

## 2022-11-24 RX ADMIN — PANTOPRAZOLE SODIUM 40 MG: 40 TABLET, DELAYED RELEASE ORAL at 10:19

## 2022-11-24 ASSESSMENT — PAIN SCALES - GENERAL
PAINLEVEL_OUTOF10: 0
PAINLEVEL_OUTOF10: 0

## 2022-11-24 ASSESSMENT — ENCOUNTER SYMPTOMS: BACK PAIN: 1

## 2022-11-24 NOTE — CONSULTS
Critical Care Admit/Consult Note     Patient - Siobhan Hewitt   MRN -  [id number]   Acct # - [de-identified]   - 1955      Date of Admission -  2022  4:38 AM  Date of evaluation -  2022  4402/4402-A   Hospital Day - 0    ADMIT/CONSULT DETAILS     Reason for Admit/Consult   Bradycardia, high degree heart block    Consulting Service/Physician   Consulting - Jonnie Britt DO  Primary Care Physician - Steph Calhoun DO       HPI   Ms. Justice Raymond is a 75 y.o. female who presented to the emergency department at [facility] for shortness of breath and concerns for irregular heartbeat. The patient reports that her pulse was reading extremely low and called EMS for further evaluation. The patient did take her normal medication for atrial fibrillation and took an extra flecainide the evening prior. The patient endorses shortness of breath and wears 2L NC chronically at night and also was feeling dizzy prior to hospitaliztion. While in the ED, the patient's heart rate was found to be in the 30's, 12 lead EKG demonstrated ventricular rhythm with fusion complexes, right bundle branch block, T wave abnormality, ventricular rate 37, atrial rate 37, QTc 419. Significant for this admission include proBNP 2972, troponin 42. While at [facility],[address], 1 mg of atropine with no response to the patient's heart rate. The patient was then transferred to [facility] for an electrophysiology consult. 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 Moving all extremities and requesting to get out of bed.  The patient denies any shortness of breath, chest pain, nausea, or vomiting. The patient reports she following with [facility] and wishes everything to be cleared by her cardiologist there. The patient was offered to be transferred to [facility] and declined at this time. The patient is a full code.      Past Medical History         Diagnosis Date    CAD in native artery 2021    Cerebral artery occlusion with cerebral infarction ([address]     \"mini stroke\" 10/2014    H/O cardiovascular stress test 2020    Lexiscan    Hyperlipidemia     Hypertension     Sleep apnea     doesnt use cpap        Past Surgical History           Procedure Laterality Date    CARDIAC CATHETERIZATION  2012    Right heart cath by Dr Hermelinda Hines Right 2019    intraocular lens    CATARACT REMOVAL WITH IMPLANT Left 10/15/2019     SECTION      COLONOSCOPY      INTRACAPSULAR CATARACT EXTRACTION Right 2019    RIGHT EYE CATARACT EMULSIFICATION IOL IMPLANT performed by Martínez Vigil MD at [address] [place] [address] Left 10/15/2019    LEFT EYE CATARACT EMULSIFICATION IOL IMPLANT performed by Martínez Vigil MD at [address] [place] [address]     Influenza:  Not indicated  Pneumococcal Polysaccharide:  Not indicated        Current Medications   Current Medications    sodium chloride flush  5-40 mL IntraVENous 2 times per day    enoxaparin  40 mg SubCUTAneous Daily    ipratropium  0.5 mg Nebulization 4x daily    budesonide  0.5 mg Nebulization BID     sodium chloride flush, sodium chloride, polyethylene glycol, acetaminophen **OR** acetaminophen  IV Drips/Infusions   sodium chloride       Home Medications  Medications Prior to Admission: metoclopramide (REGLAN) 5 MG tablet, Take 5 mg by mouth 4 times daily Take one tablet by mouth three times a day 30 minutes before meals  apixaban (ELIQUIS) 5 MG TABS tablet, Take 1 tablet by mouth 2 times daily  pantoprazole (PROTONIX) 40 MG tablet, Take 40 mg by mouth daily  sildenafil (REVATIO) 20 MG tablet, Take 20 mg by mouth in the morning and at bedtime  fluticasone (FLOVENT HFA) 110 MCG/ACT inhaler, Inhale 1 puff into the lungs 2 times daily  Tiotropium Bromide Monohydrate (SPIRIVA RESPIMAT IN), Inhale into the lungs daily  NONFORMULARY, TREVASO  Multiple Vitamins-Minerals (THERAPEUTIC MULTIVITAMIN-MINERALS) tablet, Take 1 tablet by mouth daily  vitamin B-12 (CYANOCOBALAMIN) 1000 MCG tablet, Take 1,000 mcg by mouth daily  calcium carbonate (TUMS) 500 MG chewable tablet, Take 1 tablet by mouth daily  amiodarone (CORDARONE) 200 MG tablet, Take 1 tablet by mouth 2 times daily for 14 doses  torsemide (DEMADEX) 20 MG tablet, TAKE ONE TABLET BY MOUTH DAILY  spironolactone (ALDACTONE) 25 MG tablet, TAKE ONE TABLET BY MOUTH DAILY  furosemide (LASIX) 20 MG tablet, take one tablet three times daily (Patient not taking: No sig reported)  warfarin (COUMADIN) 5 MG tablet, Take 1 tablet by mouth daily (Patient not taking: Reported on 11/24/2022)  metoprolol tartrate (LOPRESSOR) 50 MG tablet, Take 1 tablet by mouth 2 times daily  levothyroxine (SYNTHROID) 75 MCG tablet, 75 mcg Daily   lisinopril (PRINIVIL;ZESTRIL) 5 MG tablet, Take 5 mg by mouth daily (Patient not taking: No sig reported)  mycophenolate (CELLCEPT) 500 MG tablet, Take 1,000 mg by mouth 2 times daily  potassium chloride (KLOR-CON M) 10 MEQ extended release tablet, Take 1 tablet by mouth daily Take with Lasix (Patient taking differently: Take 20 mEq by mouth 2 times daily Take with Lasix)  simvastatin (ZOCOR) 40 MG tablet, Take 1 tablet by mouth nightly  rOPINIRole (REQUIP) 0.25 MG tablet, Take 1 tablet by mouth daily (Patient taking differently: Take 0.5 mg by mouth nightly)  Cholecalciferol (VITAMIN D) 2000 UNITS CAPS capsule, Take  by mouth daily.   aspirin 81 MG tablet, Take 81 mg by mouth 2 times daily  (Patient not taking: No sig reported)    Diet/Nutrition   Diet NPO    Allergies   Biaxin [clarithromycin] and Naproxen    Social History   Tobacco   reports that she quit smoking about 2 years ago. Her smoking use included cigarettes. She has a 60.00 pack-year smoking history. She has never used smokeless tobacco.    Alcohol     reports that she does not currently use alcohol after a past usage of about 10.0 standard drinks per week. Occupational history :    Family History         Problem Relation Age of Onset    Other Mother         complication of surgery    Heart Disease Brother        Sleep History   n/a    ROS   REVIEW OF SYSTEMS:  CONSTITUTIONAL:  negative except for  fatigue  RESPIRATORY:  negative except for  dyspnea  CARDIOVASCULAR:  negative except for  dyspnea, fatigue  GASTROINTESTINAL:  negative except for nausea  MUSCULOSKELETAL:  negative for  muscle weakness  NEUROLOGICAL:  negative except for dizziness and near syncope    Lines and Devices    Peripheral    Mechanical Ventilation Data   VENT SETTINGS (Comprehensive)     Additional Respiratory Assessments  Heart Rate: 51  Resp: 18  SpO2: 97 %    ABG  No results found for: PH, PCO2, PO2, HCO3, O2SAT  No results found for: IFIO2, MODE, SETTIDVOL, SETPEEP    Vitals    height is 5' 4\" (1.626 m) and weight is 146 lb 2.6 oz (66.3 kg). Her temporal temperature is 97.7 °F (36.5 °C). Her blood pressure is 81/64 and her pulse is 51. Her respiration is 18 and oxygen saturation is 97%.        Temperature Range: Temp: 97.7 °F (36.5 °C) Temp  Av.9 °F (36.6 °C)  Min: 97.7 °F (36.5 °C)  Max: 98 °F (36.7 °C)  BP Range:  Systolic (35BBF), VE , Min:81 , TFW:614     Diastolic (88JJZ), HJS:06, Min:50, Max:64    Pulse Range: Pulse  Av  Min: 39  Max: 52  Respiration Range: Resp  Av.5  Min: 16  Max: 19  Current Pulse Ox[de-identified]  SpO2: 97 %  24HR Pulse Ox Range:  SpO2  Av.8 %  Min: 95 %  Max: 98 %  Oxygen Amount and Delivery: O2 Flow Rate (L/min): 2 L/min      I/O (24 Hours)    Patient Vitals for the past 8 hrs: BP Temp Temp src Pulse Resp SpO2 Height Weight   11/24/22 0500 -- -- -- 51 18 97 % -- --   11/24/22 0445 81/64 97.7 °F (36.5 °C) Temporal 52 17 97 % 5' 4\" (1.626 m) 146 lb 2.6 oz (66.3 kg)     No intake or output data in the 24 hours ending 11/24/22 0521  No intake/output data recorded. Patient Vitals for the past 96 hrs (Last 3 readings):   Weight   11/24/22 0445 146 lb 2.6 oz (66.3 kg)     Drains/Tubes Outputs  N/A     Exam   PHYSICAL EXAM:  CONSTITUTIONAL: Elderly, alert and oriented, no acute distress noted  ENT:  Normocephalic, without obvious abnormality, atraumatic, sinuses nontender on palpation, external ears without lesions, oral pharynx with moist mucus membranes  CARDIOVASCULAR:  normal apical pulses, bradycardia with ectopic beats, and pulses 2 plus all extermities bilaterally  ABDOMEN:  No scars, normal bowel sounds, soft, non-distended, non-tender  MUSCULOSKELETAL:  There is no redness, warmth, or swelling of the joints. Moving all extremities   NEUROLOGIC:  Awake, alert, oriented to name, place and time.     SKIN:  Redness noted to face    Data   Old records and images have been reviewed    Lab Results   CBC     Lab Results   Component Value Date/Time    WBC 7.6 11/23/2022 10:13 PM    RBC 3.45 11/23/2022 10:13 PM    HGB 10.5 11/23/2022 10:13 PM    HCT 34.2 11/23/2022 10:13 PM     11/23/2022 10:13 PM    MCV 99.1 11/23/2022 10:13 PM    MCH 30.4 11/23/2022 10:13 PM    MCHC 30.7 11/23/2022 10:13 PM    RDW 16.2 11/23/2022 10:13 PM    SEGSPCT 57 06/26/2012 10:03 AM    LYMPHOPCT 13.7 11/23/2022 10:13 PM    MONOPCT 10.6 11/23/2022 10:13 PM    BASOPCT 1.1 11/23/2022 10:13 PM    MONOSABS 0.80 11/23/2022 10:13 PM    LYMPHSABS 1.04 11/23/2022 10:13 PM    EOSABS 0.05 11/23/2022 10:13 PM    BASOSABS 0.08 11/23/2022 10:13 PM       BMP   Lab Results   Component Value Date/Time     11/23/2022 10:13 PM    K 4.5 11/23/2022 10:13 PM    K 4.0 08/17/2022 03:01 PM    CL 96 11/23/2022 10:13 PM    CO2 25 11/23/2022 10:13 PM    BUN 25 11/23/2022 10:13 PM    CREATININE 1.0 11/23/2022 10:13 PM    GLUCOSE 108 11/23/2022 10:13 PM    GLUCOSE 87 01/26/2012 11:02 AM    CALCIUM 9.6 11/23/2022 10:13 PM       LFTS  Lab Results   Component Value Date/Time    ALKPHOS 73 11/23/2022 10:13 PM    ALT 17 11/23/2022 10:13 PM    AST 19 11/23/2022 10:13 PM    PROT 6.9 11/23/2022 10:13 PM    BILITOT 0.4 11/23/2022 10:13 PM    BILIDIR <0.2 09/28/2021 02:50 PM    IBILI see below 09/28/2021 02:50 PM    LABALBU 4.3 11/23/2022 10:13 PM    LABALBU 4.3 01/26/2012 11:02 AM       INR  No results for input(s): PROTIME, INR in the last 72 hours. APTT  No results for input(s): APTT in the last 72 hours. Lactic Acid  No results found for: LACTA     BNP   No results for input(s): BNP in the last 72 hours. Cultures   No results for input(s): BC in the last 72 hours. No results for input(s): Denys Kenton in the last 72 hours. No results for input(s): LABURIN in the last 72 hours. Radiology   CXR      CXR 11/23/22   No acute cardiopulmonary process. CT Scans  N/A    SYSTEMS ASSESSMENT AND PLAN    Neuro   No acute issues   -Patient is alert and oriented on examination   -Continue to monitor neurovascular assessment  Hx of Restless Leg Syndrome  Hx of CVA    Respiratory   COPD  JANNETH  Pulmonary HTN, RSVP 47 on Echo from [facility] 10/31/22  -Wean oxygen as tolerated.  Keep O2 sat 90-92%  -Wears 2L NC nocturnally at home  -Continue Pulmicort for Flovent Inhaler  -Continue Atrovent for Spiriva    Cardiovascular   High degree heart block  Hx atrial fibrillation on eliquis  HTN   -Electrophysiology consulted, appreciate input   -Continue to monitor hemodynamics    -Continue home Eliquis   -Home home antihypertensive medications for now    Gastrointestinal   Gastroparesis   GERD   -Continue PPI   -Nutrition: NPO    Renal   No acute issues   -Strict I's and O's     Infectious Disease   No acute issue   -Afebrile, no leukocytosis    Hematology/Oncology/Musk   Scleroderma  Raynaud's disease  Polymyositis with myopathy, on cellcept    -Continue home medication cellcept   -Consult Rheumatology    Endocrine   Hypothyroidism   -Take levothyroxine as home medication   -Obtain TSH and free T4    Social/Spiritual/DNR/Other   Code: Full  Disposition: ICU  Fluids/lytes/nutrition:none,replace as needed, NPO    Case and plan discussed with attending physician, Dr. Say Cummings, APRN - CNP    11/24/2022, 5:21 AM

## 2022-11-24 NOTE — ED NOTES
Crash cart placed in pt room as a precaution due to low HR per physician orders     Doug Mcgee RN  11/23/22 6368

## 2022-11-24 NOTE — CONSULTS
[facility]  CARDIAC ELECTROPHYSIOLOGY DEPARTMENT/DIVISION OF CARDIOLOGY  Consultation Report  PATIENT: Kin Echols RECORD NUMBER: [id number]  DATE OF SERVICE:  11/24/2022  ATTENDING ELECTROPHYSIOLOGIST:  Brooklynn Montero DO  REFERRING PHYSICIAN: Daryle Brightly, DO and Nadir Barfield DO  CHIEF COMPLAINT: AV block    HPI: Karin Robledo is a 75 y.o. female with a history of nonvalvular paroxysmal AF sp DCCV (3/2022 at [facility]), AT sp DCCV (8/11/2022 at [facility]), first-degree AV block, RBBB, HFpEF, CAD, CVA, carotid artery stenosis sp right CEA (?),  HTN, JANNETH-noncompliant with CPAP, COPD/emphysema, severe pulmonary HTN-combination of group 1 (CTD) + group 2 + group 3 (emphysema, JANNETH), scleroderma/Raynaud's/esophageal dysmotility, hypothyroidism, and cataracts sp bilateral removal (2019). She is managed by Annalise Zendejas and Jess ([facility] EP) with apixaban 5 mg twice daily, flecainide 100 mg BID, metoprolol XL 12.5 mg daily, spironolactone 75 mg daily, sildenafil 20 mg TID, simvastatin 40 mg daily, torsemide 20 mg daily, and PPI. In 2020, she was diagnosed with pulmonary hypertension, who initiated sildenafil. She was referred to rheumatology, who diagnosed the patient with scleroderma and initiated treatment with Norvasc for Raynaud's and mycophenolate for cutaneous manifestations. In 2021, she was diagnosed with nonvalvular paroxysmal AF, which was treated with [facility] and beta-blocker. In 12/2021, she was reportedly on a brief course of amiodarone, but details of this are unavailable to me. In 3/2022, she was admitted to [facility] for acute right-sided heart failure and AF with RVR, which was treated with diuresis thoracentesis, and ELMER/DCCV. In 6/2022, patient was referred to [facility] EP (Dr. Jeremy Crouch) for management of AF. She was noted to be in AT with 2-1 AV conduction at 110 bpm.  Metoprolol was increased at that time.   In 8/2022, she underwent DCCV with initiation of flecainide 50 mg twice daily. In 9/2022, she was noted to be back in AF/AT with variable AV conduction with ventricular rate of 84 bpm, RBBB with LAD, which was managed by increasing the flecainide dose to 100 mg twice daily and reducing metoprolol tartrate dose to 12.5 mg twice daily, as well as changing Coumadin to apixaban. Patient reduced metoprolol dose to once daily on her own due to hypotension. In 10/2022, she was noted to be in sinus with first-degree AV block and RBBB, so left on apixaban, flecainide 100 mg twice daily and metoprolol tartrate changed to metoprolol XL 12.5 mg daily. On 11/24/22, she presented with chief complaint of dizziness with low HR on home BP monitor. She was diagnosed with AT with third-degree AV block and ventricular escape rhythm in the 30s. EP service is now consulted by admitting physician for further evaluation and management of AV block. Patient denies any other complaints at this time. Prior cardiac testing:  -ECG (11/24/2022): AT with 41 AV conduction and ventricular rate of 50 bpm, RBBB (QRS D = 156 ms)  -ECG (11/24/2022 at 2210): AT, third-degree AV block with ventricular escape rhythm at 38 bpm  -TTE (10/31/2022 at [facility] LVEF = 69%, grade 3 LVDD, RV dilation, JASON, mild TR, mild-moderate pulmonary HTN. -TTE (8/25/21): LVEF = 60%, grade III LVDD, mild RVSD, RV dilation, mild CHRISTINE, moderate TR, moderate pulmonary HTN. -ECG (8/17/2022): Sinus at 53 bpm, first-degree AV block (TENISHA = 204 ms), RBBB (QRS D = 138 ms), QTc = 452 ms.  - LHC (4/6/2021): Moderate pulmonary arterial hypertension. -TTE (2/25/2021): LVEF = 65%, mild asymmetric septal LVH, hyperdynamic LV with complete obliteration of LV cavity during systole, stage III LVDD, mild RV dilation, moderate R VSD, moderate TR, and severe pulmonary HTN. -Pharmacologic nuclear stress test (6/29/2020): LVEF = 79%, normal perfusion.   -TTE (2/10/2020): LVEF = 78%, mild concentric LVH with septal thickening causing mild LVOT obstruction, mild CHRISTINE, mild-moderate TR, moderate-severe pulmonary HTN, pericardial fat pad.       Past Medical History:   Diagnosis Date    CAD in native artery 2021    Cerebral artery occlusion with cerebral infarction ([address]     \"mini stroke\" 10/2014    H/O cardiovascular stress test 2020    Lexiscan    Hyperlipidemia     Hypertension     Sleep apnea     doesnt use cpap     Past Surgical History:   Procedure Laterality Date    CARDIAC CATHETERIZATION  2012    Right heart cath by Dr Ardeth Peabody Right 2019    intraocular lens    CATARACT REMOVAL WITH IMPLANT Left 10/15/2019     SECTION      COLONOSCOPY      INTRACAPSULAR CATARACT EXTRACTION Right 2019    RIGHT EYE CATARACT EMULSIFICATION IOL IMPLANT performed by Mac Nunez MD at [facility] Left 10/15/2019    LEFT EYE CATARACT EMULSIFICATION IOL IMPLANT performed by Mac Nunez MD at [address] [place] [address]      Family History   Problem Relation Age of Onset    Other Mother         complication of surgery    Heart Disease Brother      There is no family history of sudden cardiac arrest    Social History     Tobacco Use    Smoking status: Former     Packs/day: 1.50     Years: 40.00     Pack years: 60.00     Types: Cigarettes     Quit date: 2020     Years since quittin.1    Smokeless tobacco: Never    Tobacco comments:      she has decreased to 1ppd   Substance Use Topics    Alcohol use: Not Currently     Alcohol/week: 10.0 standard drinks     Types: 10 Glasses of wine per week     Comment: green tea 2 cups a day        Current Facility-Administered Medications   Medication Dose Route Frequency Provider Last Rate Last Admin    sodium chloride flush 0.9 % injection 5-40 mL  5-40 mL IntraVENous 2 times per day Rafael Stanley, APRN - CNP   10 mL at 22 0806    sodium chloride flush 0.9 % injection 5-40 mL  5-40 mL IntraVENous PRN Sheree Bereket, APRN - CNP        0.9 % sodium chloride infusion   IntraVENous PRN Sheree Bereket, APRN - CNP        polyethylene glycol (GLYCOLAX) packet 17 g  17 g Oral Daily PRN Sheree Bereket, APRN - CNP        acetaminophen (TYLENOL) tablet 650 mg  650 mg Oral Q6H PRN Sheree Bereket, APRN - CNP        Or    acetaminophen (TYLENOL) suppository 650 mg  650 mg Rectal Q6H PRN Sheree Bereket, APRN - CNP        ipratropium (ATROVENT) 0.02 % nebulizer solution 0.5 mg  0.5 mg Nebulization 4x daily Balbir Square, DO   0.5 mg at 11/24/22 0749    budesonide (PULMICORT) nebulizer suspension 500 mcg  0.5 mg Nebulization BID Balbir Square, DO   500 mcg at 11/24/22 0749    apixaban (ELIQUIS) tablet 5 mg  5 mg Oral BID Sheree Bereket, APRN - CNP   5 mg at 11/24/22 0805    magnesium sulfate 2000 mg in 50 mL IVPB premix  2,000 mg IntraVENous Once Sheree Bereket, APRN - CNP 25 mL/hr at 11/24/22 0814 2,000 mg at 11/24/22 1589    mycophenolate (CELLCEPT) capsule 1,000 mg  1,000 mg Oral BID Sheree Bereket, APRN - CNP            Allergies   Allergen Reactions    Biaxin [Clarithromycin] Nausea And Vomiting    Naproxen Nausea And Vomiting       ROS:   Constitutional: Negative for fever, activity change and appetite change. HENT: Negative for epistaxis. Eyes: Negative for diploplia, blurred vision. Respiratory: Negative for cough, chest tightness, shortness of breath and wheezing. Cardiovascular: pertinent positives in HPI  Gastrointestinal: Negative for abdominal pain and blood in stool. Genitourinary: Negative for hematuria and difficulty urinating. Musculoskeletal: Negative for myalgias and gait problem. Skin: Negative for color change and rash. Neurological: Negative for syncope and light-headedness. Psychiatric/Behavioral: Negative for confusion and agitation. The patient is not nervous/anxious.   Heme: no bleeding disorders, no melena or hematochezia  All other review of systems are negative     PHYSICAL EXAM:  Constitutional   Vitals:    11/24/22 0610 11/24/22 0611 11/24/22 0748 11/24/22 0749   BP: (!) 127/53 (!) 121/54     Pulse: 54  (!) 49 50   Resp: 22 19 19   Temp:       TempSrc:       SpO2: 95%  97% 96%   Weight:       Height:        Well-developed, no acute distress, well groomed  Eyes: conjunctivae normal, no xanthelasma   Ears, Nose, Throat: oral mucosa moist, no cyanosis   Neck: supple, no JVD, no bruits, no thyromegaly   CV: Bradycardic, regular rhythm,  no murmurs, rubs, or gallops. PMI is nondisplaced, Peripheral pulses normal including carotid auscultation, no noted aortic bruit, bilateral femoral and pedal pulses are normal in quality  Lungs: clear to auscultation bilaterally, normal respiratory effort without used of accessory muscles, no wheezes  Abdomen: soft, non-tender, bowel sounds present, no masses or hepatomegaly   Extremities: no digital clubbing, no edema   Skin: warm, no rashes   Neuro/Psych: A&O x 3, normal mood and affect    Data:    Recent Labs     11/23/22 2213 11/24/22  0541   WBC 7.6 7.3   HGB 10.5* 10.2*   HCT 34.2 32.9*    300     Recent Labs     11/23/22 2213 11/24/22  0541    136   K 4.5 4.4   CL 96* 98   CO2 25 25   BUN 25* 25*   CREATININE 1.0 0.8   CALCIUM 9.6 10.4*     No results for input(s): INR in the last 72 hours. Recent Labs     11/24/22  0541   TSH 8.570*     Lab Results   Component Value Date/Time    MG 1.6 11/24/2022 05:41 AM     Telemetry: AT/AFL with intermittent AV block    Assessment/plan:  Third-degree AV block  -In setting of underlying first-degree AV block and RBBB with flecainide and metoprolol therapy. - Continue to hold metoprolol and flecainide.  - TTE in 10/31/2022 at [facility], so no need to repeat.   -Bedrest.  -Keep pacing pads on patient and atropine at the bedside.  -In the event patient decompensates from AV node conduction standpoint develops symptoms or hemodynamic compromise, recommend:  --#1: Attempt atropine 0.5-1 mg IV (repeat PRN every 3-5 minutes to a max dose of 3 mg), but may not improve conduction/symptoms. --#2: Transcutaneous pacing with analgesic and/or anxiolytic agents of conscious and confirmation of effective capture assessed by pulse arterial waveform. Transcutaneous pacing can be used for a few minutes if needed until beta-adrenergic anergic agonist (see options below) to be initiated to improve AV conduction/symptoms or transvenous pacing wire is placed. Dopamine: 5 to 20 mcg/kg/min IV, starting at 5 mcg/kg/min and increasing by 5 mcg/kg/min every 2 min  Isoproterenol: 20-60 mcg IV bolus followed doses of 10-20 mcg, or infusion of 1-20 mcg/min based on heart rate response  Epinephrine: 2-10 mcg/min IV titrated to desired effect  --#3: If AV block, hemodynamic status, and symptoms refractory to IV beta agonist, then recommend temporary transvenous pacing wire. Please contact interventional cardiology to place.  - Recommend Medtronic dual-chamber pacemaker implant on 11/25/2022. I reviewed indications, material risk, and benefits with patient. She is agreeable to proceed. N.p.o. at midnight. nonvalvular paroxysmal AF sp DCCV (3/2022 at [facility])  -Initially diagnosed in 2021.  - [id number] score = 7 (age, sex, CVA, PAD, HF, HTN). Recommend [address] in females with score of 2 or more. DOAC preferred. - On apixaban 5 mg twice daily. While on DOAC, recommend annual monitoring of CBC and CMP. Hold apixaban starting 11/25/2022 a.m. Plan to restart 1-2 days after pacemaker implant.  - Rhythm control desired by established EP (Dr. David Avila at [facility] Patient was reportedly on a brief course of amiodarone in 12/2021, but details of this are unavailable to me. Per her established EP, amiodarone is not desired due to concerns regarding her pulmonary hypertension.   Flecainide was initiated in 2022 for refractory AT and dose increased to 100 mg twice daily and addition to metoprolol. Flecainide and metoprolol currently on hold due to #1. Plan to restart following pacemaker implant. Consider DCCV versus adjustment of antiarrhythmic therapy in future. -Modifiable risk factors:  --Obesity: BMI goal less than 27.  --JANNETH: Noncompliant with CPAP. Recommend compliance.  --HTN: BP goal less than 130/80.  --EtOH: Patient reports 10 or more drinks weekly. Recommend reduce intake. AT  DCCV (8/11/2022 at [facility])  -See #2. HFpEF  -Established with Dr. Farrah Mccarthy. severe pulmonary HTN-combination of group 1 (CTD) + group 2 + group 3 (emphysema, JANNETH)  -Established with pulmonary at [facility].    scleroderma/Raynaud's/esophageal dysmotility  -Rheumatology consult is currently pending. I have spent a total of 60 minutes of critical care time with the patient and his/her family reviewing the above stated recommendations. And a total of >50% of that time involved face-to-face time providing counseling and or coordination of care with the other providers. Thank you for allowing me to participate in your patient's care. Please call me if there are any questions.       Reid Bolton, DO   Cardiac Electrophysiology  [facility]  [facility]) Physicians

## 2022-11-24 NOTE — PLAN OF CARE
Problem: Chronic Conditions and Co-morbidities  Goal: Patient's chronic conditions and co-morbidity symptoms are monitored and maintained or improved  Outcome: Progressing  Flowsheets (Taken 11/24/2022 0504)  Care Plan - Patient's Chronic Conditions and Co-Morbidity Symptoms are Monitored and Maintained or Improved: Monitor and assess patient's chronic conditions and comorbid symptoms for stability, deterioration, or improvement     Problem: Discharge Planning  Goal: Discharge to home or other facility with appropriate resources  Outcome: Progressing  Flowsheets (Taken 11/24/2022 0504)  Discharge to home or other facility with appropriate resources: Identify barriers to discharge with patient and caregiver     Problem: Skin/Tissue Integrity  Goal: Absence of new skin breakdown  Description: 1. Monitor for areas of redness and/or skin breakdown  2. Assess vascular access sites hourly  3. Every 4-6 hours minimum:  Change oxygen saturation probe site  4. Every 4-6 hours:  If on nasal continuous positive airway pressure, respiratory therapy assess nares and determine need for appliance change or resting period.   Outcome: Progressing

## 2022-11-24 NOTE — ED PROVIDER NOTES
HPI   Patient is a 77-year-old female with past medical history of hypertension, hyperlipidemia, obesity, CAD, atrial fibrillation on Eliquis and flecainide/metoprolol for rate control presenting to the emergency department due to concern for irregular heartbeat. Patient also complaining of shortness of breath. She states that her pulse ox at home was reading her heart rate as extremely low. Patient brought in by EMS for further evaluation. Per EMS, patient's heart rate in route to the emergency department was between 80 and 140. She did take her A. fib medication tonight and does note that she took an extra flecainide yesterday night. Patient is complaining of worsening shortness of breath since this afternoon. Patient also stating that she has been slightly dizzy as well and having vague pain between her shoulders. Pain is dull, intermittent and nonradiating. Nothing in particular makes it better or worse. It is mild to moderate in severity when present. Patient denying similar pain in the past.  She states that she follows with cardiology in [facility], [facility].  Patient is otherwise denying any other symptoms including fever, chills, cough, congestion, sore throat, syncope, numbness, tingling, vision changes, chest pain, abdominal pain, urinary symptoms, constipation or diarrhea. On arrival to the ED, patient is well-appearing and in no acute distress. She is breathing comfortably on supplemental oxygen via nasal cannula at 2 L. She states that she is on chronic home oxygen at nighttime at 2 L. Her presenting symptoms are moderate with no remitting or exacerbating factors. Review of Systems   Constitutional:  Negative for chills and fever. HENT:  Negative for congestion, rhinorrhea, trouble swallowing and voice change. Eyes:  Negative for photophobia and visual disturbance. Respiratory:  Positive for shortness of breath. Negative for cough.     Cardiovascular:  Negative for chest pain and palpitations. Gastrointestinal:  Negative for abdominal pain, diarrhea, nausea and vomiting. Genitourinary:  Negative for dysuria, flank pain, frequency and urgency. Musculoskeletal:  Positive for back pain. Negative for arthralgias. Skin:  Negative for rash and wound. Neurological:  Positive for dizziness. Negative for headaches. Psychiatric/Behavioral:  Negative for behavioral problems and confusion. Physical Exam  Constitutional:       General: She is not in acute distress. Appearance: Normal appearance. She is obese. She is not ill-appearing. HENT:      Head: Normocephalic and atraumatic. Mouth/Throat:      Mouth: Mucous membranes are moist.      Pharynx: Oropharynx is clear. Eyes:      Pupils: Pupils are equal, round, and reactive to light. Cardiovascular:      Rate and Rhythm: Regular rhythm. Bradycardia present. Pulses: Normal pulses. Heart sounds: Normal heart sounds. No murmur heard. Pulmonary:      Effort: Pulmonary effort is normal. No tachypnea or respiratory distress. Breath sounds: Normal breath sounds. No decreased breath sounds, wheezing or rales. Abdominal:      Palpations: Abdomen is soft. Tenderness: There is no abdominal tenderness. There is no guarding or rebound. Musculoskeletal:         General: Normal range of motion. Cervical back: Normal range of motion and neck supple. Right lower leg: No tenderness. Skin:     General: Skin is warm and dry. Capillary Refill: Capillary refill takes less than 2 seconds. Neurological:      General: No focal deficit present. Mental Status: She is alert and oriented to person, place, and time. Cranial Nerves: No cranial nerve deficit. Coordination: Coordination normal.   Psychiatric:         Mood and Affect: Mood normal.         Behavior: Behavior normal.        Procedures   EKG 2146:  This EKG is signed and interpreted by me.     Sinus bradycardia with ventricular rate of 37 bpm.  Incomplete right bundle branch block. Possible high degree heart block. T wave inversions in the anterior septal and inferior leads. EKG 2210: This EKG is signed and interpreted by me. Sinus bradycardia with ventricular rate of 38 bpm.  Incomplete right bundle branch block. Possible high degree heart block. T wave inversions in the anterior septal and inferior leads. EKG-Shai lead 2229: This EKG is signed and interpreted by me. Atrial flutter. Ventricular rate 42 bpm.  Possible high degree AV block    MDM   Patient is a 23-year-old female with past medical history of hypertension, hyperlipidemia, obesity, CAD, atrial fibrillation on Eliquis and flecainide/metoprolol for rate control presenting to the emergency department due to concern for irregular heartbeat. On initial evaluation, patient is well-appearing, hemodynamically stable and in no acute distress. Patient was noted to have be bradycardic on the monitor in the 30s to 40s. Initial EKG noting sinus bradycardia with possible concern for high degree heart block. Lab work-up generally unremarkable. Patient did have elevated troponin of 42 which appears to be at her baseline compared to previous labs. No other clinically significant lab abnormalities. Viral testing negative. Chest x-ray clear. Did attempt atropine without improvement in patient's heart rate. Spoke with electrophysiology who agreed patient needs transfer to [facility] for further assessment and potential pacemaker placement. Patient placed on cardiac pads with close blood pressure monitoring while in the ED while awaiting transfer. Plan is for admission to the ICU upon transfer for close monitoring work-up. Patient is agreeable with this plan. She remained hemodynamically stable in the emergency department.       ED Course as of 11/23/22 2222 Wed Nov 23, 2022 2148 EKG interpreted by me  Sinus bradycardia, rate of 37, complete right bundle branch block, T wave inversions through the inferior and anteroseptal leads [SO]      ED Course User Index  [SO] Kiran Valladares, DO      --------------------------------------------- PAST HISTORY ---------------------------------------------  Past Medical History:  has a past medical history of CAD in native artery, Cerebral artery occlusion with cerebral infarction [facility]), H/O cardiovascular stress test, Hyperlipidemia, Hypertension, and Sleep apnea. Past Surgical History:  has a past surgical history that includes Carotid endarterectomy (Right);  section; Colonoscopy; Cataract removal with implant (Right, 2019); Intracapsular cataract extraction (Right, 2019); Cataract removal with implant (Left, 10/15/2019); Intracapsular cataract extraction (Left, 10/15/2019); and Cardiac catheterization (2012). Social History:  reports that she quit smoking about 2 years ago. Her smoking use included cigarettes. She has a 60.00 pack-year smoking history. She has never used smokeless tobacco. She reports that she does not currently use alcohol after a past usage of about 10.0 standard drinks per week. She reports that she does not use drugs. Family History: family history includes Heart Disease in her brother; Other in her mother. The patients home medications have been reviewed.     Allergies: Biaxin [clarithromycin] and Naproxen    -------------------------------------------------- RESULTS -------------------------------------------------    Lab  Results for orders placed or performed during the hospital encounter of 22   COVID-19, Rapid    Specimen: Nasopharyngeal Swab   Result Value Ref Range    SARS-CoV-2, NAAT Not Detected Not Detected   RAPID INFLUENZA A/B ANTIGENS    Specimen: Nasopharyngeal   Result Value Ref Range    Influenza A by PCR Not Detected Not Detected    Influenza B by PCR Not Detected Not Detected   Comprehensive Metabolic Panel   Result Value Ref Range Sodium 134 132 - 146 mmol/L    Potassium 4.5 3.5 - 5.0 mmol/L    Chloride 96 (L) 98 - 107 mmol/L    CO2 25 22 - 29 mmol/L    Anion Gap 13 7 - 16 mmol/L    Glucose 108 (H) 74 - 99 mg/dL    BUN 25 (H) 6 - 23 mg/dL    Creatinine 1.0 0.5 - 1.0 mg/dL    Est, Glom Filt Rate >60 >=60 mL/min/1.73    Calcium 9.6 8.6 - 10.2 mg/dL    Total Protein 6.9 6.4 - 8.3 g/dL    Albumin 4.3 3.5 - 5.2 g/dL    Total Bilirubin 0.4 0.0 - 1.2 mg/dL    Alkaline Phosphatase 73 35 - 104 U/L    ALT 17 0 - 32 U/L    AST 19 0 - 31 U/L   CBC with Auto Differential   Result Value Ref Range    WBC 7.6 4.5 - 11.5 E9/L    RBC 3.45 (L) 3.50 - 5.50 E12/L    Hemoglobin 10.5 (L) 11.5 - 15.5 g/dL    Hematocrit 34.2 34.0 - 48.0 %    MCV 99.1 80.0 - 99.9 fL    MCH 30.4 26.0 - 35.0 pg    MCHC 30.7 (L) 32.0 - 34.5 %    RDW 16.2 (H) 11.5 - 15.0 fL    Platelets 262 319 - 751 E9/L    MPV 9.7 7.0 - 12.0 fL    Neutrophils % 73.5 43.0 - 80.0 %    Immature Granulocytes % 0.4 0.0 - 5.0 %    Lymphocytes % 13.7 (L) 20.0 - 42.0 %    Monocytes % 10.6 2.0 - 12.0 %    Eosinophils % 0.7 0.0 - 6.0 %    Basophils % 1.1 0.0 - 2.0 %    Neutrophils Absolute 5.58 1.80 - 7.30 E9/L    Immature Granulocytes # 0.03 E9/L    Lymphocytes Absolute 1.04 (L) 1.50 - 4.00 E9/L    Monocytes Absolute 0.80 0.10 - 0.95 E9/L    Eosinophils Absolute 0.05 0.05 - 0.50 E9/L    Basophils Absolute 0.08 0.00 - 0.20 E9/L   Troponin   Result Value Ref Range    Troponin, High Sensitivity 42 (H) 0 - 9 ng/L   Brain Natriuretic Peptide   Result Value Ref Range    Pro-BNP 2,972 (H) 0 - 125 pg/mL   EKG 12 Lead   Result Value Ref Range    Ventricular Rate 37 BPM    Atrial Rate 37 BPM    QRS Duration 150 ms    Q-T Interval 534 ms    QTc Calculation (Bazett) 419 ms    R Axis 110 degrees    T Axis -39 degrees   EKG 12 Lead   Result Value Ref Range    Ventricular Rate 38 BPM    Atrial Rate 38 BPM    QRS Duration 144 ms    Q-T Interval 558 ms    QTc Calculation (Bazett) 443 ms    R Axis 111 degrees    T Axis -27 degrees       Radiology  XR CHEST (2 VW)   Final Result   No acute cardiopulmonary process. ------------------------- NURSING NOTES AND VITALS REVIEWED ---------------------------  Date / Time Roomed:  11/23/2022  9:31 PM  ED Bed Assignment:  10/10    The nursing notes within the ED encounter and vital signs as below have been reviewed. Patient Vitals for the past 24 hrs:   BP Temp Temp src Pulse Resp SpO2 Weight   11/23/22 2141 113/60 98 °F (36.7 °C) Oral (!) 39 16 98 % 148 lb (67.1 kg)       Oxygen Saturation Interpretation: Improved after treatment      ------------------------------------------ PROGRESS NOTES ------------------------------------------  I have spoken with the patient and discussed todays results, in addition to providing specific details for the plan of care and counseling regarding the diagnosis and prognosis. Their questions are answered at this time and they are agreeable with the plan. I have discussed the risks and benefits of transfer and they wish to proceed with the transfer. --------------------------------- ADDITIONAL PROVIDER NOTES ---------------------------------  Consultations:  Spoke with hospitalist (Medicine). Discussed case. They will admit this patient. Spoke with intensivist team (Critical Care). Discussed case. They will admit this patient to ICU    Reason for transfer: high degree heart block. This patient's ED course included: a personal history and physicial examination, re-evaluation prior to disposition, multiple bedside re-evaluations, IV medications, cardiac monitoring, continuous pulse oximetry, and complex medical decision making and emergency management    This patient has remained hemodynamically stable during their ED course. Please note that the withdrawal or failure to initiate urgent interventions for this patient would likely result in a life threatening deterioration or permanent disability. Clinical Impression  1.  High degree atrioventricular block          Disposition  Patient's disposition: Transfer to [facility]. Transferred by: Reji Storm. Patient's condition is stable.        Sara Silveira DO  Resident  11/24/22 1595

## 2022-11-24 NOTE — H&P
History & Physical      PCP: Maciej Euceda DO    Date of Admission: 11/24/2022    Date of Service: Pt seen/examined on 11/24/2022 and is     admitted to Inpatient with expected LOS greater than two midnights due to medical therapy. Chief Complaint: Evaluation of irregular heartbeat    History Of Present Illness:    Ms. Julio Gallegos, a 75y.o. year old female  who  has a past medical history of CAD in native artery, Cerebral artery occlusion with cerebral infarction [facility]), H/O cardiovascular stress test, Hyperlipidemia, Hypertension, and Sleep apnea. This is a 71-year-old female with a past history of heart disease and hypertension hyperlipidemia and A. fib. She is also on flecainide and metoprolol for rate control. She had concerns of an irregular heartbeat and shortness of breath. She was reading her pulse ox at home because of the shortness of breath and her legs her heart rate was in the 40s. In the emergency room she was assessed. In route from the emergency medical services she was documented to have a heart rate between 80 up to 140. She taken her medications on schedule and took a extra flecainide yesterday. She was also complaining of leg discomfort between her shoulder planes and dial and intermittent and nonradiating. She was admitted for further care to critical care unit. There was concern that the patient was an AV block third-degree with ventricular escape rhythm which was bradycardic causing her to have a feeling of shortness of breath    She has a known history of nonvalvular paroxysmal A. fib. She also has had a cardioversion in March of this year and again in August.  Her residual heart rate rhythm on her EKG was first-degree AV block. She is also known to have history of heart failure with preserved ejection fraction and atherosclerosis manifesting as carotid artery stenosis. Patient does have a history of scleroderma.   She initially also had been treated with Norvasc for Raynaud's and mycophenolate for cutaneous manifestations. She has been appropriately assessed and appropriate interventions have been made between [facility] [employer] and our local doctors. Past Medical History:        Diagnosis Date    CAD in native artery 2021    Cerebral artery occlusion with cerebral infarction ([address]     \"mini stroke\" 10/2014    H/O cardiovascular stress test 2020    Lexiscan    Hyperlipidemia     Hypertension     Sleep apnea     doesnt use cpap       Past Surgical History:        Procedure Laterality Date    CARDIAC CATHETERIZATION  2012    Right heart cath by Dr Campbell Somers Right 2019    intraocular lens    CATARACT REMOVAL WITH IMPLANT Left 10/15/2019     SECTION      COLONOSCOPY      INTRACAPSULAR CATARACT EXTRACTION Right 2019    RIGHT EYE CATARACT EMULSIFICATION IOL IMPLANT performed by Cydney Galindo MD at [address] [place] [address] Left 10/15/2019    LEFT EYE CATARACT EMULSIFICATION IOL IMPLANT performed by Cydney Galindo MD at [address] [place] [address]       Medications Prior to Admission:      Prior to Admission medications    Medication Sig Start Date End Date Taking?  Authorizing Provider   metoclopramide (REGLAN) 5 MG tablet Take 5 mg by mouth 4 times daily Take one tablet by mouth three times a day 30 minutes before meals   Yes Jacqueline Coelho MD   apixaban (ELIQUIS) 5 MG TABS tablet Take 1 tablet by mouth 2 times daily 22   Baldo Olivas MD   pantoprazole (PROTONIX) 40 MG tablet Take 40 mg by mouth daily    Historical Provider, MD   sildenafil (REVATIO) 20 MG tablet Take 20 mg by mouth in the morning and at bedtime    Historical Provider, MD   fluticasone (FLOVENT HFA) 110 MCG/ACT inhaler Inhale 1 puff into the lungs 2 times daily    Historical Provider, MD   Tiotropium Bromide Monohydrate (SPIRIVA RESPIMAT IN) Inhale into the lungs daily Historical Provider, MD RENAE DUCKWORTH    Historical Provider, MD   Multiple Vitamins-Minerals (THERAPEUTIC MULTIVITAMIN-MINERALS) tablet Take 1 tablet by mouth daily    Historical Provider, MD   vitamin B-12 (CYANOCOBALAMIN) 1000 MCG tablet Take 1,000 mcg by mouth daily    Historical Provider, MD   calcium carbonate (TUMS) 500 MG chewable tablet Take 1 tablet by mouth daily    Historical Provider, MD   amiodarone (CORDARONE) 200 MG tablet Take 1 tablet by mouth 2 times daily for 14 doses 5/10/22 5/17/22  Arnaud Ortiz MD   torsemide (DEMADEX) 20 MG tablet TAKE ONE TABLET BY MOUTH DAILY 4/4/22   Historical Provider, MD   spironolactone (ALDACTONE) 25 MG tablet TAKE ONE TABLET BY MOUTH DAILY 4/4/22   Historical Provider, MD   furosemide (LASIX) 20 MG tablet take one tablet three times daily  Patient not taking: No sig reported 2/8/22   Arnaud Ortiz MD   warfarin (COUMADIN) 5 MG tablet Take 1 tablet by mouth daily  Patient not taking: Reported on 11/24/2022 2/8/22   Arnaud Ortiz MD   metoprolol tartrate (LOPRESSOR) 50 MG tablet Take 1 tablet by mouth 2 times daily 12/15/21   Arnaud Ortiz MD   levothyroxine (SYNTHROID) 75 MCG tablet 75 mcg Daily  12/1/21   Historical Provider, MD   lisinopril (PRINIVIL;ZESTRIL) 5 MG tablet Take 5 mg by mouth daily  Patient not taking: No sig reported 9/27/21   Historical Provider, MD   mycophenolate (CELLCEPT) 500 MG tablet Take 1,500 mg by mouth 2 times daily 11/12/21   Historical Provider, MD   potassium chloride (KLOR-CON M) 10 MEQ extended release tablet Take 1 tablet by mouth daily Take with Lasix  Patient taking differently: Take 20 mEq by mouth 2 times daily Take with Lasix 9/24/21   Arnaud Ortiz MD   simvastatin (ZOCOR) 40 MG tablet Take 1 tablet by mouth nightly 3/29/21   Arnaud Ortiz MD   rOPINIRole (REQUIP) 0.25 MG tablet Take 1 tablet by mouth daily  Patient taking differently: Take 0.5 mg by mouth nightly 9/18/15   Carla Trevino DO   Cholecalciferol (VITAMIN D) 2000 UNITS CAPS capsule Take  by mouth daily. Historical Provider, MD   aspirin 81 MG tablet Take 81 mg by mouth 2 times daily   Patient not taking: No sig reported    Historical Provider, MD       Allergies:  Biaxin [clarithromycin] and Naproxen    Social History:    TOBACCO:   reports that she quit smoking about 2 years ago. Her smoking use included cigarettes. She has a 60.00 pack-year smoking history. She has never used smokeless tobacco.  ETOH:   reports that she does not currently use alcohol after a past usage of about 10.0 standard drinks per week. Family History:    Reviewed in detail and negative for DM, CAD, Cancer, CVA. Positive as follows\"      Problem Relation Age of Onset    Other Mother         complication of surgery    Heart Disease Brother        REVIEW OF SYSTEMS:   Pertinent positives as noted in the HPI. All other systems reviewed and negative. PHYSICAL EXAM:  BP (!) 121/54   Pulse 50   Temp 97.7 °F (36.5 °C) (Temporal)   Resp 19   Ht 5' 4\" (1.626 m)   Wt 146 lb 2.6 oz (66.3 kg)   SpO2 96%   BMI 25.09 kg/m²   General appearance: No apparent distress, appears stated age and cooperative. HEENT: Normal cephalic, atraumatic without obvious deformity. Pupils equal, round, and reactive to light. Extra ocular muscles intact. Conjunctivae/corneas clear. Neck: No jugular venous distention. Trachea midline. Respiratory: Decreased with some expiratory wheezing  Cardiovascular:  rrr  Abdomen: Nontender  Musculoskeletal: No clubbing, cyanosis, no edema of bilateral lower extremities. Brisk capillary refill. Skin: Normal skin color. No rashes or lesions. Neurologic:  Neurovascularly intact without any focal sensory/motor deficits.  Cranial nerves: II-XII intact, grossly non-focal.  Psychiatric: Alert and oriented, thought content appropriate, normal insight          CBC:   Recent Labs     11/23/22  2213 11/24/22  0541   WBC 7.6 7.3   RBC 3.45* 3.33*   HGB 10.5* 10.2*   HCT 34.2 32.9*   MCV 99.1 98.8   RDW 16.2* 16.2*    300     BMP:   Recent Labs     11/23/22 2213 11/24/22  0541    136   K 4.5 4.4   CL 96* 98   CO2 25 25   BUN 25* 25*   CREATININE 1.0 0.8   MG  --  1.6   PHOS  --  3.6     LFT:  Recent Labs     11/23/22 2213 11/24/22  0541   PROT 6.9 7.2   ALKPHOS 73 71   ALT 17 16   AST 19 20   BILITOT 0.4 0.5     CE:  No results for input(s): Anh Oconnor in the last 72 hours. PT/INR: No results for input(s): INR, APTT in the last 72 hours. BNP: No results for input(s): BNP in the last 72 hours. ESR:   Lab Results   Component Value Date    SEDRATE 23 (H) 09/28/2021     CRP:   Lab Results   Component Value Date    CRP 0.3 09/28/2021     D Dimer: No results found for: DDIMER   Folate and B12:   Lab Results   Component Value Date    VCSYIUFH41 341 07/23/2021   ,   Lab Results   Component Value Date    FOLATE 12.7 07/23/2021     Lactic Acid: No results found for: LACTA  Thyroid Studies:   Lab Results   Component Value Date    TSH 8.570 (H) 11/24/2022       Oupatient labs:  Lab Results   Component Value Date    CHOL 217 (H) 09/10/2015    TRIG 117 09/10/2015    HDL 73 09/10/2015    LDLCALC 121 (H) 09/10/2015    TSH 8.570 (H) 11/24/2022    INR 2.4 08/17/2022    LABA1C 5.2 08/26/2013       Urinalysis:    Lab Results   Component Value Date/Time    NITRU Negative 08/17/2022 04:30 PM    WBCUA 1-3 04/05/2021 02:48 PM    BACTERIA RARE 04/05/2021 02:48 PM    RBCUA NONE 04/05/2021 02:48 PM    BLOODU Negative 08/17/2022 04:30 PM    SPECGRAV 1.010 08/17/2022 04:30 PM    GLUCOSEU Negative 08/17/2022 04:30 PM       Imaging:  XR CHEST (2 VW)    Result Date: 11/23/2022  EXAMINATION: TWO XRAY VIEWS OF THE CHEST11/23/2022 TECHNIQUE: Frontal and lateral  view submitted COMPARISON: None. HISTORY: ORDERING SYSTEM PROVIDED HISTORY: sob TECHNOLOGIST PROVIDED HISTORY: Reason for exam:->sob FINDINGS: The lungs are clear without focal consolidation, large pleural effusion, or pneumothorax.  The cardiomediastinal silhouette is stable. No acute cardiopulmonary process. ASSESSMENT:    Principal Problem:    Bradycardia  Resolved Problems:    * No resolved hospital problems. *  History of A. Fib  History of scleroderma  History of pulmonary hypertension  Heart failure with preserved ejection fraction  Fatigue  Long-term anticoagulation  Anemia  Hypothyroidism by labs    PLAN:  1. Await evaluation by cardiology. She is currently not bradycardic and her pulse is anywhere between 50 up to 76.  2.  Baseline history of A. fib and patient has been maintained on her anticoagulation  3. Previous history of scleroderma so patient is being maintained on her CellCept  4. Chronic anemia with no blood loss  5. Volume control with diuretics  6. Initiation of underactive thyroid treatment        Diet: Diet NPO  Code Status: Full Code  Surrogate decision maker confirmed with patient:   Extended Emergency Contact Information  Primary Emergency Contact: Xiang Issa  Address: [address] [place] [address],[address] Phone: [phone]  Relation: Spouse  Secondary Emergency Contact: Stephany Rae [address] Phone: [phone]  Relation: Child      DVT Prophylaxis: []Lovenox []Heparin []PCD [x] [address] []Encouraged ambulation  Disposition: []Med/Surg [] Intermediate [x] ICU/CCU  Admit status: [] Observation [] Inpatient     +++++++++++++++++++++++++++++++++++++++++++++++++  [address]  +++++++++++++++++++++++++++++++++++++++++++++++++  NOTE: This report was transcribed using voice recognition software. Every effort was made to ensure accuracy; however, inadvertent computerized transcription errors may be present.

## 2022-11-25 ENCOUNTER — ANESTHESIA (OUTPATIENT)
Dept: CARDIAC CATH/INVASIVE PROCEDURES | Age: 67
DRG: 242 | End: 2022-11-25
Payer: MEDICARE

## 2022-11-25 ENCOUNTER — ANESTHESIA EVENT (OUTPATIENT)
Dept: CARDIAC CATH/INVASIVE PROCEDURES | Age: 67
DRG: 242 | End: 2022-11-25
Payer: MEDICARE

## 2022-11-25 ENCOUNTER — APPOINTMENT (OUTPATIENT)
Dept: CARDIAC CATH/INVASIVE PROCEDURES | Age: 67
DRG: 242 | End: 2022-11-25
Attending: FAMILY MEDICINE
Payer: MEDICARE

## 2022-11-25 ENCOUNTER — APPOINTMENT (OUTPATIENT)
Dept: GENERAL RADIOLOGY | Age: 67
DRG: 242 | End: 2022-11-25
Attending: FAMILY MEDICINE
Payer: MEDICARE

## 2022-11-25 LAB
ALBUMIN SERPL-MCNC: 3.9 G/DL (ref 3.5–5.2)
ALP BLD-CCNC: 65 U/L (ref 35–104)
ALT SERPL-CCNC: 14 U/L (ref 0–32)
ANION GAP SERPL CALCULATED.3IONS-SCNC: 10 MMOL/L (ref 7–16)
ANION GAP SERPL CALCULATED.3IONS-SCNC: 12 MMOL/L (ref 7–16)
AST SERPL-CCNC: 19 U/L (ref 0–31)
BASOPHILS ABSOLUTE: 0.05 E9/L (ref 0–0.2)
BASOPHILS RELATIVE PERCENT: 0.6 % (ref 0–2)
BILIRUB SERPL-MCNC: 0.8 MG/DL (ref 0–1.2)
BUN BLDV-MCNC: 16 MG/DL (ref 6–23)
BUN BLDV-MCNC: 20 MG/DL (ref 6–23)
CALCIUM IONIZED: 1.33 MMOL/L (ref 1.15–1.33)
CALCIUM SERPL-MCNC: 10 MG/DL (ref 8.6–10.2)
CALCIUM SERPL-MCNC: 9.5 MG/DL (ref 8.6–10.2)
CHLORIDE BLD-SCNC: 102 MMOL/L (ref 98–107)
CHLORIDE BLD-SCNC: 99 MMOL/L (ref 98–107)
CO2: 23 MMOL/L (ref 22–29)
CO2: 26 MMOL/L (ref 22–29)
CREAT SERPL-MCNC: 0.6 MG/DL (ref 0.5–1)
CREAT SERPL-MCNC: 0.8 MG/DL (ref 0.5–1)
EKG ATRIAL RATE: 208 BPM
EKG ATRIAL RATE: 37 BPM
EKG ATRIAL RATE: 38 BPM
EKG Q-T INTERVAL: 520 MS
EKG Q-T INTERVAL: 534 MS
EKG Q-T INTERVAL: 558 MS
EKG QRS DURATION: 144 MS
EKG QRS DURATION: 150 MS
EKG QRS DURATION: 156 MS
EKG QTC CALCULATION (BAZETT): 419 MS
EKG QTC CALCULATION (BAZETT): 443 MS
EKG QTC CALCULATION (BAZETT): 474 MS
EKG R AXIS: 110 DEGREES
EKG R AXIS: 111 DEGREES
EKG R AXIS: 71 DEGREES
EKG T AXIS: -21 DEGREES
EKG T AXIS: -27 DEGREES
EKG T AXIS: -39 DEGREES
EKG VENTRICULAR RATE: 37 BPM
EKG VENTRICULAR RATE: 38 BPM
EKG VENTRICULAR RATE: 50 BPM
EOSINOPHILS ABSOLUTE: 0.06 E9/L (ref 0.05–0.5)
EOSINOPHILS RELATIVE PERCENT: 0.7 % (ref 0–6)
GFR SERPL CREATININE-BSD FRML MDRD: >60 ML/MIN/1.73
GFR SERPL CREATININE-BSD FRML MDRD: >60 ML/MIN/1.73
GLUCOSE BLD-MCNC: 105 MG/DL (ref 74–99)
GLUCOSE BLD-MCNC: 82 MG/DL (ref 74–99)
HCT VFR BLD CALC: 31.7 % (ref 34–48)
HEMOGLOBIN: 9.9 G/DL (ref 11.5–15.5)
IMMATURE GRANULOCYTES #: 0.03 E9/L
IMMATURE GRANULOCYTES %: 0.4 % (ref 0–5)
LYMPHOCYTES ABSOLUTE: 1.15 E9/L (ref 1.5–4)
LYMPHOCYTES RELATIVE PERCENT: 14.2 % (ref 20–42)
MAGNESIUM: 2.2 MG/DL (ref 1.6–2.6)
MCH RBC QN AUTO: 30.7 PG (ref 26–35)
MCHC RBC AUTO-ENTMCNC: 31.2 % (ref 32–34.5)
MCV RBC AUTO: 98.1 FL (ref 80–99.9)
MONOCYTES ABSOLUTE: 0.8 E9/L (ref 0.1–0.95)
MONOCYTES RELATIVE PERCENT: 9.9 % (ref 2–12)
MRSA CULTURE ONLY: NORMAL
NEUTROPHILS ABSOLUTE: 5.99 E9/L (ref 1.8–7.3)
NEUTROPHILS RELATIVE PERCENT: 74.2 % (ref 43–80)
PDW BLD-RTO: 16.1 FL (ref 11.5–15)
PHOSPHORUS: 3.4 MG/DL (ref 2.5–4.5)
PLATELET # BLD: 264 E9/L (ref 130–450)
PMV BLD AUTO: 10 FL (ref 7–12)
POTASSIUM SERPL-SCNC: 4.2 MMOL/L (ref 3.5–5)
POTASSIUM SERPL-SCNC: 4.8 MMOL/L (ref 3.5–5)
RBC # BLD: 3.23 E12/L (ref 3.5–5.5)
SODIUM BLD-SCNC: 135 MMOL/L (ref 132–146)
SODIUM BLD-SCNC: 137 MMOL/L (ref 132–146)
TOTAL PROTEIN: 6.8 G/DL (ref 6.4–8.3)
TROPONIN, HIGH SENSITIVITY: 40 NG/L (ref 0–9)
TROPONIN, HIGH SENSITIVITY: 42 NG/L (ref 0–9)
TROPONIN, HIGH SENSITIVITY: 56 NG/L (ref 0–9)
TROPONIN, HIGH SENSITIVITY: 63 NG/L (ref 0–9)
TROPONIN, HIGH SENSITIVITY: 63 NG/L (ref 0–9)
WBC # BLD: 8.1 E9/L (ref 4.5–11.5)

## 2022-11-25 PROCEDURE — 85025 COMPLETE CBC W/AUTO DIFF WBC: CPT

## 2022-11-25 PROCEDURE — 2140000000 HC CCU INTERMEDIATE R&B

## 2022-11-25 PROCEDURE — 33208 INSRT HEART PM ATRIAL & VENT: CPT

## 2022-11-25 PROCEDURE — 2580000003 HC RX 258

## 2022-11-25 PROCEDURE — 82330 ASSAY OF CALCIUM: CPT

## 2022-11-25 PROCEDURE — 2580000003 HC RX 258: Performed by: STUDENT IN AN ORGANIZED HEALTH CARE EDUCATION/TRAINING PROGRAM

## 2022-11-25 PROCEDURE — 02HK3JZ INSERTION OF PACEMAKER LEAD INTO RIGHT VENTRICLE, PERCUTANEOUS APPROACH: ICD-10-PCS | Performed by: STUDENT IN AN ORGANIZED HEALTH CARE EDUCATION/TRAINING PROGRAM

## 2022-11-25 PROCEDURE — 3700000000 HC ANESTHESIA ATTENDED CARE

## 2022-11-25 PROCEDURE — 6360000002 HC RX W HCPCS

## 2022-11-25 PROCEDURE — 93010 ELECTROCARDIOGRAM REPORT: CPT | Performed by: INTERNAL MEDICINE

## 2022-11-25 PROCEDURE — 84484 ASSAY OF TROPONIN QUANT: CPT

## 2022-11-25 PROCEDURE — 71045 X-RAY EXAM CHEST 1 VIEW: CPT

## 2022-11-25 PROCEDURE — C1769 GUIDE WIRE: HCPCS

## 2022-11-25 PROCEDURE — 99291 CRITICAL CARE FIRST HOUR: CPT | Performed by: INTERNAL MEDICINE

## 2022-11-25 PROCEDURE — 33208 INSRT HEART PM ATRIAL & VENT: CPT | Performed by: STUDENT IN AN ORGANIZED HEALTH CARE EDUCATION/TRAINING PROGRAM

## 2022-11-25 PROCEDURE — 94640 AIRWAY INHALATION TREATMENT: CPT

## 2022-11-25 PROCEDURE — 93005 ELECTROCARDIOGRAM TRACING: CPT | Performed by: STUDENT IN AN ORGANIZED HEALTH CARE EDUCATION/TRAINING PROGRAM

## 2022-11-25 PROCEDURE — 2500000003 HC RX 250 WO HCPCS

## 2022-11-25 PROCEDURE — 6370000000 HC RX 637 (ALT 250 FOR IP): Performed by: STUDENT IN AN ORGANIZED HEALTH CARE EDUCATION/TRAINING PROGRAM

## 2022-11-25 PROCEDURE — 6360000002 HC RX W HCPCS: Performed by: ANESTHESIOLOGIST ASSISTANT

## 2022-11-25 PROCEDURE — 6370000000 HC RX 637 (ALT 250 FOR IP): Performed by: INTERNAL MEDICINE

## 2022-11-25 PROCEDURE — 80053 COMPREHEN METABOLIC PANEL: CPT

## 2022-11-25 PROCEDURE — 2580000003 HC RX 258: Performed by: ANESTHESIOLOGIST ASSISTANT

## 2022-11-25 PROCEDURE — 80048 BASIC METABOLIC PNL TOTAL CA: CPT

## 2022-11-25 PROCEDURE — C1894 INTRO/SHEATH, NON-LASER: HCPCS

## 2022-11-25 PROCEDURE — C1785 PMKR, DUAL, RATE-RESP: HCPCS

## 2022-11-25 PROCEDURE — 0JH606Z INSERTION OF PACEMAKER, DUAL CHAMBER INTO CHEST SUBCUTANEOUS TISSUE AND FASCIA, OPEN APPROACH: ICD-10-PCS | Performed by: STUDENT IN AN ORGANIZED HEALTH CARE EDUCATION/TRAINING PROGRAM

## 2022-11-25 PROCEDURE — 6360000002 HC RX W HCPCS: Performed by: FAMILY MEDICINE

## 2022-11-25 PROCEDURE — 2500000003 HC RX 250 WO HCPCS: Performed by: ANESTHESIOLOGIST ASSISTANT

## 2022-11-25 PROCEDURE — 3700000001 HC ADD 15 MINUTES (ANESTHESIA)

## 2022-11-25 PROCEDURE — 6360000002 HC RX W HCPCS: Performed by: STUDENT IN AN ORGANIZED HEALTH CARE EDUCATION/TRAINING PROGRAM

## 2022-11-25 PROCEDURE — 02H63JZ INSERTION OF PACEMAKER LEAD INTO RIGHT ATRIUM, PERCUTANEOUS APPROACH: ICD-10-PCS | Performed by: STUDENT IN AN ORGANIZED HEALTH CARE EDUCATION/TRAINING PROGRAM

## 2022-11-25 PROCEDURE — 83735 ASSAY OF MAGNESIUM: CPT

## 2022-11-25 PROCEDURE — 2700000000 HC OXYGEN THERAPY PER DAY

## 2022-11-25 PROCEDURE — 84100 ASSAY OF PHOSPHORUS: CPT

## 2022-11-25 PROCEDURE — 2709999900 HC NON-CHARGEABLE SUPPLY

## 2022-11-25 PROCEDURE — C1898 LEAD, PMKR, OTHER THAN TRANS: HCPCS

## 2022-11-25 PROCEDURE — C1889 IMPLANT/INSERT DEVICE, NOC: HCPCS

## 2022-11-25 PROCEDURE — 36415 COLL VENOUS BLD VENIPUNCTURE: CPT

## 2022-11-25 RX ORDER — FENTANYL CITRATE 50 UG/ML
INJECTION, SOLUTION INTRAMUSCULAR; INTRAVENOUS PRN
Status: DISCONTINUED | OUTPATIENT
Start: 2022-11-25 | End: 2022-11-25 | Stop reason: SDUPTHER

## 2022-11-25 RX ORDER — LIDOCAINE HYDROCHLORIDE 20 MG/ML
INJECTION, SOLUTION INFILTRATION; PERINEURAL PRN
Status: DISCONTINUED | OUTPATIENT
Start: 2022-11-25 | End: 2022-11-25 | Stop reason: SDUPTHER

## 2022-11-25 RX ORDER — DEXTROSE MONOHYDRATE 50 MG/ML
INJECTION, SOLUTION INTRAVENOUS CONTINUOUS PRN
Status: DISCONTINUED | OUTPATIENT
Start: 2022-11-25 | End: 2022-11-25 | Stop reason: SDUPTHER

## 2022-11-25 RX ORDER — PROPOFOL 10 MG/ML
INJECTION, EMULSION INTRAVENOUS CONTINUOUS PRN
Status: DISCONTINUED | OUTPATIENT
Start: 2022-11-25 | End: 2022-11-25 | Stop reason: SDUPTHER

## 2022-11-25 RX ORDER — SODIUM CHLORIDE 0.9 % (FLUSH) 0.9 %
5-40 SYRINGE (ML) INJECTION PRN
Status: DISCONTINUED | OUTPATIENT
Start: 2022-11-25 | End: 2022-11-29 | Stop reason: HOSPADM

## 2022-11-25 RX ORDER — ACETAMINOPHEN 325 MG/1
650 TABLET ORAL EVERY 4 HOURS PRN
Status: DISCONTINUED | OUTPATIENT
Start: 2022-11-25 | End: 2022-11-29 | Stop reason: HOSPADM

## 2022-11-25 RX ORDER — SODIUM CHLORIDE 9 MG/ML
INJECTION, SOLUTION INTRAVENOUS PRN
Status: DISCONTINUED | OUTPATIENT
Start: 2022-11-25 | End: 2022-11-29 | Stop reason: HOSPADM

## 2022-11-25 RX ORDER — MIDAZOLAM HYDROCHLORIDE 1 MG/ML
INJECTION INTRAMUSCULAR; INTRAVENOUS PRN
Status: DISCONTINUED | OUTPATIENT
Start: 2022-11-25 | End: 2022-11-25 | Stop reason: SDUPTHER

## 2022-11-25 RX ORDER — SODIUM CHLORIDE 0.9 % (FLUSH) 0.9 %
5-40 SYRINGE (ML) INJECTION EVERY 12 HOURS SCHEDULED
Status: DISCONTINUED | OUTPATIENT
Start: 2022-11-25 | End: 2022-11-29 | Stop reason: HOSPADM

## 2022-11-25 RX ORDER — SODIUM CHLORIDE 9 MG/ML
INJECTION, SOLUTION INTRAVENOUS CONTINUOUS PRN
Status: DISCONTINUED | OUTPATIENT
Start: 2022-11-25 | End: 2022-11-25 | Stop reason: SDUPTHER

## 2022-11-25 RX ADMIN — SILDENAFIL 60 MG: 20 TABLET ORAL at 15:06

## 2022-11-25 RX ADMIN — MYCOPHENOLATE MOFETIL 1500 MG: 250 CAPSULE ORAL at 21:39

## 2022-11-25 RX ADMIN — METOCLOPRAMIDE 5 MG: 5 TABLET ORAL at 18:37

## 2022-11-25 RX ADMIN — MIDAZOLAM 0.5 MG: 1 INJECTION INTRAMUSCULAR; INTRAVENOUS at 11:39

## 2022-11-25 RX ADMIN — BUDESONIDE 500 MCG: 0.5 SUSPENSION RESPIRATORY (INHALATION) at 08:21

## 2022-11-25 RX ADMIN — IPRATROPIUM BROMIDE 0.5 MG: 0.5 SOLUTION RESPIRATORY (INHALATION) at 20:56

## 2022-11-25 RX ADMIN — PROPOFOL 20 MCG/KG/MIN: 10 INJECTION, EMULSION INTRAVENOUS at 11:14

## 2022-11-25 RX ADMIN — ACETAMINOPHEN 650 MG: 325 TABLET, FILM COATED ORAL at 20:29

## 2022-11-25 RX ADMIN — ATORVASTATIN CALCIUM 40 MG: 40 TABLET, FILM COATED ORAL at 20:29

## 2022-11-25 RX ADMIN — DEXTROSE MONOHYDRATE: 50 INJECTION, SOLUTION INTRAVENOUS at 11:08

## 2022-11-25 RX ADMIN — SILDENAFIL 60 MG: 20 TABLET ORAL at 21:35

## 2022-11-25 RX ADMIN — VANCOMYCIN HYDROCHLORIDE 1000 MG: 1 INJECTION, POWDER, LYOPHILIZED, FOR SOLUTION INTRAVENOUS at 11:08

## 2022-11-25 RX ADMIN — IPRATROPIUM BROMIDE 0.5 MG: 0.5 SOLUTION RESPIRATORY (INHALATION) at 08:21

## 2022-11-25 RX ADMIN — SODIUM CHLORIDE, PRESERVATIVE FREE 10 ML: 5 INJECTION INTRAVENOUS at 20:29

## 2022-11-25 RX ADMIN — MIDAZOLAM 0.5 MG: 1 INJECTION INTRAMUSCULAR; INTRAVENOUS at 11:08

## 2022-11-25 RX ADMIN — MIDAZOLAM 0.5 MG: 1 INJECTION INTRAMUSCULAR; INTRAVENOUS at 11:15

## 2022-11-25 RX ADMIN — IPRATROPIUM BROMIDE 0.5 MG: 0.5 SOLUTION RESPIRATORY (INHALATION) at 15:36

## 2022-11-25 RX ADMIN — SODIUM CHLORIDE: 9 INJECTION, SOLUTION INTRAVENOUS at 10:46

## 2022-11-25 RX ADMIN — BUDESONIDE 500 MCG: 0.5 SUSPENSION RESPIRATORY (INHALATION) at 20:56

## 2022-11-25 RX ADMIN — FENTANYL CITRATE 25 MCG: 50 INJECTION, SOLUTION INTRAMUSCULAR; INTRAVENOUS at 11:21

## 2022-11-25 RX ADMIN — LIDOCAINE HYDROCHLORIDE 40 MG: 20 INJECTION, SOLUTION INFILTRATION; PERINEURAL at 11:14

## 2022-11-25 RX ADMIN — CEFAZOLIN 2000 MG: 2 INJECTION, POWDER, FOR SOLUTION INTRAMUSCULAR; INTRAVENOUS at 11:06

## 2022-11-25 RX ADMIN — MIDAZOLAM 0.5 MG: 1 INJECTION INTRAMUSCULAR; INTRAVENOUS at 11:31

## 2022-11-25 RX ADMIN — ROPINIROLE HYDROCHLORIDE 0.5 MG: 0.5 TABLET, FILM COATED ORAL at 21:35

## 2022-11-25 RX ADMIN — SODIUM CHLORIDE, PRESERVATIVE FREE 10 ML: 5 INJECTION INTRAVENOUS at 20:32

## 2022-11-25 RX ADMIN — POTASSIUM CHLORIDE 20 MEQ: 20 TABLET, EXTENDED RELEASE ORAL at 20:29

## 2022-11-25 ASSESSMENT — PAIN SCALES - GENERAL
PAINLEVEL_OUTOF10: 0
PAINLEVEL_OUTOF10: 4
PAINLEVEL_OUTOF10: 0
PAINLEVEL_OUTOF10: 0

## 2022-11-25 ASSESSMENT — PAIN DESCRIPTION - LOCATION: LOCATION: CHEST

## 2022-11-25 ASSESSMENT — PAIN DESCRIPTION - ORIENTATION: ORIENTATION: LEFT

## 2022-11-25 ASSESSMENT — LIFESTYLE VARIABLES: SMOKING_STATUS: 0

## 2022-11-25 ASSESSMENT — PAIN DESCRIPTION - DESCRIPTORS: DESCRIPTORS: ACHING;DISCOMFORT;SORE

## 2022-11-25 NOTE — PLAN OF CARE
Problem: Chronic Conditions and Co-morbidities  Goal: Patient's chronic conditions and co-morbidity symptoms are monitored and maintained or improved  Outcome: Progressing  Flowsheets  Taken 11/24/2022 2000 by Stormy Morelos 34 - Patient's Chronic Conditions and Co-Morbidity Symptoms are Monitored and Maintained or Improved: Monitor and assess patient's chronic conditions and comorbid symptoms for stability, deterioration, or improvement  Taken 11/24/2022 0800 by Stormy Chan 34 - Patient's Chronic Conditions and Co-Morbidity Symptoms are Monitored and Maintained or Improved: Monitor and assess patient's chronic conditions and comorbid symptoms for stability, deterioration, or improvement     Problem: Discharge Planning  Goal: Discharge to home or other facility with appropriate resources  Outcome: Progressing  Flowsheets  Taken 11/24/2022 2000 by Shilpa Butler RN  Discharge to home or other facility with appropriate resources: Identify barriers to discharge with patient and caregiver  Taken 11/24/2022 0800 by Frederick Casas RN  Discharge to home or other facility with appropriate resources: Identify barriers to discharge with patient and caregiver     Problem: Skin/Tissue Integrity  Goal: Absence of new skin breakdown  Description: 1. Monitor for areas of redness and/or skin breakdown  2. Assess vascular access sites hourly  3. Every 4-6 hours minimum:  Change oxygen saturation probe site  4. Every 4-6 hours:  If on nasal continuous positive airway pressure, respiratory therapy assess nares and determine need for appliance change or resting period.   Outcome: Progressing

## 2022-11-25 NOTE — DISCHARGE INSTRUCTIONS
[facility] Pacemaker Instructions    Medications:  please hold eliquis until Friday December 2 - restart Friday     Incision Care:  Army Tina dressing: located over your incision. Remove in 1 week (Monday, 12/5/22)  Surgical glue: located under the brown dressings and over your incision. This glue is there to prevent infection. Do NOT scrub, itch, pick, or remove the glue as this could lead to infection. The glue will fall off on its own over the next 6 weeks. Daily monitoring: check incision sites on chest daily. Notify the office at [phone] if you develop any redness, swelling, drainage, warmth, or fever greater than 100 degrees. Bathing:  No soaking in a bathtub, hot tub, or pool for 6 weeks. You may shower, but do not scrub at the incision site as it has surgical glue covering it, which is there to prevent infection. Activity:  You may resume normal activity with left arm restrictions. Arm restrictions:  Arm immobilizer: Wear the arm immobilizer at all times for 48 hours except to shower, toilet, and eat. After 48 hours, wear the arm immobilizer at night for the next 4 weeks. After 4 weeks you do not need to wear the arm immobilizer anymore. The immobilizer should be loose, not tight in order to avoid restriction of blood flow. Avoid pulling yourself up with your arm, pushing or stretching motions. Do not raise left elbow higher than shoulder height and do not lift anything weighing more than 3 pounds for 6 weeks. Do not do any activities such as golfing, vacuuming, or mowing the lawn for 6 weeks. Prevent any hard blows to the pacemaker. Driving: You may drive, if you feel up to it, in 14 days, or after your follow up appointment. Start with local/short trips to familiar places. Avoid highway/ high-speed driving for the first few days after you resume driving. DO NOT drive until you have stopped taking prescription pain medications.       What else do I need to know about my pacemaker? Do not carry a cellular phone in a pocket within six inches of the pacemaker as this can affect the operation of the unit. Hold the cell phone on the opposite ear as the pacemaker insertion site. Do not walk through airport security systems as these devices can detect the metal in your pacemaker and possibly alarm. The new full body scanners are safe for both pacemakers. Keep your pacemaker ID card with you at all times and ask security to clear you with a hand search only if a full body scanner is not available. Do not let security use a hand-held wand. Avoid passing through metal detectors (for example in shopping malls and schools). If you must pass through, walk quickly through. These detectors can interfere with the pacemaker function. Do not touch the spark plug or distributor on running car or  - turn engine off first.  Avoid holding magnets near your pacemaker. Special Instructions:  Let your dentist, doctor, or medical specialist know that you have a pacemaker so precautions can be taken to protect the device. Your device is considered to be MRI conditional; meaning that under certain circumstances, MRI exams may be performed after 6 weeks post implantation  Your pacemaker may set off a metal detector, such as those used in airports and courtrooms. Let security know that you have a pacemaker & show them your card. Remote Monitor: Many of these monitors have a delay of 3-6 months currently, in some cases an emily can be used with newer smartphones to provide monitoring services. Please discuss this with the device clinic at your follow up appointment in 2 weeks. ID Card: You will have a temporary ID card until a permanent card is sent to you by the device company. The permanent card will look like a 's license or credit card and should arrive within 8 weeks.   Carry your ID card with you at all times    Follow Up:  Device clinic: On 12/13/22 at 1:00 pm to the [place] for an incision and device check. Dr Uriel Gallo: Follow up with Dr. Uriel Gallo in 6 weeks and then 3-6 months, call to schedule appointment. [facility]  [address] [place], [place] [address]mari[facility]as [facility]  [phone]

## 2022-11-25 NOTE — ANESTHESIA PRE PROCEDURE
Department of Anesthesiology  Preprocedure Note       Name:  Albert Garcia   Age:  75 y.o.  :  1955                                          MRN:  [id number]         Date:  2022      Surgeon: * Surgery not found *    Procedure:     Medications prior to admission:   Prior to Admission medications    Medication Sig Start Date End Date Taking?  Authorizing Provider   metoclopramide (REGLAN) 5 MG tablet Take 5 mg by mouth 4 times daily Take one tablet by mouth three times a day 30 minutes before meals   Yes Graeme Hoskins MD   flecainide (TAMBOCOR) 50 MG tablet Take 50 mg by mouth 2 times daily   Yes Historical Provider, MD   macitentan (OPSUMIT) 10 MG TABS Take 10 mg by mouth daily   Yes Historical Provider, MD   empagliflozin (JARDIANCE) 10 MG tablet Take 10 mg by mouth daily   Yes Historical Provider, MD   hyoscyamine (ANASPAZ;LEVSIN) 125 MCG tablet Take 125 mcg by mouth every 4 hours as needed for Cramping   Yes Historical Provider, MD   apixaban (ELIQUIS) 5 MG TABS tablet Take 1 tablet by mouth 2 times daily 22   Leslee Sanchez MD   pantoprazole (PROTONIX) 40 MG tablet Take 40 mg by mouth daily    Historical Provider, MD   sildenafil (REVATIO) 20 MG tablet Take 60 mg by mouth 3 times daily    Historical Provider, MD   fluticasone (FLOVENT HFA) 110 MCG/ACT inhaler Inhale 1 puff into the lungs 2 times daily    Historical Provider, MD   Tiotropium Bromide Monohydrate (SPIRIVA RESPIMAT IN) Inhale into the lungs daily    Historical Provider, MD   NONFORMULARY TREVASAL    Historical Provider, MD   Multiple Vitamins-Minerals (THERAPEUTIC MULTIVITAMIN-MINERALS) tablet Take 1 tablet by mouth daily    Historical Provider, MD   vitamin B-12 (CYANOCOBALAMIN) 1000 MCG tablet Take 1,000 mcg by mouth daily    Historical Provider, MD   calcium carbonate (TUMS) 500 MG chewable tablet Take 1 tablet by mouth daily    Historical Provider, MD   amiodarone (CORDARONE) 200 MG tablet Take 1 tablet by mouth 2 times daily for 14 doses 5/10/22 5/17/22  Fide Mao MD   torsemide (DEMADEX) 20 MG tablet TAKE ONE TABLET BY MOUTH DAILY 4/4/22   Historical Provider, MD   spironolactone (ALDACTONE) 25 MG tablet TAKE ONE TABLET BY MOUTH DAILY 4/4/22   Historical Provider, MD   furosemide (LASIX) 20 MG tablet take one tablet three times daily  Patient not taking: No sig reported 2/8/22   Fide Mao MD   warfarin (COUMADIN) 5 MG tablet Take 1 tablet by mouth daily  Patient not taking: Reported on 11/24/2022 2/8/22   Fide Mao MD   metoprolol tartrate (LOPRESSOR) 50 MG tablet Take 1 tablet by mouth 2 times daily 12/15/21   Fide Mao MD   levothyroxine (SYNTHROID) 75 MCG tablet 75 mcg Daily  12/1/21   Historical Provider, MD   lisinopril (PRINIVIL;ZESTRIL) 5 MG tablet Take 5 mg by mouth daily  Patient not taking: No sig reported 9/27/21   Historical Provider, MD   mycophenolate (CELLCEPT) 500 MG tablet Take 1,500 mg by mouth 2 times daily 11/12/21   Historical Provider, MD   potassium chloride (KLOR-CON M) 10 MEQ extended release tablet Take 1 tablet by mouth daily Take with Lasix  Patient taking differently: Take 20 mEq by mouth 2 times daily Take with Lasix 9/24/21   Fide Mao MD   simvastatin (ZOCOR) 40 MG tablet Take 1 tablet by mouth nightly 3/29/21   Fide Mao MD   rOPINIRole (REQUIP) 0.25 MG tablet Take 1 tablet by mouth daily  Patient taking differently: Take 0.5 mg by mouth nightly 9/18/15   Earl Cabrera DO   Cholecalciferol (VITAMIN D) 2000 UNITS CAPS capsule Take  by mouth daily.     Historical Provider, MD   aspirin 81 MG tablet Take 81 mg by mouth 2 times daily   Patient not taking: No sig reported    Historical Provider, MD       Current medications:    Current Facility-Administered Medications   Medication Dose Route Frequency Provider Last Rate Last Admin    sodium chloride flush 0.9 % injection 5-40 mL  5-40 mL IntraVENous 2 times per day STEPH Callahan - CNP   10 mL at 11/24/22 2002    sodium chloride flush 0.9 % injection 5-40 mL  5-40 mL IntraVENous PRN Merlinda Burkitt, APRN - CNP        0.9 % sodium chloride infusion   IntraVENous PRN Merlinda Burkitt, APRN - CNP        polyethylene glycol (GLYCOLAX) packet 17 g  17 g Oral Daily PRN Merlinda Burkitt, APRN - CNP        acetaminophen (TYLENOL) tablet 650 mg  650 mg Oral Q6H PRN Merlinda Burkitt, APRN - CNP        Or    acetaminophen (TYLENOL) suppository 650 mg  650 mg Rectal Q6H PRN Merlinda Burkitt, APRN - CNP        ipratropium (ATROVENT) 0.02 % nebulizer solution 0.5 mg  0.5 mg Nebulization 4x daily Rosita Mile, DO   0.5 mg at 11/25/22 4956    budesonide (PULMICORT) nebulizer suspension 500 mcg  0.5 mg Nebulization BID Rosita Mile, DO   500 mcg at 11/25/22 3319    [Held by provider] apixaban (ELIQUIS) tablet 5 mg  5 mg Oral BID Merlinda Burkitt, APRN - CNP   5 mg at 11/24/22 0805    levothyroxine (SYNTHROID) tablet 75 mcg  75 mcg Oral Daily Ang Castellon MD   75 mcg at 11/24/22 1019    pantoprazole (PROTONIX) tablet 40 mg  40 mg Oral Daily Ang Castellon MD   40 mg at 11/24/22 1019    furosemide (LASIX) tablet 20 mg  20 mg Oral Daily Ang Castellon MD   20 mg at 11/24/22 1019    atorvastatin (LIPITOR) tablet 40 mg  40 mg Oral Daily Ang Castellon MD   40 mg at 11/24/22 2002    spironolactone (ALDACTONE) tablet 25 mg  25 mg Oral Daily Ang Castellon MD   25 mg at 11/24/22 1018    vitamin B-12 (CYANOCOBALAMIN) tablet 1,000 mcg  1,000 mcg Oral Daily Ang Castellon MD   1,000 mcg at 11/24/22 1019    potassium chloride (KLOR-CON M) extended release tablet 20 mEq  20 mEq Oral BID Ang Castellon MD   20 mEq at 11/24/22 2001    rOPINIRole (REQUIP) tablet 0.5 mg  0.5 mg Oral Nightly Ang Castellon MD   0.5 mg at 11/24/22 2002    metoclopramide (REGLAN) tablet 5 mg  5 mg Oral 4x Daily Ang Castellon MD   5 mg at 11/24/22 2001    mycophenolate (CELLCEPT) capsule 1,500 mg  1,500 mg Oral BID Merlinda Burkitt, APRN - CNP   1,500 mg at 22    sildenafil (REVATIO) tablet 60 mg  60 mg Oral TID Bentley Horn MD   60 mg at 22       Allergies:     Allergies   Allergen Reactions    Biaxin [Clarithromycin] Nausea And Vomiting    Naproxen Nausea And Vomiting       Problem List:    Patient Active Problem List   Diagnosis Code    Memory loss R41.3    Cerebral atherosclerosis I67.2    Hypercholesteremia improved E78.00    Right cataract H26.9    Left cataract H26.9    CAD in native artery I25.10    Symptomatic bradycardia R00.1    High-grade atrioventricular block I44.39    Atrial flutter (HCC) I48.92       Past Medical History:        Diagnosis Date    CAD in native artery 2021    Cerebral artery occlusion with cerebral infarction ([facility] [address]     \"mini stroke\" 10/2014    H/O cardiovascular stress test 2020    Lexiscan    Hyperlipidemia     Hypertension     Sleep apnea     doesnt use cpap       Past Surgical History:        Procedure Laterality Date    CARDIAC CATHETERIZATION  2012    Right heart cath by Dr Lucrecia Hamman Right 2019    intraocular lens    CATARACT REMOVAL WITH IMPLANT Left 10/15/2019     SECTION      COLONOSCOPY      INTRACAPSULAR CATARACT EXTRACTION Right 2019    RIGHT EYE CATARACT EMULSIFICATION IOL IMPLANT performed by Masood Bernard MD at [place] [address] Left 10/15/2019    LEFT EYE CATARACT EMULSIFICATION IOL IMPLANT performed by Masood Bernard MD at [address] [place] [address] History:    Social History     Tobacco Use    Smoking status: Former     Packs/day: 1.50     Years: 40.00     Pack years: 60.00     Types: Cigarettes     Quit date: 2020     Years since quittin.1    Smokeless tobacco: Never    Tobacco comments:      she has decreased to 1ppd   Substance Use Topics    Alcohol use: Not Currently     Alcohol/week: 10.0 standard drinks Types: 10 Glasses of wine per week     Comment: green tea 2 cups a day                                 Counseling given: Not Answered  Tobacco comments: 04/20 she has decreased to 1ppd      Vital Signs (Current):   Vitals:    11/25/22 0500 11/25/22 0600 11/25/22 0700 11/25/22 0800   BP: 91/71 (!) 116/49 (!) 118/56    Pulse: (!) 49 (!) 47 (!) 49 50   Resp: 17 15 12 18   Temp:    98.3 °F (36.8 °C)   TempSrc:    Temporal   SpO2: 99% 98% 94% 97%   Weight:       Height:                                                  BP Readings from Last 3 Encounters:   11/25/22 (!) 118/56   11/24/22 (!) 124/50   08/18/22 (!) 117/55       NPO Status:                                                                                 BMI:   Wt Readings from Last 3 Encounters:   11/25/22 147 lb 11.3 oz (67 kg)   11/23/22 148 lb (67.1 kg)   08/17/22 154 lb (69.9 kg)     Body mass index is 25.35 kg/m². CBC:   Lab Results   Component Value Date/Time    WBC 8.1 11/25/2022 04:17 AM    RBC 3.23 11/25/2022 04:17 AM    HGB 9.9 11/25/2022 04:17 AM    HCT 31.7 11/25/2022 04:17 AM    MCV 98.1 11/25/2022 04:17 AM    RDW 16.1 11/25/2022 04:17 AM     11/25/2022 04:17 AM       CMP:   Lab Results   Component Value Date/Time     11/25/2022 04:17 AM    K 4.8 11/25/2022 04:17 AM    K 4.0 08/17/2022 03:01 PM    CL 99 11/25/2022 04:17 AM    CO2 26 11/25/2022 04:17 AM    BUN 20 11/25/2022 04:17 AM    CREATININE 0.8 11/25/2022 04:17 AM    GFRAA >60 08/17/2022 03:01 PM    LABGLOM >60 11/25/2022 04:17 AM    GLUCOSE 105 11/25/2022 04:17 AM    GLUCOSE 87 01/26/2012 11:02 AM    PROT 6.8 11/25/2022 04:17 AM    CALCIUM 10.0 11/25/2022 04:17 AM    BILITOT 0.8 11/25/2022 04:17 AM    ALKPHOS 65 11/25/2022 04:17 AM    AST 19 11/25/2022 04:17 AM    ALT 14 11/25/2022 04:17 AM       POC Tests: No results for input(s): POCGLU, POCNA, POCK, POCCL, POCBUN, POCHEMO, POCHCT in the last 72 hours.     Coags:   Lab Results   Component Value Date/Time    PROTIME 27.7 08/17/2022 03:01 PM    PROTIME 0.0 08/05/2022 01:14 PM    INR 2.4 08/17/2022 03:01 PM    APTT 35.6 04/05/2021 03:00 PM       HCG (If Applicable): No results found for: PREGTESTUR, PREGSERUM, HCG, HCGQUANT     ABGs: No results found for: PHART, PO2ART, OMR6NRO, OJK7RXD, BEART, N4FFOHOS     Type & Screen (If Applicable):  No results found for: LABABO, LABRH    Drug/Infectious Status (If Applicable):  No results found for: HIV, HEPCAB    COVID-19 Screening (If Applicable):   Lab Results   Component Value Date/Time    COVID19 Not Detected 11/23/2022 10:13 PM    COVID19 Not Detected 04/01/2021 01:18 PM           Anesthesia Evaluation  Patient summary reviewed no history of anesthetic complications:   Airway: Mallampati: II  TM distance: >3 FB   Neck ROM: full  Mouth opening: > = 3 FB   Dental: normal exam         Pulmonary: breath sounds clear to auscultation  (+) sleep apnea: on noncompliant,      (-) not a current smoker                           Cardiovascular:    (+) hypertension:, CAD:, dysrhythmias (High-grade atrioventricular block):, hyperlipidemia        Rhythm: regular  Rate: normal                    Neuro/Psych:   (+) CVA:,             GI/Hepatic/Renal:             Endo/Other:    (+) hypothyroidism, blood dyscrasia: anticoagulation therapy:., .                 Abdominal:             Vascular:   + PVD, aortic or cerebral (Cerebral atherosclerosis), . Other Findings:           Anesthesia Plan      MAC     ASA 4       Induction: intravenous. Anesthetic plan and risks discussed with patient. Plan discussed with CRNA.                     Yaw Mancini MD   11/25/2022

## 2022-11-25 NOTE — PROGRESS NOTES
PULMONARY  CRITICAL CARE   SERVICE DAILY PROGRESS  NOTE     11/25/2022   Hospital  LOS: 1 day      Admit date- 11/24/2022       Initially admitted for -   Symptomatic Bradycardia after doubling the dose of Flecainide      Subjective:      Feels the same , HR remained in 40s to 50s overnight , asymptomatic . No new complains . Hemodynamics stable . Planned for PPM today       Review of Systems        General- No headaches , dizzinesss, syncope   Pulmonary - No chest pain , hemoptysis   Cardiovascular- No chest pain,   GI- No abd pain ,No difficulty swallowing, diarrhea , no vomiting   Musculoskeletal- No extremity weakness, no edema , no cyanosis   Genitourinary- No burning urination, no dysuria, no incontinence  Neuro- NO syncope , AMS  Or weakness , No tingling , no numbness. Skin- No rashes , no cyanosis. Psychiatric/Behavioral: Negative for confusion, hallucinations and sleep disturbance. The patient is not nervous/anxious. Allergies: Allergies   Allergen Reactions    Biaxin [Clarithromycin] Nausea And Vomiting    Naproxen Nausea And Vomiting     Home Meds:  Prior to Admission medications    Medication Sig Start Date End Date Taking?  Authorizing Provider   metoclopramide (REGLAN) 5 MG tablet Take 5 mg by mouth 4 times daily Take one tablet by mouth three times a day 30 minutes before meals   Yes Peter Gaytan MD   flecainide (TAMBOCOR) 50 MG tablet Take 50 mg by mouth 2 times daily   Yes Historical Provider, MD   macitentan (OPSUMIT) 10 MG TABS Take 10 mg by mouth daily   Yes Historical Provider, MD   empagliflozin (JARDIANCE) 10 MG tablet Take 10 mg by mouth daily   Yes Historical Provider, MD   hyoscyamine (ANASPAZ;LEVSIN) 125 MCG tablet Take 125 mcg by mouth every 4 hours as needed for Cramping   Yes Historical Provider, MD   apixaban (ELIQUIS) 5 MG TABS tablet Take 1 tablet by mouth 2 times daily 8/26/22   Brian Bravo MD   pantoprazole (PROTONIX) 40 MG tablet Take 40 mg by mouth daily    Historical Provider, MD   sildenafil (REVATIO) 20 MG tablet Take 60 mg by mouth 3 times daily    Historical Provider, MD   fluticasone (FLOVENT HFA) 110 MCG/ACT inhaler Inhale 1 puff into the lungs 2 times daily    Historical Provider, MD   Tiotropium Bromide Monohydrate (SPIRIVA RESPIMAT IN) Inhale into the lungs daily    Historical Provider, MD   NONFORMULARY TREVASO    Historical Provider, MD   Multiple Vitamins-Minerals (THERAPEUTIC MULTIVITAMIN-MINERALS) tablet Take 1 tablet by mouth daily    Historical Provider, MD   vitamin B-12 (CYANOCOBALAMIN) 1000 MCG tablet Take 1,000 mcg by mouth daily    Historical Provider, MD   calcium carbonate (TUMS) 500 MG chewable tablet Take 1 tablet by mouth daily    Historical Provider, MD   amiodarone (CORDARONE) 200 MG tablet Take 1 tablet by mouth 2 times daily for 14 doses 5/10/22 5/17/22  Prudencio Puente MD   torsemide (DEMADEX) 20 MG tablet TAKE ONE TABLET BY MOUTH DAILY 4/4/22   Historical Provider, MD   spironolactone (ALDACTONE) 25 MG tablet TAKE ONE TABLET BY MOUTH DAILY 4/4/22   Historical Provider, MD   furosemide (LASIX) 20 MG tablet take one tablet three times daily  Patient not taking: No sig reported 2/8/22   Prudencio Puente MD   warfarin (COUMADIN) 5 MG tablet Take 1 tablet by mouth daily  Patient not taking: Reported on 11/24/2022 2/8/22   Prudencio Puente MD   metoprolol tartrate (LOPRESSOR) 50 MG tablet Take 1 tablet by mouth 2 times daily 12/15/21   Prudencio Puente MD   levothyroxine (SYNTHROID) 75 MCG tablet 75 mcg Daily  12/1/21   Historical Provider, MD   lisinopril (PRINIVIL;ZESTRIL) 5 MG tablet Take 5 mg by mouth daily  Patient not taking: No sig reported 9/27/21   Historical Provider, MD   mycophenolate (CELLCEPT) 500 MG tablet Take 1,500 mg by mouth 2 times daily 11/12/21   Historical Provider, MD   potassium chloride (KLOR-CON M) 10 MEQ extended release tablet Take 1 tablet by mouth daily Take with Lasix  Patient taking differently: Take 20 mEq by mouth 2 times daily Take with Lasix 9/24/21   Rocío Wilhelm MD   simvastatin (ZOCOR) 40 MG tablet Take 1 tablet by mouth nightly 3/29/21   Rocío Wilhelm MD   rOPINIRole (REQUIP) 0.25 MG tablet Take 1 tablet by mouth daily  Patient taking differently: Take 0.5 mg by mouth nightly 9/18/15   Hal Kruger DO   Cholecalciferol (VITAMIN D) 2000 UNITS CAPS capsule Take  by mouth daily.     Historical Provider, MD   aspirin 81 MG tablet Take 81 mg by mouth 2 times daily   Patient not taking: No sig reported    Historical Provider, MD     Scheduled Meds:   sodium chloride flush  5-40 mL IntraVENous 2 times per day    ipratropium  0.5 mg Nebulization 4x daily    budesonide  0.5 mg Nebulization BID    [Held by provider] apixaban  5 mg Oral BID    levothyroxine  75 mcg Oral Daily    pantoprazole  40 mg Oral Daily    furosemide  20 mg Oral Daily    atorvastatin  40 mg Oral Daily    spironolactone  25 mg Oral Daily    vitamin B-12  1,000 mcg Oral Daily    potassium chloride  20 mEq Oral BID    rOPINIRole  0.5 mg Oral Nightly    metoclopramide  5 mg Oral 4x Daily    mycophenolate  1,500 mg Oral BID    sildenafil  60 mg Oral TID     Continuous Infusions:   sodium chloride         Vitals:  BP (!) 118/56   Pulse 53   Temp 98.3 °F (36.8 °C) (Temporal)   Resp 19   Ht 5' 4\" (1.626 m)   Wt 147 lb 11.3 oz (67 kg)   SpO2 98%   BMI 25.35 kg/m²   Tmax:  CVP:        Intake/Output Summary (Last 24 hours) at 11/25/2022 0906  Last data filed at 11/25/2022 9232  Gross per 24 hour   Intake 161.1 ml   Output 150 ml   Net 11.1 ml       Date 11/24/22 0701 - 11/25/22 0700 11/25/22 0701 - 11/26/22 0700   Shift 8721-3604 2968-4253 24 Hour Total 8781-7667 7598-7380 24 Hour Total   INTAKE   P.O.  120 120      IV Piggyback 41.1  41.1      Shift Total 41.1 120 161.1      OUTPUT   Urine 150 0 150      Emesis/NG output  0 0      Other  0 0      Stool  0 0      Blood  0 0        Blood  0 0      Shift Total 150 0 150      NET -108.9 120 11.1 EXAM:  Physical exam -  General Appearance:    Alert, cooperative, no distress,    Appears of stated age   Head:    Normocephalic, without obvious abnormality, atraumatic   Eyes:    PERRL, conjunctiva/corneas clear, EOM's intact       Nose:   Nares normal,  mucosa normal, no drainage    or sinus tenderness   Throat:   Lips, mucosa, and tongue normal   Neck:   Supple, symmetrical, trachea midline, no adenopathy;   thyroid:  no elargement/tenderness/nodules; no carotid   bruit or JVD       Lungs:     Clear to auscultation bilaterally, respirations unlabored, no rhonchi or crackles    Chest Wall:    No tenderness or deformity    Heart:    Bradycardia present,  S1 and S2 normal, no murmur, rub   or gallop       Abdomen:     Soft, non-tender, bowel sounds active all four quadrants,     no masses, no organomegaly           Extremities:   Extremities normal, atraumatic, no cyanosis or 1+ edema   Pulses:   2+ and symmetric all extremities   Skin:   Skin color, texture, turgor normal, no rashes or lesions   Lymph nodes:   Cervical, supraclavicular,nodes normal   Neurologic:   CNII-XII intact, normal strength, sensation         CBC:   Recent Labs     11/23/22 2213 11/24/22  0541 11/25/22  0417   WBC 7.6 7.3 8.1   HGB 10.5* 10.2* 9.9*   HCT 34.2 32.9* 31.7*    300 264     BMP:    Recent Labs     11/23/22 2213 11/24/22  0541 11/25/22  0417    136 135   K 4.5 4.4 4.8   CL 96* 98 99   CO2 25 25 26   BUN 25* 25* 20   CREATININE 1.0 0.8 0.8   GLUCOSE 108* 107* 105*   CALCIUM 9.6 10.4* 10.0   MG  --  1.6 2.2   PHOS  --  3.6 3.4     HEPATIC:   Recent Labs     11/23/22 2213 11/24/22  0541 11/25/22  0417   AST 19 20 19   ALT 17 16 14   BILITOT 0.4 0.5 0.8   ALKPHOS 73 71 65     TSH:   Recent Labs     11/24/22  0541   TSH 8.570*        Cultures:    Radiology and available imaging -   Personally reviewed    ASSESSMENT AND PLAN:    High Degree AV block + Flecainide induced Bradycardia  with baseline 1st degree AV block - Cardiolology( EP) following , Plans for PPM this am .  Hemodynamically stable . - Maintain Pacer pads and atropine at the bedside . Pulmonary HTN - WHO Group I , NYHA  class I , with contribution from Group II ( last RHC reveals isolated Group I Pulmoanry HTN )    - This is not an exacerbation of Pulmonary HTN , its at baseline , no further investigation or additional treatment is required at thsi time   - She can continue Opsumit ( ERA )and Revatio ( PDE5 inhibitor) at this time ( home medications )      - Last RHC hemodynamics-   mean PAP - 41 , Pulmonary vascular Resistance - 9.0 , PCwP- 13     Scleroderma / Raynaud's disease - these are at baseline , no additional interventions required for this hospital stay. - Resume home eliquis after PPM placement     Prophylaxis:  Stress ulcer: [] PPI Agent [] H2RA [] Sucralfate [] Other:   VTE: [] Enoxaparin  [] SC Heparin  [] SCD      Critical care time spent excluding separately billable procedures is 33 minutes.   Electronically signed by Robert Mcgill MD on 11/25/2022 at 9:06 AM

## 2022-11-25 NOTE — PLAN OF CARE
Problem: Chronic Conditions and Co-morbidities  Goal: Patient's chronic conditions and co-morbidity symptoms are monitored and maintained or improved  Outcome: Progressing  Flowsheets (Taken 11/25/2022 1705)  Care Plan - Patient's Chronic Conditions and Co-Morbidity Symptoms are Monitored and Maintained or Improved: Monitor and assess patient's chronic conditions and comorbid symptoms for stability, deterioration, or improvement     Problem: Discharge Planning  Goal: Discharge to home or other facility with appropriate resources  Outcome: Progressing  Flowsheets (Taken 11/25/2022 1705)  Discharge to home or other facility with appropriate resources: Identify barriers to discharge with patient and caregiver     Problem: Skin/Tissue Integrity  Goal: Absence of new skin breakdown  Description: 1. Monitor for areas of redness and/or skin breakdown  2. Assess vascular access sites hourly  3. Every 4-6 hours minimum:  Change oxygen saturation probe site  4. Every 4-6 hours:  If on nasal continuous positive airway pressure, respiratory therapy assess nares and determine need for appliance change or resting period.   Outcome: Progressing

## 2022-11-25 NOTE — PROGRESS NOTES
Hospitalist Progress Note      Synopsis: Patient with a medical hx of CAD, cerebral artery occlusion with cerebral infarction, HLD, HTN, JANNETH, atrial fibrillation admitted on 11/24/2022 after presenting to the ED for shortness of breath and irregular heartbeat. Pt pulse ox reader indicated patient heart rate was in the 40's. Pt presented to ED by EMS. Pt apparently had taken an extra flecainide yesterday as she had discomfort between her shoulder blades. In the ED there was concern that pt was in 3rd degree AV block with ventricular escape rhythm which was bradycardic causing her to feel short of breath. Subjective  Patient is hopeful to get pacemaker today     Exam:  BP (!) 116/49   Pulse (!) 47   Temp 97.5 °F (36.4 °C) (Temporal)   Resp 15   Ht 5' 4\" (1.626 m)   Wt 147 lb 11.3 oz (67 kg)   SpO2 98%   BMI 25.35 kg/m²   General appearance: No apparent distress, appears stated age and cooperative. HEENT: Pupils equal, round, and reactive to light. Conjunctivae/corneas clear. Neck: Supple. No jugular venous distention. Trachea midline. Respiratory:  Normal respiratory effort. Clear to auscultation, bilaterally without Rales/Wheezes/Rhonchi. Cardiovascular: irregularly irregular with normal S1/S2 without murmurs, rubs or gallops. Bradycardic   Abdomen: Soft, non-tender, non-distended with normal bowel sounds. Musculoskeletal: No clubbing, cyanosis or edema bilaterally. Brisk capillary refill. 2+ lower extremity pulses (dorsalis pedis).    Skin:  No rashes    Neurologic: awake, alert and following commands; somnolent but arousable     Medications:  Reviewed    Infusion Medications    sodium chloride       Scheduled Medications    sodium chloride flush  5-40 mL IntraVENous 2 times per day    ipratropium  0.5 mg Nebulization 4x daily    budesonide  0.5 mg Nebulization BID    [Held by provider] apixaban  5 mg Oral BID    levothyroxine  75 mcg Oral Daily    pantoprazole  40 mg Oral Daily    furosemide  20 mg Oral Daily    atorvastatin  40 mg Oral Daily    spironolactone  25 mg Oral Daily    vitamin B-12  1,000 mcg Oral Daily    potassium chloride  20 mEq Oral BID    rOPINIRole  0.5 mg Oral Nightly    metoclopramide  5 mg Oral 4x Daily    mycophenolate  1,500 mg Oral BID    sildenafil  60 mg Oral TID     PRN Meds: sodium chloride flush, sodium chloride, polyethylene glycol, acetaminophen **OR** acetaminophen    I/O    Intake/Output Summary (Last 24 hours) at 11/25/2022 0643  Last data filed at 11/25/2022 2396  Gross per 24 hour   Intake 161.1 ml   Output 150 ml   Net 11.1 ml       Labs:   Recent Labs     11/23/22 2213 11/24/22  0541 11/25/22 0417   WBC 7.6 7.3 8.1   HGB 10.5* 10.2* 9.9*   HCT 34.2 32.9* 31.7*    300 264       Recent Labs     11/23/22 2213 11/24/22  0541 11/25/22 0417    136 135   K 4.5 4.4 4.8   CL 96* 98 99   CO2 25 25 26   BUN 25* 25* 20   CREATININE 1.0 0.8 0.8   CALCIUM 9.6 10.4* 10.0   PHOS  --  3.6 3.4       Recent Labs     11/23/22 2213 11/24/22  0541 11/25/22 0417   PROT 6.9 7.2 6.8   ALKPHOS 73 71 65   ALT 17 16 14   AST 19 20 19   BILITOT 0.4 0.5 0.8       No results for input(s): INR in the last 72 hours. No results for input(s): Darby Fuse in the last 72 hours. Chronic labs:  Lab Results   Component Value Date    CHOL 217 (H) 09/10/2015    TRIG 117 09/10/2015    HDL 73 09/10/2015    LDLCALC 121 (H) 09/10/2015    TSH 8.570 (H) 11/24/2022    INR 2.4 08/17/2022    LABA1C 5.2 08/26/2013       Radiology:  Imaging studies reviewed today.     ASSESSMENT:  Symptomatic bradycardia  Acute hypoxic respiratory failure   Hx of A fib  Hx of scleroderma  Hx of pulmonary HTN  HFpEF  Long term anticoagulation  Anemia  New diagnosis of hypothyroidism      PLAN:  Cardiology consulted, await recs; for pacemaker today   Continue home meds as ordered  Strict I/O, monitor renal function   Continue levothyroxine   Wean oxygen as tolerated     Diet: Diet NPO Exceptions are: Sips of Water with Meds  Code Status: Full Code  PT/OT Eval Status:   as needed  DVT Prophylaxis:   on hold  Recommended disposition at discharge:  pending medical progression     +++++++++++++++++++++++++++++++++++++++++++++++++  Marcia Whittaker MD   [facility].  +++++++++++++++++++++++++++++++++++++++++++++++++  NOTE: This report was transcribed using voice recognition software. Every effort was made to ensure accuracy; however, inadvertent computerized transcription errors may be present.

## 2022-11-25 NOTE — CARE COORDINATION
11/25: Transition of care:  Pt presented to the [facility]'s ER for Irregular heart beat. Pt was found to be bradycardic after doubling the dose of Flecainide. Pt was transferred to [facility] &  for EP consult. Pt is on 2L/NC at 98% with external pacing. Pt is for the OR today for a StARTinitiative. CM attempted to see pt but she was off the floor in the OR. CM spoke with pt's  Rosalio Mccall over the phone at [phone] to discuss CM role & dc planning. Pt's PCP is Dr. Bernice Mcleod & uses the [facility] on [address]. Pt lives with her  in a house with 1 step to enter. The bed/bathroom are on the 1st level. PTA pt was independent. Pt is on 02 overnight per  his is unsure of the amount or company. Pt has no hx of HHC/SNF. Will need PT/OT eval.  Needs are unclear. CM advise  that her needs are unclear at this time. I will dropped off [place] [address] list & SNF/[facility] for them to look over. [facility] , [address] [place] [address]. Sw/CM will continue to follow for dc planning. Electronically signed by Kate Lockwood RN on 11/25/2022 at 11:04 AM     The Plan for Transition of Care is related to the following treatment goals: HHC/SNF    The Patient and/or patient representative  was provided with a choice of provider and agrees   with the discharge plan. [x] Yes [] No    Freedom of choice list was provided with basic dialogue that supports the patient's individualized plan of care/goals, treatment preferences and shares the quality data associated with the providers.  [x] Yes [] No

## 2022-11-25 NOTE — PROGRESS NOTES
Instructed patient multiple times that she is on bedrest. Patient is adamant about using bathroom. Patient refusing bed pan. Call light within reach. Bed alarm on. Instructed patient to call for help.

## 2022-11-25 NOTE — ANESTHESIA POSTPROCEDURE EVALUATION
Department of Anesthesiology  Postprocedure Note    Patient: Vandana Bowen  MRN: [id number]  YOB: 1955  Date of evaluation: 11/25/2022      Procedure Summary     Date: 11/25/22 Room / Location: [facility] CATH LAB    Anesthesia Start: 0144 Anesthesia Stop: 4372    Procedure: PACEMAKER IMPLANT W/ANES Diagnosis:     Scheduled Providers:  Responsible Provider: Jenae Fu MD    Anesthesia Type: MAC ASA Status: 4          Anesthesia Type: MAC    Jose Miguel Phase I:      Jose Miguel Phase II:        Anesthesia Post Evaluation    Patient location during evaluation: PACU  Patient participation: complete - patient participated  Level of consciousness: awake  Pain score: 3  Airway patency: patent  Nausea & Vomiting: no nausea and no vomiting  Complications: no  Cardiovascular status: blood pressure returned to baseline  Respiratory status: acceptable  Hydration status: euvolemic

## 2022-11-26 ENCOUNTER — APPOINTMENT (OUTPATIENT)
Dept: GENERAL RADIOLOGY | Age: 67
DRG: 242 | End: 2022-11-26
Attending: FAMILY MEDICINE
Payer: MEDICARE

## 2022-11-26 LAB
ALBUMIN SERPL-MCNC: 3.7 G/DL (ref 3.5–5.2)
ALP BLD-CCNC: 59 U/L (ref 35–104)
ALT SERPL-CCNC: 14 U/L (ref 0–32)
ANION GAP SERPL CALCULATED.3IONS-SCNC: 11 MMOL/L (ref 7–16)
AST SERPL-CCNC: 19 U/L (ref 0–31)
BASOPHILS ABSOLUTE: 0.05 E9/L (ref 0–0.2)
BASOPHILS RELATIVE PERCENT: 1.1 % (ref 0–2)
BILIRUB SERPL-MCNC: 0.6 MG/DL (ref 0–1.2)
BUN BLDV-MCNC: 17 MG/DL (ref 6–23)
CALCIUM IONIZED: 1.28 MMOL/L (ref 1.15–1.33)
CALCIUM SERPL-MCNC: 9.3 MG/DL (ref 8.6–10.2)
CHLORIDE BLD-SCNC: 102 MMOL/L (ref 98–107)
CO2: 23 MMOL/L (ref 22–29)
CREAT SERPL-MCNC: 0.7 MG/DL (ref 0.5–1)
EOSINOPHILS ABSOLUTE: 0.09 E9/L (ref 0.05–0.5)
EOSINOPHILS RELATIVE PERCENT: 1.9 % (ref 0–6)
GFR SERPL CREATININE-BSD FRML MDRD: >60 ML/MIN/1.73
GLUCOSE BLD-MCNC: 78 MG/DL (ref 74–99)
HCT VFR BLD CALC: 30.6 % (ref 34–48)
HEMOGLOBIN: 9.4 G/DL (ref 11.5–15.5)
IMMATURE GRANULOCYTES #: 0.01 E9/L
IMMATURE GRANULOCYTES %: 0.2 % (ref 0–5)
LYMPHOCYTES ABSOLUTE: 0.61 E9/L (ref 1.5–4)
LYMPHOCYTES RELATIVE PERCENT: 12.8 % (ref 20–42)
MAGNESIUM: 2 MG/DL (ref 1.6–2.6)
MCH RBC QN AUTO: 30.8 PG (ref 26–35)
MCHC RBC AUTO-ENTMCNC: 30.7 % (ref 32–34.5)
MCV RBC AUTO: 100.3 FL (ref 80–99.9)
MONOCYTES ABSOLUTE: 0.53 E9/L (ref 0.1–0.95)
MONOCYTES RELATIVE PERCENT: 11.2 % (ref 2–12)
NEUTROPHILS ABSOLUTE: 3.46 E9/L (ref 1.8–7.3)
NEUTROPHILS RELATIVE PERCENT: 72.8 % (ref 43–80)
PDW BLD-RTO: 15.9 FL (ref 11.5–15)
PHOSPHORUS: 3.3 MG/DL (ref 2.5–4.5)
PLATELET # BLD: 224 E9/L (ref 130–450)
PMV BLD AUTO: 9.7 FL (ref 7–12)
POTASSIUM SERPL-SCNC: 4.2 MMOL/L (ref 3.5–5)
RBC # BLD: 3.05 E12/L (ref 3.5–5.5)
SODIUM BLD-SCNC: 136 MMOL/L (ref 132–146)
TOTAL PROTEIN: 6.3 G/DL (ref 6.4–8.3)
TROPONIN, HIGH SENSITIVITY: 44 NG/L (ref 0–9)
TROPONIN, HIGH SENSITIVITY: 45 NG/L (ref 0–9)
TROPONIN, HIGH SENSITIVITY: 48 NG/L (ref 0–9)
WBC # BLD: 4.8 E9/L (ref 4.5–11.5)

## 2022-11-26 PROCEDURE — 71046 X-RAY EXAM CHEST 2 VIEWS: CPT

## 2022-11-26 PROCEDURE — 2500000003 HC RX 250 WO HCPCS: Performed by: STUDENT IN AN ORGANIZED HEALTH CARE EDUCATION/TRAINING PROGRAM

## 2022-11-26 PROCEDURE — 2580000003 HC RX 258

## 2022-11-26 PROCEDURE — 6370000000 HC RX 637 (ALT 250 FOR IP): Performed by: INTERNAL MEDICINE

## 2022-11-26 PROCEDURE — 82330 ASSAY OF CALCIUM: CPT

## 2022-11-26 PROCEDURE — 2580000003 HC RX 258: Performed by: STUDENT IN AN ORGANIZED HEALTH CARE EDUCATION/TRAINING PROGRAM

## 2022-11-26 PROCEDURE — 2140000000 HC CCU INTERMEDIATE R&B

## 2022-11-26 PROCEDURE — 6370000000 HC RX 637 (ALT 250 FOR IP): Performed by: STUDENT IN AN ORGANIZED HEALTH CARE EDUCATION/TRAINING PROGRAM

## 2022-11-26 PROCEDURE — 85025 COMPLETE CBC W/AUTO DIFF WBC: CPT

## 2022-11-26 PROCEDURE — 6360000002 HC RX W HCPCS

## 2022-11-26 PROCEDURE — 84484 ASSAY OF TROPONIN QUANT: CPT

## 2022-11-26 PROCEDURE — 99233 SBSQ HOSP IP/OBS HIGH 50: CPT | Performed by: STUDENT IN AN ORGANIZED HEALTH CARE EDUCATION/TRAINING PROGRAM

## 2022-11-26 PROCEDURE — 80053 COMPREHEN METABOLIC PANEL: CPT

## 2022-11-26 PROCEDURE — 83735 ASSAY OF MAGNESIUM: CPT

## 2022-11-26 PROCEDURE — 36415 COLL VENOUS BLD VENIPUNCTURE: CPT

## 2022-11-26 PROCEDURE — 71045 X-RAY EXAM CHEST 1 VIEW: CPT

## 2022-11-26 PROCEDURE — 94640 AIRWAY INHALATION TREATMENT: CPT

## 2022-11-26 PROCEDURE — 6360000002 HC RX W HCPCS: Performed by: FAMILY MEDICINE

## 2022-11-26 PROCEDURE — 84100 ASSAY OF PHOSPHORUS: CPT

## 2022-11-26 RX ORDER — METOPROLOL SUCCINATE 25 MG/1
12.5 TABLET, EXTENDED RELEASE ORAL DAILY
Status: DISCONTINUED | OUTPATIENT
Start: 2022-11-26 | End: 2022-11-29 | Stop reason: HOSPADM

## 2022-11-26 RX ORDER — DIGOXIN 125 MCG
125 TABLET ORAL DAILY
Status: DISCONTINUED | OUTPATIENT
Start: 2022-11-26 | End: 2022-11-29 | Stop reason: HOSPADM

## 2022-11-26 RX ORDER — METOPROLOL TARTRATE 5 MG/5ML
2.5 INJECTION INTRAVENOUS EVERY 6 HOURS PRN
Status: DISCONTINUED | OUTPATIENT
Start: 2022-11-26 | End: 2022-11-29 | Stop reason: HOSPADM

## 2022-11-26 RX ORDER — CEPHALEXIN 500 MG/1
500 CAPSULE ORAL EVERY 12 HOURS SCHEDULED
Status: DISCONTINUED | OUTPATIENT
Start: 2022-11-26 | End: 2022-11-29 | Stop reason: HOSPADM

## 2022-11-26 RX ADMIN — POTASSIUM CHLORIDE 20 MEQ: 20 TABLET, EXTENDED RELEASE ORAL at 08:50

## 2022-11-26 RX ADMIN — SILDENAFIL 60 MG: 20 TABLET ORAL at 08:49

## 2022-11-26 RX ADMIN — SODIUM CHLORIDE, PRESERVATIVE FREE 10 ML: 5 INJECTION INTRAVENOUS at 08:51

## 2022-11-26 RX ADMIN — SILDENAFIL 60 MG: 20 TABLET ORAL at 15:35

## 2022-11-26 RX ADMIN — IPRATROPIUM BROMIDE 0.5 MG: 0.5 SOLUTION RESPIRATORY (INHALATION) at 12:41

## 2022-11-26 RX ADMIN — MYCOPHENOLATE MOFETIL 1500 MG: 250 CAPSULE ORAL at 08:49

## 2022-11-26 RX ADMIN — LEVOTHYROXINE SODIUM 75 MCG: 0.07 TABLET ORAL at 06:19

## 2022-11-26 RX ADMIN — ACETAMINOPHEN 650 MG: 325 TABLET, FILM COATED ORAL at 03:16

## 2022-11-26 RX ADMIN — IPRATROPIUM BROMIDE 0.5 MG: 0.5 SOLUTION RESPIRATORY (INHALATION) at 09:11

## 2022-11-26 RX ADMIN — POTASSIUM CHLORIDE 20 MEQ: 20 TABLET, EXTENDED RELEASE ORAL at 21:18

## 2022-11-26 RX ADMIN — ACETAMINOPHEN 650 MG: 325 TABLET, FILM COATED ORAL at 23:12

## 2022-11-26 RX ADMIN — SODIUM CHLORIDE, PRESERVATIVE FREE 10 ML: 5 INJECTION INTRAVENOUS at 21:20

## 2022-11-26 RX ADMIN — BUDESONIDE 500 MCG: 0.5 SUSPENSION RESPIRATORY (INHALATION) at 09:11

## 2022-11-26 RX ADMIN — PANTOPRAZOLE SODIUM 40 MG: 40 TABLET, DELAYED RELEASE ORAL at 06:19

## 2022-11-26 RX ADMIN — CYANOCOBALAMIN TAB 1000 MCG 1000 MCG: 1000 TAB at 08:48

## 2022-11-26 RX ADMIN — SODIUM CHLORIDE, PRESERVATIVE FREE 10 ML: 5 INJECTION INTRAVENOUS at 21:19

## 2022-11-26 RX ADMIN — IPRATROPIUM BROMIDE 0.5 MG: 0.5 SOLUTION RESPIRATORY (INHALATION) at 16:46

## 2022-11-26 RX ADMIN — METOPROLOL TARTRATE 2.5 MG: 5 INJECTION INTRAVENOUS at 13:06

## 2022-11-26 RX ADMIN — DIGOXIN 125 MCG: 0.12 TABLET ORAL at 13:05

## 2022-11-26 RX ADMIN — METOPROLOL SUCCINATE 12.5 MG: 25 TABLET, EXTENDED RELEASE ORAL at 13:05

## 2022-11-26 RX ADMIN — CEPHALEXIN 500 MG: 500 CAPSULE ORAL at 21:18

## 2022-11-26 RX ADMIN — METOCLOPRAMIDE 5 MG: 5 TABLET ORAL at 21:18

## 2022-11-26 RX ADMIN — FUROSEMIDE 20 MG: 40 TABLET ORAL at 08:48

## 2022-11-26 RX ADMIN — ROPINIROLE HYDROCHLORIDE 0.5 MG: 0.5 TABLET, FILM COATED ORAL at 21:18

## 2022-11-26 RX ADMIN — METOCLOPRAMIDE 5 MG: 5 TABLET ORAL at 13:05

## 2022-11-26 RX ADMIN — ATORVASTATIN CALCIUM 40 MG: 40 TABLET, FILM COATED ORAL at 21:18

## 2022-11-26 RX ADMIN — METOCLOPRAMIDE 5 MG: 5 TABLET ORAL at 08:48

## 2022-11-26 RX ADMIN — ACETAMINOPHEN 650 MG: 325 TABLET, FILM COATED ORAL at 15:42

## 2022-11-26 RX ADMIN — ACETAMINOPHEN 650 MG: 325 TABLET, FILM COATED ORAL at 11:24

## 2022-11-26 RX ADMIN — METOCLOPRAMIDE 5 MG: 5 TABLET ORAL at 17:26

## 2022-11-26 RX ADMIN — SILDENAFIL 60 MG: 20 TABLET ORAL at 22:38

## 2022-11-26 RX ADMIN — IPRATROPIUM BROMIDE 0.5 MG: 0.5 SOLUTION RESPIRATORY (INHALATION) at 19:29

## 2022-11-26 RX ADMIN — SPIRONOLACTONE 25 MG: 25 TABLET ORAL at 08:49

## 2022-11-26 RX ADMIN — MYCOPHENOLATE MOFETIL 1500 MG: 250 CAPSULE ORAL at 21:18

## 2022-11-26 RX ADMIN — BUDESONIDE 500 MCG: 0.5 SUSPENSION RESPIRATORY (INHALATION) at 19:29

## 2022-11-26 ASSESSMENT — PAIN DESCRIPTION - PAIN TYPE
TYPE: SURGICAL PAIN
TYPE: ACUTE PAIN

## 2022-11-26 ASSESSMENT — PAIN DESCRIPTION - LOCATION
LOCATION: INCISION
LOCATION: CHEST
LOCATION: INCISION
LOCATION: HEAD

## 2022-11-26 ASSESSMENT — PAIN DESCRIPTION - ORIENTATION
ORIENTATION: ANTERIOR
ORIENTATION: LEFT

## 2022-11-26 ASSESSMENT — PAIN SCALES - GENERAL
PAINLEVEL_OUTOF10: 0
PAINLEVEL_OUTOF10: 3
PAINLEVEL_OUTOF10: 3
PAINLEVEL_OUTOF10: 0
PAINLEVEL_OUTOF10: 4
PAINLEVEL_OUTOF10: 3
PAINLEVEL_OUTOF10: 3
PAINLEVEL_OUTOF10: 0
PAINLEVEL_OUTOF10: 0

## 2022-11-26 ASSESSMENT — PAIN DESCRIPTION - DESCRIPTORS
DESCRIPTORS: ACHING;DISCOMFORT;DULL
DESCRIPTORS: ACHING;DISCOMFORT
DESCRIPTORS: DISCOMFORT
DESCRIPTORS: ACHING;DULL;DISCOMFORT

## 2022-11-26 ASSESSMENT — PAIN DESCRIPTION - ONSET
ONSET: GRADUAL
ONSET: GRADUAL

## 2022-11-26 ASSESSMENT — PAIN DESCRIPTION - FREQUENCY
FREQUENCY: CONTINUOUS
FREQUENCY: CONTINUOUS

## 2022-11-26 NOTE — PROGRESS NOTES
[address] ELECTROPHYSIOLOGY DEPARTMENT/DIVISION OF CARDIOLOGY  Inpatient progress report  PATIENT: Ethan Ornelas RECORD NUMBER: [id number]  DATE OF SERVICE:  11/26/2022  ATTENDING ELECTROPHYSIOLOGIST:  Anish Hernandez DO  REFERRING PHYSICIAN: Tania Easton DO and Nurys Coffey DO  CHIEF COMPLAINT: AV block    HPI: Sharmaine Amezquita is a 75 y.o. female with a history of nonvalvular persistent AF sp DCCV (3/2022 at [facility]), AT sp DCCV (8/11/2022 at [facility]), first-degree AV block, RBBB, HFpEF, CAD, CVA, carotid artery stenosis sp right CEA (?),  HTN, JANNETH-noncompliant with CPAP, COPD/emphysema, severe pulmonary HTN-combination of group 1 (CTD) + group 2 + group 3 (emphysema, JANNETH), scleroderma/Raynaud's/esophageal dysmotility, hypothyroidism, and cataracts sp bilateral removal (2019). She is managed by nAnalise Zendejas and Jess (F EP) with apixaban 5 mg twice daily, flecainide 100 mg BID, metoprolol XL 12.5 mg daily, spironolactone 75 mg daily, sildenafil 20 mg TID, simvastatin 40 mg daily, torsemide 20 mg daily, and PPI. In 2020, she was diagnosed with pulmonary hypertension, who initiated sildenafil. She was referred to rheumatology, who diagnosed the patient with scleroderma and initiated treatment with Norvasc for Raynaud's and mycophenolate for cutaneous manifestations. In 2021, she was diagnosed with nonvalvular paroxysmal AF, which was treated with [address] and beta-blocker. In 12/2021, she was reportedly on a brief course of amiodarone, but details of this are unavailable to me. In 3/2022, she was admitted to [facility] for acute right-sided heart failure and AF with RVR, which was treated with diuresis thoracentesis, and ELMER/DCCV. In 6/2022, patient was referred to [facility] EP (Dr. David Avila) for management of AF. She was noted to be in AT with 2-1 AV conduction at 110 bpm.  Metoprolol was increased at that time.   In 8/2022, she underwent DCCV with initiation of flecainide 50 mg twice daily. In 9/2022, she was noted to be back in AF/AT with variable AV conduction with ventricular rate of 84 bpm, RBBB with LAD, which was managed by increasing the flecainide dose to 100 mg twice daily and reducing metoprolol tartrate dose to 12.5 mg twice daily, as well as changing Coumadin to apixaban. Patient reduced metoprolol dose to once daily on her own due to hypotension. In 10/2022, she was noted to be in sinus with first-degree AV block and RBBB, so left on apixaban, flecainide 100 mg twice daily and metoprolol tartrate changed to metoprolol XL 12.5 mg daily. On 11/23/22, she presented with chief complaint of dizziness with low HR on home BP monitor. She was diagnosed with AT with third-degree AV block and ventricular escape rhythm in the 30s. EP service is now consulted by admitting physician for further evaluation and management of AV block. Patient denies any other complaints at this time. 11/26/2022: She was observed off BB and Ic for ~48 hours and noted to have AFL with ventricular rates in ongoing AV block with ventricular rate in 50's along with intermittent AV block and ventricular escape rhythm. On 11/25/22, patient underwent implant of Medtronic dual-chamber pacemaker with RV lead in LBB area. RA lead was very difficult to implant in a stable position. Multiple RA leads as well as stylets were used to position the RA lead in multiple positions (~5 deployments, RAA, septal, lateral wall), but dislodged several times. The final RA lead position was along the lateral wall as this provided adequate sensing (> 1 mV) and stability. After final deployment, RA lead was observed for 30 minutes prior to ending the procedure, and noted to remain in stable position with stable function. A 4-hour postoperative chest x-ray showed RA lead in a stable position and device check with stable lead function. However, this morning repeat chest x-ray shows RA lead dislodged.   Patient denies any complaints at this time. She is RV pacing 98.6% with ongoing AFL. She reports in the past, her only symptoms with AF ever occurred when her HR was uncontrolled (too fast or too slow). Today, at ~1030 her AV conduction transiently recovered with ventricular rates at ~140 bpm. Home metoprolol restarted (intolerant to higher doses reportedly due to hypotension) and digoxin 62.5 mcg daily initiated. Prior cardiac testing:  -ECG (11/24/2022): AT with 41 AV conduction and ventricular rate of 50 bpm, RBBB (QRS D = 156 ms)  -ECG (11/24/2022 at 2210): AT, third-degree AV block with ventricular escape rhythm at 38 bpm  -TTE (10/31/2022 at [facility]): LVEF = 69%, grade 3 LVDD, RV dilation, JASON, mild TR, mild-moderate pulmonary HTN. -TTE (8/25/21): LVEF = 60%, grade III LVDD, mild RVSD, RV dilation, mild CHRISTINE, moderate TR, moderate pulmonary HTN. -ECG (8/17/2022): Sinus at 53 bpm, first-degree AV block (TENISHA = 204 ms), RBBB (QRS D = 138 ms), QTc = 452 ms.  - LHC (4/6/2021): Moderate pulmonary arterial hypertension. -TTE (2/25/2021): LVEF = 65%, mild asymmetric septal LVH, hyperdynamic LV with complete obliteration of LV cavity during systole, stage III LVDD, mild RV dilation, moderate R VSD, moderate TR, and severe pulmonary HTN. -Pharmacologic nuclear stress test (6/29/2020): LVEF = 79%, normal perfusion. -TTE (2/10/2020): LVEF = 78%, mild concentric LVH with septal thickening causing mild LVOT obstruction, mild CHRISTINE, mild-moderate TR, moderate-severe pulmonary HTN, pericardial fat pad.     Past Medical History:   Diagnosis Date    CAD in native artery 4/6/2021    Cerebral artery occlusion with cerebral infarction ([facility] [address]     \"mini stroke\" 10/2014    H/O cardiovascular stress test 06/29/2020    Lexiscan    Hyperlipidemia     Hypertension     Sleep apnea     doesnt use cpap     Past Surgical History:   Procedure Laterality Date    CARDIAC CATHETERIZATION  04/06/2012    Right heart cath by Dr Eric De Luna Right CATARACT REMOVAL WITH IMPLANT Right 2019    intraocular lens    CATARACT REMOVAL WITH IMPLANT Left 10/15/2019     SECTION      COLONOSCOPY      INTRACAPSULAR CATARACT EXTRACTION Right 2019    RIGHT EYE CATARACT EMULSIFICATION IOL IMPLANT performed by Mary Romero MD at [facility] Left 10/15/2019    LEFT EYE CATARACT EMULSIFICATION IOL IMPLANT performed by Mary Romero MD at [address] Left 2022    Medtronic dual chamber (Dr Anish Taylor)      Family History   Problem Relation Age of Onset    Other Mother         complication of surgery    Heart Disease Brother      There is no family history of sudden cardiac arrest    Social History     Tobacco Use    Smoking status: Former     Packs/day: 1.50     Years: 40.00     Pack years: 60.00     Types: Cigarettes     Quit date: 2020     Years since quittin.1    Smokeless tobacco: Never    Tobacco comments:      she has decreased to 1ppd   Substance Use Topics    Alcohol use: Not Currently     Alcohol/week: 10.0 standard drinks     Types: 10 Glasses of wine per week     Comment: green tea 2 cups a day        Current Facility-Administered Medications   Medication Dose Route Frequency Provider Last Rate Last Admin    sodium chloride flush 0.9 % injection 5-40 mL  5-40 mL IntraVENous 2 times per day Michae Ao, DO   10 mL at 22 0851    sodium chloride flush 0.9 % injection 5-40 mL  5-40 mL IntraVENous PRN Michae Ao, DO        0.9 % sodium chloride infusion   IntraVENous PRN Michae Ao, DO        acetaminophen (TYLENOL) tablet 650 mg  650 mg Oral Q4H PRN Michae Ao, DO   650 mg at 22 0316    sodium chloride flush 0.9 % injection 5-40 mL  5-40 mL IntraVENous 2 times per day Bowen Mins, APRN - CNP   10 mL at 22    sodium chloride flush 0.9 % injection 5-40 mL  5-40 mL IntraVENous PRN Bowen Mins, APRN - CNP        polyethylene glycol (GLYCOLAX) packet 17 g  17 g Oral Daily PRN STEPH Mckeon CNP        acetaminophen (TYLENOL) suppository 650 mg  650 mg Rectal Q6H PRN STEPH Mckeon CNP        ipratropium (ATROVENT) 0.02 % nebulizer solution 0.5 mg  0.5 mg Nebulization 4x daily Sherron Sniff, DO   0.5 mg at 11/26/22 0911    budesonide (PULMICORT) nebulizer suspension 500 mcg  0.5 mg Nebulization BID Sherron Sniff, DO   500 mcg at 11/26/22 8772    [Held by provider] apixaban (ELIQUIS) tablet 5 mg  5 mg Oral BID STEPH Mckeon CNP   5 mg at 11/24/22 0805    levothyroxine (SYNTHROID) tablet 75 mcg  75 mcg Oral Daily Elva Muñoz MD   75 mcg at 11/26/22 0619    pantoprazole (PROTONIX) tablet 40 mg  40 mg Oral Daily Elva Muñoz MD   40 mg at 11/26/22 7860    furosemide (LASIX) tablet 20 mg  20 mg Oral Daily Elva Muñoz MD   20 mg at 11/26/22 0848    atorvastatin (LIPITOR) tablet 40 mg  40 mg Oral Daily Elva Muñoz MD   40 mg at 11/25/22 2029    spironolactone (ALDACTONE) tablet 25 mg  25 mg Oral Daily Elva Muñoz MD   25 mg at 11/26/22 0849    vitamin B-12 (CYANOCOBALAMIN) tablet 1,000 mcg  1,000 mcg Oral Daily Elva Muñoz MD   1,000 mcg at 11/26/22 0848    potassium chloride (KLOR-CON M) extended release tablet 20 mEq  20 mEq Oral BID Elva Muñoz MD   20 mEq at 11/26/22 0850    rOPINIRole (REQUIP) tablet 0.5 mg  0.5 mg Oral Nightly Elva Muñoz MD   0.5 mg at 11/25/22 2135    metoclopramide (REGLAN) tablet 5 mg  5 mg Oral 4x Daily Elva Muñoz MD   5 mg at 11/26/22 0848    mycophenolate (CELLCEPT) capsule 1,500 mg  1,500 mg Oral BID STEPH Mckeon CNP   1,500 mg at 11/26/22 0849    sildenafil (REVATIO) tablet 60 mg  60 mg Oral TID Elva Muñzo MD   60 mg at 11/26/22 0849        Allergies   Allergen Reactions    Biaxin [Clarithromycin] Nausea And Vomiting    Naproxen Nausea And Vomiting       ROS:   Constitutional: Negative for fever, activity change and appetite change. HENT: Negative for epistaxis. Eyes: Negative for diploplia, blurred vision. Respiratory: Negative for cough, chest tightness, shortness of breath and wheezing. Cardiovascular: pertinent positives in HPI  Gastrointestinal: Negative for abdominal pain and blood in stool. Genitourinary: Negative for hematuria and difficulty urinating. Musculoskeletal: Negative for myalgias and gait problem. Skin: Negative for color change and rash. Neurological: Negative for syncope and light-headedness. Psychiatric/Behavioral: Negative for confusion and agitation. The patient is not nervous/anxious. Heme: no bleeding disorders, no melena or hematochezia  All other review of systems are negative     PHYSICAL EXAM:  Constitutional   Vitals:    11/25/22 2316 11/26/22 0309 11/26/22 0810 11/26/22 0912   BP: (!) 108/58 115/68 (!) 110/49    Pulse: 70 69 70 75   Resp: 18 18 19 19   Temp: 97.9 °F (36.6 °C) 98.4 °F (36.9 °C) 98.7 °F (37.1 °C)    TempSrc: Temporal Temporal Temporal    SpO2: 97% 98% 94% 92%   Weight:  147 lb 6 oz (66.8 kg)     Height:        Well-developed, no acute distress, well groomed  Eyes: conjunctivae normal, no xanthelasma   Ears, Nose, Throat: oral mucosa moist, no cyanosis   Neck: supple, no JVD, no bruits, no thyromegaly   CV: Bradycardic, regular rhythm,  no murmurs, rubs, or gallops.  PMI is nondisplaced, Peripheral pulses normal including carotid auscultation, no noted aortic bruit, bilateral femoral and pedal pulses are normal in quality  Lungs: clear to auscultation bilaterally, normal respiratory effort without used of accessory muscles, no wheezes  Abdomen: soft, non-tender, bowel sounds present, no masses or hepatomegaly   Extremities: no digital clubbing, no edema   Skin: warm, no rashes   Neuro/Psych: A&O x 3, normal mood and affect    Data:    Recent Labs     11/24/22  0541 11/25/22  0417 11/26/22  0536   WBC 7.3 8.1 4.8   HGB 10.2* 9.9* 9.4*   HCT 32.9* 31.7* 30.6*   PLT 300 264 224     Recent Labs     11/25/22  0417 11/25/22  1518 11/26/22  0536    137 136   K 4.8 4.2 4.2   CL 99 102 102   CO2 26 23 23   BUN 20 16 17   CREATININE 0.8 0.6 0.7   CALCIUM 10.0 9.5 9.3     No results for input(s): INR in the last 72 hours. Recent Labs     11/24/22  0541   TSH 8.570*     Lab Results   Component Value Date/Time    MG 2.0 11/26/2022 05:36 AM     Telemetry: AT/AFL with ventricular pacing  Device Interrogation/Reprogramming  Make/Model: Medtronic Brittnee  DOI: 11/25/22  Battery: 3.18 V  Segundo therapy: DDDR at 70/130 ppm, AV delays 150 msec sensed and 170 msec paced  Pacing %: RA = 0.9%, RV = 98.6%  Lead function:  RA lead: sensing = 0.9 mV, impedance = 494 ohms, threshold = N/A (AF)  RV lead: sensing = > 20 mV, impedance = 703 ohms, threshold = 0.5 V @ 0.4 msec  Lead programming:  RA lead: sensitivity = 0.3 mV, output = 3.5 V @ 0.4 msec  RV lead: sensitivity = 1.2 mV, output = 3.5 V @ 0.4 msec  Arrhythmias: AF = 99.4%  Reprogramming included: RV output reduced to 2.5 V @ 0.4 msec  Overall device function is normal  All device programmable settings were evaluated per above and in the scanned document, along with iterative adjustments (capture thresholds) to assess and select the most appropriate final programming to provide for consistent delivery of the appropriate therapy and to verify function of the device. Assessment/plan:  Intermittent Third-degree AV block sp Medtronic dual-chamber pacemaker (11/25/2022-, RV lead is LBB area)  -In setting of underlying first-degree AV block and RBBB. -Difficult RA lead placement despite multiple leads and stylets as well as prolonged monitoring intraoperatively. Postoperative chest x-ray 4 hours after procedure with stable device position and lead lead function on device interrogation. However, chest x-ray 11/25/2022 shows RA lead is now dislodged despite use of arm sling post implant.   I suspect her connective tissue disease is playing a role with recurrent RA lead dislodgment.  -I had a long conversation with the patient regarding RA lead revision on 11/28/2022 with Dr Amy Avery. Although she reports no symptoms with rate controlled AF/AFL and has preserved LVEF, as her established EP desires rhythm control, I would recommend attempt to revise RA lead on 11/28/22 with prolonged intraoperative monitoring and stability assessment (deep inspiration/cough, etc) and consideration of septal positioning of RA lead given failed prior attempts in RAA and RA lateral wall. However, as she has predominantly 3* AV block with ventricular pacing via LBB area lead and historically asymptomatic with rate controlled AF/AFL and preserved LVEF, I would recommend removal of RA lead and placing a port plug in RA port if unable to achieve stable RA lead position after reasonable attempts utilizing above techniques. -- Continue Cephalexin 500 mg BID for 5 days after RA lead revision on 11/28/22.     nonvalvular persistent AF/atypical AFL/AT sp DCCV x2 (3/2022 at [facility], 8/11/2022 at [facility])  -Initially diagnosed in 2021.  - [id number] score = 7 (age, sex, CVA, PAD, HF, HTN). Recommend [address] in females with score of 2 or more. DOAC preferred. - On apixaban 5 mg twice daily. While on DOAC, recommend annual monitoring of CBC and CMP. I discussed with Dr Amy Avery, who desires [address] to be held for pacemaker revision on 11/28/2022. - Rhythm control desired by established EP (Dr. Jacob Hanson at [facility]), which was attempted with flecainide as well as multiple DCCV's. Despite this, she remains in AF/AT. Patient is asymptomatic with LVEF of 69% and third-degree AV block.   Initially, I plan to continue the management strategy of patient's established EP, but given the issues with RA lead placement and refractory nature of her dysrhythmia, as well as asymptomatic with rate control and LVEF > 50%, recommend change to permanent AF/AFL if unable to achieve stable RA lead position.  -Modifiable risk factors:  --Obesity: BMI goal less than 27.  --JANNETH: Noncompliant with CPAP. Recommend compliance.  --HTN: BP goal less than 130/80.  --EtOH: Patient reports 10 or more drinks weekly. Recommend reduce intake. HFpEF  -Established with Dr. Christiana Camargo. -Restart metoprolol XL 12.5 mg daily. Start digoxin 125 mcg daily. severe pulmonary HTN-combination of group 1 (CTD) + group 2 + group 3 (emphysema, JANNETH)  -Established with pulmonary at [facility].    scleroderma/Raynaud's/esophageal dysmotility  -Rheumatology consult is currently pending. I have spent a total of 60 minutes with the patient and his/her family reviewing the above stated recommendations. And a total of >50% of that time involved face-to-face time providing counseling and or coordination of care with the other providers. Thank you for allowing me to participate in your patient's care. Please call me if there are any questions.       Lenny Lopez, DO   Cardiac Electrophysiology  [facility]  [facility]) [facility]icians

## 2022-11-26 NOTE — OP NOTE
Operative Note      Patient: Percell   YOB: 1955  MRN: [id number]    Date of Procedure: 22    Pre-Op Diagnosis:  high grade AV block, nonvalvular paroxysmal AF/AFL    Post-Op Diagnosis: Same       Surgeon: Jarrod Livingston DO     Assistant: none    Surgery: dual chamber pacemaker implant    Anesthesia: * No surgery found *    Estimated Blood Loss (mL): Minimal    Complications: None    Detailed Description of Procedure:   Verbal and written informed consent obtained from the patient and family. The patient was brought to the EP lab in a fasting state. Monitored anesthesia care was administered by anesthesia provider during the procedure. A venogram with IV contrast confirmed patency and location of left axillary, subclavian veins and SVC. The left upper chest was inspected for hair at the anticipated incision site within the clavipectoral triangle and if present was removed with electric clippers. The site was then prepared with chlorhexidine gluconate and draped in a sterile fashion. Ancef was administered paraenterally within 1 hour prior to incision. The left upper chest area was inspected for main folding lines and striae to guide orientation of incision and if not apparent a pinch test in various directions was performed to visualize the folding lines. The incision was planned to follow perpendicular to the main folding lines in order to minimize scar formation and optimize wound healing. Local anesthesia with 2% lidocaine HCl was applied to the planned incision and pocket area. Once adequate sedation was achieved and lidocaine was administered, an incision was made two finger-breadths inferior the left clavicle between the mid-clavicular line and deltopectoral groove, which was of adequate width to accommodate the device.  Using blunt dissection and electrocautery, a subcutaneous device pocket was created superficial to the pectoralis major muscle fascia in order to avoid the pain corpuscles of the subcuticular plane and vascular pectoralis major muscle. The pocket was extended in a medial and inferior direction from the pocket incision site. A superior lip to the pocket was also created. Hemostasis was achieved and confirmed. The previously performed venogram was used to guide access in the axillary vein overlying the first rib with a modified Seldinger technique and micro puncture needle under fluoroscopic visualization in PA caudal 35* view  in order to reduce the risk of pneumothorax and hemothorax. Two J tip wires were advanced to the right atrium (RA) and IVC in order to confirm venous puncture and avoid inadvertent placement of pacing leads in the arterial system. A 7 Upper sorbian sheath was advanced over the the first one. Wire and dilator were removed. A 52 cm 5.7 F StitcherAdsureFix Novus MRI ScureScan (model: 4076) active fixation atrial lead with soft straight stylet was introduced into the sheath, then the stylet was partially retracted and the lead was advanced into the RA via the sheath. The stylet was removed and a curve was introduced to the distal end of the stylet. The stylet was the reintroduced to the lead. A soft stylet was used to minimize the risk of cardiac perforation and damage the tricuspid valve (TV). The lead was advanced into the right ventricle (RV) at a lower plane in order to avoid complications with the TV due to the chordae on the septal aspect. The lead was advanced into the pulmonary artery while partially retracting the stylet in order to confirm location in the RV and avoid inadvertent placement of the lead in the middle cardiac vein or in the LV through a patent foramen ovale. The stylet was exchanged for soft stylet shaped manually to create a 3D curve to facilitate RV septal placement. The lead was then slowly withdrawn until it dropped into the RV cavity and parallel with mid septum in right anterior oblique (KAHN) 30* fluoroscopic view.  The lead was positioned on the mid RV septum as this position may result in better clinical and functional outcomes compared to apical RV. Nepali 40* view showed Rv lead tip was 0-60* from horizontal. The lead tip was deployed. The EGM was reviewed and current of injury observed, which is associated with adequate threshold and long-term stability. No evidence of perforation (negative ST-segment) was observed. Lead parameters were assessed and found to be adequate (sensing > 5 mV, impedance = 400 - 1200 ohms, threshold < 1.5 V @ 0.4 msec). High output pacing performed and no evidence of phrenic nerve capture was observed. Adequate lead slack was confirmed as RV lead noted to lay along the floor of the RA without prolapsing into IVC. Another 7 Stateless sheath was introduced over the third J-tip wire, following which the wire and dilator were removed. A 5.5 Stateless 43 cm Medtronic C315 (Model HIS) sheath with dilator was introduced via 7 Stateless sheath over a long J-wire into the RA. The dilator was then removed. The J-wire was then positioned in the RV and the C315 sheath was advanced over the wire into the RV using a KAHN 30° view on fluoroscopy. Once in the RV, the wire and dilator were exhcanged for 4.1 F 69 cm Medtronic SelectSecure MRI SureScan 3830 fixed helix lead, which was advanced through the delivery sheath. Both sheath and lead are retracted to target the upper part of the septum. On fluoroscopy, the tricuspid annulus was located. The sheath was rotated CW, then advanced 1 - 2 cm into the RV along an imaginary line between superior aspect of the triscuspid valve annulus (traditional region of the His) & RV apex. This area was targeted as this is traditionally the area of the left bundle branch. Slight counter clockwise rotation of the sheath and lead combination was applied to position the tip perpendicular on the septum. The distal aspect of the sheath and lead were noted to be directed at 1 oclock position.  Septal position was confirmed with Mongolian 25-30° view as the distal aspect of the sheath and lead were noted to be directed to 2-3 oclock position. The connector clips were connected to the lead to facilitate unipolar pacing (black clip to stylet or electrode tip and red clip to pocket). Unipolar pacing was used to locate ideal site for LBB area lead implant & rule out RVOT location. [place] site was noted to have QS with notch in the kathy (W pattern with notch in the kathy), discordance between II and III (R II > R III OR R II and RS in III), and discordance between aVL and aVR (aVL + / aVR -). Once ideal site was located via passive pace mapping, the sheath tip was advanced over the tip of the lead. In order to screw the 3830 lead into a deep septal position, clockwise rotations on the outer lead body were applied while keeping the delivery sheath in close contact with the right septum in a stable fashion. The baseline unipolar pacing impedance was noted. These maneuvers are repeated until lead electrode tip arrived at left bundle branch area. Lead advancement into the septum guided by unipolar pacing impedance (gradual increase by  ohms compared to baseline) and paced QRS morphology. As the pacing lead reached the course of the left bundle branch, the paced W shaped QRS morphology in lead V1 was noted to gradually change to a narrow QRS morphology with a terminal r-wave (qR or rsR') in lead V1 (so-called incomplete right bundle branch block morphology). Additionally, LBB area capture was confirmed as pacing stim LVAT (V5 or V6: stim to peak of R) was noted to be ~65 msec. Additionally, the unipolar pacing impedance was noted to drop by 100 ohms at this location. COI was assessed and noted to have myocardial COI on lead EGM. No evidence of perforation was observed (unipolar pace impedance decrease of > 200 ohms, unipolar pace impedance < 400 ohms, and unipolar R-sensing decreasing).  High output pacing performed and no evidence of phrenic nerve capture was observed. The sheath was then gently retracted into the RA while maintaining adequate lead slack. Lead function in both unipolar and bipolar was reassessed and determined to stable/adequate. The sheath was slit with standard slitting technique and Medtronic slitter. Lead function was reassessed and determined to be stable/adequate. Adequate lead slack was confirmed as RV lead noted to lay along the floor of the RA without prolapsing into IVC. The sheath was split, and the lead was sutured to the pectoralis major muscle with a non-absorbable 0 silk suture over suture sleeve. The a soft straight helix was partially introduced to 52 cm lead. Lead helix retracted. The lead was removed and lead tip was extended then flushed with saline. The helix was then retracted. The 52 cm 5.7 F [email or web address] CapSureFix Novus MRI ScureScan (model: 4076) active fixation atrial lead with soft straight stylet was introduced into the sheath, then the stylet was partially retracted and the lead was advanced into the IVC. The soft straight stylet was exchanged for the soft curved atrial stylet. A soft stylet was chosen to minimize the risk of cardiac perforation. The lead tip was positioned in the right atrial appendage (RAA). The lead tip was deployed. The lead tip was deployed, but appeared to function abnormally (sudden jump). The EGM was reviewed and current of injury observed, which is associated with adequate threshold and long-term stability. No evidence of perforation (negative ST-segment) was observed. Stylet removed. The lead was noted to dislodge. The helix was retracted. A curved stylet was reintroduced into the lead. The lead was repositioned in a different area of the RAA. The lead tip was positioned in the right atrial appendage (RAA). The lead tip was deployed, but appeared to function abnormally (sudden jump).  The EGM was reviewed and current of injury observed, which is associated with adequate threshold and long-term stability. No evidence of perforation (negative ST-segment) was observed. Stylet removed. Lead function reassessed and noted to be adequate (sensing > 1 mV, impedence = 400 - 1200 ohms, threshold = N/A (AFL)). The sheath was split, and the lead was sutured to the pectoralis major muscle with a non-absorbable 0 silk suture over suture sleeve. However, the lead was noted to dislodge. The lead suture sleeve was freed from the suture. The helix was retracted. The lead was partially retracted into the SVC. Therefore, a 3rd access was obtained in the same manner as described before. A 7 Haitian sheath was introduced over the second J-tip wire, following which the wire and dilator were removed. A soft straight helix was partially introduced to 52 cm lead. Lead helix retracted. The lead was removed and lead tip was extended then flushed with saline. The helix was then retracted. The 52 cm 5.7 F Medtronic CapSureFix Novus MRI ScureScan (model: 4076) active fixation atrial lead with soft straight stylet was introduced into the sheath, then the stylet was partially retracted and the lead was advanced into the IVC. The soft straight stylet was exchanged for the soft curved atrial stylet. A soft stylet was chosen to minimize the risk of cardiac perforation. The lead tip was positioned in the right atrial appendage (RAA). The lead tip was deployed. The lead tip was deployed, but appeared to function abnormally. The EGM was reviewed and current of injury observed, which is associated with adequate threshold and long-term stability. No evidence of perforation (negative ST-segment) was observed. Stylet removed. The lead was noted to dislodge. The helix was retracted. A stiff curved stylet was reintroduced into the lead. The lead was repositioned on the lateral wall of the RA. The lead tip was positioned in the right atrial appendage (RAA).  The EGM was reviewed and current of injury observed, which is associated with adequate threshold and long-term stability. No evidence of perforation (negative ST-segment) was observed. Stylet removed. Lead function reassessed and noted to be adequate (sensing > 1 mV, impedence = 400 - 1200 ohms, threshold = N/A (AFL)). Adequate lead slack was confirmed as RA lead had a J-shape without engaging the RA floor or TV annulus. The sheath was split, and the lead was sutured to the pectoralis major muscle with a non-absorbable 0 silk suture over suture sleeve. The initially implanted 52 cm lead was removed. A purse string suture with 2-0 vicryl was placed around the access sites. The pocket was then irrigated with antibiotic infused saline in order to remove tissue debris and uncover any persistent bleeding. Hemostasis was again confirmed. The leads were connected to the [employer] Fircrest XT DR MRI SureScan (model: [id number]) device. The leads were curled in the pocket in fashion to avoid any acute angles. The device was then placed in a TYRX antibiotic pouch then into the pocket with the leads beneath the device. SURGIFLO hemostatic matrix with thrombin was injected into the pocket above and below the device. Fluoroscopy was used to confirm ideal device and lead position in the pocket, lead connector pins in the device header, adequate lead slack, and lead position. The device was secured in place over the leads with a non-absorbable 0 silk suture. The pocket was closed with multiple layers of suture. An absorbable 2-0 Vicryl violet braided suture with CT-1 needle was used to approximate the clavipectoral deep fascial layer with running suture technique with deep bites of equal spacing in order to isolate the pocket, restore anterior wall structure, and prevent device erosion. An absorbable 2-0 Vicryl violet braided suture with CT-1 needle was used to approximate the subcutaneous tissue with running suture technique with deep bites of equal spacing.  An absorbable 4-0 Monocryl suture with reverse cutting needle was used to approximate the subcuticular tissue with minimal tension through running suture technique with closely spaced bites and buried sutured knot at the ends. Dermabond was placed over the incision. An Aquacel Ag surgical dressing was placed over the incision. A pressure dressing was placed over the incision. Final lead assessment:  RA: sensing = 1.0 mV, impedance = 475 ohms, capture threshold = N/A (AFL)  RV: sensing = 19.9 mV, impedance = 798 ohms, capture threshold = 0.5 V @ 0.4 msec     The device was programmed:  DDD 70/130 ppm  AV delays: 150 msec (sense) and 170 msec (pace)  Lead programming:  RA lead: sensitivity =  0.3 mV, output = 3.5 V @ 0.4 msec  RV lead: sensitivity =  1.2 mV, output =  3.5 V @ 0.4 msec. SUMMARY: Successful Medtronic dual chamber pacemaker implant in the setting of high grade AV block and nonvalvular paroxysmal AF/AFL. RV lead is LBB rodney. Difficult RA lead placement.     Electronically signed by Chio Diaz DO on 11/26/2022 at 1:27 AM

## 2022-11-27 LAB
ALBUMIN SERPL-MCNC: 3.9 G/DL (ref 3.5–5.2)
ALP BLD-CCNC: 65 U/L (ref 35–104)
ALT SERPL-CCNC: 13 U/L (ref 0–32)
ANION GAP SERPL CALCULATED.3IONS-SCNC: 14 MMOL/L (ref 7–16)
AST SERPL-CCNC: 18 U/L (ref 0–31)
BASOPHILS ABSOLUTE: 0.05 E9/L (ref 0–0.2)
BASOPHILS RELATIVE PERCENT: 1.1 % (ref 0–2)
BILIRUB SERPL-MCNC: 0.6 MG/DL (ref 0–1.2)
BUN BLDV-MCNC: 15 MG/DL (ref 6–23)
CALCIUM IONIZED: 1.37 MMOL/L (ref 1.15–1.33)
CALCIUM SERPL-MCNC: 10.2 MG/DL (ref 8.6–10.2)
CHLORIDE BLD-SCNC: 105 MMOL/L (ref 98–107)
CO2: 22 MMOL/L (ref 22–29)
CREAT SERPL-MCNC: 0.5 MG/DL (ref 0.5–1)
EOSINOPHILS ABSOLUTE: 0.08 E9/L (ref 0.05–0.5)
EOSINOPHILS RELATIVE PERCENT: 1.8 % (ref 0–6)
GFR SERPL CREATININE-BSD FRML MDRD: >60 ML/MIN/1.73
GLUCOSE BLD-MCNC: 92 MG/DL (ref 74–99)
HCT VFR BLD CALC: 31.9 % (ref 34–48)
HEMOGLOBIN: 9.8 G/DL (ref 11.5–15.5)
IMMATURE GRANULOCYTES #: 0.01 E9/L
IMMATURE GRANULOCYTES %: 0.2 % (ref 0–5)
LYMPHOCYTES ABSOLUTE: 0.71 E9/L (ref 1.5–4)
LYMPHOCYTES RELATIVE PERCENT: 15.7 % (ref 20–42)
MAGNESIUM: 2 MG/DL (ref 1.6–2.6)
MCH RBC QN AUTO: 30.5 PG (ref 26–35)
MCHC RBC AUTO-ENTMCNC: 30.7 % (ref 32–34.5)
MCV RBC AUTO: 99.4 FL (ref 80–99.9)
MONOCYTES ABSOLUTE: 0.51 E9/L (ref 0.1–0.95)
MONOCYTES RELATIVE PERCENT: 11.3 % (ref 2–12)
NEUTROPHILS ABSOLUTE: 3.15 E9/L (ref 1.8–7.3)
NEUTROPHILS RELATIVE PERCENT: 69.9 % (ref 43–80)
PDW BLD-RTO: 15.8 FL (ref 11.5–15)
PHOSPHORUS: 3.5 MG/DL (ref 2.5–4.5)
PLATELET # BLD: 253 E9/L (ref 130–450)
PMV BLD AUTO: 9.9 FL (ref 7–12)
POTASSIUM SERPL-SCNC: 4.1 MMOL/L (ref 3.5–5)
RBC # BLD: 3.21 E12/L (ref 3.5–5.5)
SODIUM BLD-SCNC: 141 MMOL/L (ref 132–146)
TOTAL PROTEIN: 6.8 G/DL (ref 6.4–8.3)
TROPONIN, HIGH SENSITIVITY: 46 NG/L (ref 0–9)
WBC # BLD: 4.5 E9/L (ref 4.5–11.5)

## 2022-11-27 PROCEDURE — 82330 ASSAY OF CALCIUM: CPT

## 2022-11-27 PROCEDURE — 2140000000 HC CCU INTERMEDIATE R&B

## 2022-11-27 PROCEDURE — 2580000003 HC RX 258: Performed by: STUDENT IN AN ORGANIZED HEALTH CARE EDUCATION/TRAINING PROGRAM

## 2022-11-27 PROCEDURE — 93005 ELECTROCARDIOGRAM TRACING: CPT | Performed by: STUDENT IN AN ORGANIZED HEALTH CARE EDUCATION/TRAINING PROGRAM

## 2022-11-27 PROCEDURE — 94640 AIRWAY INHALATION TREATMENT: CPT

## 2022-11-27 PROCEDURE — 6370000000 HC RX 637 (ALT 250 FOR IP): Performed by: STUDENT IN AN ORGANIZED HEALTH CARE EDUCATION/TRAINING PROGRAM

## 2022-11-27 PROCEDURE — 80053 COMPREHEN METABOLIC PANEL: CPT

## 2022-11-27 PROCEDURE — 85025 COMPLETE CBC W/AUTO DIFF WBC: CPT

## 2022-11-27 PROCEDURE — 83735 ASSAY OF MAGNESIUM: CPT

## 2022-11-27 PROCEDURE — 99233 SBSQ HOSP IP/OBS HIGH 50: CPT | Performed by: STUDENT IN AN ORGANIZED HEALTH CARE EDUCATION/TRAINING PROGRAM

## 2022-11-27 PROCEDURE — 6370000000 HC RX 637 (ALT 250 FOR IP): Performed by: INTERNAL MEDICINE

## 2022-11-27 PROCEDURE — 6360000002 HC RX W HCPCS

## 2022-11-27 PROCEDURE — 84100 ASSAY OF PHOSPHORUS: CPT

## 2022-11-27 PROCEDURE — 36415 COLL VENOUS BLD VENIPUNCTURE: CPT

## 2022-11-27 PROCEDURE — 6360000002 HC RX W HCPCS: Performed by: FAMILY MEDICINE

## 2022-11-27 RX ADMIN — METOPROLOL SUCCINATE 12.5 MG: 25 TABLET, EXTENDED RELEASE ORAL at 12:01

## 2022-11-27 RX ADMIN — IPRATROPIUM BROMIDE 0.5 MG: 0.5 SOLUTION RESPIRATORY (INHALATION) at 09:10

## 2022-11-27 RX ADMIN — POTASSIUM CHLORIDE 20 MEQ: 20 TABLET, EXTENDED RELEASE ORAL at 08:37

## 2022-11-27 RX ADMIN — DIGOXIN 125 MCG: 0.12 TABLET ORAL at 08:36

## 2022-11-27 RX ADMIN — METOCLOPRAMIDE 5 MG: 5 TABLET ORAL at 21:00

## 2022-11-27 RX ADMIN — SILDENAFIL 60 MG: 20 TABLET ORAL at 21:00

## 2022-11-27 RX ADMIN — POTASSIUM CHLORIDE 20 MEQ: 20 TABLET, EXTENDED RELEASE ORAL at 21:00

## 2022-11-27 RX ADMIN — ROPINIROLE HYDROCHLORIDE 0.5 MG: 0.5 TABLET, FILM COATED ORAL at 21:00

## 2022-11-27 RX ADMIN — FUROSEMIDE 20 MG: 40 TABLET ORAL at 08:36

## 2022-11-27 RX ADMIN — MYCOPHENOLATE MOFETIL 1500 MG: 250 CAPSULE ORAL at 08:37

## 2022-11-27 RX ADMIN — IPRATROPIUM BROMIDE 0.5 MG: 0.5 SOLUTION RESPIRATORY (INHALATION) at 12:15

## 2022-11-27 RX ADMIN — METOCLOPRAMIDE 5 MG: 5 TABLET ORAL at 17:09

## 2022-11-27 RX ADMIN — ACETAMINOPHEN 650 MG: 325 TABLET, FILM COATED ORAL at 12:01

## 2022-11-27 RX ADMIN — CEPHALEXIN 500 MG: 500 CAPSULE ORAL at 08:37

## 2022-11-27 RX ADMIN — IPRATROPIUM BROMIDE 0.5 MG: 0.5 SOLUTION RESPIRATORY (INHALATION) at 17:18

## 2022-11-27 RX ADMIN — SILDENAFIL 60 MG: 20 TABLET ORAL at 08:37

## 2022-11-27 RX ADMIN — METOCLOPRAMIDE 5 MG: 5 TABLET ORAL at 08:36

## 2022-11-27 RX ADMIN — SODIUM CHLORIDE, PRESERVATIVE FREE 10 ML: 5 INJECTION INTRAVENOUS at 08:39

## 2022-11-27 RX ADMIN — ACETAMINOPHEN 650 MG: 325 TABLET, FILM COATED ORAL at 21:07

## 2022-11-27 RX ADMIN — LEVOTHYROXINE SODIUM 75 MCG: 0.07 TABLET ORAL at 06:52

## 2022-11-27 RX ADMIN — SODIUM CHLORIDE, PRESERVATIVE FREE 10 ML: 5 INJECTION INTRAVENOUS at 21:01

## 2022-11-27 RX ADMIN — IPRATROPIUM BROMIDE 0.5 MG: 0.5 SOLUTION RESPIRATORY (INHALATION) at 21:33

## 2022-11-27 RX ADMIN — SPIRONOLACTONE 25 MG: 25 TABLET ORAL at 08:36

## 2022-11-27 RX ADMIN — METOCLOPRAMIDE 5 MG: 5 TABLET ORAL at 12:01

## 2022-11-27 RX ADMIN — PANTOPRAZOLE SODIUM 40 MG: 40 TABLET, DELAYED RELEASE ORAL at 06:52

## 2022-11-27 RX ADMIN — CEPHALEXIN 500 MG: 500 CAPSULE ORAL at 21:00

## 2022-11-27 RX ADMIN — SILDENAFIL 60 MG: 20 TABLET ORAL at 14:12

## 2022-11-27 RX ADMIN — ATORVASTATIN CALCIUM 40 MG: 40 TABLET, FILM COATED ORAL at 21:01

## 2022-11-27 RX ADMIN — MYCOPHENOLATE MOFETIL 1500 MG: 250 CAPSULE ORAL at 21:00

## 2022-11-27 RX ADMIN — CYANOCOBALAMIN TAB 1000 MCG 1000 MCG: 1000 TAB at 08:36

## 2022-11-27 ASSESSMENT — PAIN SCALES - GENERAL
PAINLEVEL_OUTOF10: 0
PAINLEVEL_OUTOF10: 3
PAINLEVEL_OUTOF10: 0
PAINLEVEL_OUTOF10: 4
PAINLEVEL_OUTOF10: 0

## 2022-11-27 ASSESSMENT — PAIN DESCRIPTION - DESCRIPTORS
DESCRIPTORS: ACHING
DESCRIPTORS: ACHING;DULL;DISCOMFORT

## 2022-11-27 ASSESSMENT — PAIN DESCRIPTION - PAIN TYPE: TYPE: SURGICAL PAIN

## 2022-11-27 ASSESSMENT — PAIN DESCRIPTION - FREQUENCY: FREQUENCY: CONTINUOUS

## 2022-11-27 ASSESSMENT — PAIN DESCRIPTION - LOCATION
LOCATION: CHEST
LOCATION: INCISION;CHEST

## 2022-11-27 ASSESSMENT — PAIN DESCRIPTION - ORIENTATION
ORIENTATION: LEFT
ORIENTATION: LEFT;UPPER

## 2022-11-27 ASSESSMENT — PAIN DESCRIPTION - ONSET: ONSET: GRADUAL

## 2022-11-27 NOTE — PROGRESS NOTES
Hospitalist Progress Note      SYNOPSIS:  Patient with a medical hx of CAD, cerebral artery occlusion with cerebral infarction, HLD, HTN, JANNETH, atrial fibrillation admitted on 2022 after presenting to the ED for shortness of breath and irregular heartbeat. Pt pulse ox reader indicated patient heart rate was in the 40's. Pt presented to ED by EMS. Pt apparently had taken an extra flecainide yesterday as she had discomfort between her shoulder blades. In the ED there was concern that pt was in 3rd degree AV block with ventricular escape rhythm which was bradycardic causing her to feel short of breath. Underwent pacemaker placement on , but will require lead revision which is planned for     SUBJECTIVE:    Patient seen and examined. She has no acute complaints, is aware of plans to fix lead tomorrow only pain at the incision site of the pacer  Records reviewed. Stable overnight. No other overnight issues reported. Temp (24hrs), Av.9 °F (36.6 °C), Min:97.2 °F (36.2 °C), Max:98.4 °F (36.9 °C)    DIET: ADULT DIET; Regular; Low Fat/Low Chol/High Fiber/2 gm Na  Diet NPO Exceptions are: Sips of Water with Meds  CODE: Full Code    Intake/Output Summary (Last 24 hours) at 2022 1413  Last data filed at 2022 0953  Gross per 24 hour   Intake 780 ml   Output --   Net 780 ml       OBJECTIVE:    BP (!) 100/53   Pulse 74   Temp 97.2 °F (36.2 °C)   Resp 17   Ht 5' 4\" (1.626 m)   Wt 146 lb (66.2 kg)   SpO2 96%   BMI 25.06 kg/m²     General appearance: No apparent distress, appears stated age and cooperative. HEENT:  Conjunctivae/corneas clear. Neck: Supple. No jugular venous distention. Respiratory: Clear to auscultation bilaterally, normal respiratory effort  Cardiovascular: Regular rate rhythm, normal S1-S2  Abdomen: Soft, nontender, nondistended  Musculoskeletal: No clubbing, cyanosis, no bilateral lower extremity edema. Brisk capillary refill.    Skin:  No rashes  on visible skin  Neurologic: awake, alert and following commands     ASSESSMENT:  Bradycardia, symptomatic  Acute respiratory failure with hypoxia-resolved  Anemia  Hypothyroidism  Hx of A fib  Hx of scleroderma  Hx of pulmonary HTN  HFpEF     PLAN:  -Plan for lead wire revision 11/28.   Eliquis on hold  -Continue Keflex per EP  -H&H is stable  -Continue chronic meds      DISPOSITION: To be determined after cardiac procedure tomorrow    Medications:  REVIEWED DAILY    Infusion Medications    sodium chloride       Scheduled Medications    metoprolol succinate  12.5 mg Oral Daily    cephALEXin  500 mg Oral 2 times per day    digoxin  125 mcg Oral Daily    sodium chloride flush  5-40 mL IntraVENous 2 times per day    sodium chloride flush  5-40 mL IntraVENous 2 times per day    ipratropium  0.5 mg Nebulization 4x daily    budesonide  0.5 mg Nebulization BID    [Held by provider] apixaban  5 mg Oral BID    levothyroxine  75 mcg Oral Daily    pantoprazole  40 mg Oral Daily    furosemide  20 mg Oral Daily    atorvastatin  40 mg Oral Daily    spironolactone  25 mg Oral Daily    vitamin B-12  1,000 mcg Oral Daily    potassium chloride  20 mEq Oral BID    rOPINIRole  0.5 mg Oral Nightly    metoclopramide  5 mg Oral 4x Daily    mycophenolate  1,500 mg Oral BID    sildenafil  60 mg Oral TID     PRN Meds: metoprolol, sodium chloride flush, sodium chloride, acetaminophen, sodium chloride flush, polyethylene glycol, [DISCONTINUED] acetaminophen **OR** acetaminophen    Labs:     Recent Labs     11/25/22 0417 11/26/22  0536 11/27/22  0531   WBC 8.1 4.8 4.5   HGB 9.9* 9.4* 9.8*   HCT 31.7* 30.6* 31.9*    224 253       Recent Labs     11/25/22 0417 11/25/22  1518 11/26/22  0536 11/27/22  0531    137 136 141   K 4.8 4.2 4.2 4.1   CL 99 102 102 105   CO2 26 23 23 22   BUN 20 16 17 15   CREATININE 0.8 0.6 0.7 0.5   CALCIUM 10.0 9.5 9.3 10.2   PHOS 3.4  --  3.3 3.5       Recent Labs     11/25/22 0417 11/26/22  0536 11/27/22  0531 PROT 6.8 6.3* 6.8   ALKPHOS 65 59 65   ALT 14 14 13   AST 19 19 18   BILITOT 0.8 0.6 0.6       No results for input(s): INR in the last 72 hours. No results for input(s): Hannah Nilebes in the last 72 hours. Chronic labs:    Lab Results   Component Value Date    CHOL 217 (H) 09/10/2015    TRIG 117 09/10/2015    HDL 73 09/10/2015    LDLCALC 121 (H) 09/10/2015    TSH 8.570 (H) 11/24/2022    INR 2.4 08/17/2022    LABA1C 5.2 08/26/2013       Radiology: REVIEWED DAILY    XR CHEST (2 VW)    Result Date: 11/26/2022  EXAMINATION: TWO XRAY VIEWS OF THE CHEST 11/26/2022 10:03 am COMPARISON: 11/26/2022 HISTORY: ORDERING SYSTEM PROVIDED HISTORY: pacemaker TECHNOLOGIST PROVIDED HISTORY: Keep left arm DOWN. Reason for exam:->pacemaker What reading provider will be dictating this exam?->CRC FINDINGS: AICD visualized in the left chest with 2 leads in the heart. The cardiomediastinal silhouette is without acute process. Prominence of the bronchovascular tissue lung markings visualized in bilateral lung fields. Scattered areas of patchy airspace opacification are seen. No evidence of focal infiltrate or consolidation. The costophrenic angles are clear with no evidence of pleural effusion. No evidence of pneumothorax or parenchymal lung mass is seen. The osseous structures are without acute process. AICD visualized in the left chest with 2 leads in the heart. No evidence of pneumothorax or parenchymal contusion. Bronchovascular prominence     XR CHEST PORTABLE    Result Date: 11/26/2022  EXAMINATION: ONE XRAY VIEW OF THE CHEST 11/26/2022 8:03 am COMPARISON: 11/25/2022 HISTORY: ORDERING SYSTEM PROVIDED HISTORY: pacemaker TECHNOLOGIST PROVIDED HISTORY: Reason for exam:->pacemaker What reading provider will be dictating this exam?->CRC FINDINGS: Mild bilateral hazy parenchymal opacities not significantly changed compared to 11/25/2022. heart is mildly enlarged.   There is a multi lead left pacemaker device which appear stable. No pneumothorax or evidence of alveolar consolidation. Osseous structures are unchanged. Mild cardiomegaly. Stable bilateral hazy interstitial parenchymal opacities. XR CHEST PORTABLE    Result Date: 11/25/2022  EXAMINATION: ONE XRAY VIEW OF THE CHEST 11/25/2022 3:47 pm COMPARISON: 11/23/2022 chest radiograph. HISTORY: ORDERING SYSTEM PROVIDED HISTORY: Pacemaker placement and rule out pneumothorax TECHNOLOGIST PROVIDED HISTORY: Reason for exam:->Pacemaker placement and rule out pneumothorax What reading provider will be dictating this exam?->CRC FINDINGS: The lungs are well expanded. There are interstitial and patchy airspace densities throughout the lungs. Cardiac silhouette is enlarged. There is atherosclerotic calcification of the thoracic aorta. There is a left subclavian dual-chamber pacemaker with leads in the right atrium and right ventricle. No pneumothorax. Osseous thorax is unremarkable. New bilateral lung infiltrates which may be due to mild pulmonary edema and or atelectasis. Interval left subclavian pacemaker placement, no pneumothorax. Stable enlargement of the cardiac silhouette.         +++++++++++++++++++++++++++++++++++++++++++++++++  DO ROSIE Berger/ Mateo [address]  +++++++++++++++++++++++++++++++++++++++++++++++++  NOTE: This report was transcribed using voice recognition software. Every effort was made to ensure accuracy; however, inadvertent computerized transcription errors may be present.

## 2022-11-27 NOTE — PROGRESS NOTES
Patient Heart rate up with ambulation to BR or other activity. Heartrate up in 140's while up to use Restroom but goes right back down when  returns to bed not sustained.   Heart rate has been in the 70's to low 80's while resting

## 2022-11-27 NOTE — PROGRESS NOTES
Hospitalist Progress Note      Synopsis: Patient with a medical hx of CAD, cerebral artery occlusion with cerebral infarction, HLD, HTN, JANNETH, atrial fibrillation admitted on 11/24/2022 after presenting to the ED for shortness of breath and irregular heartbeat. Pt pulse ox reader indicated patient heart rate was in the 40's. Pt presented to ED by EMS. Pt apparently had taken an extra flecainide yesterday as she had discomfort between her shoulder blades. In the ED there was concern that pt was in 3rd degree AV block with ventricular escape rhythm which was bradycardic causing her to feel short of breath. Underwent pacemaker placement on 11/25, but will require lead revision which is planned for 11/28    Subjective  Patient was hopeful to go home today     Exam:  /87   Pulse 72   Temp 97.5 °F (36.4 °C) (Temporal)   Resp 18   Ht 5' 4\" (1.626 m)   Wt 147 lb 6 oz (66.8 kg)   SpO2 94%   BMI 25.30 kg/m²   General appearance: No apparent distress, appears stated age and cooperative. HEENT: Pupils equal, round, and reactive to light. Conjunctivae/corneas clear. Neck: Supple. No jugular venous distention. Trachea midline. Respiratory:  Normal respiratory effort. Clear to auscultation, bilaterally without Rales/Wheezes/Rhonchi. Cardiovascular: irregularly irregular with normal S1/S2 without murmurs, rubs or gallops. Bradycardic   Abdomen: Soft, non-tender, non-distended with normal bowel sounds. Musculoskeletal: No clubbing, cyanosis or edema bilaterally. Brisk capillary refill. 2+ lower extremity pulses (dorsalis pedis).    Skin:  No rashes    Neurologic: awake, alert and following commands; somnolent but arousable     Medications:  Reviewed    Infusion Medications    sodium chloride       Scheduled Medications    metoprolol succinate  12.5 mg Oral Daily    cephALEXin  500 mg Oral 2 times per day    digoxin  125 mcg Oral Daily    sodium chloride flush  5-40 mL IntraVENous 2 times per day    sodium chloride flush  5-40 mL IntraVENous 2 times per day    ipratropium  0.5 mg Nebulization 4x daily    budesonide  0.5 mg Nebulization BID    [Held by provider] apixaban  5 mg Oral BID    levothyroxine  75 mcg Oral Daily    pantoprazole  40 mg Oral Daily    furosemide  20 mg Oral Daily    atorvastatin  40 mg Oral Daily    spironolactone  25 mg Oral Daily    vitamin B-12  1,000 mcg Oral Daily    potassium chloride  20 mEq Oral BID    rOPINIRole  0.5 mg Oral Nightly    metoclopramide  5 mg Oral 4x Daily    mycophenolate  1,500 mg Oral BID    sildenafil  60 mg Oral TID     PRN Meds: metoprolol, sodium chloride flush, sodium chloride, acetaminophen, sodium chloride flush, polyethylene glycol, [DISCONTINUED] acetaminophen **OR** acetaminophen    I/O    Intake/Output Summary (Last 24 hours) at 11/26/2022 2209  Last data filed at 11/26/2022 2017  Gross per 24 hour   Intake 960 ml   Output 200 ml   Net 760 ml         Labs:   Recent Labs     11/24/22  0541 11/25/22 0417 11/26/22  0536   WBC 7.3 8.1 4.8   HGB 10.2* 9.9* 9.4*   HCT 32.9* 31.7* 30.6*    264 224         Recent Labs     11/24/22  0541 11/25/22  0417 11/25/22  1518 11/26/22  0536    135 137 136   K 4.4 4.8 4.2 4.2   CL 98 99 102 102   CO2 25 26 23 23   BUN 25* 20 16 17   CREATININE 0.8 0.8 0.6 0.7   CALCIUM 10.4* 10.0 9.5 9.3   PHOS 3.6 3.4  --  3.3         Recent Labs     11/24/22  0541 11/25/22  0417 11/26/22  0536   PROT 7.2 6.8 6.3*   ALKPHOS 71 65 59   ALT 16 14 14   AST 20 19 19   BILITOT 0.5 0.8 0.6         No results for input(s): INR in the last 72 hours. No results for input(s): [facility] in the last 72 hours. Chronic labs:  Lab Results   Component Value Date    CHOL 217 (H) 09/10/2015    TRIG 117 09/10/2015    HDL 73 09/10/2015    LDLCALC 121 (H) 09/10/2015    TSH 8.570 (H) 11/24/2022    INR 2.4 08/17/2022    LABA1C 5.2 08/26/2013       Radiology:  Imaging studies reviewed today.     ASSESSMENT:  Symptomatic bradycardia  Acute hypoxic respiratory failure   Hx of A fib  Hx of scleroderma  Hx of pulmonary HTN  HFpEF  Long term anticoagulation  Anemia  New diagnosis of hypothyroidism      PLAN:  Cardiology consulted,  EP consulted and pt underwent pacemaker placement but will require revision which is planned on 11/28  EP would like pt to remain on keflex prior to revision   Continue home meds as ordered  Strict I/O, monitor renal function   Continue levothyroxine   Wean oxygen as tolerated     Diet: ADULT DIET; Regular; Low Fat/Low Chol/High Fiber/2 gm Na  Code Status: Full Code  PT/OT Eval Status:   as needed  DVT Prophylaxis:   on hold  Recommended disposition at discharge:  pending medical progression     +++++++++++++++++++++++++++++++++++++++++++++++++  Janae Diaz MD   [facility].  +++++++++++++++++++++++++++++++++++++++++++++++++  NOTE: This report was transcribed using voice recognition software. Every effort was made to ensure accuracy; however, inadvertent computerized transcription errors may be present.

## 2022-11-27 NOTE — PROGRESS NOTES
[address] ELECTROPHYSIOLOGY DEPARTMENT/DIVISION OF CARDIOLOGY  Inpatient progress report  PATIENT: Carmen Godinez RECORD NUMBER: [id number]  DATE OF SERVICE:  11/27/2022  ATTENDING ELECTROPHYSIOLOGIST:  Remedios Gomez DO  REFERRING PHYSICIAN: Lee Ann Rene DO and Lul Ramirez DO  CHIEF COMPLAINT: AV block    HPI: Dandre Morse is a 75 y.o. female with a history of nonvalvular persistent AF sp DCCV (3/2022 at [facility]), AT sp DCCV (8/11/2022 at [facility]), first-degree AV block, RBBB, HFpEF, CAD, CVA, carotid artery stenosis sp right CEA (?),  HTN, JANNETH-noncompliant with CPAP, COPD/emphysema, severe pulmonary HTN-combination of group 1 (CTD) + group 2 + group 3 (emphysema, JANNETH), scleroderma/Raynaud's/esophageal dysmotility, hypothyroidism, and cataracts sp bilateral removal (2019). She is managed by Annalise Zendejas and Jess ([facility] EP) with apixaban 5 mg twice daily, flecainide 100 mg BID, metoprolol XL 12.5 mg daily, spironolactone 75 mg daily, sildenafil 20 mg TID, simvastatin 40 mg daily, torsemide 20 mg daily, and PPI. In 2020, she was diagnosed with pulmonary hypertension, who initiated sildenafil. She was referred to rheumatology, who diagnosed the patient with scleroderma and initiated treatment with Norvasc for Raynaud's and mycophenolate for cutaneous manifestations. In 2021, she was diagnosed with nonvalvular paroxysmal AF, which was treated with [address] and beta-blocker. In 12/2021, she was reportedly on a brief course of amiodarone, but details of this are unavailable to me. In 3/2022, she was admitted to [facility] for acute right-sided heart failure and AF with RVR, which was treated with diuresis thoracentesis, and ELMER/DCCV. In 6/2022, patient was referred to [facility] EP (Dr. Wyatt Chavez) for management of AF. She was noted to be in AT with 2-1 AV conduction at 110 bpm.  Metoprolol was increased at that time.   In 8/2022, she underwent DCCV with initiation of flecainide 50 mg twice daily. In 9/2022, she was noted to be back in AF/AT with variable AV conduction with ventricular rate of 84 bpm, RBBB with LAD, which was managed by increasing the flecainide dose to 100 mg twice daily and reducing metoprolol tartrate dose to 12.5 mg twice daily, as well as changing Coumadin to apixaban. Patient reduced metoprolol dose to once daily on her own due to hypotension. In 10/2022, she was noted to be in sinus with first-degree AV block and RBBB, so left on apixaban, flecainide 100 mg twice daily and metoprolol tartrate changed to metoprolol XL 12.5 mg daily. On 11/23/22, she presented with chief complaint of dizziness with low HR on home BP monitor. She was diagnosed with AT with third-degree AV block and ventricular escape rhythm in the 30s. EP service is now consulted by admitting physician for further evaluation and management of AV block. Patient denies any other complaints at this time. 11/26/2022: She was observed off BB and Ic for ~48 hours and noted to have AFL with ventricular rates in ongoing AV block with ventricular rate in 50's along with intermittent AV block and ventricular escape rhythm. On 11/25/22, patient underwent implant of Medtronic dual-chamber pacemaker with RV lead in LBB area. RA lead was very difficult to implant in a stable position. Multiple RA leads as well as stylets were used to position the RA lead in multiple positions (~5 deployments, RAA, septal, lateral wall), but dislodged several times. The final RA lead position was along the lateral wall as this provided adequate sensing (> 1 mV) and stability. After final deployment, RA lead was observed for 30 minutes prior to ending the procedure, and noted to remain in stable position with stable function. A 4-hour postoperative chest x-ray showed RA lead in a stable position and device check with stable lead function. However, this morning repeat chest x-ray shows RA lead dislodged.   Patient denies any complaints at this time. She is RV pacing 98.6% with ongoing AFL. She reports in the past, her only symptoms with AF ever occurred when her HR was uncontrolled (too fast or too slow). Today, at ~1030 her AV conduction transiently recovered with ventricular rates at ~140 bpm. Home metoprolol restarted (intolerant to higher doses reportedly due to hypotension) and digoxin 62.5 mcg daily initiated. 11/27/22: Predominantly ventricular paced. Denies any complaints. Plan for RA lead revision 11/28/22 with Dr Jonnathan Marin. NPO at midnight. Prior cardiac testing:  -ECG (11/24/2022): AT with 41 AV conduction and ventricular rate of 50 bpm, RBBB (QRS D = 156 ms)  -ECG (11/24/2022 at 2210): AT, third-degree AV block with ventricular escape rhythm at 38 bpm  -TTE (10/31/2022 at [facility]): LVEF = 69%, grade 3 LVDD, RV dilation, JASON, mild TR, mild-moderate pulmonary HTN. -TTE (8/25/21): LVEF = 60%, grade III LVDD, mild RVSD, RV dilation, mild CHRISTINE, moderate TR, moderate pulmonary HTN. -ECG (8/17/2022): Sinus at 53 bpm, first-degree AV block (TENISHA = 204 ms), RBBB (QRS D = 138 ms), QTc = 452 ms.  - LHC (4/6/2021): Moderate pulmonary arterial hypertension. -TTE (2/25/2021): LVEF = 65%, mild asymmetric septal LVH, hyperdynamic LV with complete obliteration of LV cavity during systole, stage III LVDD, mild RV dilation, moderate R VSD, moderate TR, and severe pulmonary HTN. -Pharmacologic nuclear stress test (6/29/2020): LVEF = 79%, normal perfusion. -TTE (2/10/2020): LVEF = 78%, mild concentric LVH with septal thickening causing mild LVOT obstruction, mild CHRISTINE, mild-moderate TR, moderate-severe pulmonary HTN, pericardial fat pad.     Past Medical History:   Diagnosis Date    CAD in native artery 4/6/2021    Cerebral artery occlusion with cerebral infarction ([address]     \"mini stroke\" 10/2014    H/O cardiovascular stress test 06/29/2020    Lexiscan    Hyperlipidemia     Hypertension     Sleep apnea     doesnt use cpap     Past Surgical History:   Procedure Laterality Date    CARDIAC CATHETERIZATION  2012    Right heart cath by Dr Catarino Dominguez Right 2019    intraocular lens    CATARACT REMOVAL WITH IMPLANT Left 10/15/2019     SECTION      COLONOSCOPY      INTRACAPSULAR CATARACT EXTRACTION Right 2019    RIGHT EYE CATARACT EMULSIFICATION IOL IMPLANT performed by Vonda Barajas MD at [facility] Left 10/15/2019    LEFT EYE CATARACT EMULSIFICATION IOL IMPLANT performed by Vonda Barajas MD at [facility] Left 2022    Medtronic dual chamber (Dr Headley Challenger)      Family History   Problem Relation Age of Onset    Other Mother         complication of surgery    Heart Disease Brother      There is no family history of sudden cardiac arrest    Social History     Tobacco Use    Smoking status: Former     Packs/day: 1.50     Years: 40.00     Pack years: 60.00     Types: Cigarettes     Quit date: 2020     Years since quittin.1    Smokeless tobacco: Never    Tobacco comments:      she has decreased to 1ppd   Substance Use Topics    Alcohol use: Not Currently     Alcohol/week: 10.0 standard drinks     Types: 10 Glasses of wine per week     Comment: green tea 2 cups a day        Current Facility-Administered Medications   Medication Dose Route Frequency Provider Last Rate Last Admin    metoprolol succinate (TOPROL XL) extended release tablet 12.5 mg  12.5 mg Oral Daily Reid Bolton, DO   12.5 mg at 22 1201    cephALEXin (KEFLEX) capsule 500 mg  500 mg Oral 2 times per day Reid Bolton DO   500 mg at 22 0837    digoxin (LANOXIN) tablet 125 mcg  125 mcg Oral Daily Reid Bolton DO   125 mcg at 22 0836    metoprolol (LOPRESSOR) injection 2.5 mg  2.5 mg IntraVENous Q6H PRN Reid Bolton DO   2.5 mg at 22 1306    sodium chloride flush 0.9 % injection 5-40 mL  5-40 mL IntraVENous 2 times per day Paulene Boyd, DO   10 mL at 11/27/22 6886    sodium chloride flush 0.9 % injection 5-40 mL  5-40 mL IntraVENous PRN Paulene Boyd, DO        0.9 % sodium chloride infusion   IntraVENous PRN Paulene Boyd, DO        acetaminophen (TYLENOL) tablet 650 mg  650 mg Oral Q4H PRN Paulene Boyd, DO   650 mg at 11/27/22 1201    sodium chloride flush 0.9 % injection 5-40 mL  5-40 mL IntraVENous 2 times per day Ragena August, APRN - CNP   10 mL at 11/26/22 2120    sodium chloride flush 0.9 % injection 5-40 mL  5-40 mL IntraVENous PRN Ragena August, APRN - CNP        polyethylene glycol (GLYCOLAX) packet 17 g  17 g Oral Daily PRN Ragena August, APRN - CNP        acetaminophen (TYLENOL) suppository 650 mg  650 mg Rectal Q6H PRN Ragena August, APRN - CNP        ipratropium (ATROVENT) 0.02 % nebulizer solution 0.5 mg  0.5 mg Nebulization 4x daily Aftab Seen, DO   0.5 mg at 11/27/22 1215    budesonide (PULMICORT) nebulizer suspension 500 mcg  0.5 mg Nebulization BID [place] Seen, DO   500 mcg at 11/26/22 1929    [Held by provider] apixaban (ELIQUIS) tablet 5 mg  5 mg Oral BID Ragena August, APRN - CNP   5 mg at 11/24/22 0805    levothyroxine (SYNTHROID) tablet 75 mcg  75 mcg Oral Daily Indigo Barrett MD   75 mcg at 11/27/22 0652    pantoprazole (PROTONIX) tablet 40 mg  40 mg Oral Daily Indigo Barrett MD   40 mg at 11/27/22 4297    furosemide (LASIX) tablet 20 mg  20 mg Oral Daily Indigo Barrett MD   20 mg at 11/27/22 0836    atorvastatin (LIPITOR) tablet 40 mg  40 mg Oral Daily Indigo Barrett MD   40 mg at 11/26/22 2118    spironolactone (ALDACTONE) tablet 25 mg  25 mg Oral Daily Indigo Barrett MD   25 mg at 11/27/22 0836    vitamin B-12 (CYANOCOBALAMIN) tablet 1,000 mcg  1,000 mcg Oral Daily Indigo Barrett MD   1,000 mcg at 11/27/22 0836    potassium chloride (KLOR-CON M) extended release tablet 20 mEq  20 mEq Oral BID Indigo Barrett MD   20 mEq at 11/27/22 0837    rOPINIRole (REQUIP) tablet 0.5 mg  0.5 mg Oral Nightly Dede Land MD   0.5 mg at 11/26/22 2118    metoclopramide (REGLAN) tablet 5 mg  5 mg Oral 4x Daily Dede Land MD   5 mg at 11/27/22 1201    mycophenolate (CELLCEPT) capsule 1,500 mg  1,500 mg Oral BID STEPH Jones CNP   1,500 mg at 11/27/22 5542    sildenafil (REVATIO) tablet 60 mg  60 mg Oral TID Dede Land MD   60 mg at 11/27/22 1412        Allergies   Allergen Reactions    Biaxin [Clarithromycin] Nausea And Vomiting    Naproxen Nausea And Vomiting       ROS:   Constitutional: Negative for fever, activity change and appetite change. HENT: Negative for epistaxis. Eyes: Negative for diploplia, blurred vision. Respiratory: Negative for cough, chest tightness, shortness of breath and wheezing. Cardiovascular: pertinent positives in HPI  Gastrointestinal: Negative for abdominal pain and blood in stool. Genitourinary: Negative for hematuria and difficulty urinating. Musculoskeletal: Negative for myalgias and gait problem. Skin: Negative for color change and rash. Neurological: Negative for syncope and light-headedness. Psychiatric/Behavioral: Negative for confusion and agitation. The patient is not nervous/anxious. Heme: no bleeding disorders, no melena or hematochezia  All other review of systems are negative     PHYSICAL EXAM:  Constitutional   Vitals:    11/27/22 0820 11/27/22 1115 11/27/22 1200 11/27/22 1458   BP: (!) 96/57 (!) 96/51 (!) 100/53 (!) 94/59   Pulse: 64 72 74 86   Resp: 17   14   Temp: 97.2 °F (36.2 °C)   97.3 °F (36.3 °C)   TempSrc:       SpO2: 96%   94%   Weight:       Height:        Well-developed, no acute distress, well groomed  Eyes: conjunctivae normal, no xanthelasma   Ears, Nose, Throat: oral mucosa moist, no cyanosis   Neck: supple, no JVD, no bruits, no thyromegaly   CV: Bradycardic, regular rhythm,  no murmurs, rubs, or gallops.  PMI is nondisplaced, Peripheral pulses normal including carotid auscultation, no noted aortic bruit, bilateral femoral and pedal pulses are normal in quality  Lungs: clear to auscultation bilaterally, normal respiratory effort without used of accessory muscles, no wheezes  Abdomen: soft, non-tender, bowel sounds present, no masses or hepatomegaly   Extremities: no digital clubbing, no edema   Skin: warm, no rashes   Neuro/Psych: A&O x 3, normal mood and affect  Pacemaker site: pressure dressing remains in place. Data:    Recent Labs     11/25/22  0417 11/26/22  0536 11/27/22  0531   WBC 8.1 4.8 4.5   HGB 9.9* 9.4* 9.8*   HCT 31.7* 30.6* 31.9*    224 253     Recent Labs     11/25/22  1518 11/26/22  0536 11/27/22  0531    136 141   K 4.2 4.2 4.1    102 105   CO2 23 23 22   BUN 16 17 15   CREATININE 0.6 0.7 0.5   CALCIUM 9.5 9.3 10.2     No results for input(s): INR in the last 72 hours. No results for input(s): TSH in the last 72 hours. Lab Results   Component Value Date/Time    MG 2.0 11/27/2022 05:31 AM     Telemetry: AT/AFL with predominantly ventricular pacing, but intermittent intrinsic    Assessment/plan:  Intermittent Third-degree AV block sp Medtronic dual-chamber pacemaker (11/25/2022-, RV lead is LBB area)  -In setting of underlying first-degree AV block and RBBB. -Difficult RA lead placement despite multiple leads and stylets as well as prolonged monitoring intraoperatively. Postoperative chest x-ray 4 hours after procedure with stable device position and lead lead function on device interrogation. However, chest x-ray 11/25/2022 shows RA lead is dislodged despite use of arm sling post implant. Device reprogrammed VVIR. I suspect her connective tissue disease is playing a role with recurrent RA lead dislodgment as cardiac involvement with scleroderma is common.   -RA lead revision on 11/28/2022 with Dr Oc Paniagua with prolonged intraoperative monitoring and stability assessment (deep inspiration/cough, etc) and consideration of septal positioning of RA lead given failed prior attempts in RAA and RA lateral wall. However, as she has predominantly 3* AV block with ventricular pacing via LBB area lead and historically asymptomatic with rate controlled AF/AFL and preserved LVEF, so if unable to achieve stable RA lead position, I would recommend removal of RA lead and placing a port plug in RA port. -- Continue Cephalexin 500 mg BID for 5 days after RA lead revision on 11/28/22. -- Device clinic eval in ~2 weeks. -- Patient wishes to transfer EP care to my office. Echo in ~3 months. Appointment with me ~1 week after. nonvalvular persistent AF/atypical AFL/AT sp DCCV x2 (3/2022 at [facility], 8/11/2022 at [facility])  -Initially diagnosed in 2021.  - [id number] score = 7 (age, sex, CVA, PAD, HF, HTN). Recommend [address] in females with score of 2 or more. DOAC preferred. - On apixaban 5 mg twice daily. While on DOAC, recommend annual monitoring of CBC and CMP. I discussed with Dr Iman Shukla, who desires [address] to be held for pacemaker revision on 11/28/2022. - Rhythm control strategy with flecainide and multiple DCCV's pursued in the past by outside EP. However, has refractory AF/AFL. Patient is asymptomatic with LVEF of 69% and intermittent third-degree AV block. If able to achieve stable RA lead position, I will plan to restart flecainide and perform DCCV in ~6 weeks if remains in AF/AFL at that time followed by reassessment of symptoms. However, if unable to achieve stable RA lead position, then will change the management strategy to rate control as she is reportedly asymptomatic with rate control and LVEF > 50%. -Modifiable risk factors:  --Obesity: BMI goal less than 27.  --JANNETH: Noncompliant with CPAP. Recommend compliance.  --HTN: BP goal less than 130/80.  --EtOH: Patient reports 10 or more drinks weekly. Recommend reduce intake. HFpEF  -Established with Dr. Eri Senior.     -Continue metoprolol XL 12.5 mg daily and digoxin 125 mcg daily. severe pulmonary HTN-combination of group 1 (CTD) + group 2 + group 3 (emphysema, JANNETH)  -Established with pulmonary at [facility].    scleroderma/Raynaud's/esophageal dysmotility  -Rheumatology consult is currently pending. I have spent a total of 60 minutes with the patient and his/her family reviewing the above stated recommendations. And a total of >50% of that time involved face-to-face time providing counseling and or coordination of care with the other providers. Thank you for allowing me to participate in your patient's care. Please call me if there are any questions.       Theo Green, DO   Cardiac Electrophysiology  [facility]  [facility] between [facility]) Physicians

## 2022-11-28 ENCOUNTER — ANESTHESIA EVENT (OUTPATIENT)
Dept: CARDIAC CATH/INVASIVE PROCEDURES | Age: 67
DRG: 242 | End: 2022-11-28
Payer: MEDICARE

## 2022-11-28 ENCOUNTER — APPOINTMENT (OUTPATIENT)
Dept: GENERAL RADIOLOGY | Age: 67
DRG: 242 | End: 2022-11-28
Attending: FAMILY MEDICINE
Payer: MEDICARE

## 2022-11-28 ENCOUNTER — ANESTHESIA (OUTPATIENT)
Dept: CARDIAC CATH/INVASIVE PROCEDURES | Age: 67
DRG: 242 | End: 2022-11-28
Payer: MEDICARE

## 2022-11-28 ENCOUNTER — APPOINTMENT (OUTPATIENT)
Dept: CARDIAC CATH/INVASIVE PROCEDURES | Age: 67
DRG: 242 | End: 2022-11-28
Attending: FAMILY MEDICINE
Payer: MEDICARE

## 2022-11-28 PROBLEM — T82.110A PACEMAKER LEAD MALFUNCTION: Status: ACTIVE | Noted: 2022-11-28

## 2022-11-28 LAB
ALBUMIN SERPL-MCNC: 3.6 G/DL (ref 3.5–5.2)
ALP BLD-CCNC: 64 U/L (ref 35–104)
ALT SERPL-CCNC: 13 U/L (ref 0–32)
ANION GAP SERPL CALCULATED.3IONS-SCNC: 11 MMOL/L (ref 7–16)
AST SERPL-CCNC: 16 U/L (ref 0–31)
BASOPHILS ABSOLUTE: 0.04 E9/L (ref 0–0.2)
BASOPHILS RELATIVE PERCENT: 0.9 % (ref 0–2)
BILIRUB SERPL-MCNC: 0.6 MG/DL (ref 0–1.2)
BUN BLDV-MCNC: 15 MG/DL (ref 6–23)
CALCIUM IONIZED: 1.33 MMOL/L (ref 1.15–1.33)
CALCIUM SERPL-MCNC: 9.7 MG/DL (ref 8.6–10.2)
CHLORIDE BLD-SCNC: 105 MMOL/L (ref 98–107)
CO2: 22 MMOL/L (ref 22–29)
CREAT SERPL-MCNC: 0.6 MG/DL (ref 0.5–1)
EKG ATRIAL RATE: 277 BPM
EKG ATRIAL RATE: 288 BPM
EKG Q-T INTERVAL: 414 MS
EKG Q-T INTERVAL: 440 MS
EKG QRS DURATION: 110 MS
EKG QRS DURATION: 138 MS
EKG QTC CALCULATION (BAZETT): 449 MS
EKG QTC CALCULATION (BAZETT): 475 MS
EKG R AXIS: -17 DEGREES
EKG R AXIS: -23 DEGREES
EKG T AXIS: 117 DEGREES
EKG T AXIS: 71 DEGREES
EKG VENTRICULAR RATE: 70 BPM
EKG VENTRICULAR RATE: 71 BPM
EOSINOPHILS ABSOLUTE: 0.09 E9/L (ref 0.05–0.5)
EOSINOPHILS RELATIVE PERCENT: 1.9 % (ref 0–6)
GFR SERPL CREATININE-BSD FRML MDRD: >60 ML/MIN/1.73
GLUCOSE BLD-MCNC: 98 MG/DL (ref 74–99)
HCT VFR BLD CALC: 32.1 % (ref 34–48)
HEMOGLOBIN: 9.8 G/DL (ref 11.5–15.5)
IMMATURE GRANULOCYTES #: 0.01 E9/L
IMMATURE GRANULOCYTES %: 0.2 % (ref 0–5)
LYMPHOCYTES ABSOLUTE: 0.82 E9/L (ref 1.5–4)
LYMPHOCYTES RELATIVE PERCENT: 17.6 % (ref 20–42)
MAGNESIUM: 1.9 MG/DL (ref 1.6–2.6)
MCH RBC QN AUTO: 30.8 PG (ref 26–35)
MCHC RBC AUTO-ENTMCNC: 30.5 % (ref 32–34.5)
MCV RBC AUTO: 100.9 FL (ref 80–99.9)
MONOCYTES ABSOLUTE: 0.52 E9/L (ref 0.1–0.95)
MONOCYTES RELATIVE PERCENT: 11.2 % (ref 2–12)
NEUTROPHILS ABSOLUTE: 3.18 E9/L (ref 1.8–7.3)
NEUTROPHILS RELATIVE PERCENT: 68.2 % (ref 43–80)
PDW BLD-RTO: 15.9 FL (ref 11.5–15)
PHOSPHORUS: 4.2 MG/DL (ref 2.5–4.5)
PLATELET # BLD: 258 E9/L (ref 130–450)
PMV BLD AUTO: 9.7 FL (ref 7–12)
POTASSIUM SERPL-SCNC: 4.4 MMOL/L (ref 3.5–5)
RBC # BLD: 3.18 E12/L (ref 3.5–5.5)
SODIUM BLD-SCNC: 138 MMOL/L (ref 132–146)
TOTAL PROTEIN: 6.1 G/DL (ref 6.4–8.3)
WBC # BLD: 4.7 E9/L (ref 4.5–11.5)

## 2022-11-28 PROCEDURE — 85025 COMPLETE CBC W/AUTO DIFF WBC: CPT

## 2022-11-28 PROCEDURE — 6370000000 HC RX 637 (ALT 250 FOR IP): Performed by: STUDENT IN AN ORGANIZED HEALTH CARE EDUCATION/TRAINING PROGRAM

## 2022-11-28 PROCEDURE — C1889 IMPLANT/INSERT DEVICE, NOC: HCPCS

## 2022-11-28 PROCEDURE — 02H63JZ INSERTION OF PACEMAKER LEAD INTO RIGHT ATRIUM, PERCUTANEOUS APPROACH: ICD-10-PCS | Performed by: INTERNAL MEDICINE

## 2022-11-28 PROCEDURE — 6360000002 HC RX W HCPCS: Performed by: NURSE ANESTHETIST, CERTIFIED REGISTERED

## 2022-11-28 PROCEDURE — 6360000002 HC RX W HCPCS: Performed by: INTERNAL MEDICINE

## 2022-11-28 PROCEDURE — 80053 COMPREHEN METABOLIC PANEL: CPT

## 2022-11-28 PROCEDURE — 6370000000 HC RX 637 (ALT 250 FOR IP): Performed by: INTERNAL MEDICINE

## 2022-11-28 PROCEDURE — 33216 INSERT 1 ELECTRODE PM-DEFIB: CPT

## 2022-11-28 PROCEDURE — 02PA3MZ REMOVAL OF CARDIAC LEAD FROM HEART, PERCUTANEOUS APPROACH: ICD-10-PCS | Performed by: INTERNAL MEDICINE

## 2022-11-28 PROCEDURE — 2580000003 HC RX 258: Performed by: NURSE ANESTHETIST, CERTIFIED REGISTERED

## 2022-11-28 PROCEDURE — 3700000000 HC ANESTHESIA ATTENDED CARE

## 2022-11-28 PROCEDURE — 2500000003 HC RX 250 WO HCPCS

## 2022-11-28 PROCEDURE — 2140000000 HC CCU INTERMEDIATE R&B

## 2022-11-28 PROCEDURE — 33216 INSERT 1 ELECTRODE PM-DEFIB: CPT | Performed by: INTERNAL MEDICINE

## 2022-11-28 PROCEDURE — 94640 AIRWAY INHALATION TREATMENT: CPT

## 2022-11-28 PROCEDURE — 2580000003 HC RX 258: Performed by: INTERNAL MEDICINE

## 2022-11-28 PROCEDURE — 33235 REMOVAL PACEMAKER ELECTRODE: CPT

## 2022-11-28 PROCEDURE — 6370000000 HC RX 637 (ALT 250 FOR IP): Performed by: FAMILY MEDICINE

## 2022-11-28 PROCEDURE — 3700000001 HC ADD 15 MINUTES (ANESTHESIA)

## 2022-11-28 PROCEDURE — 83735 ASSAY OF MAGNESIUM: CPT

## 2022-11-28 PROCEDURE — 36415 COLL VENOUS BLD VENIPUNCTURE: CPT

## 2022-11-28 PROCEDURE — 6360000002 HC RX W HCPCS: Performed by: FAMILY MEDICINE

## 2022-11-28 PROCEDURE — C1894 INTRO/SHEATH, NON-LASER: HCPCS

## 2022-11-28 PROCEDURE — 93010 ELECTROCARDIOGRAM REPORT: CPT | Performed by: INTERNAL MEDICINE

## 2022-11-28 PROCEDURE — 71045 X-RAY EXAM CHEST 1 VIEW: CPT

## 2022-11-28 PROCEDURE — 84100 ASSAY OF PHOSPHORUS: CPT

## 2022-11-28 PROCEDURE — 82330 ASSAY OF CALCIUM: CPT

## 2022-11-28 PROCEDURE — C1898 LEAD, PMKR, OTHER THAN TRANS: HCPCS

## 2022-11-28 PROCEDURE — 33234 REMOVAL OF PACEMAKER SYSTEM: CPT | Performed by: INTERNAL MEDICINE

## 2022-11-28 PROCEDURE — 2580000003 HC RX 258

## 2022-11-28 PROCEDURE — 6360000002 HC RX W HCPCS

## 2022-11-28 PROCEDURE — 2580000003 HC RX 258: Performed by: STUDENT IN AN ORGANIZED HEALTH CARE EDUCATION/TRAINING PROGRAM

## 2022-11-28 PROCEDURE — 2709999900 HC NON-CHARGEABLE SUPPLY

## 2022-11-28 PROCEDURE — 93005 ELECTROCARDIOGRAM TRACING: CPT | Performed by: STUDENT IN AN ORGANIZED HEALTH CARE EDUCATION/TRAINING PROGRAM

## 2022-11-28 RX ORDER — FENTANYL CITRATE 50 UG/ML
INJECTION, SOLUTION INTRAMUSCULAR; INTRAVENOUS PRN
Status: DISCONTINUED | OUTPATIENT
Start: 2022-11-28 | End: 2022-11-28 | Stop reason: SDUPTHER

## 2022-11-28 RX ORDER — SODIUM CHLORIDE 0.9 % (FLUSH) 0.9 %
5-40 SYRINGE (ML) INJECTION EVERY 12 HOURS SCHEDULED
Status: DISCONTINUED | OUTPATIENT
Start: 2022-11-28 | End: 2022-11-29 | Stop reason: HOSPADM

## 2022-11-28 RX ORDER — SODIUM CHLORIDE 9 MG/ML
INJECTION, SOLUTION INTRAVENOUS CONTINUOUS PRN
Status: DISCONTINUED | OUTPATIENT
Start: 2022-11-28 | End: 2022-11-28 | Stop reason: SDUPTHER

## 2022-11-28 RX ORDER — PROPOFOL 10 MG/ML
INJECTION, EMULSION INTRAVENOUS CONTINUOUS PRN
Status: DISCONTINUED | OUTPATIENT
Start: 2022-11-28 | End: 2022-11-28 | Stop reason: SDUPTHER

## 2022-11-28 RX ORDER — SODIUM CHLORIDE 0.9 % (FLUSH) 0.9 %
5-40 SYRINGE (ML) INJECTION PRN
Status: DISCONTINUED | OUTPATIENT
Start: 2022-11-28 | End: 2022-11-29 | Stop reason: HOSPADM

## 2022-11-28 RX ORDER — TRAMADOL HYDROCHLORIDE 50 MG/1
50 TABLET ORAL ONCE
Status: COMPLETED | OUTPATIENT
Start: 2022-11-28 | End: 2022-11-28

## 2022-11-28 RX ORDER — VANCOMYCIN HYDROCHLORIDE 1 G/20ML
INJECTION, POWDER, LYOPHILIZED, FOR SOLUTION INTRAVENOUS PRN
Status: DISCONTINUED | OUTPATIENT
Start: 2022-11-28 | End: 2022-11-28 | Stop reason: SDUPTHER

## 2022-11-28 RX ORDER — SODIUM CHLORIDE 9 MG/ML
INJECTION, SOLUTION INTRAVENOUS PRN
Status: DISCONTINUED | OUTPATIENT
Start: 2022-11-28 | End: 2022-11-29 | Stop reason: HOSPADM

## 2022-11-28 RX ADMIN — SILDENAFIL 60 MG: 20 TABLET ORAL at 08:33

## 2022-11-28 RX ADMIN — LEVOTHYROXINE SODIUM 75 MCG: 0.07 TABLET ORAL at 05:54

## 2022-11-28 RX ADMIN — SILDENAFIL 60 MG: 20 TABLET ORAL at 22:44

## 2022-11-28 RX ADMIN — PROPOFOL 75 MCG/KG/MIN: 10 INJECTION, EMULSION INTRAVENOUS at 16:23

## 2022-11-28 RX ADMIN — MYCOPHENOLATE MOFETIL 1500 MG: 250 CAPSULE ORAL at 08:30

## 2022-11-28 RX ADMIN — BUDESONIDE 500 MCG: 0.5 SUSPENSION RESPIRATORY (INHALATION) at 21:58

## 2022-11-28 RX ADMIN — TRAMADOL HYDROCHLORIDE 50 MG: 50 TABLET, COATED ORAL at 22:45

## 2022-11-28 RX ADMIN — ROPINIROLE HYDROCHLORIDE 0.5 MG: 0.5 TABLET, FILM COATED ORAL at 22:47

## 2022-11-28 RX ADMIN — PANTOPRAZOLE SODIUM 40 MG: 40 TABLET, DELAYED RELEASE ORAL at 05:54

## 2022-11-28 RX ADMIN — SODIUM CHLORIDE, PRESERVATIVE FREE 10 ML: 5 INJECTION INTRAVENOUS at 08:30

## 2022-11-28 RX ADMIN — FENTANYL CITRATE 50 MCG: 50 INJECTION, SOLUTION INTRAMUSCULAR; INTRAVENOUS at 16:58

## 2022-11-28 RX ADMIN — METOCLOPRAMIDE 5 MG: 5 TABLET ORAL at 22:44

## 2022-11-28 RX ADMIN — ATORVASTATIN CALCIUM 40 MG: 40 TABLET, FILM COATED ORAL at 22:44

## 2022-11-28 RX ADMIN — SODIUM CHLORIDE: 9 INJECTION, SOLUTION INTRAVENOUS at 16:15

## 2022-11-28 RX ADMIN — IPRATROPIUM BROMIDE 0.5 MG: 0.5 SOLUTION RESPIRATORY (INHALATION) at 12:35

## 2022-11-28 RX ADMIN — FENTANYL CITRATE 50 MCG: 50 INJECTION, SOLUTION INTRAMUSCULAR; INTRAVENOUS at 16:23

## 2022-11-28 RX ADMIN — DIGOXIN 125 MCG: 0.12 TABLET ORAL at 08:30

## 2022-11-28 RX ADMIN — SODIUM CHLORIDE, PRESERVATIVE FREE 10 ML: 5 INJECTION INTRAVENOUS at 22:48

## 2022-11-28 RX ADMIN — METOCLOPRAMIDE 5 MG: 5 TABLET ORAL at 08:33

## 2022-11-28 RX ADMIN — SODIUM CHLORIDE, PRESERVATIVE FREE 10 ML: 5 INJECTION INTRAVENOUS at 21:00

## 2022-11-28 RX ADMIN — MYCOPHENOLATE MOFETIL 1500 MG: 250 CAPSULE ORAL at 22:44

## 2022-11-28 RX ADMIN — CEPHALEXIN 500 MG: 500 CAPSULE ORAL at 08:32

## 2022-11-28 RX ADMIN — ACETAMINOPHEN 650 MG: 325 TABLET, FILM COATED ORAL at 19:41

## 2022-11-28 RX ADMIN — SODIUM CHLORIDE, PRESERVATIVE FREE 10 ML: 5 INJECTION INTRAVENOUS at 08:37

## 2022-11-28 RX ADMIN — IPRATROPIUM BROMIDE 0.5 MG: 0.5 SOLUTION RESPIRATORY (INHALATION) at 21:58

## 2022-11-28 RX ADMIN — VANCOMYCIN HYDROCHLORIDE 1000 MG: 1 INJECTION, POWDER, LYOPHILIZED, FOR SOLUTION INTRAVENOUS at 16:29

## 2022-11-28 RX ADMIN — CEPHALEXIN 500 MG: 500 CAPSULE ORAL at 22:45

## 2022-11-28 RX ADMIN — METOPROLOL SUCCINATE 12.5 MG: 25 TABLET, EXTENDED RELEASE ORAL at 08:31

## 2022-11-28 ASSESSMENT — PAIN SCALES - GENERAL
PAINLEVEL_OUTOF10: 0
PAINLEVEL_OUTOF10: 0
PAINLEVEL_OUTOF10: 5
PAINLEVEL_OUTOF10: 7

## 2022-11-28 ASSESSMENT — PAIN DESCRIPTION - ORIENTATION
ORIENTATION: LEFT;UPPER
ORIENTATION: LEFT

## 2022-11-28 ASSESSMENT — PAIN DESCRIPTION - DESCRIPTORS
DESCRIPTORS: ACHING
DESCRIPTORS: ACHING;SORE;STABBING;SHOOTING

## 2022-11-28 ASSESSMENT — PAIN DESCRIPTION - LOCATION
LOCATION: INCISION
LOCATION: INCISION;SHOULDER

## 2022-11-28 NOTE — PROGRESS NOTES
Hospitalist Progress Note      SYNOPSIS: Patient admitted on 2022 for Symptomatic bradycardia      SUBJECTIVE:    Feels okay, no chest pain no abdominal pain no nausea no vomiting no dizziness no shortness of breath    Stable overnight. No other overnight issues reported. Temp (24hrs), Av °F (36.7 °C), Min:97.3 °F (36.3 °C), Max:98.2 °F (36.8 °C)    DIET: Diet NPO Exceptions are: Sips of Water with Meds  CODE: Full Code    Intake/Output Summary (Last 24 hours) at 2022 1221  Last data filed at 2022 0655  Gross per 24 hour   Intake 240 ml   Output 550 ml   Net -310 ml       OBJECTIVE:    /60   Pulse 78   Temp 98.2 °F (36.8 °C) (Temporal)   Resp 18   Ht 5' 4\" (1.626 m)   Wt 150 lb (68 kg)   SpO2 94%   BMI 25.75 kg/m²     General appearance: No apparent distress, appears stated age and cooperative. HEENT:  Conjunctivae/corneas clear. Neck: Supple. No jugular venous distention. Respiratory: Clear to auscultation bilaterally, normal respiratory effort  Cardiovascular: Regular rate rhythm, normal S1-S2  Abdomen: Soft, nontender, nondistended  Musculoskeletal: No clubbing, cyanosis, no bilateral lower extremity edema. Brisk capillary refill. Skin:  No rashes  on visible skin  Neurologic: awake, alert and following commands     ASSESSMENT:    Bradycardia, symptomatic  EP consulted and pt underwent pacemaker placement but will require revision which is planned on   EP would like pt to remain on keflex prior to revision     Acute respiratory failure with hypoxia-resolved  Anemia  Hypothyroidism  Hx of A fib  Hx of scleroderma  Hx of pulmonary HTN  HFpEF     PLAN:  -Plan for lead wire revision . Eliquis on hold  -Continue Keflex per EP  -H&H is stable  -Continue chronic meds    Continue supportive care.     Medications:  REVIEWED DAILY    Infusion Medications    sodium chloride       Scheduled Medications    metoprolol succinate  12.5 mg Oral Daily    cephALEXin  500 mg Oral 2 times per day    digoxin  125 mcg Oral Daily    sodium chloride flush  5-40 mL IntraVENous 2 times per day    sodium chloride flush  5-40 mL IntraVENous 2 times per day    ipratropium  0.5 mg Nebulization 4x daily    budesonide  0.5 mg Nebulization BID    [Held by provider] apixaban  5 mg Oral BID    levothyroxine  75 mcg Oral Daily    pantoprazole  40 mg Oral Daily    furosemide  20 mg Oral Daily    atorvastatin  40 mg Oral Daily    spironolactone  25 mg Oral Daily    vitamin B-12  1,000 mcg Oral Daily    potassium chloride  20 mEq Oral BID    rOPINIRole  0.5 mg Oral Nightly    metoclopramide  5 mg Oral 4x Daily    mycophenolate  1,500 mg Oral BID    sildenafil  60 mg Oral TID     PRN Meds: metoprolol, sodium chloride flush, sodium chloride, acetaminophen, sodium chloride flush, polyethylene glycol, [DISCONTINUED] acetaminophen **OR** acetaminophen    Labs:     Recent Labs     11/26/22  0536 11/27/22  0531 11/28/22  0536   WBC 4.8 4.5 4.7   HGB 9.4* 9.8* 9.8*   HCT 30.6* 31.9* 32.1*    253 258       Recent Labs     11/26/22  0536 11/27/22  0531 11/28/22  0536    141 138   K 4.2 4.1 4.4    105 105   CO2 23 22 22   BUN 17 15 15   CREATININE 0.7 0.5 0.6   CALCIUM 9.3 10.2 9.7   PHOS 3.3 3.5 4.2       Recent Labs     11/26/22  0536 11/27/22  0531 11/28/22  0536   PROT 6.3* 6.8 6.1*   ALKPHOS 59 65 64   ALT 14 13 13   AST 19 18 16   BILITOT 0.6 0.6 0.6       No results for input(s): INR in the last 72 hours. No results for input(s): Jose Elias Monterroso in the last 72 hours.     Chronic labs:    Lab Results   Component Value Date    CHOL 217 (H) 09/10/2015    TRIG 117 09/10/2015    HDL 73 09/10/2015    LDLCALC 121 (H) 09/10/2015    TSH 8.570 (H) 11/24/2022    INR 2.4 08/17/2022    LABA1C 5.2 08/26/2013       Radiology: REVIEWED DAILY    +++++++++++++++++++++++++++++++++++++++++++++++++  Ryaray Momin MD  [facility] - [address], [place]  +++++++++++++++++++++++++++++++++++++++++++++++++  NOTE: This report was transcribed using voice recognition software. Every effort was made to ensure accuracy; however, inadvertent computerized transcription errors may be present.

## 2022-11-28 NOTE — CARE COORDINATION
11/28/22 Update CM Note: Patient is NPO for lead wire revision. Will follow.  Electronically signed by Bahman Simmons RN CM on 11/28/2022 at 1:44 PM

## 2022-11-28 NOTE — OP NOTE
[address] [facility] Electrophysiology  Procedure Report  PATIENT: Timothy Zamora  MEDICAL RECORD NUMBER: [id number]  DATE OF PROCEDURE:  11/28/2022  ATTENDING ELECTROPHYSIOLOGIST:Hiro No MD  REFERRING PHYSICIAN: Dr. Lita Alston    Procedure Performed:  1. Insertion of a new right atrial pacing lead  2. Removal of an old right atrial pacing lead  3. Left upper extremity venography  4. Fluoroscopy    Indication for Procedure:  1. Right atrial lead dislodgement    Flouroscopy: 5.1 min  Complications: none immediately apparent  EBL: minimal  Specimens: none  Contrast: 10 cc    FINDINGS:  Implanted device information:  1. Pulse Generator is a Medtronic. Serial # [id number]  Placement: Left pectoral subcutaneous  Date of implant: 11/25/2022  2. Old right atrial lead parameters are as follows:  Medtronic Model # [id number]. Serial # [id number]  Lead position: RA  Date of implant: 11/25/2022  Date of removal: 11/28/2022  3. New right atrial lead parameters are as follows:  Medtronic Model # [id number]. Serial # [id number]  Lead position: RA  Date of implant: 11/28/2022  P-waves: 1.3 mV  Pacing threshold: AFL  Impedance: 1045 ohms. 4. Right ventricular lead parameters are as follows:  Medtronic Model 3830-69. Serial # [id number]  Lead position: RV  Date of implant: 11/25/2022  R-waves: 19 mV  Pacing threshold: 0.5 V at 0.4 ms. Impedance: 608 ohms. 5. Bradycardia parameters:  Mode: DDD  Base Rate: 70  AV delay:  170/150  Max Tracking Rate: 130    DETAILS OF THE OPERATION: The risks, benefits, alternatives of the procedure were explained to patient and family. They consented and agreed to proceed. Written consent was obtained in the chart. Blood products are not routinely needed for such procedures. The patient was brought to the Electrophysiology lab in a fasting and non-sedated state. The patient had electrocardiographic and hemodynamic monitoring equipment placed. During the case the patient was under the care of an anesthesiologist/CRNA team for sedation and hemodynamic monitoring. A final time out was performed prior to beginning the procedure. The patient was prepped and draped in usual sterile fashion. The left subclavian venogram was performed and showed patent subclavian vein. Twenty mL of 2% lidocaine was used to anesthetize the left pectoral area. Using a #10 blade an incision was made over the patient's previous incision. Using combination of scalpel and plasma-blade, we dissected down to the device pocket. The device and was then removed from the pocket and the right atrial lead was disconnected from the header of the device. The suture stiches were removed from the suture sleeve. The straight stylet was inserted into the right atrial lead and the active fixation was retracted. The right atrial lead was slowly removed under fluoroscopic guidance and was then removed from the body. The hemostasis was obtained by using manual compression. Using a modified Seldinger technique and a fluroscopic guidance, a guide wire was placed in the left subclavian vein. Over a short J-tipped guide wire, a 7-Fr Safe-sheath was passed. Through this, the new right atrial lead was advanced without difficulty to the right atrial appendage using a combination of straight and curved stylets and under fluoroscopic guidance. Mapping of the lead was performed. Lead parameters were deemed to be adequate and lead was then actively fixated in place. The 7-Fr Safe- sheath was then removed. The lead was then sewn to the pre-pectoral fascia using 0 Ethibond sutures, suturing over the suture sleeve. The device pocket was then irrigated with antibiotic solution. The new right atrial lead was then attached to the appropriate binding posts on the header of the device. The set screw was then tightened with a torque wrench. Tug tests were performed on both leads to insure they are adequately fixated. The device and the leads were then placed within the Tyrx antibiotic pouch and were placed within the existing device pocket. Lead parameters were then rechecked via the device and deemed to be adequate. The pulse generator was also tied to the pre-pectoral fascia using 0-Ethibond. The incision was closed with 3 layers of absorbable suture, Vicryl. The deepest of which was a 2-0 interrupted stitch followed by a 2nd layer of 3-0 running stitch performed perpendicular to the deep layer and, lastly, a 4-0 subcuticular stitch. The skin was then prepped with benzoin solution, Steri-Strips, and island dressing were then applied. At the end of the case, the patient was hemodynamically stable and under the care of the anesthesiologist, the patient was brought back to the recovery area for post procedural monitoring. ASSESSMENT:  1. Successful removal of old right atrial pacing lead and insertion of the new right atrial pacing lead. RECOMMENDATIONS:  1. Stat portable chest x-ray to rule out pneumothorax and check lead placement now and tomorrow morning. 2. EKG to be performed now. 3. Post-procedural monitoring in the recovery area. 4. The patient will be observed overnight on Telemetry. 5. The patient will receive 24-hours of lawanda-operative intravenous antibiotics. 6. The patient's device will be interrogated in the morning by a representative of the device . 7. The patient is to follow-up in device clinic within 2 weeks upon discharge. 8. The patient is advised follow all post-operative device and wound care instructions as per the information provided.     Naty Leigh MD  Cardiac Electrophysiology  [address]  [facility]  4:28 PM  11/28/2022             ;

## 2022-11-28 NOTE — ANESTHESIA PRE PROCEDURE
Department of Anesthesiology  Preprocedure Note       Name:  Olivier Lei   Age:  75 y.o.  :  1955                                          MRN:  [id number]         Date:  2022      Surgeon: * Surgery not found *    Procedure:     Medications prior to admission:   Prior to Admission medications    Medication Sig Start Date End Date Taking?  Authorizing Provider   metoclopramide (REGLAN) 5 MG tablet Take 5 mg by mouth 4 times daily Take one tablet by mouth three times a day 30 minutes before meals   Yes Kay Prather MD   flecainide (TAMBOCOR) 50 MG tablet Take 50 mg by mouth 2 times daily   Yes Historical Provider, MD   macitentan (OPSUMIT) 10 MG TABS Take 10 mg by mouth daily   Yes Historical Provider, MD   empagliflozin (JARDIANCE) 10 MG tablet Take 10 mg by mouth daily   Yes Historical Provider, MD   hyoscyamine (ANASPAZ;LEVSIN) 125 MCG tablet Take 125 mcg by mouth every 4 hours as needed for Cramping   Yes Historical Provider, MD   apixaban (ELIQUIS) 5 MG TABS tablet Take 1 tablet by mouth 2 times daily 22   Dayana Yanes MD   pantoprazole (PROTONIX) 40 MG tablet Take 40 mg by mouth daily    Historical Provider, MD   sildenafil (REVATIO) 20 MG tablet Take 60 mg by mouth 3 times daily    Historical Provider, MD   fluticasone (FLOVENT HFA) 110 MCG/ACT inhaler Inhale 1 puff into the lungs 2 times daily    Historical Provider, MD   Tiotropium Bromide Monohydrate (SPIRIVA RESPIMAT IN) Inhale into the lungs daily    Historical Provider, MD   NONFORMULARY TREVASAL    Historical Provider, MD   Multiple Vitamins-Minerals (THERAPEUTIC MULTIVITAMIN-MINERALS) tablet Take 1 tablet by mouth daily    Historical Provider, MD   vitamin B-12 (CYANOCOBALAMIN) 1000 MCG tablet Take 1,000 mcg by mouth daily    Historical Provider, MD   calcium carbonate (TUMS) 500 MG chewable tablet Take 1 tablet by mouth daily    Historical Provider, MD   amiodarone (CORDARONE) 200 MG tablet Take 1 tablet by mouth 2 times daily for 14 doses 5/10/22 5/17/22  Arnaud Ortiz MD   torsemide (DEMADEX) 20 MG tablet TAKE ONE TABLET BY MOUTH DAILY 4/4/22   Historical Provider, MD   spironolactone (ALDACTONE) 25 MG tablet TAKE ONE TABLET BY MOUTH DAILY 4/4/22   Historical Provider, MD   furosemide (LASIX) 20 MG tablet take one tablet three times daily 2/8/22   Arnaud Ortiz MD   levothyroxine (SYNTHROID) 75 MCG tablet 75 mcg Daily  12/1/21   Historical Provider, MD   lisinopril (PRINIVIL;ZESTRIL) 5 MG tablet Take 5 mg by mouth daily 9/27/21   Historical Provider, MD   mycophenolate (CELLCEPT) 500 MG tablet Take 1,500 mg by mouth 2 times daily 11/12/21   Historical Provider, MD   potassium chloride (KLOR-CON M) 10 MEQ extended release tablet Take 1 tablet by mouth daily Take with Lasix  Patient taking differently: Take 20 mEq by mouth 2 times daily Take with Lasix 9/24/21   Arnaud Ortiz MD   simvastatin (ZOCOR) 40 MG tablet Take 1 tablet by mouth nightly 3/29/21   Arnaud Ortiz MD   rOPINIRole (REQUIP) 0.25 MG tablet Take 1 tablet by mouth daily  Patient taking differently: Take 0.5 mg by mouth nightly 9/18/15   Carla Trevino DO   Cholecalciferol (VITAMIN D) 2000 UNITS CAPS capsule Take  by mouth daily.     Historical Provider, MD   aspirin 81 MG tablet Take 81 mg by mouth 2 times daily     Historical Provider, MD       Current medications:    Current Facility-Administered Medications   Medication Dose Route Frequency Provider Last Rate Last Admin    metoprolol succinate (TOPROL XL) extended release tablet 12.5 mg  12.5 mg Oral Daily Cathlyn Edge, DO   12.5 mg at 11/28/22 0831    cephALEXin (KEFLEX) capsule 500 mg  500 mg Oral 2 times per day Cathlyn Edge, DO   500 mg at 11/28/22 1173    digoxin (LANOXIN) tablet 125 mcg  125 mcg Oral Daily Cathlyn Edge, DO   125 mcg at 11/28/22 0830    metoprolol (LOPRESSOR) injection 2.5 mg  2.5 mg IntraVENous Q6H PRN Cathlyn Janel, DO   2.5 mg at 11/26/22 1306    sodium chloride flush 0.9 % injection 5-40 mL  5-40 mL IntraVENous 2 times per day Maureen Banker, DO   10 mL at 11/28/22 0830    sodium chloride flush 0.9 % injection 5-40 mL  5-40 mL IntraVENous PRN Maureen Banker, DO        0.9 % sodium chloride infusion   IntraVENous PRN Maureen Banker, DO        acetaminophen (TYLENOL) tablet 650 mg  650 mg Oral Q4H PRN Maureen Banker, DO   650 mg at 11/27/22 2107    sodium chloride flush 0.9 % injection 5-40 mL  5-40 mL IntraVENous 2 times per day Ladoris Dub, APRN - CNP   10 mL at 11/28/22 7987    sodium chloride flush 0.9 % injection 5-40 mL  5-40 mL IntraVENous PRN Ladoris Dub, APRN - CNP        polyethylene glycol (GLYCOLAX) packet 17 g  17 g Oral Daily PRN Ladoris Dub, APRN - CNP        acetaminophen (TYLENOL) suppository 650 mg  650 mg Rectal Q6H PRN Ladoris Dub, APRN - CNP        ipratropium (ATROVENT) 0.02 % nebulizer solution 0.5 mg  0.5 mg Nebulization 4x daily Jerrye Ayanna, DO   0.5 mg at 11/28/22 1235    budesonide (PULMICORT) nebulizer suspension 500 mcg  0.5 mg Nebulization BID Jerrye Ayanna, DO   500 mcg at 11/26/22 1929    [Held by provider] apixaban (ELIQUIS) tablet 5 mg  5 mg Oral BID Ladoris Dub, APRN - CNP   5 mg at 11/24/22 0805    levothyroxine (SYNTHROID) tablet 75 mcg  75 mcg Oral Daily Alden Mistry MD   75 mcg at 11/28/22 0554    pantoprazole (PROTONIX) tablet 40 mg  40 mg Oral Daily Alden Mistry MD   40 mg at 11/28/22 0554    furosemide (LASIX) tablet 20 mg  20 mg Oral Daily Alden Mistry MD   20 mg at 11/27/22 0836    atorvastatin (LIPITOR) tablet 40 mg  40 mg Oral Daily Alden Mistry MD   40 mg at 11/27/22 2101    spironolactone (ALDACTONE) tablet 25 mg  25 mg Oral Daily Alden Mistry MD   25 mg at 11/27/22 0836    vitamin B-12 (CYANOCOBALAMIN) tablet 1,000 mcg  1,000 mcg Oral Daily Alden Mistry MD   1,000 mcg at 11/27/22 0836    potassium chloride (KLOR-CON M) extended release tablet 20 mEq  20 mEq Oral BID Bárbara Iain Hernandez MD   20 mEq at 22    rOPINIRole (REQUIP) tablet 0.5 mg  0.5 mg Oral Nightly Bentley Horn MD   0.5 mg at 22    metoclopramide (REGLAN) tablet 5 mg  5 mg Oral 4x Daily Bentley Horn MD   5 mg at 22 1476    mycophenolate (CELLCEPT) capsule 1,500 mg  1,500 mg Oral BID STEPH Peters CNP   1,500 mg at 22 0830    sildenafil (REVATIO) tablet 60 mg  60 mg Oral TID Bentley Horn MD   60 mg at 22 3778       Allergies:     Allergies   Allergen Reactions    Biaxin [Clarithromycin] Nausea And Vomiting    Naproxen Nausea And Vomiting       Problem List:    Patient Active Problem List   Diagnosis Code    Memory loss R41.3    Cerebral atherosclerosis I67.2    Hypercholesteremia improved E78.00    Right cataract H26.9    Left cataract H26.9    CAD in native artery I25.10    Symptomatic bradycardia R00.1    High-grade atrioventricular block I44.39    Atrial flutter (HCC) I48.92       Past Medical History:        Diagnosis Date    CAD in native artery 2021    Cerebral artery occlusion with cerebral infarction ([address]     \"mini stroke\" 10/2014    H/O cardiovascular stress test 2020    Lexiscan    Hyperlipidemia     Hypertension     Sleep apnea     doesnt use cpap       Past Surgical History:        Procedure Laterality Date    CARDIAC CATHETERIZATION  2012    Right heart cath by Dr Lucrecia Hamman Right 2019    intraocular lens    CATARACT REMOVAL WITH IMPLANT Left 10/15/2019     SECTION      COLONOSCOPY      INTRACAPSULAR CATARACT EXTRACTION Right 2019    RIGHT EYE CATARACT EMULSIFICATION IOL IMPLANT performed by Masood Bernard MD at [place] [address] Left 10/15/2019    LEFT EYE CATARACT EMULSIFICATION IOL IMPLANT performed by Masood Bernard MD at [address] [place] [address] Left 2022    Medtronic dual chamber (Dr Vivian Del Castillo)       Social History:    Social History     Tobacco Use    Smoking status: Former     Packs/day: 1.50     Years: 40.00     Pack years: 60.00     Types: Cigarettes     Quit date: 2020     Years since quittin.1    Smokeless tobacco: Never    Tobacco comments:      she has decreased to 1ppd   Substance Use Topics    Alcohol use: Not Currently     Alcohol/week: 10.0 standard drinks     Types: 10 Glasses of wine per week     Comment: green tea 2 cups a day                                 Counseling given: Not Answered  Tobacco comments:  she has decreased to 1ppd      Vital Signs (Current):   Vitals:    22 2238 22 0430 22 0745 22 1133   BP: (!) 113/59 (!) 103/56 (!) 124/58 100/60   Pulse: 72 73 78 78   Resp:    Temp: 36.7 °C (98.1 °F) 36.7 °C (98 °F) 36.7 °C (98.1 °F) 36.8 °C (98.2 °F)   TempSrc: Temporal Temporal Temporal Temporal   SpO2: 100% 98% 96% 94%   Weight:  150 lb (68 kg)     Height:                                                  BP Readings from Last 3 Encounters:   22 100/60   22 (!) 124/50   22 (!) 117/55       NPO Status:                           Greater 8 hours                                                       BMI:   Wt Readings from Last 3 Encounters:   22 150 lb (68 kg)   22 148 lb (67.1 kg)   22 154 lb (69.9 kg)     Body mass index is 25.75 kg/m².     CBC:   Lab Results   Component Value Date/Time    WBC 4.7 2022 05:36 AM    RBC 3.18 2022 05:36 AM    HGB 9.8 2022 05:36 AM    HCT 32.1 2022 05:36 AM    .9 2022 05:36 AM    RDW 15.9 2022 05:36 AM     2022 05:36 AM       CMP:   Lab Results   Component Value Date/Time     2022 05:36 AM    K 4.4 2022 05:36 AM    K 4.0 2022 03:01 PM     2022 05:36 AM    CO2 22 2022 05:36 AM    BUN 15 2022 05:36 AM    CREATININE 0.6 2022 05:36 AM GFRAA >60 08/17/2022 03:01 PM    LABGLOM >60 11/28/2022 05:36 AM    GLUCOSE 98 11/28/2022 05:36 AM    GLUCOSE 87 01/26/2012 11:02 AM    PROT 6.1 11/28/2022 05:36 AM    CALCIUM 9.7 11/28/2022 05:36 AM    BILITOT 0.6 11/28/2022 05:36 AM    ALKPHOS 64 11/28/2022 05:36 AM    AST 16 11/28/2022 05:36 AM    ALT 13 11/28/2022 05:36 AM       POC Tests: No results for input(s): POCGLU, POCNA, POCK, POCCL, POCBUN, POCHEMO, POCHCT in the last 72 hours. Coags:   Lab Results   Component Value Date/Time    PROTIME 27.7 08/17/2022 03:01 PM    PROTIME 0.0 08/05/2022 01:14 PM    INR 2.4 08/17/2022 03:01 PM    APTT 35.6 04/05/2021 03:00 PM       HCG (If Applicable): No results found for: PREGTESTUR, PREGSERUM, HCG, HCGQUANT     ABGs: No results found for: PHART, PO2ART, GBJ0SNB, ZPJ6JNZ, BEART, P8RFFBXI     Type & Screen (If Applicable):  No results found for: LABABO, LABRH    Drug/Infectious Status (If Applicable):  No results found for: HIV, HEPCAB    COVID-19 Screening (If Applicable):   Lab Results   Component Value Date/Time    COVID19 Not Detected 11/23/2022 10:13 PM    COVID19 Not Detected 04/01/2021 01:18 PM           Anesthesia Evaluation  Patient summary reviewed and Nursing notes reviewed no history of anesthetic complications:   Airway: Mallampati: II  TM distance: >3 FB   Neck ROM: full  Mouth opening: > = 3 FB   Dental: normal exam         Pulmonary:normal exam  breath sounds clear to auscultation  (+) sleep apnea:                             Cardiovascular:  Exercise tolerance: good (>4 METS),   (+) hypertension:, pacemaker: pacemaker, CAD:, dysrhythmias:,         Rhythm: regular  Rate: normal           Beta Blocker:  Not on Beta Blocker         Neuro/Psych:   (+) CVA:,             GI/Hepatic/Renal:   (+) GERD:,           Endo/Other: Negative Endo/Other ROS                    Abdominal:             Vascular: negative vascular ROS.          Other Findings:           Anesthesia Plan      MAC     ASA 3 Induction: intravenous. Anesthetic plan and risks discussed with patient. Plan discussed with attending.                     Prince Homans, APRN - CRNA   11/28/2022

## 2022-11-29 ENCOUNTER — APPOINTMENT (OUTPATIENT)
Dept: GENERAL RADIOLOGY | Age: 67
DRG: 242 | End: 2022-11-29
Attending: FAMILY MEDICINE
Payer: MEDICARE

## 2022-11-29 VITALS
BODY MASS INDEX: 25.52 KG/M2 | OXYGEN SATURATION: 91 % | DIASTOLIC BLOOD PRESSURE: 56 MMHG | SYSTOLIC BLOOD PRESSURE: 113 MMHG | TEMPERATURE: 97.7 F | WEIGHT: 149.5 LBS | HEART RATE: 94 BPM | HEIGHT: 64 IN | RESPIRATION RATE: 14 BRPM

## 2022-11-29 LAB
ALBUMIN SERPL-MCNC: 3.4 G/DL (ref 3.5–5.2)
ALP BLD-CCNC: 63 U/L (ref 35–104)
ALT SERPL-CCNC: 13 U/L (ref 0–32)
ANION GAP SERPL CALCULATED.3IONS-SCNC: 11 MMOL/L (ref 7–16)
AST SERPL-CCNC: 18 U/L (ref 0–31)
BASOPHILS ABSOLUTE: 0.04 E9/L (ref 0–0.2)
BASOPHILS RELATIVE PERCENT: 0.9 % (ref 0–2)
BILIRUB SERPL-MCNC: 0.5 MG/DL (ref 0–1.2)
BUN BLDV-MCNC: 11 MG/DL (ref 6–23)
CALCIUM IONIZED: 1.25 MMOL/L (ref 1.15–1.33)
CALCIUM SERPL-MCNC: 9.1 MG/DL (ref 8.6–10.2)
CHLORIDE BLD-SCNC: 100 MMOL/L (ref 98–107)
CO2: 22 MMOL/L (ref 22–29)
CREAT SERPL-MCNC: 0.5 MG/DL (ref 0.5–1)
EKG ATRIAL RATE: 300 BPM
EKG ATRIAL RATE: 70 BPM
EKG P AXIS: 87 DEGREES
EKG P-R INTERVAL: 358 MS
EKG Q-T INTERVAL: 404 MS
EKG Q-T INTERVAL: 436 MS
EKG QRS DURATION: 134 MS
EKG QRS DURATION: 168 MS
EKG QTC CALCULATION (BAZETT): 436 MS
EKG QTC CALCULATION (BAZETT): 470 MS
EKG R AXIS: -22 DEGREES
EKG R AXIS: 94 DEGREES
EKG T AXIS: 2 DEGREES
EKG T AXIS: 71 DEGREES
EKG VENTRICULAR RATE: 70 BPM
EKG VENTRICULAR RATE: 70 BPM
EOSINOPHILS ABSOLUTE: 0.13 E9/L (ref 0.05–0.5)
EOSINOPHILS RELATIVE PERCENT: 2.8 % (ref 0–6)
GFR SERPL CREATININE-BSD FRML MDRD: >60 ML/MIN/1.73
GLUCOSE BLD-MCNC: 89 MG/DL (ref 74–99)
HCT VFR BLD CALC: 31.5 % (ref 34–48)
HEMOGLOBIN: 9.6 G/DL (ref 11.5–15.5)
IMMATURE GRANULOCYTES #: 0.02 E9/L
IMMATURE GRANULOCYTES %: 0.4 % (ref 0–5)
LYMPHOCYTES ABSOLUTE: 1.05 E9/L (ref 1.5–4)
LYMPHOCYTES RELATIVE PERCENT: 22.4 % (ref 20–42)
MAGNESIUM: 1.7 MG/DL (ref 1.6–2.6)
MCH RBC QN AUTO: 30.6 PG (ref 26–35)
MCHC RBC AUTO-ENTMCNC: 30.5 % (ref 32–34.5)
MCV RBC AUTO: 100.3 FL (ref 80–99.9)
MONOCYTES ABSOLUTE: 0.55 E9/L (ref 0.1–0.95)
MONOCYTES RELATIVE PERCENT: 11.7 % (ref 2–12)
NEUTROPHILS ABSOLUTE: 2.9 E9/L (ref 1.8–7.3)
NEUTROPHILS RELATIVE PERCENT: 61.8 % (ref 43–80)
PDW BLD-RTO: 16.1 FL (ref 11.5–15)
PHOSPHORUS: 3.2 MG/DL (ref 2.5–4.5)
PLATELET # BLD: 258 E9/L (ref 130–450)
PMV BLD AUTO: 9.8 FL (ref 7–12)
POTASSIUM SERPL-SCNC: 4.2 MMOL/L (ref 3.5–5)
RBC # BLD: 3.14 E12/L (ref 3.5–5.5)
SODIUM BLD-SCNC: 133 MMOL/L (ref 132–146)
TOTAL PROTEIN: 6.1 G/DL (ref 6.4–8.3)
WBC # BLD: 4.7 E9/L (ref 4.5–11.5)

## 2022-11-29 PROCEDURE — 36415 COLL VENOUS BLD VENIPUNCTURE: CPT

## 2022-11-29 PROCEDURE — 6360000002 HC RX W HCPCS: Performed by: INTERNAL MEDICINE

## 2022-11-29 PROCEDURE — 99233 SBSQ HOSP IP/OBS HIGH 50: CPT | Performed by: INTERNAL MEDICINE

## 2022-11-29 PROCEDURE — 84100 ASSAY OF PHOSPHORUS: CPT

## 2022-11-29 PROCEDURE — 82330 ASSAY OF CALCIUM: CPT

## 2022-11-29 PROCEDURE — 93010 ELECTROCARDIOGRAM REPORT: CPT | Performed by: INTERNAL MEDICINE

## 2022-11-29 PROCEDURE — 80053 COMPREHEN METABOLIC PANEL: CPT

## 2022-11-29 PROCEDURE — 93005 ELECTROCARDIOGRAM TRACING: CPT | Performed by: INTERNAL MEDICINE

## 2022-11-29 PROCEDURE — 2580000003 HC RX 258: Performed by: INTERNAL MEDICINE

## 2022-11-29 PROCEDURE — 83735 ASSAY OF MAGNESIUM: CPT

## 2022-11-29 PROCEDURE — 94640 AIRWAY INHALATION TREATMENT: CPT

## 2022-11-29 PROCEDURE — 2700000000 HC OXYGEN THERAPY PER DAY

## 2022-11-29 PROCEDURE — 6370000000 HC RX 637 (ALT 250 FOR IP): Performed by: FAMILY MEDICINE

## 2022-11-29 PROCEDURE — 6370000000 HC RX 637 (ALT 250 FOR IP): Performed by: INTERNAL MEDICINE

## 2022-11-29 PROCEDURE — 71045 X-RAY EXAM CHEST 1 VIEW: CPT

## 2022-11-29 PROCEDURE — 85025 COMPLETE CBC W/AUTO DIFF WBC: CPT

## 2022-11-29 RX ORDER — METOPROLOL SUCCINATE 25 MG/1
12.5 TABLET, EXTENDED RELEASE ORAL DAILY
Qty: 90 TABLET | Refills: 3 | Status: SHIPPED | OUTPATIENT
Start: 2022-11-30

## 2022-11-29 RX ORDER — CEPHALEXIN 500 MG/1
500 CAPSULE ORAL EVERY 12 HOURS SCHEDULED
Qty: 6 CAPSULE | Refills: 0 | Status: SHIPPED | OUTPATIENT
Start: 2022-11-29 | End: 2022-12-02

## 2022-11-29 RX ORDER — DOXYCYCLINE HYCLATE 100 MG/1
100 CAPSULE ORAL EVERY 12 HOURS SCHEDULED
Status: DISCONTINUED | OUTPATIENT
Start: 2022-11-29 | End: 2022-11-29

## 2022-11-29 RX ORDER — TRAMADOL HYDROCHLORIDE 50 MG/1
50 TABLET ORAL ONCE
Status: COMPLETED | OUTPATIENT
Start: 2022-11-29 | End: 2022-11-29

## 2022-11-29 RX ORDER — METOPROLOL SUCCINATE 25 MG/1
12.5 TABLET, EXTENDED RELEASE ORAL DAILY
Qty: 90 TABLET | Refills: 3 | Status: SHIPPED | OUTPATIENT
Start: 2022-11-30 | End: 2022-11-29 | Stop reason: SDUPTHER

## 2022-11-29 RX ADMIN — BUDESONIDE 500 MCG: 0.5 SUSPENSION RESPIRATORY (INHALATION) at 07:47

## 2022-11-29 RX ADMIN — CEPHALEXIN 500 MG: 500 CAPSULE ORAL at 08:27

## 2022-11-29 RX ADMIN — TRAMADOL HYDROCHLORIDE 50 MG: 50 TABLET, COATED ORAL at 07:29

## 2022-11-29 RX ADMIN — DIGOXIN 125 MCG: 0.12 TABLET ORAL at 08:27

## 2022-11-29 RX ADMIN — IPRATROPIUM BROMIDE 0.5 MG: 0.5 SOLUTION RESPIRATORY (INHALATION) at 07:47

## 2022-11-29 RX ADMIN — SILDENAFIL 60 MG: 20 TABLET ORAL at 12:41

## 2022-11-29 RX ADMIN — ACETAMINOPHEN 650 MG: 325 TABLET, FILM COATED ORAL at 12:39

## 2022-11-29 RX ADMIN — FUROSEMIDE 20 MG: 40 TABLET ORAL at 12:42

## 2022-11-29 RX ADMIN — SODIUM CHLORIDE, PRESERVATIVE FREE 10 ML: 5 INJECTION INTRAVENOUS at 08:27

## 2022-11-29 RX ADMIN — CYANOCOBALAMIN TAB 1000 MCG 1000 MCG: 1000 TAB at 08:27

## 2022-11-29 RX ADMIN — SPIRONOLACTONE 25 MG: 25 TABLET ORAL at 08:27

## 2022-11-29 RX ADMIN — LEVOTHYROXINE SODIUM 75 MCG: 0.07 TABLET ORAL at 07:05

## 2022-11-29 RX ADMIN — POTASSIUM CHLORIDE 20 MEQ: 20 TABLET, EXTENDED RELEASE ORAL at 08:27

## 2022-11-29 RX ADMIN — VANCOMYCIN HYDROCHLORIDE 1000 MG: 1 INJECTION, POWDER, LYOPHILIZED, FOR SOLUTION INTRAVENOUS at 04:40

## 2022-11-29 RX ADMIN — PANTOPRAZOLE SODIUM 40 MG: 40 TABLET, DELAYED RELEASE ORAL at 07:05

## 2022-11-29 RX ADMIN — METOPROLOL SUCCINATE 12.5 MG: 25 TABLET, EXTENDED RELEASE ORAL at 08:28

## 2022-11-29 RX ADMIN — IPRATROPIUM BROMIDE 0.5 MG: 0.5 SOLUTION RESPIRATORY (INHALATION) at 11:44

## 2022-11-29 RX ADMIN — ACETAMINOPHEN 650 MG: 325 TABLET, FILM COATED ORAL at 02:47

## 2022-11-29 RX ADMIN — METOCLOPRAMIDE 5 MG: 5 TABLET ORAL at 08:27

## 2022-11-29 ASSESSMENT — PAIN DESCRIPTION - LOCATION
LOCATION: INCISION;CHEST
LOCATION: CHEST

## 2022-11-29 ASSESSMENT — PAIN SCALES - GENERAL
PAINLEVEL_OUTOF10: 4
PAINLEVEL_OUTOF10: 6
PAINLEVEL_OUTOF10: 5

## 2022-11-29 ASSESSMENT — PAIN DESCRIPTION - ORIENTATION
ORIENTATION: LEFT;UPPER
ORIENTATION: LEFT

## 2022-11-29 ASSESSMENT — PAIN DESCRIPTION - DESCRIPTORS
DESCRIPTORS: ACHING
DESCRIPTORS: ACHING;NAGGING;DISCOMFORT

## 2022-11-29 NOTE — PROGRESS NOTES
Discharge instructions given to patient. All questions answered appropriately. Ivs removed and heart monitor cleansed and placed back in nurses station.

## 2022-11-29 NOTE — DISCHARGE SUMMARY
Hospitalist Discharge Summary    Patient ID: Milly Hurley   Patient : 1955  Patient's PCP: Emy Rawls DO    Admit Date: 2022   Admitting Physician: Juve Montez DO    Discharge Date:  2022  Discharge Physician: Jean Claude Wallace MD   Discharge Condition: Stable  Discharge Disposition: Home    History of presenting illness:  Ms. Milly Hurley, a 75y.o. year old female  who  has a past medical history of CAD in native artery, Cerebral artery occlusion with cerebral infarction [facility]), H/O cardiovascular stress test, Hyperlipidemia, Hypertension, and Sleep apnea. This is a 58-year-old female with a past history of heart disease and hypertension hyperlipidemia and A. fib. She is also on flecainide and metoprolol for rate control. She had concerns of an irregular heartbeat and shortness of breath. She was reading her pulse ox at home because of the shortness of breath and her legs her heart rate was in the 40s. In the emergency room she was assessed. In route from the emergency medical services she was documented to have a heart rate between 80 up to 140. She taken her medications on schedule and took a extra flecainide yesterday. She was also complaining of leg discomfort between her shoulder planes and dial and intermittent and nonradiating. She was admitted for further care to critical care unit. There was concern that the patient was an AV block third-degree with ventricular escape rhythm which was bradycardic causing her to have a feeling of shortness of breath     She has a known history of nonvalvular paroxysmal A. fib. She also has had a cardioversion in March of this year and again in August.  Her residual heart rate rhythm on her EKG was first-degree AV block. She is also known to have history of heart failure with preserved ejection fraction and atherosclerosis manifesting as carotid artery stenosis. Patient does have a history of scleroderma.   She initially also had been treated with Norvasc for Raynaud's and mycophenolate for cutaneous manifestations. She has been appropriately assessed and appropriate interventions have been made between [place] and our local doctors. Hospital course in brief:  (Please refer to daily progress notes for a comprehensive review of the hospitalization by requesting medical records)    Patient was admitted to the hospital with symptomatic bradycardia. Physiology consulted, patient underwent pacemaker with revision:    Procedure Performed:  1. Insertion of a new right atrial pacing lead  2. Removal of an old right atrial pacing lead  3. Left upper extremity venography  4. Fluoroscopy     Indication for Procedure:  1. Right atrial lead dislodgement    She continued to feel fine. No chest pain no abdominal pain. She was placed on keflex per EP   She feels better   She will be discharged home to follow-up with PCP and electrophysiology as an outpatient. Patient is adjusted per cardiology.     Consults:   IP CONSULT TO ELECTROPHYSIOLOGY  IP CONSULT TO RHEUMATOLOGY    Discharge Diagnoses:  Bradycardia, symptomatic  EP consulted and pt underwent pacemaker placement but will require revision which is planned on 11/28  EP would like pt to remain on keflex prior to revision      Acute respiratory failure with hypoxia-resolved  Anemia  Hypothyroidism  Hx of A fib  Hx of scleroderma  Hx of pulmonary HTN  HFpEF       Discharge Instructions / Follow up:    Continued appropriate risk factor modification of blood pressure, diabetes and serum lipids will remain essential to reducing risk of future atherosclerotic development    Activity: activity as tolerated    Significant labs:  CBC:   Recent Labs     11/27/22  0531 11/28/22  0536 11/29/22  0604   WBC 4.5 4.7 4.7   RBC 3.21* 3.18* 3.14*   HGB 9.8* 9.8* 9.6*   HCT 31.9* 32.1* 31.5*   MCV 99.4 100.9* 100.3*   RDW 15.8* 15.9* 16.1*    258 258     BMP:   Recent Labs     11/27/22  0531 11/28/22  0536 11/29/22  0604    138 133   K 4.1 4.4 4.2    105 100   CO2 22 22 22   BUN 15 15 11   CREATININE 0.5 0.6 0.5   MG 2.0 1.9 1.7   PHOS 3.5 4.2 3.2     LFT:  Recent Labs     11/27/22  0531 11/28/22  0536 11/29/22  0604   PROT 6.8 6.1* 6.1*   ALKPHOS 65 64 63   ALT 13 13 13   AST 18 16 18   BILITOT 0.6 0.6 0.5     PT/INR: No results for input(s): INR, APTT in the last 72 hours. BNP: No results for input(s): BNP in the last 72 hours. Hgb A1C:   Lab Results   Component Value Date    LABA1C 5.2 08/26/2013     Folate and B12:   Lab Results   Component Value Date    ABDRYARE08 819 07/23/2021   ,   Lab Results   Component Value Date    FOLATE 12.7 07/23/2021     Thyroid Studies:   Lab Results   Component Value Date    TSH 8.570 (H) 11/24/2022       Urinalysis:    Lab Results   Component Value Date/Time    NITRU Negative 08/17/2022 04:30 PM    WBCUA 1-3 04/05/2021 02:48 PM    BACTERIA RARE 04/05/2021 02:48 PM    RBCUA NONE 04/05/2021 02:48 PM    BLOODU Negative 08/17/2022 04:30 PM    SPECGRAV 1.010 08/17/2022 04:30 PM    GLUCOSEU Negative 08/17/2022 04:30 PM       Imaging:  XR CHEST (2 VW)    Result Date: 11/26/2022  EXAMINATION: TWO XRAY VIEWS OF THE CHEST 11/26/2022 10:03 am COMPARISON: 11/26/2022 HISTORY: ORDERING SYSTEM PROVIDED HISTORY: pacemaker TECHNOLOGIST PROVIDED HISTORY: Keep left arm DOWN. Reason for exam:->pacemaker What reading provider will be dictating this exam?->CRC FINDINGS: AICD visualized in the left chest with 2 leads in the heart. The cardiomediastinal silhouette is without acute process. Prominence of the bronchovascular tissue lung markings visualized in bilateral lung fields. Scattered areas of patchy airspace opacification are seen. No evidence of focal infiltrate or consolidation. The costophrenic angles are clear with no evidence of pleural effusion. No evidence of pneumothorax or parenchymal lung mass is seen. The osseous structures are without acute process. AICD visualized in the left chest with 2 leads in the heart. No evidence of pneumothorax or parenchymal contusion. Bronchovascular prominence     XR CHEST (2 VW)    Result Date: 11/23/2022  EXAMINATION: TWO XRAY VIEWS OF THE CHEST11/23/2022 TECHNIQUE: Frontal and lateral  view submitted COMPARISON: None. HISTORY: ORDERING SYSTEM PROVIDED HISTORY: sob TECHNOLOGIST PROVIDED HISTORY: Reason for exam:->sob FINDINGS: The lungs are clear without focal consolidation, large pleural effusion, or pneumothorax. The cardiomediastinal silhouette is stable. No acute cardiopulmonary process. XR CHEST PORTABLE    Result Date: 11/29/2022  EXAMINATION: ONE XRAY VIEW OF THE CHEST 11/29/2022 7:23 am COMPARISON: 28 November 2022 HISTORY: ORDERING SYSTEM PROVIDED HISTORY: Post atrial lead placement TECHNOLOGIST PROVIDED HISTORY: Reason for exam:->Post atrial lead placement What reading provider will be dictating this exam?->CRC FINDINGS: Left-sided pacemaker is unchanged. No pneumothorax or other insertional complication is demonstrated. Pulmonary vascularity is congested and there is interstitial edema evident. CHF with pulmonary vascular congestion and interstitial pulmonary edema. Stable left-sided pacemaker with no evidence of insertional complication. XR CHEST PORTABLE    Result Date: 11/28/2022  EXAMINATION: ONE XRAY VIEW OF THE CHEST 11/28/2022 5:49 pm COMPARISON: Chest series from November 26, 2022 HISTORY: ORDERING SYSTEM PROVIDED HISTORY: Pacemaker placement and rule out pneumothorax TECHNOLOGIST PROVIDED HISTORY: Reason for exam:->Pacemaker placement and rule out pneumothorax What reading provider will be dictating this exam?->CRC FINDINGS: Left anterior chest wall cardiac support device with distal leads projecting over the vicinity of the right atrium and right ventricle. Terminal connector pins appearing engaged. Atherosclerotic disease and cardiomegaly. Bilateral interstitial opacities.  No pleural effusions or pneumothoraces. Osseous and thoracic soft tissue structures demonstrate no acute findings. 1.  Left anterior chest wall cardiac support device. No evidence of pneumothorax. 2.  Atherosclerotic disease and cardiomegaly. Bilateral interstitial opacities with an appearance more suggestive of edema. Infection not entirely excluded. XR CHEST PORTABLE    Result Date: 11/26/2022  EXAMINATION: ONE XRAY VIEW OF THE CHEST 11/26/2022 8:03 am COMPARISON: 11/25/2022 HISTORY: ORDERING SYSTEM PROVIDED HISTORY: pacemaker TECHNOLOGIST PROVIDED HISTORY: Reason for exam:->pacemaker What reading provider will be dictating this exam?->CRC FINDINGS: Mild bilateral hazy parenchymal opacities not significantly changed compared to 11/25/2022. heart is mildly enlarged. There is a multi lead left pacemaker device which appear stable. No pneumothorax or evidence of alveolar consolidation. Osseous structures are unchanged. Mild cardiomegaly. Stable bilateral hazy interstitial parenchymal opacities. XR CHEST PORTABLE    Result Date: 11/25/2022  EXAMINATION: ONE XRAY VIEW OF THE CHEST 11/25/2022 3:47 pm COMPARISON: 11/23/2022 chest radiograph. HISTORY: ORDERING SYSTEM PROVIDED HISTORY: Pacemaker placement and rule out pneumothorax TECHNOLOGIST PROVIDED HISTORY: Reason for exam:->Pacemaker placement and rule out pneumothorax What reading provider will be dictating this exam?->CRC FINDINGS: The lungs are well expanded. There are interstitial and patchy airspace densities throughout the lungs. Cardiac silhouette is enlarged. There is atherosclerotic calcification of the thoracic aorta. There is a left subclavian dual-chamber pacemaker with leads in the right atrium and right ventricle. No pneumothorax. Osseous thorax is unremarkable. New bilateral lung infiltrates which may be due to mild pulmonary edema and or atelectasis. Interval left subclavian pacemaker placement, no pneumothorax.  Stable enlargement of the cardiac silhouette.        Discharge Medications:      Medication List        START taking these medications      cephALEXin 500 MG capsule  Commonly known as: KEFLEX  Take 1 capsule by mouth every 12 hours for 3 days     metoprolol succinate 25 MG extended release tablet  Commonly known as: TOPROL XL  Take 0.5 tablets by mouth daily  Start taking on: November 30, 2022            CONTINUE taking these medications      apixaban 5 MG Tabs tablet  Commonly known as: Eliquis  Take 1 tablet by mouth 2 times daily     calcium carbonate 500 MG chewable tablet  Commonly known as: TUMS     fluticasone 110 MCG/ACT inhaler  Commonly known as: FLOVENT HFA     hyoscyamine 125 MCG tablet  Commonly known as: ANASPAZ;LEVSIN     Jardiance 10 MG tablet  Generic drug: empagliflozin     levothyroxine 75 MCG tablet  Commonly known as: SYNTHROID     metoclopramide 5 MG tablet  Commonly known as: REGLAN     mycophenolate 500 MG tablet  Commonly known as: CELLCEPT     NONFORMULARY     Opsumit 10 MG Tabs  Generic drug: macitentan     pantoprazole 40 MG tablet  Commonly known as: PROTONIX     sildenafil 20 MG tablet  Commonly known as: REVATIO     simvastatin 40 MG tablet  Commonly known as: ZOCOR  Take 1 tablet by mouth nightly     SPIRIVA RESPIMAT IN     spironolactone 25 MG tablet  Commonly known as: ALDACTONE     therapeutic multivitamin-minerals tablet     torsemide 20 MG tablet  Commonly known as: DEMADEX     vitamin B-12 1000 MCG tablet  Commonly known as: CYANOCOBALAMIN     vitamin D 50 MCG (2000 UT) Caps capsule            STOP taking these medications      amiodarone 200 MG tablet  Commonly known as: CORDARONE     aspirin 81 MG tablet     flecainide 50 MG tablet  Commonly known as: TAMBOCOR     furosemide 20 MG tablet  Commonly known as: LASIX     lisinopril 5 MG tablet  Commonly known as: PRINIVIL;ZESTRIL     metoprolol tartrate 50 MG tablet  Commonly known as: LOPRESSOR     warfarin 5 MG tablet  Commonly known as: Coumadin            ASK your doctor about these medications      potassium chloride 10 MEQ extended release tablet  Commonly known as: KLOR-CON M  Take 1 tablet by mouth daily Take with Lasix     rOPINIRole 0.25 MG tablet  Commonly known as: REQUIP  Take 1 tablet by mouth daily               Where to Get Your Medications        These medications were sent to [address], [address]  [place] [address]      Phone: [phone]   metoprolol succinate 25 MG extended release tablet       These medications were sent to [facility] [address], [address] [facility]      Phone: [phone]   cephALEXin 500 MG capsule         Time Spent on discharge is more than 35 minutes in the examination, evaluation, counseling and review of medications and discharge plan.    +++++++++++++++++++++++++++++++++++++++++++++++++  MD ROSIE Young/ Mateo Martinez [address] [place] [address]  +++++++++++++++++++++++++++++++++++++++++++++++++  NOTE: This report was transcribed using voice recognition software. Every effort was made to ensure accuracy; however, inadvertent computerized transcription errors may be present.

## 2022-11-29 NOTE — CARE COORDINATION
11/29/22 Update CM Note: Patient is POD 1 of right pacing lead removal and insertion. Doxy stopped. Discharge order placed and patient will return home today.  Electronically signed by Jonnathan Mayen RN CM on 11/29/2022 at 2:13 PM

## 2022-11-29 NOTE — PROGRESS NOTES
[address] ELECTROPHYSIOLOGY DEPARTMENT/DIVISION OF CARDIOLOGY  Inpatient progress report  PATIENT: Du Gresham RECORD NUMBER: [id number]  DATE OF SERVICE:  11/29/2022  ATTENDING ELECTROPHYSIOLOGIST:  Dr Elbert Albright   Primary EP: Dr Barraza Filter: Estrada Ulloa DO and Denys Wilks DO  CHIEF COMPLAINT: AV block    HPI: Anne-Marie Parish is a 75 y.o. female with a history of nonvalvular persistent AF sp DCCV (3/2022 at [facility]), AT sp DCCV (8/11/2022 at [facility]), first-degree AV block, RBBB, HFpEF, CAD, CVA, carotid artery stenosis sp right CEA (?),  HTN, JANNETH-noncompliant with CPAP, COPD/emphysema, severe pulmonary HTN-combination of group 1 (CTD) + group 2 + group 3 (emphysema, JANNETH), scleroderma/Raynaud's/esophageal dysmotility, hypothyroidism, and cataracts sp bilateral removal (2019). She is managed by Annalise Zendejas and Jess (F EP) with apixaban 5 mg twice daily, flecainide 100 mg BID, metoprolol XL 12.5 mg daily, spironolactone 75 mg daily, sildenafil 20 mg TID, simvastatin 40 mg daily, torsemide 20 mg daily, and PPI. In 2020, she was diagnosed with pulmonary hypertension, who initiated sildenafil. She was referred to rheumatology, who diagnosed the patient with scleroderma and initiated treatment with Norvasc for Raynaud's and mycophenolate for cutaneous manifestations. In 2021, she was diagnosed with nonvalvular paroxysmal AF, which was treated with [facility] and beta-blocker. In 12/2021, she was reportedly on a brief course of amiodarone, but details of this are unavailable to me. In 3/2022, she was admitted to [facility] for acute right-sided heart failure and AF with RVR, which was treated with diuresis thoracentesis, and ELMER/DCCV. In 6/2022, patient was referred to [facility] EP (Dr. Riley Aggarwal) for management of AF. She was noted to be in AT with 2-1 AV conduction at 110 bpm.  Metoprolol was increased at that time.   In 8/2022, she underwent DCCV with initiation of flecainide 50 mg twice daily. In 9/2022, she was noted to be back in AF/AT with variable AV conduction with ventricular rate of 84 bpm, RBBB with LAD, which was managed by increasing the flecainide dose to 100 mg twice daily and reducing metoprolol tartrate dose to 12.5 mg twice daily, as well as changing Coumadin to apixaban. Patient reduced metoprolol dose to once daily on her own due to hypotension. In 10/2022, she was noted to be in sinus with first-degree AV block and RBBB, so left on apixaban, flecainide 100 mg twice daily and metoprolol tartrate changed to metoprolol XL 12.5 mg daily. On 11/23/22, she presented with chief complaint of dizziness with low HR on home BP monitor. She was diagnosed with AT with third-degree AV block and ventricular escape rhythm in the 30s. EP service is now consulted by admitting physician for further evaluation and management of AV block. Patient denies any other complaints at this time. 11/26/2022: She was observed off BB and Ic for ~48 hours and noted to have AFL with ventricular rates in ongoing AV block with ventricular rate in 50's along with intermittent AV block and ventricular escape rhythm. On 11/25/22, patient underwent implant of Medtronic dual-chamber pacemaker with RV lead in LBB area. RA lead was very difficult to implant in a stable position. Multiple RA leads as well as stylets were used to position the RA lead in multiple positions (~5 deployments, RAA, septal, lateral wall), but dislodged several times. The final RA lead position was along the lateral wall as this provided adequate sensing (> 1 mV) and stability. After final deployment, RA lead was observed for 30 minutes prior to ending the procedure, and noted to remain in stable position with stable function. A 4-hour postoperative chest x-ray showed RA lead in a stable position and device check with stable lead function. However, this morning repeat chest x-ray shows RA lead dislodged.   Patient denies any complaints at this time. She is RV pacing 98.6% with ongoing AFL. She reports in the past, her only symptoms with AF ever occurred when her HR was uncontrolled (too fast or too slow). Today, at ~1030 her AV conduction transiently recovered with ventricular rates at ~140 bpm. Home metoprolol restarted (intolerant to higher doses reportedly due to hypotension) and digoxin 62.5 mcg daily initiated. 11/27/22: Predominantly ventricular paced. Denies any complaints. Plan for RA lead revision 11/28/22 with Dr Tez Leblanc NPO at midnight. 11/28/2022: RA lead revision    11/29/2022: Pressure dressing from left chest removed and site without swelling or drainage. Pain controlled. Patient feels overall good and plans for discharge today. Device check and x-ray done this morning. Prior cardiac testing:  -ECG (11/24/2022): AT with 41 AV conduction and ventricular rate of 50 bpm, RBBB (QRS D = 156 ms)  -ECG (11/24/2022 at 2210): AT, third-degree AV block with ventricular escape rhythm at 38 bpm  -TTE (10/31/2022 at [facility]): LVEF = 69%, grade 3 LVDD, RV dilation, JASON, mild TR, mild-moderate pulmonary HTN. -TTE (8/25/21): LVEF = 60%, grade III LVDD, mild RVSD, RV dilation, mild CHRISTINE, moderate TR, moderate pulmonary HTN. -ECG (8/17/2022): Sinus at 53 bpm, first-degree AV block (TENISHA = 204 ms), RBBB (QRS D = 138 ms), QTc = 452 ms.  - C (4/6/2021): Moderate pulmonary arterial hypertension. -TTE (2/25/2021): LVEF = 65%, mild asymmetric septal LVH, hyperdynamic LV with complete obliteration of LV cavity during systole, stage III LVDD, mild RV dilation, moderate R VSD, moderate TR, and severe pulmonary HTN. -Pharmacologic nuclear stress test (6/29/2020): LVEF = 79%, normal perfusion. -TTE (2/10/2020): LVEF = 78%, mild concentric LVH with septal thickening causing mild LVOT obstruction, mild CHRISTINE, mild-moderate TR, moderate-severe pulmonary HTN, pericardial fat pad.     Past Medical History:   Diagnosis Date    CAD in native artery 2021    Cerebral artery occlusion with cerebral infarction ([address]     \"mini stroke\" 10/2014    H/O cardiovascular stress test 2020    Lexiscan    Hyperlipidemia     Hypertension     Sleep apnea     doesnt use cpap     Past Surgical History:   Procedure Laterality Date    CARDIAC CATHETERIZATION  2012    Right heart cath by Dr Ardeth Peabody Right 2019    intraocular lens    CATARACT REMOVAL WITH IMPLANT Left 10/15/2019     SECTION      COLONOSCOPY      INTRACAPSULAR CATARACT EXTRACTION Right 2019    RIGHT EYE CATARACT EMULSIFICATION IOL IMPLANT performed by Mac Nunez MD at [facility] Left 10/15/2019    LEFT EYE CATARACT EMULSIFICATION IOL IMPLANT performed by Mac Nunez MD at [address] [place] [address] 24  2022    Successful removal of old right atrial pacing lead and insertion of the new right atrial pacing lead (Dr. Marc Bradley)    PACEMAKER PLACEMENT Left 2022    Medtronic dual chamber (Dr Valentina Villegas)      Family History   Problem Relation Age of Onset    Other Mother         complication of surgery    Heart Disease Brother      There is no family history of sudden cardiac arrest    Social History     Tobacco Use    Smoking status: Former     Packs/day: 1.50     Years: 40.00     Pack years: 60.00     Types: Cigarettes     Quit date: 2020     Years since quittin.2    Smokeless tobacco: Never    Tobacco comments:      she has decreased to 1ppd   Substance Use Topics    Alcohol use: Not Currently     Alcohol/week: 10.0 standard drinks     Types: 10 Glasses of wine per week     Comment: green tea 2 cups a day        Current Facility-Administered Medications   Medication Dose Route Frequency Provider Last Rate Last Admin    sodium chloride flush 0.9 % injection 5-40 mL  5-40 mL IntraVENous 2 times per day Hiro Donnie Enamorado MD   10 mL at 11/29/22 0827    sodium chloride flush 0.9 % injection 5-40 mL  5-40 mL IntraVENous PRN Catrachita Maynard MD        0.9 % sodium chloride infusion   IntraVENous PRN Catrachita Maynard MD        metoprolol succinate (TOPROL XL) extended release tablet 12.5 mg  12.5 mg Oral Daily Catrachita Maynard MD   12.5 mg at 11/29/22 0828    cephALEXin (KEFLEX) capsule 500 mg  500 mg Oral 2 times per day Catrachita Maynard MD   500 mg at 11/29/22 0827    digoxin (LANOXIN) tablet 125 mcg  125 mcg Oral Daily Catrachita Maynard MD   125 mcg at 11/29/22 0827    metoprolol (LOPRESSOR) injection 2.5 mg  2.5 mg IntraVENous Q6H PRN Catrachita Maynard MD   2.5 mg at 11/26/22 1306    sodium chloride flush 0.9 % injection 5-40 mL  5-40 mL IntraVENous 2 times per day Catrachita Maynard MD   10 mL at 11/28/22 2100    sodium chloride flush 0.9 % injection 5-40 mL  5-40 mL IntraVENous PRN Catrachita Maynard MD        0.9 % sodium chloride infusion   IntraVENous PRN Catrachita Maynard MD        acetaminophen (TYLENOL) tablet 650 mg  650 mg Oral Q4H PRN Catrachita Maynard MD   650 mg at 11/29/22 1239    sodium chloride flush 0.9 % injection 5-40 mL  5-40 mL IntraVENous 2 times per day Catrachita Maynard MD   10 mL at 11/28/22 2100    sodium chloride flush 0.9 % injection 5-40 mL  5-40 mL IntraVENous PRN Catrachita Maynard MD        polyethylene glycol (GLYCOLAX) packet 17 g  17 g Oral Daily PRN Hiro Clark MD        acetaminophen (TYLENOL) suppository 650 mg  650 mg Rectal Q6H PRN Hiro Clark MD        ipratropium (ATROVENT) 0.02 % nebulizer solution 0.5 mg  0.5 mg Nebulization 4x daily Catrachita Maynard MD   0.5 mg at 11/29/22 1144    budesonide (PULMICORT) nebulizer suspension 500 mcg  0.5 mg Nebulization BID Catrachita Maynard MD   500 mcg at 11/29/22 0747    [Held by provider] apixaban (ELIQUIS) tablet 5 mg  5 mg Oral BID Catrachita Maynard MD   5 mg at 11/24/22 1415 levothyroxine (SYNTHROID) tablet 75 mcg  75 mcg Oral Daily Leticia Valencia MD   75 mcg at 11/29/22 0705    pantoprazole (PROTONIX) tablet 40 mg  40 mg Oral Daily Leticiaanna Valencia MD   40 mg at 11/29/22 5306    furosemide (LASIX) tablet 20 mg  20 mg Oral Daily Leticiaanna Valencia MD   20 mg at 11/29/22 1242    atorvastatin (LIPITOR) tablet 40 mg  40 mg Oral Daily Leticiaanna Valencia MD   40 mg at 11/28/22 2244    spironolactone (ALDACTONE) tablet 25 mg  25 mg Oral Daily Leticiaanna Valencia MD   25 mg at 11/29/22 0827    vitamin B-12 (CYANOCOBALAMIN) tablet 1,000 mcg  1,000 mcg Oral Daily Leticia Valencia MD   1,000 mcg at 11/29/22 0827    potassium chloride (KLOR-CON M) extended release tablet 20 mEq  20 mEq Oral BID Leticiaanna Valencia MD   20 mEq at 11/29/22 0827    rOPINIRole (REQUIP) tablet 0.5 mg  0.5 mg Oral Nightly Leticia Annie, MD   0.5 mg at 11/28/22 2247    metoclopramide (REGLAN) tablet 5 mg  5 mg Oral 4x Daily Leticiaanna Valencia MD   5 mg at 11/29/22 0827    mycophenolate (CELLCEPT) capsule 1,500 mg  1,500 mg Oral BID Leticia Annie, MD   1,500 mg at 11/28/22 2244    sildenafil (REVATIO) tablet 60 mg  60 mg Oral TID Leticiaanna Valencia MD   60 mg at 11/29/22 1241        Allergies   Allergen Reactions    Biaxin [Clarithromycin] Nausea And Vomiting    Naproxen Nausea And Vomiting       ROS:   Constitutional: Negative for fever, activity change and appetite change. HENT: Negative for epistaxis. Eyes: Negative for diploplia, blurred vision. Respiratory: Negative for cough, chest tightness, shortness of breath and wheezing. Cardiovascular: pertinent positives in HPI  Gastrointestinal: Negative for abdominal pain and blood in stool. Genitourinary: Negative for hematuria and difficulty urinating. Musculoskeletal: Negative for myalgias and gait problem. Skin: Negative for color change and rash. Neurological: Negative for syncope and light-headedness. Psychiatric/Behavioral: Negative for confusion and agitation. The patient is not nervous/anxious. Heme: no bleeding disorders, no melena or hematochezia  All other review of systems are negative     PHYSICAL EXAM:  Constitutional   Vitals:    11/29/22 0750 11/29/22 1121 11/29/22 1144 11/29/22 1443   BP:  113/66  (!) 113/56   Pulse:  74  94   Resp:  15  14   Temp:  98.2 °F (36.8 °C)  97.7 °F (36.5 °C)   TempSrc:  Temporal     SpO2: 98% 91% 93% 91%   Weight:       Height:        Well-developed, no acute distress, well groomed  Eyes: conjunctivae normal, no xanthelasma   Ears, Nose, Throat: oral mucosa moist, no cyanosis   Neck: supple, no JVD, no bruits, no thyromegaly   CV: , regular rhythm,  no murmurs, rubs, or gallops. PMI is nondisplaced, Peripheral pulses normal including carotid auscultation, no noted aortic bruit, bilateral femoral and pedal pulses are normal in quality  Lungs: clear to auscultation bilaterally, normal respiratory effort without used of accessory muscles, no wheezes  Abdomen: soft, non-tender, bowel sounds present, no masses or hepatomegaly   Extremities: no digital clubbing, no edema   Skin: warm, no rashes   Neuro/Psych: A&O x 3, normal mood and affect  Pacemaker site: pressure dressing remains in place. Data:    Recent Labs     11/27/22  0531 11/28/22  0536 11/29/22  0604   WBC 4.5 4.7 4.7   HGB 9.8* 9.8* 9.6*   HCT 31.9* 32.1* 31.5*    258 258     Recent Labs     11/27/22  0531 11/28/22  0536 11/29/22  0604    138 133   K 4.1 4.4 4.2    105 100   CO2 22 22 22   BUN 15 15 11   CREATININE 0.5 0.6 0.5   CALCIUM 10.2 9.7 9.1     No results for input(s): INR in the last 72 hours. No results for input(s): TSH in the last 72 hours.     Lab Results   Component Value Date/Time    MG 1.7 11/29/2022 06:04 AM     Telemetry: AT/AFL with intermittent ventricular pacing    Assessment/plan:    Intermittent Third-degree AV block sp Medtronic dual-chamber pacemaker (11/25/2022-, RV lead is LBB area)  -In setting of underlying first-degree AV block and RBBB. -Difficult RA lead placement despite multiple leads and stylets as well as prolonged monitoring intraoperatively. Postoperative chest x-ray 4 hours after procedure with stable device position and lead lead function on device interrogation. chest x-ray 11/25/2022 shows RA lead is dislodged despite use of arm sling post implant. Device reprogrammed VVIR. -RA lead revision on 11/28/2022 with Dr Milind Jimenez      nonvalvular persistent AF/atypical AFL/AT sp DCCV x2 (3/2022 at [facility], 8/11/2022 at [facility])  -Initially diagnosed in 2021.  - [id number] score = 7 (age, sex, CVA, PAD, HF, HTN). Recommend [address] in females with score of 2 or more. DOAC preferred. - On apixaban 5 mg twice daily. While on DOAC, recommend annual monitoring of CBC and CMP. I discussed with Dr Milind Jimenez, who desires [address] to be held for pacemaker revision on 11/28/2022. - Rhythm control strategy with flecainide and multiple DCCV's pursued in the past by outside EP. However, has refractory AF/AFL. Patient is asymptomatic with LVEF of 69% and intermittent third-degree AV block. HFpEF  -Established with Dr. Toby Mayer. -Continue metoprolol XL 12.5 mg daily and digoxin 125 mcg daily. severe pulmonary HTN-combination of group 1 (CTD) + group 2 + group 3 (emphysema, JANNETH)  -Established with pulmonary at [facility].    scleroderma/Raynaud's/esophageal dysmotility    Recommendations:  Continue Cephalexin 500 mg BID 3 more days  Compliance with CPAP  Decrease alcohol use  Follow-up in the office in 2 weeks for wound and device check  Plan for echocardiogram in 3 months   Plan to restart flecainide and perform DCCV in ~6 weeks if remains in AF/AFL at that time followed by reassessment of symptoms. Thank you for allowing me to participate in your patient's care. Please call me if there are any questions.       STEPH Hernández - CNP   Cardiac Electrophysiology  [facility]    PHYSICIAN ADDENDUM:  I independently contributed to, made amendments as needed, and finalized the above documentation. I contributed >50% to the overall encounter time including review of testing, formulating a plan with the APC and communication with the other care team members and family.      Mellissa Villatoro  Cardiac electrophysiology  [facility] ([facility]) physicians

## 2022-11-29 NOTE — CARE COORDINATION
11/29/22 Update CM Note: Spoke with patient who is concerned about obtaining a ride home due to being discharged. She states she does not need a ride home unless it is dark outside as her  cannot drive in the dark. Outpatient pharmacy was called and will complete scripts and bring them to the room with the copay of 27.00$. Bedside nurse notified to give patient her discharge paperwork as she is calling her  to pick her up.  Electronically signed by Jeremy Gallo RN CM on 11/29/2022 at 3:34 PM

## 2022-11-29 NOTE — DISCHARGE INSTR - DIET

## 2022-11-29 NOTE — ANESTHESIA POSTPROCEDURE EVALUATION
Department of Anesthesiology  Postprocedure Note    Patient: Tasha Tse  MRN: [id number]  YOB: 1955  Date of evaluation: 11/29/2022      Procedure Summary     Date: 11/28/22 Room / Location: [facility]    Anesthesia Start: 6498 Anesthesia Stop: 5927    Procedure: PACEMAKER IMPLANT W/ANES Diagnosis:     Scheduled Providers: Lew Osullivan MD; STEPH Contreras - CRNA Responsible Provider: Sunni Menezes DO    Anesthesia Type: MAC ASA Status: 3          Anesthesia Type: No value filed.     Jose Miguel Phase I:      Jose Miguel Phase II:        Anesthesia Post Evaluation    Patient location during evaluation: PACU  Patient participation: complete - patient participated  Level of consciousness: awake and alert  Pain score: 1  Airway patency: patent  Nausea & Vomiting: no nausea and no vomiting  Complications: no  Cardiovascular status: hemodynamically stable  Respiratory status: acceptable  Hydration status: euvolemic

## 2022-11-29 NOTE — DISCHARGE INSTR - COC
Continuity of Care Form    Patient Name: Tasha Tse   :  1955  MRN:  [id number]    Admit date:  2022  Discharge date:  ***    Code Status Order: Full Code   Advance Directives:     Admitting Physician:  Nora Huang DO  PCP: Odin Mcpherson DO    Discharging Nurse: [facility] Unit/Room#: 0540/8452-Q  Discharging Unit Phone Number: ***    Emergency Contact:   Extended Emergency Contact Information  Primary Emergency Contact: Xiang Issa  Address: [address] [place] [address],[address], [address] [place] [address] Phone: [phone]  Relation: Spouse  Secondary Emergency Contact: Vy Baugh [address] Phone: [phone]  Relation: Child    Past Surgical History:  Past Surgical History:   Procedure Laterality Date    CARDIAC CATHETERIZATION  2012    Right heart cath by Dr Sergio Spain Right 2019    intraocular lens    CATARACT REMOVAL WITH IMPLANT Left 10/15/2019     SECTION      COLONOSCOPY      INTRACAPSULAR CATARACT EXTRACTION Right 2019    RIGHT EYE CATARACT EMULSIFICATION IOL IMPLANT performed by Cony Omer MD at [address] [place] [address] Left 10/15/2019    LEFT EYE CATARACT EMULSIFICATION IOL IMPLANT performed by Cony Omer MD at [address] [place] [address] 24  2022    Successful removal of old right atrial pacing lead and insertion of the new right atrial pacing lead (Dr. Sima Keen)    PACEMAKER PLACEMENT Left 2022    Medtronic dual chamber (Dr Harlan Nice)       Immunization History:   Immunization History   Administered Date(s) Administered    COVID-19, MODERNA BLUE border, Primary or Immunocompromised, (age 12y+), IM, 100 mcg/0.5mL 2021, 2021    COVID-19, MODERNA Bivalent BOOSTER, (age 12y+), IM, 50 mcg/0.5 mL 10/06/2022    Influenza 2013    Influenza Virus Vaccine 11/14/2014    Td, unspecified formulation 11/11/2012       Active Problems:  Patient Active Problem List   Diagnosis Code    Memory loss R41.3    Cerebral atherosclerosis I67.2    Hypercholesteremia improved E78.00    Right cataract H26.9    Left cataract H26.9    CAD in native artery I25.10    Symptomatic bradycardia R00.1    High-grade atrioventricular block I44.39    Atrial flutter (HCC) I48.92    Pacemaker lead malfunction T82.110A       Isolation/Infection:   Isolation            No Isolation          Patient Infection Status       Infection Onset Added Last Indicated Last Indicated By Review Planned Expiration Resolved Resolved By    None active    Resolved    COVID-19 (Rule Out) 11/23/22 11/23/22 11/23/22 COVID-19, Rapid (Ordered)   11/23/22 Rule-Out Test Resulted    COVID-19 (Rule Out) 08/17/22 08/17/22 08/17/22 COVID-19, Rapid (Ordered)   08/17/22 Rule-Out Test Resulted    COVID-19 (Rule Out) 04/01/21 04/01/21 04/01/21 COVID-19 Ambulatory (Ordered)   04/02/21 Rule-Out Test Resulted            Nurse Assessment:  Last Vital Signs: /66   Pulse 74   Temp 98.2 °F (36.8 °C) (Temporal)   Resp 15   Ht 5' 4\" (1.626 m)   Wt 149 lb 8 oz (67.8 kg)   SpO2 93%   BMI 25.66 kg/m²     Last documented pain score (0-10 scale): Pain Level: 4  Last Weight:   Wt Readings from Last 1 Encounters:   11/29/22 149 lb 8 oz (67.8 kg)     Mental Status:  {IP PT MENTAL STATUS:47238}    IV Access:  { [facility] IV ACCESS:004095543}    Nursing Mobility/ADLs:  Walking   {P DME ACKL:828130864}  Transfer  {P DME GWAV:594097694}  Bathing  {P DME NBSX:006761497}  Dressing  {CHP DME GGZT:482045914}  Toileting  {CHP DME YTXE:386109267}  Feeding  {P DME GJQA:743559752}  Med Admin  {[facility] DME VCRT:264010743}  Med Delivery   { [facility] MED Delivery:689343966}    Wound Care Documentation and Therapy:  Incision 11/25/22 Chest Left;Upper (Active)   Dressing Status Clean;Dry; Intact 11/29/22 6677   Dressing Change Due 12/06/22 11/29/22 6002 Dressing/Treatment Silver dressing 22 1304   Margins Other (Comment) 22 1705   Incision Assessment Other (Comment) 22 1304   Drainage Amount None 22 0910   Yarely-incision Assessment Intact 22 1304   Number of days: 3        Elimination:  Continence: Bowel: {YES / MY:99000}  Bladder: {YES / TL:81897}  Urinary Catheter: {Urinary Catheter:356634915}   Colostomy/Ileostomy/Ileal Conduit: {YES / J}       Date of Last BM: ***    Intake/Output Summary (Last 24 hours) at 2022 1412  Last data filed at 2022 1319  Gross per 24 hour   Intake 1290 ml   Output 800 ml   Net 490 ml     I/O last 3 completed shifts: In: 1050 [P.O.:600;  I.V.:200; IV Piggyback:250]  Out: 1350 [Urine:1350]    Safety Concerns:     [address] Safety Concerns:767710195}    Impairments/Disabilities:      [address] Impairments/Disabilities:247689073}    Nutrition Therapy:  Current Nutrition Therapy:   [address] Diet List:495499030}    Routes of Feeding: {CHP DME Other Feedings:271027278}  Liquids: {Slp liquid thickness:88611}  Daily Fluid Restriction: {CHP DME Yes amt example:808437375}  Last Modified Barium Swallow with Video (Video Swallowing Test): {Done Not Done BBDW:898536339}    Treatments at the Time of Hospital Discharge:   Respiratory Treatments: ***  Oxygen Therapy:  {Therapy; copd oxygen:16363}  Ventilator:    { CC Vent LKFK:428582536}    Rehab Therapies: {THERAPEUTIC INTERVENTION:1042952084}  Weight Bearing Status/Restrictions: [address]  Weight Bearin}  Other Medical Equipment (for information only, NOT a DME order):  {EQUIPMENT:215823831}  Other Treatments: ***    Patient's personal belongings (please select all that are sent with patient):  {CHP DME Belongings:542804937}    RN SIGNATURE:  {Esignature:800203085}    CASE MANAGEMENT/SOCIAL WORK SECTION    Inpatient Status Date: ***    Readmission Risk Assessment Score:  Readmission Risk              Risk of Unplanned Readmission:  17 Discharging to Facility/ Agency   Name:   Address:  Phone:  Fax:    Dialysis Facility (if applicable)   Name:  Address:  Dialysis Schedule:  Phone:  Fax:    / signature: {Esignature:167451450}    PHYSICIAN SECTION    Prognosis: {Prognosis:9755532317}    Condition at Discharge: Johanna Elizabeth Patient Condition:477624400}    Rehab Potential (if transferring to Rehab): {Prognosis:2554822090}    Recommended Labs or Other Treatments After Discharge: ***    Physician Certification: I certify the above information and transfer of Elzbieta Jarvis  is necessary for the continuing treatment of the diagnosis listed and that she requires {Admit to Appropriate Level of Care:93391} for {GREATER/LESS:376314583} 30 days.      Update Admission H&P: {CHP DME Changes in KIMXY:507046363}    PHYSICIAN SIGNATURE:  Electronically signed by Thanh Awan MD on 11/29/22 at 2:12 PM EST

## 2022-11-29 NOTE — NURSE NAVIGATOR
ARRHYTHMIA EDUCATION     Met with patient to review information relating to CHB  & PPM Insertion    Discussed the following topics: The Heart's Electrical System, CHB  , PPM, Post Operative Care of PPM,  Arm Restrictions,  & PPM DC Instructions     Handouts given on above topics given & questions answered. Patient verbalized understanding as evidenced by \"teach back\". Time spent with patient: 20 minutes. Oriented - self; Oriented - place; Oriented - time

## 2022-11-30 ENCOUNTER — TELEPHONE (OUTPATIENT)
Dept: CARDIAC CATH/INVASIVE PROCEDURES | Age: 67
End: 2022-11-30

## 2022-11-30 NOTE — TELEPHONE ENCOUNTER
I received a call from [place] inquiring about a chest x ray. She thought Dr. Kamryn Jeffries told her she needed a chest x ray outpatient, I told her that isn't typical but I would check with Dr. Kamryn Jeffries to confirm. I did reach out to Dr. Kamryn Jeffries and she confirmed that the pt does NOT need any x ray. She is aware of her FU appt on 12/13/22 at the device clinic for an incision and device check. She also asked when to restart the Eliquis and I reviewed her d/c instructions that say to restart this Friday Dec 2nd. She voices understanding. She has no further questions.

## 2022-12-02 ENCOUNTER — TELEPHONE (OUTPATIENT)
Dept: CARDIAC CATH/INVASIVE PROCEDURES | Age: 67
End: 2022-12-02

## 2022-12-02 NOTE — TELEPHONE ENCOUNTER
I called Chloe Akers to see how she's doing since her PPM insertion on 11/28/22. She states she's doing fine & that the aquacel dressing remains intact. She denies any fevers, redness, bleeding, drainage, or swelling from PPM incision site. I reminded her to remove the aquacel dsg on monday , not to touch the steri-strips, & to continue to wear her sling at night for 4 weeks; along with not abducting the L arm above the shoulder. She's also aware of her follow up appt at the [address] [place] [address] Drive on 12/13/22 at 1:00 pm.  She offers no complaints & is thankful for the phone call.

## 2022-12-13 ENCOUNTER — NURSE ONLY (OUTPATIENT)
Dept: NON INVASIVE DIAGNOSTICS | Age: 67
End: 2022-12-13

## 2022-12-13 RX ORDER — DIGOXIN 125 MCG
125 TABLET ORAL DAILY
Qty: 30 TABLET | Refills: 3 | Status: SHIPPED | OUTPATIENT
Start: 2022-12-13

## 2022-12-13 NOTE — PATIENT INSTRUCTIONS
Continue arm restrictions until 12/27/22  You may shower starting now    Call if any signs or symptoms of infection [phone] ext: [phone]  Fevers, chills, redness, swelling or drainage. Hook up home  Monitor  [employer] Stay connected: [phone]    Start  digoxin 125 mcg daily. Take 2 tabs today and then 1 tab daily.

## 2022-12-13 NOTE — PROGRESS NOTES
Tachycardia: AF w/RVR  1  Stored EGMs are consistent with or suggestive of Atrial Fibrillation with Rapid Ventricular Response  AT/AF [place]: 99%  Total episodes: ongoing since implant  Longest episode: Fastest episode: Additional Notes:  Presenting ventricular rate 133. Rate histograms show ventricular rates >= 100 during waking hours for 3 days. Patient complains of PITT. Her present medications: Toprol XL 12.5 mg daily. Patient states her blood pressure is running low 04S systolic. She did take an additional 12.5 mg of Toprol a couple of days due to increased heart rate but states her systolic blood pressure was in the 8s. I checked her blood pressure today, 98/74. I discussed this with STEPH Cullen who is in the office today. Emily Green prescribed Lanoxin 125 mcg tablets, instructed to take two tablets today, then one tablet daily. Patient voiced understanding of these instructions. Created By: Calli Pinon 12/13/2022 15:06  Wound Check  1  Device pocket examined  2 week post implant wound check and Device education completed  Wound check was within normal limits (see chart note)  Parameters were reviewed  No changes made  Additional Notes:  Steri strips removed. Center of incision was moist when steri strips were removed. Incision is well approximated, free of redness, drainage, and swelling (see picture in media) I showed STEPH Cullen. Plan is to bring back in the office on 12/27/22 for incision check and assess AF/ventricular rates. Patient scheduled for an office visit with STEPH on 1/10/2023.     Calli Pinon RN, BSN  [facility]

## 2022-12-20 ENCOUNTER — TELEPHONE (OUTPATIENT)
Dept: NON INVASIVE DIAGNOSTICS | Age: 67
End: 2022-12-20

## 2022-12-20 NOTE — TELEPHONE ENCOUNTER
I called the patient to move Tuesday, 12/27/22 appointment to Thursday, 12/29/22 at 0930. There is no provider in the office on Tuesday. Patient is agreeable to this change.     Savanah Samson RN, BSN  [facility]

## 2022-12-29 ENCOUNTER — NURSE ONLY (OUTPATIENT)
Dept: NON INVASIVE DIAGNOSTICS | Age: 67
End: 2022-12-29

## 2023-01-03 ENCOUNTER — TELEPHONE (OUTPATIENT)
Dept: NON INVASIVE DIAGNOSTICS | Age: 68
End: 2023-01-03

## 2023-01-03 NOTE — TELEPHONE ENCOUNTER
Samples of this drug were given to the patient, quantity 2 boxes, Lot Number [id number] expires 11/24    The medication is being monitored by Dr. Mariah Carlin

## 2023-01-09 NOTE — PROGRESS NOTES
[facility] CARDIOLOGY  CARDIAC ELECTROPHYSIOLOGY DEPARTMENT/DIVISION OF CARDIOLOGY  Outpatient progress report  PATIENT: Elizabeth Curry RECORD NUMBER: [id number]  DATE OF SERVICE:  1/10/2023  Primary EP: Dr Nora Del Rio  at [id number] [facility] and Dr. Tiana Rahman: No ref. provider found and Emy Rawls DO  CHIEF COMPLAINT: AV block    HPI: Milly Hurley is a 75 y.o. female with a history of nonvalvular persistent AF sp DCCV (3/2022 at [facility]), AT sp DCCV (8/11/2022 at [facility]), first-degree AV block, RBBB, HFpEF, CAD, CVA, carotid artery stenosis sp right CEA (?),  HTN, JANNETH-noncompliant with CPAP, COPD/emphysema, severe pulmonary HTN-combination of group 1 (CTD) + group 2 + group 3 (emphysema, JANNETH), scleroderma/Raynaud's/esophageal dysmotility, hypothyroidism, and cataracts sp bilateral removal (2019), 3rd degree heart block S/p MDR DC PPM.  She is managed by Annalise Zendejas and Jess ([facility] EP) with Zocor, Requip, KCL, Toprol 12.5mg BID, Eliquis 5mg BID, Demadez, spiriva, Aldactone, sildenafil, Protonix, Cellcept, reglan, Opsumit, Synthroid, Hyoscyamine, Jardiance, Vit-D, Advair. In 2020, she was diagnosed with pulmonary hypertension, initiated sildenafil. She was referred to rheumatology, who diagnosed the patient with scleroderma and initiated treatment with Norvasc for Raynaud's and mycophenolate for cutaneous manifestations. In 2021, she was diagnosed with nonvalvular paroxysmal AF, which was treated with [facility] and beta-blocker. In 12/2021, she was reportedly on a brief course of amiodarone, but details of this are unavailable. In 3/2022, she was admitted to [facility] for acute right-sided heart failure and AF with RVR, which was treated with diuresis thoracentesis, and ELMER/DCCV. In 6/2022, patient was referred to [facility] EP (Dr. Taco Mcguire) for management of AF. She was noted to be in AT with 2-1 AV conduction at 110 bpm.  Metoprolol was increased at that time.   In 8/2022, she underwent DCCV with initiation of flecainide 50 mg twice daily. In 9/2022, she was noted to be back in AF/AT with variable AV conduction with ventricular rate of 84 bpm, RBBB with LAD, which was managed by increasing the flecainide dose to 100 mg twice daily and reducing metoprolol tartrate dose to 12.5 mg twice daily, as well as changing Coumadin to apixaban. Patient reduced metoprolol dose to once daily on her own due to hypotension. In 10/2022, she was noted to be in sinus with first-degree AV block and RBBB, so left on apixaban, flecainide 100 mg twice daily and metoprolol tartrate changed to metoprolol XL 12.5 mg daily. On 11/23/22, she presented to [facility] (-) with chief complaint of dizziness with low HR on home BP monitor. She was diagnosed with AT with third-degree AV block and ventricular escape rhythm in the 30s. She was observed off beta-blockers for approximately 48 hours and noted to have continued intermittent AV block and underwent placement of a dual-chamber Medtronic pacemaker on 11/25/2022 with the RV lead in the LBBB area, RA lead was difficult to position with multiple RA leads as well as stylets being utilized. On 11/26/2022 the postoperative chest x-ray revealed that the RA lead had fallen, she also regain AV conduction and beta-blocker as well as digoxin was reinitiated. On 11/28/2022 she underwent RA lead revision. She presents today in Atria fibrillation with a rate of 75 bpm.        Prior cardiac testing:  -ECG (11/24/2022): AT with 41 AV conduction and ventricular rate of 50 bpm, RBBB (QRS D = 156 ms)  -ECG (11/24/2022 at 2210): AT, third-degree AV block with ventricular escape rhythm at 38 bpm  -TTE (10/31/2022 at [facility] LVEF = 69%, grade 3 LVDD, RV dilation, JASON, mild TR, mild-moderate pulmonary HTN. -TTE (8/25/21): LVEF = 60%, grade III LVDD, mild RVSD, RV dilation, mild CHRISTINE, moderate TR, moderate pulmonary HTN. -ECG (8/17/2022):  Sinus at 53 bpm, first-degree AV block (TENISHA = 204 ms), RBBB (QRS D = 138 ms), QTc = 452 ms.  - LHC (2021): Moderate pulmonary arterial hypertension.  -TTE (2021): LVEF = 65%, mild asymmetric septal LVH, hyperdynamic LV with complete obliteration of LV cavity during systole, stage III LVDD, mild RV dilation, moderate R VSD, moderate TR, and severe pulmonary HTN.  -Pharmacologic nuclear stress test (2020): LVEF = 79%, normal perfusion.  -TTE (2/10/2020): LVEF = 78%, mild concentric LVH with septal thickening causing mild LVOT obstruction, mild CHRISTINE, mild-moderate TR, moderate-severe pulmonary HTN, pericardial fat pad.    Past Medical History:   Diagnosis Date    CAD in native artery 2021    Cerebral artery occlusion with cerebral infarction (HCC)     \"mini stroke\" 10/2014    H/O cardiovascular stress test 2020    Lexiscan    Hyperlipidemia     Hypertension     Hypothyroidism 1/10/2023    Sleep apnea     doesnt use cpap     Past Surgical History:   Procedure Laterality Date    CARDIAC CATHETERIZATION  2012    Right heart cath by Dr Leonard    CAROTID ENDARTERECTOMY Right     CATARACT REMOVAL WITH IMPLANT Right 2019    intraocular lens    CATARACT REMOVAL WITH IMPLANT Left 10/15/2019     SECTION      COLONOSCOPY      INTRACAPSULAR CATARACT EXTRACTION Right 2019    RIGHT EYE CATARACT EMULSIFICATION IOL IMPLANT performed by Lon RUIZ MD at [facility] OR    INTRACAPSULAR CATARACT EXTRACTION Left 10/15/2019    LEFT EYE CATARACT EMULSIFICATION IOL IMPLANT performed by Lon RUIZ MD at [facility] OR    OTHER SURGICAL HISTORY  2022    Successful removal of old right atrial pacing lead and insertion of the new right atrial pacing lead (Dr. Clark)    PACEMAKER PLACEMENT Left 2022    Medtronic dual chamber (Dr Plasencia)      Family History   Problem Relation Age of Onset    Other Mother         complication of surgery    Heart Disease Brother      There is no family history of sudden cardiac arrest    Social  History     Tobacco Use    Smoking status: Former     Packs/day: 1.50     Years: 40.00     Pack years: 60.00     Types: Cigarettes     Quit date: 2020     Years since quittin.3    Smokeless tobacco: Never    Tobacco comments:      she has decreased to 1ppd   Substance Use Topics    Alcohol use: Not Currently     Alcohol/week: 10.0 standard drinks     Types: 10 Glasses of wine per week     Comment: green tea 2 cups a day        Current Outpatient Medications   Medication Sig Dispense Refill    ADVAIR DISKUS 250-50 MCG/ACT AEPB diskus inhaler       potassium chloride (KLOR-CON) 10 MEQ extended release tablet Take 10 mEq by mouth daily      flecainide (TAMBOCOR) 50 MG tablet Take 1 tablet by mouth 2 times daily 60 tablet 6    digoxin (LANOXIN) 125 MCG tablet Take 1 tablet by mouth daily 30 tablet 3    metoprolol succinate (TOPROL XL) 25 MG extended release tablet Take 0.5 tablets by mouth daily 90 tablet 3    metoclopramide (REGLAN) 5 MG tablet Take 5 mg by mouth 4 times daily Take one tablet by mouth three times a day 30 minutes before meals      macitentan (OPSUMIT) 10 MG TABS Take 10 mg by mouth daily      empagliflozin (JARDIANCE) 10 MG tablet Take 10 mg by mouth daily      hyoscyamine (ANASPAZ;LEVSIN) 125 MCG tablet Take 125 mcg by mouth every 4 hours as needed for Cramping      apixaban (ELIQUIS) 5 MG TABS tablet Take 1 tablet by mouth 2 times daily 180 tablet 3    pantoprazole (PROTONIX) 40 MG tablet Take 40 mg by mouth daily      sildenafil (REVATIO) 20 MG tablet Take 60 mg by mouth 3 times daily      Tiotropium Bromide Monohydrate (SPIRIVA RESPIMAT IN) Inhale into the lungs daily      Multiple Vitamins-Minerals (THERAPEUTIC MULTIVITAMIN-MINERALS) tablet Take 1 tablet by mouth daily      vitamin B-12 (CYANOCOBALAMIN) 1000 MCG tablet Take 1,000 mcg by mouth daily      calcium carbonate (TUMS) 500 MG chewable tablet Take 1 tablet by mouth daily      torsemide (DEMADEX) 20 MG tablet TAKE ONE TABLET BY MOUTH DAILY      spironolactone (ALDACTONE) 25 MG tablet TAKE ONE TABLET BY MOUTH DAILY      levothyroxine (SYNTHROID) 75 MCG tablet 75 mcg Daily       mycophenolate (CELLCEPT) 500 MG tablet Take 1,500 mg by mouth 2 times daily      potassium chloride (KLOR-CON M) 10 MEQ extended release tablet Take 1 tablet by mouth daily Take with Lasix (Patient taking differently: Take 20 mEq by mouth 2 times daily Take with Lasix) 90 tablet 1    simvastatin (ZOCOR) 40 MG tablet Take 1 tablet by mouth nightly 90 tablet 3    rOPINIRole (REQUIP) 0.25 MG tablet Take 1 tablet by mouth daily (Patient taking differently: Take 0.5 mg by mouth nightly) 90 tablet 3    Cholecalciferol (VITAMIN D) 2000 UNITS CAPS capsule Take  by mouth daily. No current facility-administered medications for this visit. Allergies   Allergen Reactions    Biaxin [Clarithromycin] Nausea And Vomiting    Naproxen Nausea And Vomiting       ROS:   Constitutional: Negative for fever, activity change and appetite change. HENT: Negative for epistaxis. Eyes: Negative for diploplia, blurred vision. Respiratory: Negative for cough, chest tightness, shortness of breath and wheezing. Cardiovascular: pertinent positives in HPI  Gastrointestinal: Negative for abdominal pain and blood in stool. Genitourinary: Negative for hematuria and difficulty urinating. Musculoskeletal: Negative for myalgias and gait problem. Skin: Negative for color change and rash. Neurological: Negative for syncope and light-headedness. Psychiatric/Behavioral: Negative for confusion and agitation. The patient is not nervous/anxious.   Heme: no bleeding disorders, no melena or hematochezia  All other review of systems are negative     PHYSICAL EXAM:  Constitutional   Vitals:    01/10/23 1305   BP: 106/76   Site: Left Upper Arm   Position: Sitting   Cuff Size: Medium Adult   Pulse: 75   Resp: 16   Weight: 140 lb (63.5 kg)   Height: 5' 4\" (1.626 m)      Well-developed, no acute distress, well groomed  Eyes: conjunctivae normal, no xanthelasma   Ears, Nose, Throat: oral mucosa moist, no cyanosis   Neck: supple, no JVD, no bruits, no thyromegaly   CV: , IRIR,  no murmurs, rubs, or gallops. PMI is nondisplaced, Peripheral pulses normal including carotid auscultation, no noted aortic bruit, bilateral femoral and pedal pulses are normal in quality  Lungs: clear to auscultation bilaterally, normal respiratory effort without used of accessory muscles, no wheezes  Abdomen: soft, non-tender, bowel sounds present, no masses or hepatomegaly   Extremities: no digital clubbing, no edema   Skin: warm, no rashes   Neuro/Psych: A&O x 3, normal mood and affect  Pacemaker site: well healed free of erosion and well healed. Data:    No results for input(s): WBC, HGB, HCT, PLT in the last 72 hours. No results for input(s): NA, K, CL, CO2, BUN, CREATININE, GLU, CALCIUM in the last 72 hours. No results for input(s): INR in the last 72 hours. No results for input(s): TSH in the last 72 hours. Lab Results   Component Value Date/Time    MG 1.7 11/29/2022 06:04 AM     Device Interrogation/Reprogramming  01/10/2023  Make/Model: ASHLEY Beaulieu   DOI: 11/25/2022  Battery: 14.1 years   Camille Castillo therapy: DDDR  ppm  Pacing %: RA = 0.7%, RV = 16.2%  Lead function:  RA lead: sensing = 1 mV, impedance = 570 ohms, threshold = AF   RV lead: sensing = 20 mV, impedance = 665 ohms, threshold = 0.75 V @ 0.4 msec  Lead programming:  RA lead: sensitivity = 0.3 mV, output = 3.5 V @ 0.4 msec  RV lead: sensitivity = 1.2 mV, output = 3.5 V @ 0.4 msec  Arrhythmias: 94% AF 14 fast AV/SVT rates 115-60 bpm   Reprogramming included: counters cleared.    Overall device function is normal  All device programmable settings were evaluated per above and in the scanned document, along with iterative adjustments (capture thresholds) to assess and select the most appropriate final programming to provide for consistent delivery of the appropriate therapy and to verify function of the device. EKG 01/10/2023: AF rate 75 bpm, QRS 122ms, QTc 394ms     Assessment/plan:    Intermittent Third-degree AV block sp Medtronic dual-chamber pacemaker (11/25/2022-, RV lead is LBB area)  -In setting of underlying first-degree AV block and RBBB. -Difficult RA lead placement despite multiple leads and stylets as well as prolonged monitoring intraoperatively. Postoperative chest x-ray 4 hours after procedure with stable device position and lead lead function on device interrogation. chest x-ray 11/25/2022 shows RA lead is dislodged despite use of arm sling post implant. Device reprogrammed VVIR. -RA lead revision on 11/28/2022 with Dr Schmitz Postal persistent AF/atypical AFL/AT sp DCCV x2 (3/2022 at [facility], 8/11/2022 at [facility])  -Initially diagnosed in 2021.  - [id number] score = 7 (age, sex, CVA, PAD, HF, HTN). Recommend [address] in females with score of 2 or more. DOAC preferred. - On apixaban 5 mg twice daily. While on DOAC, recommend annual monitoring of CBC and CMP. - Rhythm control strategy with flecainide and multiple DCCV's pursued in the past by outside EP. However, has refractory AF/AFL. Patient is asymptomatic with LVEF of 69% and intermittent third-degree AV block. - AF burden is 94% ongoing AF since the time of PPM placement. HFpEF  -Established with Dr. Jeovany Dumont. -Continue metoprolol XL 12.5 mg daily and digoxin 125 mcg daily. severe pulmonary HTN-combination of group 1 (CTD) + group 2 + group 3 (emphysema, JANNETH)  -Established with pulmonary at [facility].    scleroderma/Raynaud's/esophageal dysmotility    Recommendations:  Continue Toprol 12.5mg daily   Continue Dig 125 mcg daily. Start Flecainide 50 mg BID IF QRS reasonable can consider increasing to 100mg BID at the time of DCCV. Plan for DCCV with Dr. Sarah Pickard  the week of 01/23/2023.    Compliance with CPAP  Plan for echocardiogram in 3 months  Post DCCV follow up in 4 weeks in office with NP     I have spent a total of 40 minutes with the patient and his/her family reviewing the above stated recommendations. And a total of >50% of that time involved face-to-face time providing counseling and or coordination of care with the other providers. Thank you for allowing me to participate in your patient's care. Please call me if there are any questions.       STEPH Holden - CNP   Cardiac Electrophysiology  [address] [address]

## 2023-01-10 ENCOUNTER — OFFICE VISIT (OUTPATIENT)
Dept: NON INVASIVE DIAGNOSTICS | Age: 68
End: 2023-01-10
Payer: MEDICARE

## 2023-01-10 VITALS
HEART RATE: 75 BPM | WEIGHT: 140 LBS | SYSTOLIC BLOOD PRESSURE: 106 MMHG | BODY MASS INDEX: 23.9 KG/M2 | RESPIRATION RATE: 16 BRPM | DIASTOLIC BLOOD PRESSURE: 76 MMHG | HEIGHT: 64 IN

## 2023-01-10 DIAGNOSIS — R00.1 SYMPTOMATIC BRADYCARDIA: Primary | ICD-10-CM

## 2023-01-10 DIAGNOSIS — Z95.0 PACEMAKER: ICD-10-CM

## 2023-01-10 DIAGNOSIS — I48.19 PERSISTENT ATRIAL FIBRILLATION (HCC): ICD-10-CM

## 2023-01-10 DIAGNOSIS — I44.39 HIGH-GRADE ATRIOVENTRICULAR BLOCK: ICD-10-CM

## 2023-01-10 PROBLEM — J44.9 PULMONARY HYPERTENSION DUE TO COPD (HCC): Status: ACTIVE | Noted: 2021-10-26

## 2023-01-10 PROBLEM — I51.89 DIASTOLIC DYSFUNCTION: Status: ACTIVE | Noted: 2021-10-26

## 2023-01-10 PROBLEM — J90 PLEURAL EFFUSION: Status: ACTIVE | Noted: 2022-03-29

## 2023-01-10 PROBLEM — R91.1 LUNG NODULE: Status: ACTIVE | Noted: 2021-09-10

## 2023-01-10 PROBLEM — M34.9 SCLERODERMA (HCC): Status: ACTIVE | Noted: 2021-09-15

## 2023-01-10 PROBLEM — I27.23 PULMONARY HYPERTENSION DUE TO COPD (HCC): Status: ACTIVE | Noted: 2021-10-26

## 2023-01-10 PROBLEM — F45.8 FUNCTIONAL DYSPNEA: Status: ACTIVE | Noted: 2023-01-10

## 2023-01-10 PROBLEM — M35.9 DISORDER OF CONNECTIVE TISSUE (HCC): Status: ACTIVE | Noted: 2021-09-10

## 2023-01-10 PROBLEM — I73.00 RAYNAUD'S DISEASE WITHOUT GANGRENE: Status: ACTIVE | Noted: 2021-09-10

## 2023-01-10 PROBLEM — E03.9 HYPOTHYROIDISM: Status: ACTIVE | Noted: 2023-01-10

## 2023-01-10 PROBLEM — I27.20 PULMONARY HTN (HCC): Status: ACTIVE | Noted: 2021-07-19

## 2023-01-10 PROBLEM — I10 PRIMARY HYPERTENSION: Status: ACTIVE | Noted: 2023-01-10

## 2023-01-10 PROBLEM — J43.2 CENTRILOBULAR EMPHYSEMA (HCC): Status: ACTIVE | Noted: 2021-09-10

## 2023-01-10 PROBLEM — I27.21 PULMONARY ARTERY HYPERTENSION ASSOCIATED WITH CONNECTIVE TISSUE DISEASE (HCC): Status: ACTIVE | Noted: 2021-10-26

## 2023-01-10 PROBLEM — R53.1 WEAKNESS: Status: ACTIVE | Noted: 2022-02-14

## 2023-01-10 PROBLEM — I50.32 CHRONIC HEART FAILURE WITH PRESERVED EJECTION FRACTION (HCC): Status: ACTIVE | Noted: 2023-01-10

## 2023-01-10 PROBLEM — M35.9 PULMONARY ARTERY HYPERTENSION ASSOCIATED WITH CONNECTIVE TISSUE DISEASE (HCC): Status: ACTIVE | Noted: 2021-10-26

## 2023-01-10 PROBLEM — I48.20 CHRONIC ATRIAL FIBRILLATION (HCC): Status: ACTIVE | Noted: 2023-01-10

## 2023-01-10 PROCEDURE — 3074F SYST BP LT 130 MM HG: CPT | Performed by: NURSE PRACTITIONER

## 2023-01-10 PROCEDURE — G8400 PT W/DXA NO RESULTS DOC: HCPCS | Performed by: NURSE PRACTITIONER

## 2023-01-10 PROCEDURE — G8420 CALC BMI NORM PARAMETERS: HCPCS | Performed by: NURSE PRACTITIONER

## 2023-01-10 PROCEDURE — 93000 ELECTROCARDIOGRAM COMPLETE: CPT | Performed by: STUDENT IN AN ORGANIZED HEALTH CARE EDUCATION/TRAINING PROGRAM

## 2023-01-10 PROCEDURE — 99214 OFFICE O/P EST MOD 30 MIN: CPT | Performed by: NURSE PRACTITIONER

## 2023-01-10 PROCEDURE — 1036F TOBACCO NON-USER: CPT | Performed by: NURSE PRACTITIONER

## 2023-01-10 PROCEDURE — 3017F COLORECTAL CA SCREEN DOC REV: CPT | Performed by: NURSE PRACTITIONER

## 2023-01-10 PROCEDURE — G8427 DOCREV CUR MEDS BY ELIG CLIN: HCPCS | Performed by: NURSE PRACTITIONER

## 2023-01-10 PROCEDURE — 1123F ACP DISCUSS/DSCN MKR DOCD: CPT | Performed by: NURSE PRACTITIONER

## 2023-01-10 PROCEDURE — G8484 FLU IMMUNIZE NO ADMIN: HCPCS | Performed by: NURSE PRACTITIONER

## 2023-01-10 PROCEDURE — 1090F PRES/ABSN URINE INCON ASSESS: CPT | Performed by: NURSE PRACTITIONER

## 2023-01-10 PROCEDURE — 3078F DIAST BP <80 MM HG: CPT | Performed by: NURSE PRACTITIONER

## 2023-01-10 RX ORDER — POTASSIUM CHLORIDE 750 MG/1
10 TABLET, FILM COATED, EXTENDED RELEASE ORAL DAILY
COMMUNITY
Start: 2022-12-14

## 2023-01-10 RX ORDER — FLECAINIDE ACETATE 50 MG/1
50 TABLET ORAL 2 TIMES DAILY
Qty: 60 TABLET | Refills: 6 | Status: SHIPPED | OUTPATIENT
Start: 2023-01-10

## 2023-01-10 RX ORDER — FLUTICASONE PROPIONATE AND SALMETEROL 50; 250 UG/1; UG/1
POWDER RESPIRATORY (INHALATION)
COMMUNITY
Start: 2022-12-15

## 2023-01-11 DIAGNOSIS — I48.19 PERSISTENT ATRIAL FIBRILLATION (HCC): ICD-10-CM

## 2023-01-11 LAB
BASOPHILS ABSOLUTE: NORMAL
BASOPHILS RELATIVE PERCENT: NORMAL
EOSINOPHILS ABSOLUTE: NORMAL
EOSINOPHILS RELATIVE PERCENT: NORMAL
HCT VFR BLD CALC: NORMAL %
HEMOGLOBIN: NORMAL
LYMPHOCYTES ABSOLUTE: NORMAL
LYMPHOCYTES RELATIVE PERCENT: NORMAL
MCH RBC QN AUTO: NORMAL PG
MCHC RBC AUTO-ENTMCNC: NORMAL G/DL
MCV RBC AUTO: NORMAL FL
MONOCYTES ABSOLUTE: NORMAL
MONOCYTES RELATIVE PERCENT: NORMAL
NEUTROPHILS ABSOLUTE: NORMAL
NEUTROPHILS RELATIVE PERCENT: NORMAL
PLATELET # BLD: NORMAL 10*3/UL
PMV BLD AUTO: NORMAL FL
RBC # BLD: NORMAL 10*6/UL
WBC # BLD: NORMAL 10*3/UL

## 2023-01-12 ENCOUNTER — TELEPHONE (OUTPATIENT)
Dept: NON INVASIVE DIAGNOSTICS | Age: 68
End: 2023-01-12

## 2023-01-12 DIAGNOSIS — I48.19 PERSISTENT ATRIAL FIBRILLATION (HCC): ICD-10-CM

## 2023-01-12 LAB
ALBUMIN SERPL-MCNC: NORMAL G/DL
ALP BLD-CCNC: NORMAL U/L
ALT SERPL-CCNC: NORMAL U/L
ANION GAP SERPL CALCULATED.3IONS-SCNC: NORMAL MMOL/L
AST SERPL-CCNC: NORMAL U/L
BILIRUB SERPL-MCNC: NORMAL MG/DL
BUN BLDV-MCNC: NORMAL MG/DL
CALCIUM SERPL-MCNC: NORMAL MG/DL
CHLORIDE BLD-SCNC: NORMAL MMOL/L
CO2: NORMAL
CREAT SERPL-MCNC: NORMAL MG/DL
GFR CALCULATED: NORMAL
GLUCOSE BLD-MCNC: NORMAL MG/DL
MAGNESIUM: 1.7 MG/DL
POTASSIUM SERPL-SCNC: NORMAL MMOL/L
SODIUM BLD-SCNC: NORMAL MMOL/L
TOTAL PROTEIN: NORMAL

## 2023-01-12 RX ORDER — MAGNESIUM OXIDE 400 MG/1
400 TABLET ORAL DAILY
Qty: 30 TABLET | Refills: 1 | Status: SHIPPED | OUTPATIENT
Start: 2023-01-12

## 2023-01-12 NOTE — TELEPHONE ENCOUNTER
----- Message from STEPH Israel CNP sent at 1/12/2023 12:22 PM EST -----  Please let King Whitehead known that her Norlene Minder was a little on the low side 1.7 and I ordered supplements.  Thanks.   ----- Message -----  From: Hildaalou Serum  Sent: 1/12/2023   8:03 AM EST  To: STEPH Israel CNP

## 2023-01-24 ENCOUNTER — TELEPHONE (OUTPATIENT)
Dept: NON INVASIVE DIAGNOSTICS | Age: 68
End: 2023-01-24

## 2023-01-24 ENCOUNTER — HOSPITAL ENCOUNTER (OUTPATIENT)
Dept: CARDIAC CATH/INVASIVE PROCEDURES | Age: 68
Discharge: HOME | End: 2023-01-24
Payer: MEDICARE

## 2023-01-24 ENCOUNTER — ANESTHESIA (OUTPATIENT)
Dept: CARDIAC CATH/INVASIVE PROCEDURES | Age: 68
End: 2023-01-24

## 2023-01-24 ENCOUNTER — ANESTHESIA EVENT (OUTPATIENT)
Dept: CARDIAC CATH/INVASIVE PROCEDURES | Age: 68
End: 2023-01-24

## 2023-01-24 ENCOUNTER — HOSPITAL ENCOUNTER (OUTPATIENT)
Dept: GENERAL RADIOLOGY | Age: 68
Discharge: HOME | End: 2023-01-26
Payer: MEDICARE

## 2023-01-24 VITALS
TEMPERATURE: 98 F | SYSTOLIC BLOOD PRESSURE: 102 MMHG | BODY MASS INDEX: 23.9 KG/M2 | DIASTOLIC BLOOD PRESSURE: 66 MMHG | HEART RATE: 70 BPM | RESPIRATION RATE: 18 BRPM | HEIGHT: 64 IN | OXYGEN SATURATION: 100 % | WEIGHT: 140 LBS

## 2023-01-24 PROBLEM — I44.39 HIGH DEGREE ATRIOVENTRICULAR BLOCK: Status: ACTIVE | Noted: 2023-01-24

## 2023-01-24 PROBLEM — Z95.0 PRESENCE OF CARDIAC PACEMAKER: Status: ACTIVE | Noted: 2023-01-24

## 2023-01-24 PROBLEM — I48.19 PERSISTENT ATRIAL FIBRILLATION (HCC): Status: ACTIVE | Noted: 2023-01-24

## 2023-01-24 LAB
EKG ATRIAL RATE: 258 BPM
EKG Q-T INTERVAL: 414 MS
EKG QRS DURATION: 166 MS
EKG QTC CALCULATION (BAZETT): 456 MS
EKG R AXIS: -5 DEGREES
EKG T AXIS: -15 DEGREES
EKG VENTRICULAR RATE: 73 BPM

## 2023-01-24 PROCEDURE — 3700000000 HC ANESTHESIA ATTENDED CARE

## 2023-01-24 PROCEDURE — 2580000003 HC RX 258: Performed by: NURSE ANESTHETIST, CERTIFIED REGISTERED

## 2023-01-24 PROCEDURE — 6360000002 HC RX W HCPCS: Performed by: NURSE ANESTHETIST, CERTIFIED REGISTERED

## 2023-01-24 PROCEDURE — 93005 ELECTROCARDIOGRAM TRACING: CPT | Performed by: STUDENT IN AN ORGANIZED HEALTH CARE EDUCATION/TRAINING PROGRAM

## 2023-01-24 PROCEDURE — 92960 CARDIOVERSION ELECTRIC EXT: CPT

## 2023-01-24 PROCEDURE — 71045 X-RAY EXAM CHEST 1 VIEW: CPT

## 2023-01-24 PROCEDURE — 2709999900 HC NON-CHARGEABLE SUPPLY

## 2023-01-24 RX ORDER — PROPOFOL 10 MG/ML
INJECTION, EMULSION INTRAVENOUS PRN
Status: DISCONTINUED | OUTPATIENT
Start: 2023-01-24 | End: 2023-01-24 | Stop reason: SDUPTHER

## 2023-01-24 RX ORDER — FLECAINIDE ACETATE 100 MG/1
100 TABLET ORAL 2 TIMES DAILY
Qty: 180 TABLET | Refills: 1 | Status: SHIPPED | OUTPATIENT
Start: 2023-01-24 | End: 2023-04-24

## 2023-01-24 RX ORDER — SODIUM CHLORIDE 9 MG/ML
INJECTION, SOLUTION INTRAVENOUS CONTINUOUS PRN
Status: DISCONTINUED | OUTPATIENT
Start: 2023-01-24 | End: 2023-01-24 | Stop reason: SDUPTHER

## 2023-01-24 RX ADMIN — SODIUM CHLORIDE: 9 INJECTION, SOLUTION INTRAVENOUS at 13:15

## 2023-01-24 RX ADMIN — PROPOFOL 50 MG: 10 INJECTION, EMULSION INTRAVENOUS at 13:43

## 2023-01-24 NOTE — ANESTHESIA PRE PROCEDURE
Department of Anesthesiology  Preprocedure Note       Name:  Srinivas Peoples   Age:  75 y.o.  :  1955                                          MRN:  [id number]         Date:  2023      Surgeon: Heather Kothari    Procedure: DCCV    Medications prior to admission:   Prior to Admission medications    Medication Sig Start Date End Date Taking?  Authorizing Provider   magnesium oxide (MAG-OX) 400 MG tablet Take 1 tablet by mouth daily 23   STEPH Hager CNP   ADVAIR DISKUS 250-50 MCG/ACT AEPB diskus inhaler  12/15/22   Historical Provider, MD   potassium chloride (KLOR-CON) 10 MEQ extended release tablet Take 10 mEq by mouth daily 22   Historical Provider, MD   flecainide (TAMBOCOR) 50 MG tablet Take 1 tablet by mouth 2 times daily 1/10/23   STEPH Hager CNP   digoxin (LANOXIN) 125 MCG tablet Take 1 tablet by mouth daily 22   STEPH Hope CNP   metoprolol succinate (TOPROL XL) 25 MG extended release tablet Take 0.5 tablets by mouth daily 22   Xi Oliva MD   metoclopramide (REGLAN) 5 MG tablet Take 5 mg by mouth 4 times daily Take one tablet by mouth three times a day 30 minutes before meals    Rachael Lund MD   macitentan (OPSUMIT) 10 MG TABS Take 10 mg by mouth daily    Historical Provider, MD   empagliflozin (JARDIANCE) 10 MG tablet Take 10 mg by mouth daily    Historical Provider, MD   hyoscyamine (ANASPAZ;LEVSIN) 125 MCG tablet Take 125 mcg by mouth every 4 hours as needed for Cramping    Historical Provider, MD   apixaban (ELIQUIS) 5 MG TABS tablet Take 1 tablet by mouth 2 times daily 22   Lacie Joshi MD   pantoprazole (PROTONIX) 40 MG tablet Take 40 mg by mouth daily    Historical Provider, MD   sildenafil (REVATIO) 20 MG tablet Take 60 mg by mouth 3 times daily    Historical Provider, MD   Tiotropium Bromide Monohydrate (SPIRIVA RESPIMAT IN) Inhale into the lungs daily    Historical Provider, MD   Multiple Vitamins-Minerals (THERAPEUTIC MULTIVITAMIN-MINERALS) tablet Take 1 tablet by mouth daily    Historical Provider, MD   vitamin B-12 (CYANOCOBALAMIN) 1000 MCG tablet Take 1,000 mcg by mouth daily    Historical Provider, MD   calcium carbonate (TUMS) 500 MG chewable tablet Take 1 tablet by mouth daily    Historical Provider, MD   torsemide (DEMADEX) 20 MG tablet TAKE ONE TABLET BY MOUTH DAILY 4/4/22   Historical Provider, MD   spironolactone (ALDACTONE) 25 MG tablet TAKE ONE TABLET BY MOUTH DAILY 4/4/22   Historical Provider, MD   levothyroxine (SYNTHROID) 75 MCG tablet 75 mcg Daily  12/1/21   Historical Provider, MD   mycophenolate (CELLCEPT) 500 MG tablet Take 1,500 mg by mouth 2 times daily 11/12/21   Historical Provider, MD   potassium chloride (KLOR-CON M) 10 MEQ extended release tablet Take 1 tablet by mouth daily Take with Lasix  Patient taking differently: Take 20 mEq by mouth 2 times daily Take with Lasix 9/24/21   Khadra Kong MD   simvastatin (ZOCOR) 40 MG tablet Take 1 tablet by mouth nightly 3/29/21   Khadra Kong MD   rOPINIRole (REQUIP) 0.25 MG tablet Take 1 tablet by mouth daily  Patient taking differently: Take 0.5 mg by mouth nightly 9/18/15   Ranjan Jose DO   Cholecalciferol (VITAMIN D) 2000 UNITS CAPS capsule Take  by mouth daily.     Historical Provider, MD       Current medications:    Current Outpatient Medications   Medication Sig Dispense Refill    magnesium oxide (MAG-OX) 400 MG tablet Take 1 tablet by mouth daily 30 tablet 1    ADVAIR DISKUS 250-50 MCG/ACT AEPB diskus inhaler       potassium chloride (KLOR-CON) 10 MEQ extended release tablet Take 10 mEq by mouth daily      flecainide (TAMBOCOR) 50 MG tablet Take 1 tablet by mouth 2 times daily 60 tablet 6    digoxin (LANOXIN) 125 MCG tablet Take 1 tablet by mouth daily 30 tablet 3    metoprolol succinate (TOPROL XL) 25 MG extended release tablet Take 0.5 tablets by mouth daily 90 tablet 3    metoclopramide (REGLAN) 5 MG tablet Take 5 mg by mouth 4 times daily Take one tablet by mouth three times a day 30 minutes before meals      macitentan (OPSUMIT) 10 MG TABS Take 10 mg by mouth daily      empagliflozin (JARDIANCE) 10 MG tablet Take 10 mg by mouth daily      hyoscyamine (ANASPAZ;LEVSIN) 125 MCG tablet Take 125 mcg by mouth every 4 hours as needed for Cramping      apixaban (ELIQUIS) 5 MG TABS tablet Take 1 tablet by mouth 2 times daily 180 tablet 3    pantoprazole (PROTONIX) 40 MG tablet Take 40 mg by mouth daily      sildenafil (REVATIO) 20 MG tablet Take 60 mg by mouth 3 times daily      Tiotropium Bromide Monohydrate (SPIRIVA RESPIMAT IN) Inhale into the lungs daily      Multiple Vitamins-Minerals (THERAPEUTIC MULTIVITAMIN-MINERALS) tablet Take 1 tablet by mouth daily      vitamin B-12 (CYANOCOBALAMIN) 1000 MCG tablet Take 1,000 mcg by mouth daily      calcium carbonate (TUMS) 500 MG chewable tablet Take 1 tablet by mouth daily      torsemide (DEMADEX) 20 MG tablet TAKE ONE TABLET BY MOUTH DAILY      spironolactone (ALDACTONE) 25 MG tablet TAKE ONE TABLET BY MOUTH DAILY      levothyroxine (SYNTHROID) 75 MCG tablet 75 mcg Daily       mycophenolate (CELLCEPT) 500 MG tablet Take 1,500 mg by mouth 2 times daily      potassium chloride (KLOR-CON M) 10 MEQ extended release tablet Take 1 tablet by mouth daily Take with Lasix (Patient taking differently: Take 20 mEq by mouth 2 times daily Take with Lasix) 90 tablet 1    simvastatin (ZOCOR) 40 MG tablet Take 1 tablet by mouth nightly 90 tablet 3    rOPINIRole (REQUIP) 0.25 MG tablet Take 1 tablet by mouth daily (Patient taking differently: Take 0.5 mg by mouth nightly) 90 tablet 3    Cholecalciferol (VITAMIN D) 2000 UNITS CAPS capsule Take  by mouth daily. No current facility-administered medications for this encounter. Allergies:     Allergies   Allergen Reactions    Biaxin [Clarithromycin] Nausea And Vomiting    Naproxen Nausea And Vomiting       Problem List:    Patient Active Problem List Diagnosis Code    Memory loss R41.3    Cerebral atherosclerosis I67.2    Hypercholesteremia improved E78.00    Right cataract H26.9    Left cataract H26.9    CAD in native artery I25.10    Symptomatic bradycardia R00.1    High-grade atrioventricular block I44.39    Atrial flutter (HCC) I48.92    Pacemaker lead malfunction T82.110A    Centrilobular emphysema ([facility]) J43.2    Chronic atrial fibrillation ([facility]) I48.20    Chronic heart failure with preserved ejection fraction ([facility]) C45.46    Diastolic dysfunction F51.60    Disorder of connective tissue ([facility]) M35.9    Functional dyspnea F45.8    Hypothyroidism E03.9    Lung nodule R91.1    Pleural effusion J90    Primary hypertension I10    Pulmonary artery hypertension associated with connective tissue disease ([facility]) I27.21, M35.9    Pulmonary HTN ([facility]) I27.20    Pulmonary hypertension due to COPD ([facility]) I27.23, J44.9    Raynaud's disease without gangrene I73.00    Scleroderma ([facility]) M34.9    Weakness R53.1       Past Medical History:        Diagnosis Date    CAD in native artery 2021    Cerebral artery occlusion with cerebral infarction ([facility] [address]     \"mini stroke\" 10/2014    H/O cardiovascular stress test 2020    Lexiscan    Hyperlipidemia     Hypertension     Hypothyroidism 1/10/2023    Sleep apnea     doesnt use cpap       Past Surgical History:        Procedure Laterality Date    CARDIAC CATHETERIZATION  2012    Right heart cath by Dr Forest Wyman Right 2019    intraocular lens    CATARACT REMOVAL WITH IMPLANT Left 10/15/2019     SECTION      COLONOSCOPY      INTRACAPSULAR CATARACT EXTRACTION Right 2019    RIGHT EYE CATARACT EMULSIFICATION IOL IMPLANT performed by Jefferson London MD at [place] [id number] Left 10/15/2019    LEFT EYE CATARACT EMULSIFICATION IOL IMPLANT performed by Jefferson London MD at [facility] 8  2022    Successful removal of old right atrial pacing lead and insertion of the new right atrial pacing lead (Dr. Yaima Francois)   [facility] Left 2022    Medtronic dual chamber (Dr Rebecca Saldana)       Social History:    Social History     Tobacco Use    Smoking status: Former     Packs/day: 1.50     Years: 40.00     Pack years: 60.00     Types: Cigarettes     Quit date: 2020     Years since quittin.3    Smokeless tobacco: Never    Tobacco comments:      she has decreased to 1ppd   Substance Use Topics    Alcohol use: Not Currently     Alcohol/week: 10.0 standard drinks     Types: 10 Glasses of wine per week     Comment: green tea 2 cups a day                                 Counseling given: Not Answered  Tobacco comments:  she has decreased to 1ppd      Vital Signs (Current): There were no vitals filed for this visit.                                            BP Readings from Last 3 Encounters:   01/10/23 106/76   22 (!) 113/56   22 (!) 124/50       NPO Status:                                                                                BMI:   Wt Readings from Last 3 Encounters:   01/10/23 140 lb (63.5 kg)   22 149 lb 8 oz (67.8 kg)   22 148 lb (67.1 kg)     There is no height or weight on file to calculate BMI.    CBC:   Lab Results   Component Value Date/Time    WBC 4.7 2022 06:04 AM    RBC 3.14 2022 06:04 AM    HGB 9.6 2022 06:04 AM    HCT 31.5 2022 06:04 AM    .3 2022 06:04 AM    RDW 16.1 2022 06:04 AM     2022 06:04 AM       CMP:   Lab Results   Component Value Date/Time     2022 06:04 AM    K 4.2 2022 06:04 AM    K 4.0 2022 03:01 PM     2022 06:04 AM    CO2 22 2022 06:04 AM    BUN 11 2022 06:04 AM    CREATININE 0.5 2022 06:04 AM    GFRAA >60 2022 03:01 PM    LABGLOM >60 2022 06:04 AM    GLUCOSE 89 11/29/2022 06:04 AM    GLUCOSE 87 01/26/2012 11:02 AM    PROT 6.1 11/29/2022 06:04 AM    CALCIUM 9.1 11/29/2022 06:04 AM    BILITOT 0.5 11/29/2022 06:04 AM    ALKPHOS 63 11/29/2022 06:04 AM    AST 18 11/29/2022 06:04 AM    ALT 13 11/29/2022 06:04 AM       POC Tests: No results for input(s): POCGLU, POCNA, POCK, POCCL, POCBUN, POCHEMO, POCHCT in the last 72 hours. Coags:   Lab Results   Component Value Date/Time    PROTIME 27.7 08/17/2022 03:01 PM    PROTIME 0.0 08/05/2022 01:14 PM    INR 2.4 08/17/2022 03:01 PM    APTT 35.6 04/05/2021 03:00 PM       HCG (If Applicable): No results found for: PREGTESTUR, PREGSERUM, HCG, HCGQUANT     ABGs: No results found for: PHART, PO2ART, UHI2VDU, DDP2OEL, BEART, [id number]     Type & Screen (If Applicable):  No results found for: LABABO, LABRH    Drug/Infectious Status (If Applicable):  No results found for: HIV, HEPCAB    COVID-19 Screening (If Applicable):   Lab Results   Component Value Date/Time    COVID19 Not Detected 11/23/2022 10:13 PM    COVID19 Not Detected 04/01/2021 01:18 PM           Anesthesia Evaluation  Nursing notes reviewed no history of anesthetic complications:   Airway: Mallampati: III  TM distance: >3 FB   Neck ROM: limited  Mouth opening: > = 3 FB   Dental:    (+) upper dentures and lower dentures      Pulmonary:   (+) COPD:  sleep apnea: on noncompliant,                             Cardiovascular:    (+) hypertension:, pacemaker: pacemaker, CAD:, dysrhythmias: atrial fibrillation, CHF:, pulmonary hypertension:,          Beta Blocker:  Dose within 24 Hrs         Neuro/Psych:   (+) CVA:,              ROS comment: Raynauds/ Scleroderma GI/Hepatic/Renal:            ROS comment: gastroparesis. Endo/Other:    (+) hypothyroidism, blood dyscrasia: anticoagulation therapy, arthritis: OA., .                  ROS comment: On Eliquis Abdominal:             Vascular:           DMITRY comment: S/p R CEA.  Other Findings:           Anesthesia Plan      MAC     ASA 4             Anesthetic plan and risks discussed with patient. Plan discussed with attending.                     STEPH Pichardo - CRNA   1/24/2023

## 2023-01-24 NOTE — TELEPHONE ENCOUNTER
----- Message from Veronique Geller DO sent at 1/24/2023  2:25 PM EST -----  Regarding: Appointment  Appointment with my office in approximately 2 months.  -Veronique Geller DO

## 2023-01-24 NOTE — ANESTHESIA POSTPROCEDURE EVALUATION
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 Jose Miguel Phase I:      Jose Miguel Phase II:        Anesthesia Post Evaluation    Patient location during evaluation: bedside  Patient participation: complete - patient cannot participate  Level of consciousness: awake and alert  Airway patency: patent  Nausea & Vomiting: no nausea and no vomiting  Complications: no  Cardiovascular status: blood pressure returned to baseline  Respiratory status: acceptable  Hydration status: euvolemic  There was medical reason for not using a multimodal analgesia pain management approach.

## 2023-01-24 NOTE — DISCHARGE INSTRUCTIONS
Cardioversion discharge instructions  - Medications: Increase flecainide from 50 mg twice a day to 100 mg twice a day. Prescription sent to [facility] mail pharmacy. Continue all other prescribed medications. - Follow-up:  -- Remote interrogation of pacemaker: Will be performed in approximately 1 week. -- Dr. Becky Givens office appointment:  Rodolfo Corea will be contacted to schedule appointment with Dr. Becky Givens office in approximately 2 months. - Activity/driving: No driving or operating heavy machinery for 24 hours. - Questions/concerns: Please contact the office at [phone].

## 2023-01-25 LAB
EKG ATRIAL RATE: 50 BPM
EKG P-R INTERVAL: 220 MS
EKG Q-T INTERVAL: 418 MS
EKG QRS DURATION: 126 MS
EKG QTC CALCULATION (BAZETT): 451 MS
EKG R AXIS: -22 DEGREES
EKG T AXIS: 87 DEGREES
EKG VENTRICULAR RATE: 70 BPM

## 2023-01-27 ENCOUNTER — TELEPHONE (OUTPATIENT)
Dept: NON INVASIVE DIAGNOSTICS | Age: 68
End: 2023-01-27

## 2023-01-27 NOTE — TELEPHONE ENCOUNTER
Called patient to discuss device transmission and being out of rhythm again. Offered appointment to see Dr Mac Guevara in office 02/02/2023. Patient states that she has no availability that day due to other doctor visits.

## 2023-01-27 NOTE — TELEPHONE ENCOUNTER
----- Message from Renea Reyes DO sent at 1/26/2023  9:13 PM EST -----  Please schedule with me in office on 2/2/23. Can squeeze in.    -Renea Reyes DO   ----- Message -----  From: Idalmis Xiong RN  Sent: 1/26/2023   8:04 AM EST  To: Renea Reyes DO    Please see Murj report 01/26/2023. Pt has DCCV on 01/24/2023. PPM Alert for AFL Detection s/p 1/24/2023 DCCV    Implant Indication: High-Grade AVB, hx AF/AFL, PH  Per medication list, pt taking Flecainide, Mag-Ox, Digoxin, Metoprolol S, Eliquis  Presenting Rhythm:  AsVp- AFL - tracking at 111 ppm (not in mode switch d/t flutter waves in blanking)  AT/AF [place] 47.1% since 1/24/2023 w/ 93 mode switches  EGMs are c/w AFL /Vs, flutter waves occasionally in blanking  RVp 69.5%

## 2023-02-01 ENCOUNTER — TELEPHONE (OUTPATIENT)
Dept: NON INVASIVE DIAGNOSTICS | Age: 68
End: 2023-02-01

## 2023-02-08 ENCOUNTER — OFFICE VISIT (OUTPATIENT)
Dept: NON INVASIVE DIAGNOSTICS | Age: 68
End: 2023-02-08
Payer: MEDICARE

## 2023-02-08 VITALS
SYSTOLIC BLOOD PRESSURE: 118 MMHG | DIASTOLIC BLOOD PRESSURE: 62 MMHG | WEIGHT: 139.2 LBS | BODY MASS INDEX: 24.66 KG/M2 | HEIGHT: 63 IN | HEART RATE: 81 BPM

## 2023-02-08 DIAGNOSIS — I48.19 PERSISTENT ATRIAL FIBRILLATION (HCC): Primary | ICD-10-CM

## 2023-02-08 PROCEDURE — G8427 DOCREV CUR MEDS BY ELIG CLIN: HCPCS | Performed by: STUDENT IN AN ORGANIZED HEALTH CARE EDUCATION/TRAINING PROGRAM

## 2023-02-08 PROCEDURE — 3017F COLORECTAL CA SCREEN DOC REV: CPT | Performed by: STUDENT IN AN ORGANIZED HEALTH CARE EDUCATION/TRAINING PROGRAM

## 2023-02-08 PROCEDURE — 93000 ELECTROCARDIOGRAM COMPLETE: CPT | Performed by: STUDENT IN AN ORGANIZED HEALTH CARE EDUCATION/TRAINING PROGRAM

## 2023-02-08 PROCEDURE — 1036F TOBACCO NON-USER: CPT | Performed by: STUDENT IN AN ORGANIZED HEALTH CARE EDUCATION/TRAINING PROGRAM

## 2023-02-08 PROCEDURE — G8484 FLU IMMUNIZE NO ADMIN: HCPCS | Performed by: STUDENT IN AN ORGANIZED HEALTH CARE EDUCATION/TRAINING PROGRAM

## 2023-02-08 PROCEDURE — G8400 PT W/DXA NO RESULTS DOC: HCPCS | Performed by: STUDENT IN AN ORGANIZED HEALTH CARE EDUCATION/TRAINING PROGRAM

## 2023-02-08 PROCEDURE — 3074F SYST BP LT 130 MM HG: CPT | Performed by: STUDENT IN AN ORGANIZED HEALTH CARE EDUCATION/TRAINING PROGRAM

## 2023-02-08 PROCEDURE — G8420 CALC BMI NORM PARAMETERS: HCPCS | Performed by: STUDENT IN AN ORGANIZED HEALTH CARE EDUCATION/TRAINING PROGRAM

## 2023-02-08 PROCEDURE — 99215 OFFICE O/P EST HI 40 MIN: CPT | Performed by: STUDENT IN AN ORGANIZED HEALTH CARE EDUCATION/TRAINING PROGRAM

## 2023-02-08 PROCEDURE — 3078F DIAST BP <80 MM HG: CPT | Performed by: STUDENT IN AN ORGANIZED HEALTH CARE EDUCATION/TRAINING PROGRAM

## 2023-02-08 PROCEDURE — 1123F ACP DISCUSS/DSCN MKR DOCD: CPT | Performed by: STUDENT IN AN ORGANIZED HEALTH CARE EDUCATION/TRAINING PROGRAM

## 2023-02-08 PROCEDURE — 1090F PRES/ABSN URINE INCON ASSESS: CPT | Performed by: STUDENT IN AN ORGANIZED HEALTH CARE EDUCATION/TRAINING PROGRAM

## 2023-02-08 RX ORDER — FLECAINIDE ACETATE 150 MG/1
150 TABLET ORAL 2 TIMES DAILY
Qty: 180 TABLET | Refills: 1 | Status: SHIPPED | OUTPATIENT
Start: 2023-02-08 | End: 2023-05-09

## 2023-02-08 RX ORDER — DIGOXIN 125 MCG
125 TABLET ORAL DAILY
Qty: 90 TABLET | Refills: 1 | Status: SHIPPED | OUTPATIENT
Start: 2023-02-08 | End: 2023-05-09

## 2023-02-08 NOTE — PROGRESS NOTES
[facility] CARDIOLOGY  CARDIAC ELECTROPHYSIOLOGY DEPARTMENT/DIVISION OF CARDIOLOGY  Outpatient progress report  PATIENT: Sara Pillai RECORD NUMBER: [id number]  DATE OF SERVICE:  2/8/2023  Primary EP: Dr Crescencio Galvin  at [address] and Dr. Edmar Barnes: No ref. provider found and Naheed Clemente DO  CHIEF COMPLAINT: AV block    HPI: : Mandie Call is a 75 y.o. female with a history of nonvalvular persistent AF sp DCCV x3 (3/2022 at [facility]; 8/11/2022 at [facility]; 1/24/2023), AT sp DCCV (8/11/2022 at [facility]), high grade AV block sp MDT dc PPM (DOI: 11/25/22-Dr Crescencio Galvin with RV lead as LBB area; RA lead revision 2/2 RA lead dislodgement: 11/28/22-Dr Blinda Osler), CVA, carotid artery stenosis sp right CEA (?),  HTN, JANNETH-noncompliant with CPAP, COPD/emphysema, severe pulmonary HTN-combination of group 1 (CTD) + group 2 + group 3 (emphysema, JANNETH), scleroderma/Raynaud's/esophageal dysmotility, hypothyroidism, and cataracts sp bilateral removal (2019). She is managed by Dr. Dwayne Cook with apixaban 5 mg twice daily, digoxin 125 mg daily, flecainide 100 mg twice daily, simvastatin 40 mg daily, spironolactone 25 mg daily, and PPI. In 2020, she was diagnosed with pulmonary hypertension, which was treated with sildenafil. She was referred to rheumatology, who diagnosed the patient with scleroderma and initiated treatment with Norvasc for Raynaud's and mycophenolate for cutaneous manifestations. In 2021, she was diagnosed with nonvalvular paroxysmal AF, which was treated with [address] and beta-blocker. In 12/2021, she was reportedly on a brief course of amiodarone, but details of this are unavailable. In 3/2022, she was admitted to [facility] for acute right-sided heart failure and AF with RVR, which was treated with diuresis, thoracentesis, and ELMER/DCCV. In 6/2022, patient was referred to [facility] (Dr. Indira Raman) for management of AF.   Patient was noted to be in AT with 2-1 AV conduction at 110 bpm.  Metoprolol was increased at that time. In 8/2022, she underwent DCCV with initiation of flecainide 50 mg twice daily. In 9/2022, she was noted to be back in AF/AT with variable AV conduction with ventricular rate of 84 bpm, RBBB with LAD, which was managed by increasing the flecainide dose to 100 mg twice daily and reducing metoprolol tartrate dose to 12.5 mg twice daily, as well as changing Coumadin to apixaban. Patient reduced metoprolol dose to once daily on her own due to hypotension. In 10/2022, she was noted to be in sinus with 1* AV block and RBBB, so left on apixaban, flecainide 100 mg twice daily and metoprolol tartrate changed to metoprolol XL 12.5 mg daily. In 11/2022, she was diagnosed with intermittent third-degree AV block and AT, which was treated with temporary cessation of AV jaun inhibitors/flecainide, but AV block persisted, so Medtronic dual-chamber pacemaker implanted with RV lead in LBB area by Dr Kwaku Rice. This procedure was complicated by RA lead dislodgment, which required RA lead revision by Dr Cherrie Corral. Following procedure, flecainide reintroduced, but at 50 mg twice daily. In 1/2023, patient noted to have remained in AF, which was treated with DCCV, as well as increasing flecainide to 100 mg twice daily. She presents today, 2/8/2023; for follow-up my office. Her device is enrolled in remote monitoring, which reports stable device function, AF/AT burden of 47.1%, and RV pacing of 69.5%. She reports hypotension at home BP monitor, so discontinued metoprolol on her own. She denies any complaints at this time. Prior cardiac testing:  --EKG 01/10/2023: AF rate 75 bpm, QRS 122ms, QTc 394ms   -ECG (11/24/2022):  AT with 41 AV conduction and ventricular rate of 50 bpm, RBBB (QRS D = 156 ms)  -ECG (11/24/2022 at 2210): AT, third-degree AV block with ventricular escape rhythm at 38 bpm  -TTE (10/31/2022 at [facility]): LVEF = 69%, grade 3 LVDD, RV dilation, JASON, mild TR, mild-moderate pulmonary HTN.  -TTE (21): LVEF = 60%, grade III LVDD, mild RVSD, RV dilation, mild CHRISTINE, moderate TR, moderate pulmonary HTN. -ECG (2022): Sinus at 53 bpm, first-degree AV block (TENISHA = 204 ms), RBBB (QRS D = 138 ms), QTc = 452 ms.  - LHC (2021): Moderate pulmonary arterial hypertension. -TTE (2021): LVEF = 65%, mild asymmetric septal LVH, hyperdynamic LV with complete obliteration of LV cavity during systole, stage III LVDD, mild RV dilation, moderate R VSD, moderate TR, and severe pulmonary HTN. -Pharmacologic nuclear stress test (2020): LVEF = 79%, normal perfusion. -TTE (2/10/2020): LVEF = 78%, mild concentric LVH with septal thickening causing mild LVOT obstruction, mild CHRISTINE, mild-moderate TR, moderate-severe pulmonary HTN, pericardial fat pad.     Past Medical History:   Diagnosis Date    CAD in native artery 2021    Cerebral artery occlusion with cerebral infarction ([facility] [address]     \"mini stroke\" 10/2014    H/O cardiovascular stress test 2020    Lexiscan    Hyperlipidemia     Hypertension     Hypothyroidism 1/10/2023    Sleep apnea     doesnt use cpap     Past Surgical History:   Procedure Laterality Date    CARDIAC CATHETERIZATION  2012    Right heart cath by Dr Kamryn Ferreira Left 2023    Successful (Dr. Peggy Lacey)    CAROTID ENDARTERECTOMY Right     CATARACT REMOVAL WITH IMPLANT Right 2019    intraocular lens    CATARACT REMOVAL WITH IMPLANT Left 10/15/2019     SECTION      COLONOSCOPY      INTRACAPSULAR CATARACT EXTRACTION Right 2019    RIGHT EYE CATARACT EMULSIFICATION IOL IMPLANT performed by Lesly Whitney MD at [address] [place] [address] Left 10/15/2019    LEFT EYE CATARACT EMULSIFICATION IOL IMPLANT performed by Lesly Whitney MD at [address] [place] [address] 24  2022    Successful removal of old right atrial pacing lead and insertion of the new right atrial pacing lead (Dr. Maggie Hitchcock) PACEMAKER PLACEMENT Left 2022    Medtronic dual chamber (Dr Ekaterina Estrada)      Family History   Problem Relation Age of Onset    Other Mother         complication of surgery    Heart Disease Brother      There is no family history of sudden cardiac arrest    Social History     Tobacco Use    Smoking status: Former     Packs/day: 1.50     Years: 40.00     Pack years: 60.00     Types: Cigarettes     Quit date: 2020     Years since quittin.3    Smokeless tobacco: Never    Tobacco comments:      she has decreased to 1ppd   Substance Use Topics    Alcohol use: Not Currently     Alcohol/week: 10.0 standard drinks     Types: 10 Glasses of wine per week     Comment: green tea 2 cups a day        Current Outpatient Medications   Medication Sig Dispense Refill    flecainide (TAMBOCOR) 100 MG tablet Take 1 tablet by mouth 2 times daily 180 tablet 1    magnesium oxide (MAG-OX) 400 MG tablet Take 1 tablet by mouth daily 30 tablet 1    ADVAIR DISKUS 250-50 MCG/ACT AEPB diskus inhaler       potassium chloride (KLOR-CON) 10 MEQ extended release tablet Take 10 mEq by mouth daily      digoxin (LANOXIN) 125 MCG tablet Take 1 tablet by mouth daily 30 tablet 3    metoprolol succinate (TOPROL XL) 25 MG extended release tablet Take 0.5 tablets by mouth daily 90 tablet 3    metoclopramide (REGLAN) 5 MG tablet Take 5 mg by mouth 4 times daily Take one tablet by mouth three times a day 30 minutes before meals      macitentan (OPSUMIT) 10 MG TABS Take 10 mg by mouth daily      empagliflozin (JARDIANCE) 10 MG tablet Take 10 mg by mouth daily      hyoscyamine (ANASPAZ;LEVSIN) 125 MCG tablet Take 125 mcg by mouth every 4 hours as needed for Cramping      apixaban (ELIQUIS) 5 MG TABS tablet Take 1 tablet by mouth 2 times daily 180 tablet 3    pantoprazole (PROTONIX) 40 MG tablet Take 40 mg by mouth daily      sildenafil (REVATIO) 20 MG tablet Take 60 mg by mouth 3 times daily      Tiotropium Bromide Monohydrate (SPIRIVA RESPIMAT IN) Inhale into the lungs daily      Multiple Vitamins-Minerals (THERAPEUTIC MULTIVITAMIN-MINERALS) tablet Take 1 tablet by mouth daily      vitamin B-12 (CYANOCOBALAMIN) 1000 MCG tablet Take 1,000 mcg by mouth daily      calcium carbonate (TUMS) 500 MG chewable tablet Take 1 tablet by mouth daily      spironolactone (ALDACTONE) 25 MG tablet TAKE ONE TABLET BY MOUTH DAILY      levothyroxine (SYNTHROID) 75 MCG tablet 75 mcg Daily       mycophenolate (CELLCEPT) 500 MG tablet Take 1,500 mg by mouth 2 times daily      potassium chloride (KLOR-CON M) 10 MEQ extended release tablet Take 1 tablet by mouth daily Take with Lasix (Patient taking differently: Take 20 mEq by mouth 2 times daily Take with Lasix) 90 tablet 1    simvastatin (ZOCOR) 40 MG tablet Take 1 tablet by mouth nightly 90 tablet 3    rOPINIRole (REQUIP) 0.25 MG tablet Take 1 tablet by mouth daily (Patient taking differently: Take 0.5 mg by mouth nightly) 90 tablet 3    Cholecalciferol (VITAMIN D) 2000 UNITS CAPS capsule Take  by mouth daily. No current facility-administered medications for this visit. Allergies   Allergen Reactions    Biaxin [Clarithromycin] Nausea And Vomiting    Naproxen Nausea And Vomiting       ROS:   Constitutional: Negative for fever, activity change and appetite change. HENT: Negative for epistaxis. Eyes: Negative for diploplia, blurred vision. Respiratory: Negative for cough, chest tightness, shortness of breath and wheezing. Cardiovascular: pertinent positives in HPI  Gastrointestinal: Negative for abdominal pain and blood in stool. Genitourinary: Negative for hematuria and difficulty urinating. Musculoskeletal: Negative for myalgias and gait problem. Skin: Negative for color change and rash. Neurological: Negative for syncope and light-headedness. Psychiatric/Behavioral: Negative for confusion and agitation. The patient is not nervous/anxious.   Heme: no bleeding disorders, no melena or hematochezia  All other review of systems are negative     PHYSICAL EXAM:  Constitutional /62   Pulse 81   Ht 5' 3\" (1.6 m)   Wt 139 lb 3.2 oz (63.1 kg)   BMI 24.66 kg/m²   Well-developed, no acute distress, well groomed  Eyes: conjunctivae normal, no xanthelasma   Ears, Nose, Throat: oral mucosa moist, no cyanosis   Neck: supple, no JVD, no bruits, no thyromegaly   CV: , IRIR,  no murmurs, rubs, or gallops. PMI is nondisplaced, Peripheral pulses normal including carotid auscultation, no noted aortic bruit, bilateral femoral and pedal pulses are normal in quality  Lungs: clear to auscultation bilaterally, normal respiratory effort without used of accessory muscles, no wheezes  Abdomen: soft, non-tender, bowel sounds present, no masses or hepatomegaly   Extremities: no digital clubbing, no edema   Skin: warm, no rashes   Neuro/Psych: A&O x 3, normal mood and affect  Pacemaker site: well healed free of erosion and well healed. Cardiac testing today:  ECG (2/8/2023):  Atrial-ventricular paced rhythm at 81 bpm  Device Interrogation/Reprogramming  Make/Model: Medtronic [place] XT  MRI  DOI: 11/20/2022  Battery: 12.8 years  Melecio Crest therapy: DDDR  bpm, AV delays 150 ms sensed and 180 ms paced  Pacing %: RA = 49.8%, RV = 77.3%  Lead function:  RA lead: sensing = 0.6 mV, impedance = 456 ohms, threshold = 0.5 V @ 0.4 msec  RV lead: sensing = > 20 mV, impedance = 570 ohms, threshold = 1.25 V @ 0.4 msec  Lead programming:  RA lead: sensitivity = 0.3 mV, output = 3.5 V @ 0.4 msec  RV lead: sensitivity = 1.2 mV, output = 3.5 V @ 0.4 msec  Arrhythmias: AF burden = 22.2%  Reprogramming included: outputs reduced to 2x safety margin  Overall device function is normal  All device programmable settings were evaluated per above and in the scanned document, along with iterative adjustments (capture thresholds) to assess and select the most appropriate final programming to provide for consistent delivery of the appropriate therapy and to verify function of the device. Assessment/plan:  Intermittent 3* AV block sp Medtronic dual-chamber pacemaker (DOI: 11/25/22-Dr Plasencia with RV lead as LBB area; RA lead revision 2/2 RA lead dislodgement: 11/28/22-Dr Clark)  - Stable device function.    -Continue remote monitoring every 91 days.    -Follow-up my office in approximately 3 months. Nonvalvular persistent AF/atypical AFL/AT sp DCCV x23(3/2022 at [facility], 8/11/2022 at [facility], 1/24/2023)  - Initially diagnosed in 2021.  - [id number] score = 7 (age, sex, CVA, PAD, HF, HTN). Recommend [address] in females with score of 2 or more. DOAC preferred. - On apixaban 5 mg twice daily. While on DOAC, recommend annual monitoring of CBC and CMP. - Rhythm control strategy with flecainide and multiple DCCV's pursued in the past by outside EP. Following last DCCV, patient reports no change in her symptoms. Appears symptoms of dyspnea are likely related to her severe pulmonary HTN, not AF. However, I will attempt to maintain sinus rhythm as I suspect she will do better given her borderline hypotension and severe pulmonary HTN. AF burden today is 22.2%. Therefore, I will increase flecainide dose to 150 mg twice daily. In the past, she was intolerant to metoprolol, so placed on digoxin. In the future, if further AF remains refractory to flecainide, then will need to reconsider pursuit of rhythm control. If rhythm control desired, then would need to consider class III antiarrhythmic versus ablation. If ablation was desired, her severe pulmonary HTN would require referral to larger outside institution ([facility], etc.). -Modifiable risk factors:  --Obesity: BMI goal less than 27.  --JANNETH: Denies sleep apnea symptoms.  --HTN: BP goal less than 130/80.  --EtOH: Continue to avoid. HFpEF  -Established with Dr. Adarsh Cross.       severe pulmonary HTN-combination of group 1 (CTD) + group 2 + group 3 (emphysema, JANNETH)  -I suspect her symptoms of dyspnea are not related to AF, but rather her severe pulmonary HTN. -Established with pulmonary at [facility]. She reports recent testing and scheduled for follow-up in the near future to discuss results as well as future management. scleroderma/Raynaud's/esophageal dysmotility    I spent a total of 40 minutes reviewing previous notes, test results, and face to face with the patient discussing the diagnosis and importance of compliance with the treatment plan as well as documenting on the day of the visit. Time of the day of service includes:  Preparing to see the patient (eg. Review of the medical record, such as tests). Obtaining and/or reviewing separately obtained history. Ordering prescription medications, tests, and/or procedures. Communicating results to the patient/family/caregiver. Counseling/educating the patient/family/caregiver. Documenting clinical information in the patients electronic record. Coordination of care for the patient. Performing a medical appropriate exam and/or evaluation. Thank you for allowing me to participate in your patient's care. Please call me if there are any questions.       Petr Lozano, DO  Cardiac Electrophysiology  [facility]

## 2023-02-08 NOTE — PATIENT INSTRUCTIONS
Increase flecainide to 150 mg twice a day. Continue remote monitoring of pacemaker every 91 days. Follow-up with Dr Kevin Link office in ~3 months.

## 2023-02-13 ENCOUNTER — APPOINTMENT (OUTPATIENT)
Dept: GENERAL RADIOLOGY | Age: 68
DRG: 394 | End: 2023-02-13
Payer: MEDICARE

## 2023-02-13 ENCOUNTER — HOSPITAL ENCOUNTER (INPATIENT)
Age: 68
LOS: 3 days | Discharge: HOME | DRG: 394 | End: 2023-02-16
Attending: EMERGENCY MEDICINE | Admitting: FAMILY MEDICINE
Payer: MEDICARE

## 2023-02-13 ENCOUNTER — APPOINTMENT (OUTPATIENT)
Dept: CT IMAGING | Age: 68
DRG: 394 | End: 2023-02-13
Payer: MEDICARE

## 2023-02-13 DIAGNOSIS — K56.609 SBO (SMALL BOWEL OBSTRUCTION) (HCC): Primary | ICD-10-CM

## 2023-02-13 DIAGNOSIS — I10 ESSENTIAL HYPERTENSION: ICD-10-CM

## 2023-02-13 LAB
ALBUMIN SERPL-MCNC: 4.6 G/DL (ref 3.5–5.2)
ALP BLD-CCNC: 91 U/L (ref 35–104)
ALT SERPL-CCNC: 20 U/L (ref 0–32)
ANION GAP SERPL CALCULATED.3IONS-SCNC: 18 MMOL/L (ref 7–16)
ANISOCYTOSIS: ABNORMAL
AST SERPL-CCNC: 26 U/L (ref 0–31)
BACTERIA: ABNORMAL /HPF
BASOPHILS ABSOLUTE: 0.05 E9/L (ref 0–0.2)
BASOPHILS RELATIVE PERCENT: 0.3 % (ref 0–2)
BILIRUB SERPL-MCNC: 1 MG/DL (ref 0–1.2)
BILIRUBIN URINE: ABNORMAL
BLOOD, URINE: NEGATIVE
BUN BLDV-MCNC: 34 MG/DL (ref 6–23)
CALCIUM SERPL-MCNC: 10.3 MG/DL (ref 8.6–10.2)
CHLORIDE BLD-SCNC: 85 MMOL/L (ref 98–107)
CLARITY: ABNORMAL
CO2: 25 MMOL/L (ref 22–29)
COLOR: YELLOW
CREAT SERPL-MCNC: 0.9 MG/DL (ref 0.5–1)
DIGOXIN LEVEL: 0.7 NG/ML (ref 0.8–2)
DOHLE BODIES: ABNORMAL
EOSINOPHILS ABSOLUTE: 0.07 E9/L (ref 0.05–0.5)
EOSINOPHILS RELATIVE PERCENT: 0.5 % (ref 0–6)
EPITHELIAL CELLS, UA: ABNORMAL /HPF
GFR SERPL CREATININE-BSD FRML MDRD: >60 ML/MIN/1.73
GLUCOSE BLD-MCNC: 99 MG/DL (ref 74–99)
GLUCOSE URINE: 250 MG/DL
HCT VFR BLD CALC: 35.8 % (ref 34–48)
HEMOGLOBIN: 11.2 G/DL (ref 11.5–15.5)
HYPOCHROMIA: ABNORMAL
IMMATURE GRANULOCYTES #: 0.04 E9/L
IMMATURE GRANULOCYTES %: 0.3 % (ref 0–5)
KETONES, URINE: ABNORMAL MG/DL
LACTIC ACID: 1 MMOL/L (ref 0.5–2.2)
LEUKOCYTE ESTERASE, URINE: ABNORMAL
LIPASE: 10 U/L (ref 13–60)
LYMPHOCYTES ABSOLUTE: 0.34 E9/L (ref 1.5–4)
LYMPHOCYTES RELATIVE PERCENT: 2.4 % (ref 20–42)
MAGNESIUM: 2.4 MG/DL (ref 1.6–2.6)
MCH RBC QN AUTO: 27.3 PG (ref 26–35)
MCHC RBC AUTO-ENTMCNC: 31.3 % (ref 32–34.5)
MCV RBC AUTO: 87.3 FL (ref 80–99.9)
MONOCYTES ABSOLUTE: 1.35 E9/L (ref 0.1–0.95)
MONOCYTES RELATIVE PERCENT: 9.4 % (ref 2–12)
NEUTROPHILS ABSOLUTE: 12.55 E9/L (ref 1.8–7.3)
NEUTROPHILS RELATIVE PERCENT: 87.1 % (ref 43–80)
NITRITE, URINE: NEGATIVE
OVALOCYTES: ABNORMAL
PDW BLD-RTO: 16.5 FL (ref 11.5–15)
PH UA: 5.5 (ref 5–9)
PLATELET # BLD: 416 E9/L (ref 130–450)
PMV BLD AUTO: 9.9 FL (ref 7–12)
POIKILOCYTES: ABNORMAL
POLYCHROMASIA: ABNORMAL
POTASSIUM REFLEX MAGNESIUM: 4.3 MMOL/L (ref 3.5–5)
PROTEIN UA: NEGATIVE MG/DL
RBC # BLD: 4.1 E12/L (ref 3.5–5.5)
RBC UA: ABNORMAL /HPF (ref 0–2)
SODIUM BLD-SCNC: 128 MMOL/L (ref 132–146)
SPECIFIC GRAVITY UA: >=1.03 (ref 1–1.03)
TEAR DROP CELLS: ABNORMAL
TOTAL PROTEIN: 8.4 G/DL (ref 6.4–8.3)
TROPONIN, HIGH SENSITIVITY: 54 NG/L (ref 0–9)
TROPONIN, HIGH SENSITIVITY: 59 NG/L (ref 0–9)
UROBILINOGEN, URINE: 0.2 E.U./DL
VACUOLATED NEUTROPHILS: ABNORMAL
WBC # BLD: 14.4 E9/L (ref 4.5–11.5)
WBC UA: ABNORMAL /HPF (ref 0–5)

## 2023-02-13 PROCEDURE — 6360000002 HC RX W HCPCS: Performed by: EMERGENCY MEDICINE

## 2023-02-13 PROCEDURE — 2580000003 HC RX 258

## 2023-02-13 PROCEDURE — 83605 ASSAY OF LACTIC ACID: CPT

## 2023-02-13 PROCEDURE — 36415 COLL VENOUS BLD VENIPUNCTURE: CPT

## 2023-02-13 PROCEDURE — 96375 TX/PRO/DX INJ NEW DRUG ADDON: CPT

## 2023-02-13 PROCEDURE — 96374 THER/PROPH/DIAG INJ IV PUSH: CPT

## 2023-02-13 PROCEDURE — 74018 RADEX ABDOMEN 1 VIEW: CPT

## 2023-02-13 PROCEDURE — 74177 CT ABD & PELVIS W/CONTRAST: CPT

## 2023-02-13 PROCEDURE — 2580000003 HC RX 258: Performed by: EMERGENCY MEDICINE

## 2023-02-13 PROCEDURE — 83735 ASSAY OF MAGNESIUM: CPT

## 2023-02-13 PROCEDURE — 99285 EMERGENCY DEPT VISIT HI MDM: CPT

## 2023-02-13 PROCEDURE — 1200000000 HC SEMI PRIVATE

## 2023-02-13 PROCEDURE — 81001 URINALYSIS AUTO W/SCOPE: CPT

## 2023-02-13 PROCEDURE — 80053 COMPREHEN METABOLIC PANEL: CPT

## 2023-02-13 PROCEDURE — 85025 COMPLETE CBC W/AUTO DIFF WBC: CPT

## 2023-02-13 PROCEDURE — 93005 ELECTROCARDIOGRAM TRACING: CPT | Performed by: PHYSICIAN ASSISTANT

## 2023-02-13 PROCEDURE — 84484 ASSAY OF TROPONIN QUANT: CPT

## 2023-02-13 PROCEDURE — 80162 ASSAY OF DIGOXIN TOTAL: CPT

## 2023-02-13 PROCEDURE — 6360000004 HC RX CONTRAST MEDICATION: Performed by: RADIOLOGY

## 2023-02-13 PROCEDURE — 83690 ASSAY OF LIPASE: CPT

## 2023-02-13 PROCEDURE — 2580000003 HC RX 258: Performed by: RADIOLOGY

## 2023-02-13 RX ORDER — 0.9 % SODIUM CHLORIDE 0.9 %
500 INTRAVENOUS SOLUTION INTRAVENOUS ONCE
Status: COMPLETED | OUTPATIENT
Start: 2023-02-13 | End: 2023-02-13

## 2023-02-13 RX ORDER — SODIUM CHLORIDE 9 MG/ML
1000 INJECTION, SOLUTION INTRAVENOUS ONCE
Status: COMPLETED | OUTPATIENT
Start: 2023-02-13 | End: 2023-02-13

## 2023-02-13 RX ORDER — FENTANYL CITRATE 50 UG/ML
50 INJECTION, SOLUTION INTRAMUSCULAR; INTRAVENOUS ONCE
Status: COMPLETED | OUTPATIENT
Start: 2023-02-13 | End: 2023-02-13

## 2023-02-13 RX ORDER — SODIUM CHLORIDE 0.9 % (FLUSH) 0.9 %
10 SYRINGE (ML) INJECTION PRN
Status: DISCONTINUED | OUTPATIENT
Start: 2023-02-13 | End: 2023-02-14

## 2023-02-13 RX ORDER — ONDANSETRON 2 MG/ML
4 INJECTION INTRAMUSCULAR; INTRAVENOUS ONCE
Status: COMPLETED | OUTPATIENT
Start: 2023-02-13 | End: 2023-02-13

## 2023-02-13 RX ADMIN — IOPAMIDOL 75 ML: 755 INJECTION, SOLUTION INTRAVENOUS at 21:07

## 2023-02-13 RX ADMIN — Medication 10 ML: at 21:08

## 2023-02-13 RX ADMIN — SODIUM CHLORIDE 500 ML: 9 INJECTION, SOLUTION INTRAVENOUS at 20:12

## 2023-02-13 RX ADMIN — ONDANSETRON 4 MG: 2 INJECTION INTRAMUSCULAR; INTRAVENOUS at 20:13

## 2023-02-13 RX ADMIN — FENTANYL CITRATE 50 MCG: 50 INJECTION, SOLUTION INTRAMUSCULAR; INTRAVENOUS at 20:13

## 2023-02-13 RX ADMIN — SODIUM CHLORIDE 1000 ML: 9 INJECTION, SOLUTION INTRAVENOUS at 23:36

## 2023-02-13 ASSESSMENT — PAIN DESCRIPTION - DESCRIPTORS: DESCRIPTORS: ACHING

## 2023-02-13 ASSESSMENT — PAIN DESCRIPTION - LOCATION
LOCATION: ABDOMEN
LOCATION: ABDOMEN

## 2023-02-13 ASSESSMENT — PAIN - FUNCTIONAL ASSESSMENT: PAIN_FUNCTIONAL_ASSESSMENT: 0-10

## 2023-02-13 ASSESSMENT — PAIN DESCRIPTION - ORIENTATION: ORIENTATION: MID

## 2023-02-13 ASSESSMENT — PAIN SCALES - GENERAL
PAINLEVEL_OUTOF10: 5
PAINLEVEL_OUTOF10: 4

## 2023-02-13 NOTE — ED NOTES
Department of Emergency Medicine  FIRST PROVIDER TRIAGE NOTE             Independent MLP           2/13/23  5:18 PM EST    Date of Encounter: 2/13/23   MRN: [id number]      HPI: Mitul Manzano is a 75 y.o. female who presents to the ED for No chief complaint on file. Pt presenting with RUQ, LUQ abdominal pain, vomiting x 4 days. ROS: Negative for cp or sob. PE: Gen Appearance/Constitutional: alert  HEENT: NC/NT. PERRLA,  Airway patent. Initial Plan of Care: All treatment areas with department are currently occupied. Plan to order/Initiate the following while awaiting opening in ED: labs, EKG, and imaging studies.   Initiate Treatment-Testing, Proceed toTreatment Area When Bed Available for ED Attending/MLP to Continue Care    Electronically signed by Sandhya Gay PA-C   DD: 2/13/23      Sandhya Gay PA-C  02/13/23 [id number]

## 2023-02-14 ENCOUNTER — APPOINTMENT (OUTPATIENT)
Dept: GENERAL RADIOLOGY | Age: 68
DRG: 394 | End: 2023-02-14
Payer: MEDICARE

## 2023-02-14 LAB
ALBUMIN SERPL-MCNC: 4.3 G/DL (ref 3.5–5.2)
ALP BLD-CCNC: 86 U/L (ref 35–104)
ALT SERPL-CCNC: 18 U/L (ref 0–32)
ANION GAP SERPL CALCULATED.3IONS-SCNC: 18 MMOL/L (ref 7–16)
ANISOCYTOSIS: ABNORMAL
AST SERPL-CCNC: 23 U/L (ref 0–31)
BASOPHILS ABSOLUTE: 0.04 E9/L (ref 0–0.2)
BASOPHILS RELATIVE PERCENT: 0.4 % (ref 0–2)
BILIRUB SERPL-MCNC: 0.9 MG/DL (ref 0–1.2)
BUN BLDV-MCNC: 38 MG/DL (ref 6–23)
BURR CELLS: ABNORMAL
CALCIUM SERPL-MCNC: 9.8 MG/DL (ref 8.6–10.2)
CHLORIDE BLD-SCNC: 85 MMOL/L (ref 98–107)
CO2: 24 MMOL/L (ref 22–29)
CREAT SERPL-MCNC: 0.9 MG/DL (ref 0.5–1)
EKG ATRIAL RATE: 79 BPM
EKG P AXIS: 52 DEGREES
EKG P-R INTERVAL: 198 MS
EKG Q-T INTERVAL: 480 MS
EKG QRS DURATION: 186 MS
EKG QTC CALCULATION (BAZETT): 550 MS
EKG R AXIS: 5 DEGREES
EKG T AXIS: -33 DEGREES
EKG VENTRICULAR RATE: 79 BPM
EOSINOPHILS ABSOLUTE: 0.04 E9/L (ref 0.05–0.5)
EOSINOPHILS RELATIVE PERCENT: 0.4 % (ref 0–6)
GFR SERPL CREATININE-BSD FRML MDRD: >60 ML/MIN/1.73
GLUCOSE BLD-MCNC: 101 MG/DL (ref 74–99)
HCT VFR BLD CALC: 32.3 % (ref 34–48)
HEMOGLOBIN: 10.9 G/DL (ref 11.5–15.5)
HYPOCHROMIA: ABNORMAL
IMMATURE GRANULOCYTES #: 0.05 E9/L
IMMATURE GRANULOCYTES %: 0.5 % (ref 0–5)
LYMPHOCYTES ABSOLUTE: 0.52 E9/L (ref 1.5–4)
LYMPHOCYTES RELATIVE PERCENT: 4.8 % (ref 20–42)
MCH RBC QN AUTO: 28.7 PG (ref 26–35)
MCHC RBC AUTO-ENTMCNC: 33.7 % (ref 32–34.5)
MCV RBC AUTO: 85 FL (ref 80–99.9)
MONOCYTES ABSOLUTE: 1.3 E9/L (ref 0.1–0.95)
MONOCYTES RELATIVE PERCENT: 12 % (ref 2–12)
NEUTROPHILS ABSOLUTE: 8.86 E9/L (ref 1.8–7.3)
NEUTROPHILS RELATIVE PERCENT: 81.9 % (ref 43–80)
OVALOCYTES: ABNORMAL
PDW BLD-RTO: 16.6 FL (ref 11.5–15)
PLATELET # BLD: 448 E9/L (ref 130–450)
PMV BLD AUTO: 10.5 FL (ref 7–12)
POIKILOCYTES: ABNORMAL
POLYCHROMASIA: ABNORMAL
POTASSIUM REFLEX MAGNESIUM: 3.7 MMOL/L (ref 3.5–5)
POTASSIUM SERPL-SCNC: 3.7 MMOL/L (ref 3.5–5)
RBC # BLD: 3.8 E12/L (ref 3.5–5.5)
SCHISTOCYTES: ABNORMAL
SODIUM BLD-SCNC: 127 MMOL/L (ref 132–146)
TEAR DROP CELLS: ABNORMAL
TOTAL PROTEIN: 7.9 G/DL (ref 6.4–8.3)
WBC # BLD: 10.8 E9/L (ref 4.5–11.5)

## 2023-02-14 PROCEDURE — 2580000003 HC RX 258: Performed by: FAMILY MEDICINE

## 2023-02-14 PROCEDURE — 80048 BASIC METABOLIC PNL TOTAL CA: CPT

## 2023-02-14 PROCEDURE — 6370000000 HC RX 637 (ALT 250 FOR IP): Performed by: STUDENT IN AN ORGANIZED HEALTH CARE EDUCATION/TRAINING PROGRAM

## 2023-02-14 PROCEDURE — 6360000002 HC RX W HCPCS: Performed by: FAMILY MEDICINE

## 2023-02-14 PROCEDURE — 6370000000 HC RX 637 (ALT 250 FOR IP): Performed by: FAMILY MEDICINE

## 2023-02-14 PROCEDURE — 74250 X-RAY XM SM INT 1CNTRST STD: CPT

## 2023-02-14 PROCEDURE — 80053 COMPREHEN METABOLIC PANEL: CPT

## 2023-02-14 PROCEDURE — 74018 RADEX ABDOMEN 1 VIEW: CPT

## 2023-02-14 PROCEDURE — 93010 ELECTROCARDIOGRAM REPORT: CPT | Performed by: INTERNAL MEDICINE

## 2023-02-14 PROCEDURE — 6360000004 HC RX CONTRAST MEDICATION: Performed by: STUDENT IN AN ORGANIZED HEALTH CARE EDUCATION/TRAINING PROGRAM

## 2023-02-14 PROCEDURE — 6360000002 HC RX W HCPCS: Performed by: STUDENT IN AN ORGANIZED HEALTH CARE EDUCATION/TRAINING PROGRAM

## 2023-02-14 PROCEDURE — 36415 COLL VENOUS BLD VENIPUNCTURE: CPT

## 2023-02-14 PROCEDURE — 6360000004 HC RX CONTRAST MEDICATION: Performed by: RADIOLOGY

## 2023-02-14 PROCEDURE — 1200000000 HC SEMI PRIVATE

## 2023-02-14 PROCEDURE — 85025 COMPLETE CBC W/AUTO DIFF WBC: CPT

## 2023-02-14 RX ORDER — MYCOPHENOLATE MOFETIL 250 MG/1
1500 CAPSULE ORAL 2 TIMES DAILY
Status: DISCONTINUED | OUTPATIENT
Start: 2023-02-14 | End: 2023-02-16 | Stop reason: HOSPADM

## 2023-02-14 RX ORDER — ACETAMINOPHEN 325 MG/1
650 TABLET ORAL EVERY 6 HOURS PRN
Status: DISCONTINUED | OUTPATIENT
Start: 2023-02-14 | End: 2023-02-16 | Stop reason: HOSPADM

## 2023-02-14 RX ORDER — METOPROLOL SUCCINATE 25 MG/1
12.5 TABLET, EXTENDED RELEASE ORAL DAILY
Status: DISCONTINUED | OUTPATIENT
Start: 2023-02-14 | End: 2023-02-16 | Stop reason: HOSPADM

## 2023-02-14 RX ORDER — LANOLIN ALCOHOL/MO/W.PET/CERES
1000 CREAM (GRAM) TOPICAL DAILY
Status: DISCONTINUED | OUTPATIENT
Start: 2023-02-14 | End: 2023-02-16 | Stop reason: HOSPADM

## 2023-02-14 RX ORDER — POTASSIUM CHLORIDE 20 MEQ/1
40 TABLET, EXTENDED RELEASE ORAL PRN
Status: DISCONTINUED | OUTPATIENT
Start: 2023-02-14 | End: 2023-02-16 | Stop reason: HOSPADM

## 2023-02-14 RX ORDER — LANOLIN ALCOHOL/MO/W.PET/CERES
400 CREAM (GRAM) TOPICAL DAILY
Status: DISCONTINUED | OUTPATIENT
Start: 2023-02-14 | End: 2023-02-16 | Stop reason: HOSPADM

## 2023-02-14 RX ORDER — SILDENAFIL CITRATE 20 MG/1
60 TABLET ORAL 3 TIMES DAILY
Status: DISCONTINUED | OUTPATIENT
Start: 2023-02-14 | End: 2023-02-16 | Stop reason: HOSPADM

## 2023-02-14 RX ORDER — ACETAMINOPHEN 650 MG/1
650 SUPPOSITORY RECTAL EVERY 6 HOURS PRN
Status: DISCONTINUED | OUTPATIENT
Start: 2023-02-14 | End: 2023-02-16 | Stop reason: HOSPADM

## 2023-02-14 RX ORDER — ONDANSETRON 2 MG/ML
4 INJECTION INTRAMUSCULAR; INTRAVENOUS EVERY 6 HOURS PRN
Status: DISCONTINUED | OUTPATIENT
Start: 2023-02-14 | End: 2023-02-14 | Stop reason: CLARIF

## 2023-02-14 RX ORDER — SPIRONOLACTONE 25 MG/1
25 TABLET ORAL DAILY
Status: DISCONTINUED | OUTPATIENT
Start: 2023-02-14 | End: 2023-02-16 | Stop reason: HOSPADM

## 2023-02-14 RX ORDER — ATORVASTATIN CALCIUM 20 MG/1
20 TABLET, FILM COATED ORAL DAILY
Status: DISCONTINUED | OUTPATIENT
Start: 2023-02-14 | End: 2023-02-16 | Stop reason: HOSPADM

## 2023-02-14 RX ORDER — POTASSIUM CHLORIDE 7.45 MG/ML
10 INJECTION INTRAVENOUS
Status: DISPENSED | OUTPATIENT
Start: 2023-02-14 | End: 2023-02-14

## 2023-02-14 RX ORDER — ROPINIROLE 0.5 MG/1
0.5 TABLET, FILM COATED ORAL NIGHTLY
Status: DISCONTINUED | OUTPATIENT
Start: 2023-02-14 | End: 2023-02-16 | Stop reason: HOSPADM

## 2023-02-14 RX ORDER — POLYETHYLENE GLYCOL 3350 17 G/17G
17 POWDER, FOR SOLUTION ORAL DAILY PRN
Status: DISCONTINUED | OUTPATIENT
Start: 2023-02-14 | End: 2023-02-16 | Stop reason: HOSPADM

## 2023-02-14 RX ORDER — SODIUM CHLORIDE 0.9 % (FLUSH) 0.9 %
10 SYRINGE (ML) INJECTION EVERY 12 HOURS SCHEDULED
Status: DISCONTINUED | OUTPATIENT
Start: 2023-02-14 | End: 2023-02-16 | Stop reason: HOSPADM

## 2023-02-14 RX ORDER — POTASSIUM CHLORIDE 7.45 MG/ML
10 INJECTION INTRAVENOUS PRN
Status: DISCONTINUED | OUTPATIENT
Start: 2023-02-14 | End: 2023-02-16 | Stop reason: HOSPADM

## 2023-02-14 RX ORDER — SODIUM CHLORIDE 0.9 % (FLUSH) 0.9 %
10 SYRINGE (ML) INJECTION PRN
Status: DISCONTINUED | OUTPATIENT
Start: 2023-02-14 | End: 2023-02-16 | Stop reason: HOSPADM

## 2023-02-14 RX ORDER — FENTANYL CITRATE 50 UG/ML
50 INJECTION, SOLUTION INTRAMUSCULAR; INTRAVENOUS
Status: DISCONTINUED | OUTPATIENT
Start: 2023-02-14 | End: 2023-02-16 | Stop reason: HOSPADM

## 2023-02-14 RX ORDER — DIGOXIN 125 MCG
125 TABLET ORAL DAILY
Status: DISCONTINUED | OUTPATIENT
Start: 2023-02-14 | End: 2023-02-16 | Stop reason: HOSPADM

## 2023-02-14 RX ORDER — PROMETHAZINE HYDROCHLORIDE 12.5 MG/1
12.5 TABLET ORAL EVERY 6 HOURS PRN
Status: DISCONTINUED | OUTPATIENT
Start: 2023-02-14 | End: 2023-02-14 | Stop reason: CLARIF

## 2023-02-14 RX ORDER — SODIUM CHLORIDE AND POTASSIUM CHLORIDE 150; 900 MG/100ML; MG/100ML
INJECTION, SOLUTION INTRAVENOUS CONTINUOUS
Status: DISCONTINUED | OUTPATIENT
Start: 2023-02-14 | End: 2023-02-16 | Stop reason: HOSPADM

## 2023-02-14 RX ORDER — SODIUM CHLORIDE 9 MG/ML
INJECTION, SOLUTION INTRAVENOUS PRN
Status: DISCONTINUED | OUTPATIENT
Start: 2023-02-14 | End: 2023-02-16 | Stop reason: HOSPADM

## 2023-02-14 RX ORDER — PANTOPRAZOLE SODIUM 40 MG/1
40 TABLET, DELAYED RELEASE ORAL DAILY
Status: DISCONTINUED | OUTPATIENT
Start: 2023-02-14 | End: 2023-02-16 | Stop reason: HOSPADM

## 2023-02-14 RX ORDER — SODIUM CHLORIDE 9 MG/ML
INJECTION, SOLUTION INTRAVENOUS CONTINUOUS
Status: DISCONTINUED | OUTPATIENT
Start: 2023-02-14 | End: 2023-02-14

## 2023-02-14 RX ADMIN — ROPINIROLE 0.5 MG: 0.5 TABLET, FILM COATED ORAL at 21:15

## 2023-02-14 RX ADMIN — FENTANYL CITRATE 50 MCG: 50 INJECTION, SOLUTION INTRAMUSCULAR; INTRAVENOUS at 01:07

## 2023-02-14 RX ADMIN — DIATRIZOATE MEGLUMINE AND DIATRIZOATE SODIUM 120 ML: 660; 100 LIQUID ORAL; RECTAL at 12:40

## 2023-02-14 RX ADMIN — SODIUM CHLORIDE: 9 INJECTION, SOLUTION INTRAVENOUS at 01:02

## 2023-02-14 RX ADMIN — SODIUM CHLORIDE, PRESERVATIVE FREE 10 ML: 5 INJECTION INTRAVENOUS at 10:10

## 2023-02-14 RX ADMIN — POTASSIUM CHLORIDE AND SODIUM CHLORIDE: 900; 150 INJECTION, SOLUTION INTRAVENOUS at 16:45

## 2023-02-14 RX ADMIN — FENTANYL CITRATE 50 MCG: 50 INJECTION, SOLUTION INTRAMUSCULAR; INTRAVENOUS at 21:18

## 2023-02-14 RX ADMIN — SILDENAFIL CITRATE 60 MG: 20 TABLET ORAL at 21:16

## 2023-02-14 RX ADMIN — BENZOCAINE: 200 SPRAY DENTAL; ORAL; PERIODONTAL at 05:39

## 2023-02-14 RX ADMIN — DIATRIZOATE MEGLUMINE AND DIATRIZOATE SODIUM 120 ML: 660; 100 LIQUID ORAL; RECTAL at 12:47

## 2023-02-14 RX ADMIN — SILDENAFIL CITRATE 60 MG: 20 TABLET ORAL at 16:40

## 2023-02-14 RX ADMIN — FLECAINIDE ACETATE 150 MG: 100 TABLET ORAL at 21:12

## 2023-02-14 ASSESSMENT — PAIN DESCRIPTION - LOCATION
LOCATION: ABDOMEN
LOCATION: ABDOMEN
LOCATION: THROAT
LOCATION: ABDOMEN

## 2023-02-14 ASSESSMENT — PAIN DESCRIPTION - DESCRIPTORS
DESCRIPTORS: CRAMPING
DESCRIPTORS: SORE;BURNING
DESCRIPTORS: BURNING;CRAMPING
DESCRIPTORS: CRAMPING

## 2023-02-14 ASSESSMENT — PAIN SCALES - GENERAL
PAINLEVEL_OUTOF10: 7
PAINLEVEL_OUTOF10: 7
PAINLEVEL_OUTOF10: 4
PAINLEVEL_OUTOF10: 7

## 2023-02-14 ASSESSMENT — LIFESTYLE VARIABLES
HOW OFTEN DO YOU HAVE A DRINK CONTAINING ALCOHOL: NEVER
HOW MANY STANDARD DRINKS CONTAINING ALCOHOL DO YOU HAVE ON A TYPICAL DAY: PATIENT DOES NOT DRINK

## 2023-02-14 NOTE — PROGRESS NOTES
GENERAL SURGERY  DAILY PROGRESS NOTE  2/14/2023    CHIEF COMPLAINT:  Chief Complaint   Patient presents with    Abdominal Pain     Bilateral upper quad pain greater in the left quad, NV x4 days, unable to eat, hx of pulmonary hypertension        SUBJECTIVE:  Patient feeling significantly better this morning after NG decompression overnight. There was approximately 300 of dark bilious output in canister this morning. States that her abdominal pain is approximately 4 out of 10 compared to 8 out of 10 when I last saw her in the emergency room. OBJECTIVE:  /63   Pulse 81   Temp 97.8 °F (36.6 °C) (Oral)   Resp 17   Ht 5' 3\" (1.6 m)   Wt 131 lb (59.4 kg)   SpO2 92%   BMI 23.21 kg/m²     GENERAL:  NAD. A&Ox3. NG in place  LUNGS:  No increased work of breathing. CARDIOVASCULAR: RR, normotensive  ABDOMEN:  Soft, minimally distended, mild generalized tenderness. No guarding, rigidity, rebound.     ASSESSMENT/PLAN:  75 y.o. female with small bowel obstruction  -Per report, NG tube fell out after I saw the patient    Plan:  -KUB to assess for gastric bubble, if gastric bubble is present, replace NG tube  -If gastric bubble is not present do not replace NG tube  -Small bowel follow-through today, contrast per oral versus NG pending above  -Remain n.p.o. until small bowel follow-through is complete  -Multimodal antiemetic  -If small bowel follow-through has contrast in the colon, restart Eliquis and transition to clears    Discussed with Dr. Madhavi Joy, DO  Surgery Resident PGY-1  2/14/2023  7:20 AM

## 2023-02-14 NOTE — PROGRESS NOTES
Hospitalist Progress Note      PCP: Brad Morfin DO    Date of Admission: 2/13/2023    Chief Complaint: SBO    Hospital Course:    Patient presented to the emergency department with abdominal pain and vomiting. This is been going on for about 4 days. Pain is located in the upper quadrants, greater in the left quadrant. Denies fever, chills, cough, chest pain, shortness of breath. Vital signs within normal limits and stable. The patient is afebrile. Laboratory studies demonstrate sodium 128, BUN 34, troponin 54 with repeat of 59, WBC 14.4, hemoglobin 11.2. CT abdomen pelvis shows high-grade small bowel obstruction with a focal transition point noted within the right lower quadrant. There is a shorter segment of ileum seen which is collapse noted within the pelvis with the distal most portion of the ileum within this cluster component showing focal compression as it exits the same region of the obstruction point of the dilated bowel. This raises concern for possible internal hernia. General surgery was consulted. NG tube was placed to low intermittent suction. Medicine consulted for admission. Subjective: Patient was seen at bedside this morning, mentions that her abdominal pain has improved though still has residual pain on the left side of her upper quadrant. Mentions that she had a bowel movement this morning and is passing gas.       Medications:  Reviewed    Infusion Medications    sodium chloride      0.9% NaCl with KCl 20 mEq       Scheduled Medications    [Held by provider] apixaban  5 mg Oral BID    digoxin  125 mcg Oral Daily    flecainide  150 mg Oral BID    levothyroxine  75 mcg Oral Daily    macitentan  10 mg Oral Daily    magnesium oxide  400 mg Oral Daily    metoprolol succinate  12.5 mg Oral Daily    mycophenolate  1,500 mg Oral BID    pantoprazole  40 mg Oral Daily    rOPINIRole  0.5 mg Oral Nightly    sildenafil  60 mg Oral TID    atorvastatin  20 mg Oral Daily spironolactone  25 mg Oral Daily    vitamin B-12  1,000 mcg Oral Daily    sodium chloride flush  10 mL IntraVENous 2 times per day    potassium chloride  10 mEq IntraVENous Q1H     PRN Meds: fentanNYL, sodium chloride flush, sodium chloride, promethazine **OR** ondansetron, polyethylene glycol, acetaminophen **OR** acetaminophen, potassium chloride **OR** potassium alternative oral replacement **OR** potassium chloride, benzocaine      Intake/Output Summary (Last 24 hours) at 2/14/2023 1058  Last data filed at 2/14/2023 0600  Gross per 24 hour   Intake 500 ml   Output 200 ml   Net 300 ml       Exam:    /67   Pulse 78   Temp 98.1 °F (36.7 °C) (Temporal)   Resp 17   Ht 5' 3\" (1.6 m)   Wt 131 lb (59.4 kg)   SpO2 97%   BMI 23.21 kg/m²     General appearance: Malnourished, appears stated age and cooperative. HEENT: Pupils equal, round, and reactive to light. Conjunctivae/corneas clear. Neck: Supple, with full range of motion. No jugular venous distention. Trachea midline. Respiratory:  Normal respiratory effort. Clear to auscultation, bilaterally without Rales/Wheezes/Rhonchi. Cardiovascular: Regular rate and rhythm with normal S1/S2 without murmurs, rubs or gallops. Abdomen: Soft, mild tenderness in the left upper quadrant, no bowel sounds heard  Musculoskeletal: No clubbing, cyanosis or edema bilaterally. Full range of motion without deformity. Skin: Skin color, texture, turgor normal.  No rashes or lesions.   Neurologic: No focal deficits  Psychiatric: Alert and oriented, thought content appropriate, normal insight    Labs:   Recent Labs     02/13/23 1821 02/14/23 0200   WBC 14.4* 10.8   HGB 11.2* 10.9*   HCT 35.8 32.3*    448     Recent Labs     02/13/23 1821 02/14/23  0200   * 127*   K 4.3 3.7  3.7   CL 85* 85*   CO2 25 24   BUN 34* 38*   CREATININE 0.9 0.9   CALCIUM 10.3* 9.8     Recent Labs     02/13/23 1821 02/14/23  0200   AST 26 23   ALT 20 18   BILITOT 1.0 0.9   ALKPHOS 91 86     No results for input(s): INR in the last 72 hours. No results for input(s): Cherre Gash in the last 72 hours.     Assessment/Plan:    Active Hospital Problems    Diagnosis Date Noted    SBO (small bowel obstruction) ([facility] [address] [K56.609] 02/13/2023     Priority: Medium   -Small bowel obstruction  -Possible internal hernia  -Leukocytosis  -Hyponatremia  -Coronary artery disease  -Atrial fibrillation  -Hypertension  -Hyperlipidemia        PLAN:  -Consulted general surgery- NG removed, small bowel follow through today  -N.p.o. now, liquid diet based on results of follow through  -Normal saline 75 mL/h  -Monitor sodium-currently 127  -Pain management  -Antiemetics  -Monitor serum electrolytes  -Continue home medications      DVT Prophylaxis: Eliquis held at this time, will start later today based on the results of test  Diet: Diet NPO Exceptions are: Sips of Water with Meds  Code Status: Full Code    Dispo -small bowel follow-through, ongoing specialist eval, advancing diet as tolerated    Sha Siegel MD

## 2023-02-14 NOTE — PROGRESS NOTES
Lenard Coreas was ordered macitentan which is a nonformulary medication. This medication will need to be supplied by the patient as the pharmacy does not carry this non-formulary medication. If the medication has not been administered by 1400 on the following day from the time the order was placed, a pharmacist will follow-up with the nurse of the patient to assess the capability of the patient to bring in the medication. If it is determined that the patient cannot supply the medication and it is not available to be dispensed from the pharmacy, the provider will be notified.     Hieu Coronel PharmD, [facility], BCPS 2/14/2023 12:57 AM

## 2023-02-14 NOTE — PROGRESS NOTES
Pt coughed a few times and NG came out. Surgery rounded on pt less than five minutes after while RN was still in room. They were informed and discussed two options w/pt. Placing another or waiting to place another and obtaining KUB in meantime to see if any air is in the abdomen and placing an NG if so, but if no air is observed, no NG will be replaced. Pt is agreeable to these conditions. Still awaiting KUB.

## 2023-02-14 NOTE — H&P
Hospitalist History & Physical      PCP: Lila Vargas DO    Date of Service: Pt seen/examined on 2/13/2023     Chief Complaint:  had concerns including Abdominal Pain (Bilateral upper quad pain greater in the left quad, NV x4 days, unable to eat, hx of pulmonary hypertension ). History Of Present Illness:    Ms. Shailesh Wright, a 75y.o. year old female  who  has a past medical history of CAD in native artery, Cerebral artery occlusion with cerebral infarction [facility]), H/O cardiovascular stress test, Hyperlipidemia, Hypertension, Hypothyroidism, and Sleep apnea. Patient presented to the emergency department with abdominal pain and vomiting. This is been going on for about 4 days. Pain is located in the upper quadrants, greater in the left quadrant. Denies fever, chills, cough, chest pain, shortness of breath. Vital signs within normal limits and stable. The patient is afebrile. Laboratory studies demonstrate sodium 128, BUN 34, troponin 54 with repeat of 59, WBC 14.4, hemoglobin 11.2. CT abdomen pelvis shows high-grade small bowel obstruction with a focal transition point noted within the right lower quadrant. There is a shorter segment of ileum seen which is collapse noted within the pelvis with the distal most portion of the ileum within this cluster component showing focal compression as it exits the same region of the obstruction point of the dilated bowel. This raises concern for possible internal hernia. General surgery was consulted. NG tube was placed to low intermittent suction. Medicine consulted for admission.       Past Medical History:   Diagnosis Date    CAD in native artery 4/6/2021    Cerebral artery occlusion with cerebral infarction ([address]     \"mini stroke\" 10/2014    H/O cardiovascular stress test 06/29/2020    Lexiscan    Hyperlipidemia     Hypertension     Hypothyroidism 1/10/2023    Sleep apnea     doesnt use cpap       Past Surgical History:   Procedure Laterality Date CARDIAC CATHETERIZATION  2012    Right heart cath by Dr Genoveva Giron Left 2023    Successful (Dr. Fransisca Caldera)    CAROTID ENDARTERECTOMY Right     CATARACT REMOVAL WITH IMPLANT Right 2019    intraocular lens    CATARACT REMOVAL WITH IMPLANT Left 10/15/2019     SECTION      COLONOSCOPY      INTRACAPSULAR CATARACT EXTRACTION Right 2019    RIGHT EYE CATARACT EMULSIFICATION IOL IMPLANT performed by Azam Kline MD at [facility] Left 10/15/2019    LEFT EYE CATARACT EMULSIFICATION IOL IMPLANT performed by Azam Kline MD at [id number] [place] [address] 24  2022    Successful removal of old right atrial pacing lead and insertion of the new right atrial pacing lead (Dr. Sarai Alex)    PACEMAKER PLACEMENT Left 2022    Medtronic dual chamber (Dr Fransisca Caldera)       Prior to Admission medications    Medication Sig Start Date End Date Taking?  Authorizing Provider   flecainide (TAMBOCOR) 150 MG tablet Take 1 tablet by mouth 2 times daily 23  Jacqueline Bryan,    digoxin (LANOXIN) 125 MCG tablet Take 1 tablet by mouth daily 23  Jacqueline Bryan,    magnesium oxide (MAG-OX) 400 MG tablet Take 1 tablet by mouth daily 23   Marilynn Montgomery, APRN - CNP   ADVAIR DISKUS 250-50 MCG/ACT AEPB diskus inhaler  12/15/22   Historical Provider, MD   potassium chloride (KLOR-CON) 10 MEQ extended release tablet Take 10 mEq by mouth daily 22   Historical Provider, MD   metoprolol succinate (TOPROL XL) 25 MG extended release tablet Take 0.5 tablets by mouth daily  Patient not taking: Reported on 22   Adalid Tse MD   metoclopramide (REGLAN) 5 MG tablet Take 5 mg by mouth 4 times daily Take one tablet by mouth three times a day 30 minutes before meals    Kemi Davis MD   macitentan (OPSUMIT) 10 MG TABS Take 10 mg by mouth daily    Historical Provider, MD   empagliflozin ([place]) 10 MG tablet Take 10 mg by mouth daily    Historical Provider, MD   hyoscyamine (ANASPAZ;LEVSIN) 125 MCG tablet Take 125 mcg by mouth every 4 hours as needed for Cramping    Historical Provider, MD   apixaban (ELIQUIS) 5 MG TABS tablet Take 1 tablet by mouth 2 times daily 8/26/22   Edith Espinosa MD   pantoprazole (PROTONIX) 40 MG tablet Take 40 mg by mouth daily    Historical Provider, MD   sildenafil (REVATIO) 20 MG tablet Take 60 mg by mouth 3 times daily    Historical Provider, MD   Tiotropium Bromide Monohydrate (SPIRIVA RESPIMAT IN) Inhale into the lungs daily    Historical Provider, MD   Multiple Vitamins-Minerals (THERAPEUTIC MULTIVITAMIN-MINERALS) tablet Take 1 tablet by mouth daily    Historical Provider, MD   vitamin B-12 (CYANOCOBALAMIN) 1000 MCG tablet Take 1,000 mcg by mouth daily    Historical Provider, MD   calcium carbonate (TUMS) 500 MG chewable tablet Take 1 tablet by mouth daily    Historical Provider, MD   spironolactone (ALDACTONE) 25 MG tablet TAKE ONE TABLET BY MOUTH DAILY 4/4/22   Historical Provider, MD   levothyroxine (SYNTHROID) 75 MCG tablet 75 mcg Daily  12/1/21   Historical Provider, MD   mycophenolate (CELLCEPT) 500 MG tablet Take 1,500 mg by mouth 2 times daily 11/12/21   Historical Provider, MD   potassium chloride (KLOR-CON M) 10 MEQ extended release tablet Take 1 tablet by mouth daily Take with Lasix  Patient taking differently: Take 20 mEq by mouth 2 times daily Take with Lasix 9/24/21   Edith Espinosa MD   simvastatin (ZOCOR) 40 MG tablet Take 1 tablet by mouth nightly 3/29/21   Edith Espinosa MD   rOPINIRole (REQUIP) 0.25 MG tablet Take 1 tablet by mouth daily  Patient taking differently: Take 0.5 mg by mouth nightly 9/18/15   Unice Backers, DO   Cholecalciferol (VITAMIN D) 2000 UNITS CAPS capsule Take  by mouth daily.     Historical Provider, MD         Allergies:  Biaxin [clarithromycin] and Naproxen    Social History:    TOBACCO:   reports that she quit smoking about 2 years ago. Her smoking use included cigarettes. She has a 60.00 pack-year smoking history. She has never used smokeless tobacco.  ETOH:   reports that she does not currently use alcohol after a past usage of about 10.0 standard drinks per week. Family History:    Reviewed in detail and negative for DM, CAD, Cancer, CVA. Positive as follows\"      Problem Relation Age of Onset    Other Mother         complication of surgery    Heart Disease Brother        REVIEW OF SYSTEMS:   Pertinent positives as noted in the HPI. All other systems reviewed and negative. PHYSICAL EXAM:  BP (!) 99/45   Pulse 81   Temp 97.5 °F (36.4 °C) (Temporal)   Resp 18   SpO2 94%   General appearance: No apparent distress, appears stated age and cooperative. HEENT: Normal cephalic, atraumatic without obvious deformity. Pupils equal, round, and reactive to light. Extra ocular muscles intact. Conjunctivae/corneas clear. Neck: Supple, with full range of motion. No jugular venous distention. Trachea midline. Respiratory: CTA  Cardiovascular: RRR  Abdomen: S/TTP/ND  Musculoskeletal: No clubbing, cyanosis, edema of bilateral lower extremities. Brisk capillary refill. Skin: Normal skin color. No rashes or lesions. Neurologic:  Neurovascularly intact without any focal sensory/motor deficits. Cranial nerves: II-XII intact, grossly non-focal.  Psychiatric: Alert and oriented, thought content appropriate, normal insight    Reviewed EKG and CXR personally      CBC:   Recent Labs     02/13/23  1821   WBC 14.4*   RBC 4.10   HGB 11.2*   HCT 35.8   MCV 87.3   RDW 16.5*        BMP:   Recent Labs     02/13/23  1617 02/13/23  1821   NA  --  128*   K  --  4.3   CL  --  85*   CO2  --  25   BUN  --  34*   CREATININE  --  0.9   MG 2.4  --      LFT:  Recent Labs     02/13/23  1821   PROT 8.4*   ALKPHOS 91   ALT 20   AST 26   BILITOT 1.0   LIPASE 10*     CE:  No results for input(s): Radames Chang in the last 72 hours.   PT/INR: No results for input(s): INR, APTT in the last 72 hours. BNP: No results for input(s): BNP in the last 72 hours. ESR:   Lab Results   Component Value Date    SEDRATE 23 (H) 09/28/2021     CRP:   Lab Results   Component Value Date    CRP 0.3 09/28/2021     D Dimer: No results found for: DDIMER   Folate and B12:   Lab Results   Component Value Date    FRYBXVMD59 341 07/23/2021   ,   Lab Results   Component Value Date    FOLATE 12.7 07/23/2021     Lactic Acid:   Lab Results   Component Value Date    LACTA 1.0 02/13/2023     Thyroid Studies:   Lab Results   Component Value Date    TSH 8.570 (H) 11/24/2022       Oupatient labs:  Lab Results   Component Value Date    CHOL 217 (H) 09/10/2015    TRIG 117 09/10/2015    HDL 73 09/10/2015    LDLCALC 121 (H) 09/10/2015    TSH 8.570 (H) 11/24/2022    INR 2.4 08/17/2022    LABA1C 5.2 08/26/2013       Urinalysis:    Lab Results   Component Value Date/Time    NITRU Negative 02/13/2023 06:21 PM    WBCUA 0-1 02/13/2023 06:21 PM    BACTERIA FEW 02/13/2023 06:21 PM    RBCUA NONE 02/13/2023 06:21 PM    BLOODU Negative 02/13/2023 06:21 PM    SPECGRAV >=1.030 02/13/2023 06:21 PM    GLUCOSEU 250 02/13/2023 06:21 PM       Imaging:  CT ABDOMEN PELVIS W IV CONTRAST Additional Contrast? None    Result Date: 2/13/2023  EXAMINATION: CT OF THE ABDOMEN AND PELVIS WITH CONTRAST 2/13/2023 9:17 pm TECHNIQUE: CT of the abdomen and pelvis was performed with the administration of intravenous contrast. Multiplanar reformatted images are provided for review. Automated exposure control, iterative reconstruction, and/or weight based adjustment of the mA/kV was utilized to reduce the radiation dose to as low as reasonably achievable. COMPARISON: None.  HISTORY: ORDERING SYSTEM PROVIDED HISTORY: vomiting, dehydration, diffuse lower abdominal ttp TECHNOLOGIST PROVIDED HISTORY: Additional Contrast?->None Reason for exam:->vomiting, dehydration, diffuse lower abdominal ttp Decision Support Exception - unselect if not a suspected or confirmed emergency medical condition->Emergency Medical Condition (MA) What reading provider will be dictating this exam?->CRC FINDINGS: Lower Chest: No significant pericardial effusion. Small volume of fluid seen within the distal thoracic esophagus suggestive of reflux. Cardiac leads are noted. Multilobar patchy nodular infiltrates with tree-in-bud opacities are seen. Organs: No enhancing mass in the liver or spleen. No adrenal mass. No pancreatic mass. No peripancreatic inflammatory process. No ductal dilation. No obstructive urinary tract calculi. Nonobstructive stone noted in the lower pole the left kidney. No ureteral calculi are identified. GI/Bowel: There is an acute small-bowel obstruction, multiple dilated loops of small bowel are seen measuring up to approximately 4.9 cm in size. There is a high-grade small bowel obstruction with a transition point noted within the right lower quadrant, on and around axial image 119, as well as sagittal image 56. There is a 2nd loop of small bowel which is fluid-filled seen on axial image 112, to the low left of the scratch adjacent to the iliac vasculature, with a focal transition point at this same location, though this loop is not dilated. No pneumatosis is identified. No mesenteric venous gas is seen. Pelvis: No pelvic mass. No free fluid in the pelvis. Bladder is unremarkable. Peritoneum/Retroperitoneum: No abdominal aortic aneurysm. Diffuse aorto iliac atherosclerosis. No retroperitoneal or mesenteric lymphadenopathy. No bowel herniation is seen. Bones/Soft Tissues: No acute subcutaneous soft tissue abnormality. No acute osseous abnormality. Multilevel degenerative changes are seen within the spine. No osseous destructive changes are identified. Disc disease noted at the level of L4-L5, moderate to severe in amount. 1. High-grade small bowel obstruction, with a focal transition point noted within the right lower quadrant. There is a shorter segment of ileum seen which is collapsed noted within the pelvis, with the distal most portion of the ileum within this clustered component showing focal compression as it exits this same region of the obstruction point of the dilated bowel. This raises concern for possible internal hernia, with right lower quadrant adhesions in the differential as well. 2. Fluid seen in the distal thoracic esophagus felt related to reflux. 3. Nodular appearing bibasilar infiltrates with tree-in-bud opacities, concerning for bronchiolitis/bronchopneumonia or sequela of aspiration. XR CHEST PORTABLE    Result Date: 1/24/2023  EXAMINATION: ONE XRAY VIEW OF THE CHEST 1/24/2023 1:06 pm COMPARISON: 29 November 2022 HISTORY: ORDERING SYSTEM PROVIDED HISTORY: pacemaker TECHNOLOGIST PROVIDED HISTORY: Reason for exam:->pacemaker What reading provider will be dictating this exam?->CRC FINDINGS: Left-sided pacemaker again noted. No evident pneumothorax. Normal heart and pulmonary vascularity. Lungs are clear. Neither costophrenic angle is blunted. Unremarkable chest.  Interval resolution of CHF since 29 November 2022.        ASSESSMENT:  -Small bowel obstruction  -Possible internal hernia  -Leukocytosis  -Hyponatremia  -Coronary artery disease  -Atrial fibrillation  -Hypertension  -Hyperlipidemia      PLAN:  -Admit to medicine  -Consult general surgery  -N.p.o. now  -Normal saline 75 mL/h  -Pain management  -Antiemetics  -NGT to low intermittent suction  -Monitor serum electrolytes  -Continue home medications        Diet: No diet orders on file  Code Status: Prior  Surrogate decision maker confirmed with patient:   Extended Emergency Contact Information  Primary Emergency Contact: IssaXiang  Address: [address] [place] [address],[address] Phone: [phone]  Relation: Spouse  Secondary Emergency Contact: Bill Alcantar   [address] Phone: [phone]  Relation: Child    DVT Prophylaxis: []Lovenox []Heparin []PCD [] [address]  []Encouraged ambulation  Disposition: []Med/Surg [] Intermediate [] ICU/CCU  Admit status: [] Observation [] Inpatient     +++++++++++++++++++++++++++++++++++++++++++++++++  Constantine uRelas,   +++++++++++++++++++++++++++++++++++++++++++++++++  NOTE: This report was transcribed using voice recognition software. Every effort was made to ensure accuracy; however, inadvertent computerized transcription errors may be present.

## 2023-02-14 NOTE — ED NOTES
10:35 PM EST  I received this patient at sign out from Dr. Parker Almendarez. I have discussed the patient's initial exam, treatment and plan of care with the out going physician. I have introduced my self to the patient / family and have answered their questions to this point. I have examined the patient myself and reviewed ordered tests / medications and  reviewed any available results to this point. If a resident is involved in the Emergency Department care, I have discussed my findings and plan with them as well. This patient was signed out to me by Dr. Parker Almendarez pending CT scan. CT showed a small bowel obstruction. NG tube was placed. General surgery was consulted. I did speak with surgery resident who recommended admission to medicine. I discussed the case with Dr. Maddie Quezada, hospitalist, who accepted for admission.       Emelina Flor,   02/13/23 1523

## 2023-02-14 NOTE — PROCEDURES
Awaiting instructions for the SBFT, to either give contrast orally or ngt.      Please let x-ray know when decision is made on how to administer contrast so we can start exam. Ext. [phone]

## 2023-02-14 NOTE — CARE COORDINATION
Social Work/Case Management Transition of Care Planning (Dakota [place] [phone]): Patient presented to the hospital with concerns of nausea and vomiting x4 days as well as abdominal pain. She was found to have a small bowel obstruction. She had an NG tube to LIWS overnight for decompression. NG tube is currently out. Plan is for SBFT today. Multiple attempts were made to meet with patient. She has been off the unit much of the day. Will follow up tomorrow.   TESSIE Ziegler  2/14/2023

## 2023-02-14 NOTE — PROGRESS NOTES
Pharmacy Note    This patient was ordered empagliflozin for type 2 diabetes. Per the [address] [place] [address] Dr, this medication is non-formulary and not stocked by pharmacy for the reason indicated below. The medication can be reordered at discharge.      Medications in which risks outweigh benefits during hospitalization:           -  oral bisphosphonates         -  raloxifene (Evista)        -  SGLT2 inhibitors (ordered in the hospital for an indication other than heart failure or chronic kidney disease)    Medications that lack necessity during an acute hospital stay:        -  nasal antihistamines        -  nasal ipratropium 0.03% and 0.06%        -  nasal miacalcin        -  acyclovir topical cream/ointment orders for herpes labialis (cold sores)    Iliana Villatoro PharmD, [facility], BCPS 2/14/2023 12:56 AM

## 2023-02-14 NOTE — CONSULTS
GENERAL SURGERY  CONSULT NOTE  2023    Physician Consulted: Dr. Albert Cobb  Reason for Consult: SBO    HPI  Vaishali Lin is a 75 y.o. female who presents for evaluation of abdominal pain, nausea and emesis starting 2/10. History significant for atrial fibrillation on Eliquis, pacer placement approximately 2 months ago, scleroderma, gastroparesis. Patient states she feels bloated, unable to tolerate solids or liquids secondary to nausea and emesis, last emesis today. Last bowel movement . Currently not passing flatus. States that this has never happened before. Prior abdominal surgeries only include 2 C-sections. Does not remember the last time she had a colonoscopy. Per chart review possibly . States she has had an EGD to evaluate her gastroparesis by  approximately 5 years ago. Patient denies any angina, dyspnea, hematochezia, melena or hematemesis. Patient's labs remarkable for anion gap of 18, hyponatremia 128 and hypochloremia of 85  Lactic acid 1.0, GI profile within normal limits.   White blood cell 14  CT abdomen pelvis with dilated loops of small bowel    Past Medical History:   Diagnosis Date    CAD in native artery 2021    Cerebral artery occlusion with cerebral infarction ([facility] [address]     \"mini stroke\" 10/2014    H/O cardiovascular stress test 2020    Lexiscan    Hyperlipidemia     Hypertension     Hypothyroidism 1/10/2023    Sleep apnea     doesnt use cpap       Past Surgical History:   Procedure Laterality Date    CARDIAC CATHETERIZATION  2012    Right heart cath by Dr Margot Goldstein Left 2023    Successful (Dr. Vikram Gipson)    CAROTID ENDARTERECTOMY Right     CATARACT REMOVAL WITH IMPLANT Right 2019    intraocular lens    CATARACT REMOVAL WITH IMPLANT Left 10/15/2019     SECTION      COLONOSCOPY      INTRACAPSULAR CATARACT EXTRACTION Right 2019    RIGHT EYE CATARACT EMULSIFICATION IOL IMPLANT performed by Felix Amaral MD at [facility]    INTRACAPSULAR CATARACT EXTRACTION Left 10/15/2019    LEFT EYE CATARACT EMULSIFICATION IOL IMPLANT [facility]  11/28/2022    Successful removal of old right atrial pacing lead and insertion of the new right atrial pacing lead (Dr. Kris Moran)    PACEMAKER PLACEMENT Left 11/25/2022    Medtronic dual chamber (Dr Batsheva García)       Medications Prior to Admission    Prior to Admission medications    Medication Sig Start Date End Date Taking?  Authorizing Provider   flecainide (TAMBOCOR) 150 MG tablet Take 1 tablet by mouth 2 times daily 2/8/23 5/9/23  Jerome Mccromick,    digoxin (LANOXIN) 125 MCG tablet Take 1 tablet by mouth daily 2/8/23 5/9/23  Jerome Mccormick, DO   magnesium oxide (MAG-OX) 400 MG tablet Take 1 tablet by mouth daily 1/12/23   Odalys Gotti APRN - CNP   ADVAIR DISKUS 250-50 MCG/ACT AEPB diskus inhaler  12/15/22   Historical Provider, MD   potassium chloride (KLOR-CON) 10 MEQ extended release tablet Take 10 mEq by mouth daily 12/14/22   Historical Provider, MD   metoprolol succinate (TOPROL XL) 25 MG extended release tablet Take 0.5 tablets by mouth daily  Patient not taking: Reported on 2/8/2023 11/30/22   Tru Marquez MD   metoclopramide (REGLAN) 5 MG tablet Take 5 mg by mouth 4 times daily Take one tablet by mouth three times a day 30 minutes before meals    Rochelle Slider, MD   macitentan (OPSUMIT) 10 MG TABS Take 10 mg by mouth daily    Historical Provider, MD   empagliflozin (JARDIANCE) 10 MG tablet Take 10 mg by mouth daily    Historical Provider, MD   hyoscyamine (ANASPAZ;LEVSIN) 125 MCG tablet Take 125 mcg by mouth every 4 hours as needed for Cramping    Historical Provider, MD   apixaban (ELIQUIS) 5 MG TABS tablet Take 1 tablet by mouth 2 times daily 8/26/22   Isatu Blue MD   pantoprazole (PROTONIX) 40 MG tablet Take 40 mg by mouth daily    Historical Provider, MD   sildenafil (REVATIO) 20 MG tablet Take 60 mg by mouth 3 times daily    Historical Provider, MD   Tiotropium Bromide Monohydrate (SPIRIVA RESPIMAT IN) Inhale into the lungs daily    Historical Provider, MD   Multiple Vitamins-Minerals (THERAPEUTIC MULTIVITAMIN-MINERALS) tablet Take 1 tablet by mouth daily    Historical Provider, MD   vitamin B-12 (CYANOCOBALAMIN) 1000 MCG tablet Take 1,000 mcg by mouth daily    Historical Provider, MD   calcium carbonate (TUMS) 500 MG chewable tablet Take 1 tablet by mouth daily    Historical Provider, MD   spironolactone (ALDACTONE) 25 MG tablet TAKE ONE TABLET BY MOUTH DAILY 22   Historical Provider, MD   levothyroxine (SYNTHROID) 75 MCG tablet 75 mcg Daily  21   Historical Provider, MD   mycophenolate (CELLCEPT) 500 MG tablet Take 1,500 mg by mouth 2 times daily 21   Historical Provider, MD   potassium chloride (KLOR-CON M) 10 MEQ extended release tablet Take 1 tablet by mouth daily Take with Lasix  Patient taking differently: Take 20 mEq by mouth 2 times daily Take with Lasix 21   Liliya Stevenson MD   simvastatin (ZOCOR) 40 MG tablet Take 1 tablet by mouth nightly 3/29/21   Liliya Stevenson MD   rOPINIRole (REQUIP) 0.25 MG tablet Take 1 tablet by mouth daily  Patient taking differently: Take 0.5 mg by mouth nightly 9/18/15   Arlington Canavan, DO   Cholecalciferol (VITAMIN D) 2000 UNITS CAPS capsule Take  by mouth daily.     Historical Provider, MD       Allergies   Allergen Reactions    Biaxin [Clarithromycin] Nausea And Vomiting    Naproxen Nausea And Vomiting       Family History   Problem Relation Age of Onset    Other Mother         complication of surgery    Heart Disease Brother        Social History     Tobacco Use    Smoking status: Former     Packs/day: 1.50     Years: 40.00     Pack years: 60.00     Types: Cigarettes     Quit date: 2020     Years since quittin.4    Smokeless tobacco: Never    Tobacco comments:      she has decreased to 1ppd   Vaping Use    Vaping Use: Never used Substance Use Topics    Alcohol use: Not Currently     Alcohol/week: 10.0 standard drinks     Types: 10 Glasses of wine per week     Comment: green tea 2 cups a day     Drug use: No     Review of Systems: Review of Systems - History obtained from chart review and the patient  General ROS: negative for - chills or fever  ENT ROS: negative for - visual changes  Allergy and Immunology ROS: negative for - hives  Hematological and Lymphatic ROS: negative for - jaundice or pallor  Endocrine ROS: negative for - polydipsia/polyuria  Respiratory ROS: no cough, shortness of breath, or wheezing  Cardiovascular ROS: negative for - chest pain  Gastrointestinal ROS: positive for - abdominal pain, nausea vomiting and bloating  negative for - blood in stools, diarrhea, hematemesis, or melena  Genito-Urinary ROS: negative for - hematuria  Musculoskeletal ROS: negative for - muscular weakness    PHYSICAL EXAM:    Vitals:    02/13/23 1720   BP: (!) 99/45   Pulse: 81   Resp: 18   Temp:    SpO2: 94%       GENERAL: Alert, answers questions appropriately, uncomfortable appearing  HEAD:  Normocephalic. Atraumatic. EYES:   No scleral icterus. PERRL. LUNGS: Nonlabored breathing on room air  CARDIOVASCULAR: RR  ABDOMEN:  Soft, moderately distended, mildly tender, tympanic no rigidity or rebound  EXTREMITIES:   MAEx4. Atraumatic. No LE edema. SKIN:  Warm and dry  NEUROLOGIC:  GCS 15    ASSESSMENT/PLAN:  75 y.o. female with small bowel obstruction    Plan   -NG tube decompression/strict n.p.o.  -Strict I's and O's  -IVF resuscitation  -Repeat BMP  -Small bowel follow-through 2/14  -hold eliquis     discussed with Dr. Jose Martin Whitney.     Jose Rose DO  Surgery Resident PGY-1  2/13/2023  10:43 PM

## 2023-02-14 NOTE — PROGRESS NOTES
GENERAL SURGERY  DAILY PROGRESS NOTE  2/14/2023    Subjective:  Patient accidentally coughed up the NG tube. She was having nose bleed (chronic) when I saw her. NG tube output since midnight 225ml. She complained of mild diffuse abdominal pain.     Objective:  /63   Pulse 81   Temp 97.8 °F (36.6 °C) (Oral)   Resp 17   Ht 5' 3\" (1.6 m)   Wt 131 lb (59.4 kg)   SpO2 92%   BMI 23.21 kg/m²     Gen: alert, oriented, no apparent distress  HEENT: NCAT, anicteric  CV: RR  Pulm: nonlabored breathing on room air  Abdomen: soft, mildly tender, nondistended  Extremities: moving all extremities, no peripheral edema  Skin: warm and dry    Assessment/Plan:  75 y.o. female with SBO    Plan:  -KUB   If gastric bubble seen, replace the NG tube otherwise no need to replace NG tube  -Small bowel follow through, contrast per oral  -NPO  -Multimodal antiemetic      Electronically signed by Saul Tabor on 2/14/2023 at 6:23 AM

## 2023-02-14 NOTE — PLAN OF CARE
Patient's chart updated to reflect:      . - HF care plan, HF education points and HF discharge instructions.  -Orders: 2 gram sodium diet, daily weights, I/O.  -PCP and/or Cardiologist appointment to be scheduled within 7 days of hospital discharge.  -History of HF, not primary admission Dx.   Patient admitted for treatment of ASSESSMENT:  -Small bowel obstruction  -Possible internal hernia  -Leukocytosis  -Hyponatremia  -Coronary artery disease  -Atrial fibrillation  -Hypertension  -Hyperlipidemia     Keaton Estrada RN RN, BSN  Heart Failure Navigator

## 2023-02-14 NOTE — ED PROVIDER NOTES
ED PROVIDER NOTE    Chief Complaint   Patient presents with    Abdominal Pain     Bilateral upper quad pain greater in the left quad, NV x4 days, unable to eat, hx of pulmonary hypertension        HPI:  2/13/23,   Time: 7:20 PM DEWAYNE Martell is a 75 y.o. female presenting to the ED for vomiting and abdominal pain. Symptoms ongoing for the last 4 days, persistent since onset, moderate severity, no aggravating or alleviating factors. Patient has had diffuse abdominal pain, nausea, vomiting. No black or bloody emesis. No diarrhea. Last bowel movement 4 days ago. No associated fever, chills, cough, chest pain. Does have lightheadedness with positional changes. Shortness of breath, no significant orthopnea or leg swelling. Patient does have a history of pulmonary hypertension, has been taking all her pulmonary hypertension medications. She had a telehealth visit with her pulmonologist Dr. Eliza Logan and she recommended patient come to ED. Chart review: hx of pulmonary HTN, HTN, HLD, hypothyroidism, CAD, JANNETH, HFpEF, COPD, scleroderma, afib/CHB s/p PM  Lab Results   Component Value Date    LVEF 65 02/25/2021     Reviewed telemedicine pulmonology note by Dr. Eliza Logan from earlier today:  Recommendations to come to ED for intractable nausea and vomiting, could be from gastroparesis/dysmotility from scleroderma or low flow state from pulmonary hypertension. Suspected to be more of a primary GI exacerbation. Advised to go to ER as soon as possible. Recommendations for ER management are judicious IV fluids, electrolyte repletion, EKG, digoxin level, and antiemetics.     Review of Systems:     Review of Systems  Pertinent positives and negatives as stated in HPI     --------------------------------------------- PAST HISTORY ---------------------------------------------  Past Medical History:   Past Medical History:   Diagnosis Date    CAD in native artery 4/6/2021    Cerebral artery occlusion with cerebral infarction ([address]     \"mini stroke\" 10/2014    H/O cardiovascular stress test 2020    Lexiscan    Hyperlipidemia     Hypertension     Hypothyroidism 1/10/2023    Sleep apnea     doesnt use cpap       Past Surgical History:   Past Surgical History:   Procedure Laterality Date    CARDIAC CATHETERIZATION  2012    Right heart cath by Dr Margot Goldstein Left 2023    Successful (Dr. Vikram Gipson)    CAROTID ENDARTERECTOMY Right     CATARACT REMOVAL WITH IMPLANT Right 2019    intraocular lens    CATARACT REMOVAL WITH IMPLANT Left 10/15/2019     SECTION      COLONOSCOPY      INTRACAPSULAR CATARACT EXTRACTION Right 2019    RIGHT EYE CATARACT EMULSIFICATION IOL IMPLANT performed by Felix Amaral MD at [facility] Left 10/15/2019    LEFT EYE CATARACT EMULSIFICATION IOL IMPLANT performed by Felix Amaral MD at [id number] [place] [address] 24  2022    Successful removal of old right atrial pacing lead and insertion of the new right atrial pacing lead (Dr. Kalia Kwong)    PACEMAKER PLACEMENT Left 2022    Medtronic dual chamber (Dr Vikram Gipson)       Social History:   Social History     Socioeconomic History    Marital status:      Spouse name: None    Number of children: None    Years of education: None    Highest education level: None   Tobacco Use    Smoking status: Former     Packs/day: 1.50     Years: 40.00     Pack years: 60.00     Types: Cigarettes     Quit date: 2020     Years since quittin.4    Smokeless tobacco: Never    Tobacco comments:      she has decreased to 1ppd   Vaping Use    Vaping Use: Never used   Substance and Sexual Activity    Alcohol use: Not Currently     Alcohol/week: 10.0 standard drinks     Types: 10 Glasses of wine per week     Comment: green tea 2 cups a day     Drug use: No       Family History:   Family History   Problem Relation Age of Onset    Other Mother complication of surgery    Heart Disease Brother        The patients home medications have been reviewed. Allergies: Allergies   Allergen Reactions    Biaxin [Clarithromycin] Nausea And Vomiting    Naproxen Nausea And Vomiting           ---------------------------------------------------PHYSICAL EXAM--------------------------------------    BP (!) 99/45   Pulse 81   Temp 97.5 °F (36.4 °C) (Temporal)   Resp 18   SpO2 94%     Physical Exam  Constitutional:       General: She is not in acute distress. Appearance: She is not toxic-appearing. HENT:      Mouth/Throat:      Mouth: Mucous membranes are moist.   Eyes:      General: No scleral icterus. Extraocular Movements: Extraocular movements intact. Pupils: Pupils are equal, round, and reactive to light. Neck:      Comments: No JVD  Cardiovascular:      Rate and Rhythm: Normal rate and regular rhythm. Pulses: Normal pulses. Heart sounds: Normal heart sounds. No murmur heard. Pulmonary:      Effort: Pulmonary effort is normal. No respiratory distress. Breath sounds: Normal breath sounds. No wheezing or rales. Abdominal:      General: There is no distension. Palpations: Abdomen is soft. Tenderness: There is abdominal tenderness (diffuse lower). There is no guarding or rebound. Musculoskeletal:         General: No swelling or tenderness. Normal range of motion. Cervical back: Normal range of motion and neck supple. Skin:     General: Skin is warm and dry. Neurological:      Mental Status: She is alert and oriented to person, place, and time. Comments: Strength 5/5 and sensation grossly intact to light touch and equal bilaterally throughout all extremities          -------------------------------------------------- RESULTS -------------------------------------------------  I have personally reviewed all laboratory and imaging results for this patient. Results are listed below.      LABS:  Labs Reviewed CBC WITH AUTO DIFFERENTIAL - Abnormal; Notable for the following components:       Result Value    WBC 14.4 (*)     Hemoglobin 11.2 (*)     MCHC 31.3 (*)     RDW 16.5 (*)     Neutrophils % 87.1 (*)     Lymphocytes % 2.4 (*)     Neutrophils Absolute 12.55 (*)     Lymphocytes Absolute 0.34 (*)     Monocytes Absolute 1.35 (*)     All other components within normal limits    Narrative:     swisslog 1842   COMPREHENSIVE METABOLIC PANEL W/ REFLEX TO MG FOR LOW K - Abnormal; Notable for the following components:    Sodium 128 (*)     Chloride 85 (*)     Anion Gap 18 (*)     BUN 34 (*)     Calcium 10.3 (*)     Total Protein 8.4 (*)     All other components within normal limits    Narrative:     swisslog 1842   LIPASE - Abnormal; Notable for the following components:    Lipase 10 (*)     All other components within normal limits    Narrative:     swisslog 1842   URINALYSIS WITH MICROSCOPIC - Abnormal; Notable for the following components:    Glucose, Ur 250 (*)     Bilirubin Urine MODERATE (*)     Ketones, Urine TRACE (*)     Leukocyte Esterase, Urine TRACE (*)     Bacteria, UA FEW (*)     All other components within normal limits   TROPONIN - Abnormal; Notable for the following components:    Troponin, High Sensitivity 54 (*)     All other components within normal limits   DIGOXIN LEVEL - Abnormal; Notable for the following components:    Digoxin Lvl 0.7 (*)     All other components within normal limits   TROPONIN - Abnormal; Notable for the following components:    Troponin, High Sensitivity 59 (*)     All other components within normal limits    Narrative:     addon to 2004 labs   LACTIC ACID    Narrative:     swisslog 1842   MAGNESIUM   TROPONIN       RADIOLOGY:  Interpreted personally and by Radiologist.  CT ABDOMEN PELVIS W IV CONTRAST Additional Contrast? None    (Results Pending)       EKG: This EKG is signed and interpreted by the EP.     Atrial sensed ventricular paced rhythm, ventricular rate 79 bpm, normal axis, nonspecific ST-T abnormality, no acute injury pattern, no clinically significant change compared w/ prior EKG       ------------------------- NURSING NOTES AND VITALS REVIEWED ---------------------------   The nursing notes within the ED encounter and vital signs as below have been reviewed by myself. BP (!) 99/45   Pulse 81   Temp 97.5 °F (36.4 °C) (Temporal)   Resp 18   SpO2 94%   Oxygen Saturation Interpretation: Normal    The patients available past medical records and past encounters were reviewed. ------------------------------ ED COURSE/MEDICAL DECISION MAKING----------------------  Medications   sodium chloride flush 0.9 % injection 10 mL (10 mLs IntraVENous Given 23)   0.9 % sodium chloride bolus (0 mLs IntraVENous Stopped 23)   fentaNYL (SUBLIMAZE) injection 50 mcg (50 mcg IntraVENous Given 23)   ondansetron (ZOFRAN) injection 4 mg (4 mg IntraVENous Given 23)   iopamidol (ISOVUE-370) 76 % injection 75 mL (75 mLs IntraVENous Given 23)     Consultations:             General surgery    Independent interpretation of tests:  Elevated BUN c/w dehydration  Hyponatremia likely 2/2 poor PO intake  Leukocytosis  High sens troponin elevated compared w/ prior baseline values      Counseling: The emergency provider has spoken with the patient and discussed todays results, in addition to providing specific details for the plan of care and counseling regarding the diagnosis and prognosis. Questions are answered at this time and they are agreeable with the plan. ED Course/Medical Decision Makin y.o. female here with abdominal pain and vomiting x 4 days. Mild hypotension on arrival improving w/ IV fluids. Labs c/w volume depletion. Judicious IV hydration given patient's hx of pulmonary HTN. Workup notable for mildly elevated troponin, leukocytosis, and on my wet read CT shows SBO with transition point in RLQ.   Started NG tube, general surgery consult. Signed out to oncoming physician with plan to f/u CT read, general surgery recommendations, and admit to medicine service for further management.       --------------------------------- IMPRESSION AND DISPOSITION ---------------------------------    IMPRESSION  1. [facility]BO [facility]        DISPOSITION  Disposition: Admit to telemetry  Patient condition is stable    NOTE: This report was transcribed using voice recognition software.  Every effort was made to ensure accuracy; however, inadvertent computerized transcription errors may be present    Elva Haro MD  Attending Emergency Physician         Elva Haro MD  02/13/23 1567

## 2023-02-15 ENCOUNTER — APPOINTMENT (OUTPATIENT)
Dept: GENERAL RADIOLOGY | Age: 68
DRG: 394 | End: 2023-02-15
Payer: MEDICARE

## 2023-02-15 LAB
ALBUMIN SERPL-MCNC: 4.1 G/DL (ref 3.5–5.2)
ALP BLD-CCNC: 84 U/L (ref 35–104)
ALT SERPL-CCNC: 19 U/L (ref 0–32)
ANION GAP SERPL CALCULATED.3IONS-SCNC: 23 MMOL/L (ref 7–16)
ANISOCYTOSIS: ABNORMAL
AST SERPL-CCNC: 36 U/L (ref 0–31)
BASOPHILS ABSOLUTE: 0.04 E9/L (ref 0–0.2)
BASOPHILS RELATIVE PERCENT: 0.5 % (ref 0–2)
BILIRUB SERPL-MCNC: 0.7 MG/DL (ref 0–1.2)
BUN BLDV-MCNC: 43 MG/DL (ref 6–23)
CALCIUM SERPL-MCNC: 9.4 MG/DL (ref 8.6–10.2)
CHLORIDE BLD-SCNC: 94 MMOL/L (ref 98–107)
CO2: 14 MMOL/L (ref 22–29)
CREAT SERPL-MCNC: 0.7 MG/DL (ref 0.5–1)
EOSINOPHILS ABSOLUTE: 0.06 E9/L (ref 0.05–0.5)
EOSINOPHILS RELATIVE PERCENT: 0.7 % (ref 0–6)
GFR SERPL CREATININE-BSD FRML MDRD: >60 ML/MIN/1.73
GLUCOSE BLD-MCNC: 92 MG/DL (ref 74–99)
HCT VFR BLD CALC: 30.2 % (ref 34–48)
HEMOGLOBIN: 9.5 G/DL (ref 11.5–15.5)
HYPOCHROMIA: ABNORMAL
IMMATURE GRANULOCYTES #: 0.05 E9/L
IMMATURE GRANULOCYTES %: 0.6 % (ref 0–5)
LYMPHOCYTES ABSOLUTE: 0.39 E9/L (ref 1.5–4)
LYMPHOCYTES RELATIVE PERCENT: 4.6 % (ref 20–42)
MAGNESIUM: 2.7 MG/DL (ref 1.6–2.6)
MCH RBC QN AUTO: 27.2 PG (ref 26–35)
MCHC RBC AUTO-ENTMCNC: 31.5 % (ref 32–34.5)
MCV RBC AUTO: 86.5 FL (ref 80–99.9)
MONOCYTES ABSOLUTE: 1.05 E9/L (ref 0.1–0.95)
MONOCYTES RELATIVE PERCENT: 12.4 % (ref 2–12)
NEUTROPHILS ABSOLUTE: 6.88 E9/L (ref 1.8–7.3)
NEUTROPHILS RELATIVE PERCENT: 81.2 % (ref 43–80)
OVALOCYTES: ABNORMAL
PDW BLD-RTO: 16.4 FL (ref 11.5–15)
PHOSPHORUS: 3.2 MG/DL (ref 2.5–4.5)
PLATELET # BLD: 384 E9/L (ref 130–450)
PMV BLD AUTO: 9.9 FL (ref 7–12)
POIKILOCYTES: ABNORMAL
POLYCHROMASIA: ABNORMAL
POTASSIUM REFLEX MAGNESIUM: 4.2 MMOL/L (ref 3.5–5)
RBC # BLD: 3.49 E12/L (ref 3.5–5.5)
REASON FOR REJECTION: NORMAL
REJECTED TEST: NORMAL
SODIUM BLD-SCNC: 131 MMOL/L (ref 132–146)
TEAR DROP CELLS: ABNORMAL
TOTAL PROTEIN: 7.8 G/DL (ref 6.4–8.3)
WBC # BLD: 8.5 E9/L (ref 4.5–11.5)

## 2023-02-15 PROCEDURE — 6360000002 HC RX W HCPCS: Performed by: FAMILY MEDICINE

## 2023-02-15 PROCEDURE — 6360000002 HC RX W HCPCS: Performed by: INTERNAL MEDICINE

## 2023-02-15 PROCEDURE — 80053 COMPREHEN METABOLIC PANEL: CPT

## 2023-02-15 PROCEDURE — 6370000000 HC RX 637 (ALT 250 FOR IP): Performed by: FAMILY MEDICINE

## 2023-02-15 PROCEDURE — 6360000002 HC RX W HCPCS: Performed by: STUDENT IN AN ORGANIZED HEALTH CARE EDUCATION/TRAINING PROGRAM

## 2023-02-15 PROCEDURE — 83735 ASSAY OF MAGNESIUM: CPT

## 2023-02-15 PROCEDURE — 85025 COMPLETE CBC W/AUTO DIFF WBC: CPT

## 2023-02-15 PROCEDURE — 1200000000 HC SEMI PRIVATE

## 2023-02-15 PROCEDURE — 74018 RADEX ABDOMEN 1 VIEW: CPT

## 2023-02-15 PROCEDURE — 84100 ASSAY OF PHOSPHORUS: CPT

## 2023-02-15 PROCEDURE — 36415 COLL VENOUS BLD VENIPUNCTURE: CPT

## 2023-02-15 RX ORDER — ENOXAPARIN SODIUM 100 MG/ML
1 INJECTION SUBCUTANEOUS 2 TIMES DAILY
Status: DISCONTINUED | OUTPATIENT
Start: 2023-02-15 | End: 2023-02-16 | Stop reason: HOSPADM

## 2023-02-15 RX ADMIN — ENOXAPARIN SODIUM 60 MG: 100 INJECTION SUBCUTANEOUS at 13:38

## 2023-02-15 RX ADMIN — PANTOPRAZOLE SODIUM 40 MG: 40 TABLET, DELAYED RELEASE ORAL at 11:15

## 2023-02-15 RX ADMIN — CYANOCOBALAMIN TAB 1000 MCG 1000 MCG: 1000 TAB at 11:15

## 2023-02-15 RX ADMIN — SPIRONOLACTONE 25 MG: 25 TABLET ORAL at 11:15

## 2023-02-15 RX ADMIN — MAGNESIUM OXIDE 400 MG (241.3 MG MAGNESIUM) TABLET 400 MG: TABLET at 11:14

## 2023-02-15 RX ADMIN — DIGOXIN 125 MCG: 125 TABLET ORAL at 11:14

## 2023-02-15 RX ADMIN — MYCOPHENOLATE MOFETIL 1500 MG: 250 CAPSULE ORAL at 21:28

## 2023-02-15 RX ADMIN — SILDENAFIL CITRATE 60 MG: 20 TABLET ORAL at 11:16

## 2023-02-15 RX ADMIN — FLECAINIDE ACETATE 150 MG: 100 TABLET ORAL at 11:13

## 2023-02-15 RX ADMIN — ENOXAPARIN SODIUM 60 MG: 100 INJECTION SUBCUTANEOUS at 21:28

## 2023-02-15 RX ADMIN — TRIMETHOBENZAMIDE HYDROCHLORIDE 200 MG: 100 INJECTION INTRAMUSCULAR at 01:57

## 2023-02-15 RX ADMIN — ATORVASTATIN CALCIUM 20 MG: 20 TABLET, FILM COATED ORAL at 11:14

## 2023-02-15 RX ADMIN — SILDENAFIL CITRATE 60 MG: 20 TABLET ORAL at 14:32

## 2023-02-15 RX ADMIN — MYCOPHENOLATE MOFETIL 1500 MG: 250 CAPSULE ORAL at 11:15

## 2023-02-15 RX ADMIN — SILDENAFIL CITRATE 60 MG: 20 TABLET ORAL at 21:28

## 2023-02-15 RX ADMIN — ROPINIROLE 0.5 MG: 0.5 TABLET, FILM COATED ORAL at 21:28

## 2023-02-15 RX ADMIN — FLECAINIDE ACETATE 150 MG: 100 TABLET ORAL at 21:31

## 2023-02-15 RX ADMIN — METOPROLOL SUCCINATE 12.5 MG: 25 TABLET, EXTENDED RELEASE ORAL at 11:13

## 2023-02-15 ASSESSMENT — PAIN SCALES - GENERAL
PAINLEVEL_OUTOF10: 0

## 2023-02-15 NOTE — PROGRESS NOTES
GENERAL SURGERY  DAILY PROGRESS NOTE  2/15/2023    Subjective:  Patients has had multiple episodes of diarrhea withput blood in it. She also reports nausea. 100ml of gastric fluid came out upon placement of NG tube.  Denied abdominal pain    Objective:  BP (!) 140/70   Pulse 77   Temp 97.9 °F (36.6 °C) (Oral)   Resp 17   Ht 5' 3\" (1.6 m)   Wt 122 lb 4 oz (55.5 kg)   SpO2 92%   BMI 21.66 kg/m²     Gen: alert, oriented, no apparent distress  HEENT: NCAT, anicteric  CV: RR  Pulm: nonlabored breathing on room air  Abdomen: soft, nontender, nondistended  Extremities: moving all extremities, no peripheral edema  Skin: warm and dry    Assessment/Plan:  75 y.o. female with SBO    Plan:    -Small bowel follow through, contrast per oral  -waiting for KUB  -clear diet  -take NG tube out  -Multimodal antiemetic  -monitor electrolytes      Electronically signed by Ester Rosales on 2/15/2023 at 6:12 AM

## 2023-02-15 NOTE — CARE COORDINATION
Social Work/Case Management Transition of Care Planning (Татьяна Earl [place] [phone]): Per report, patient had SBFT on 2/14. She did have an emesis x1 overnight. NG tube was placed but is now out. She was started on a clear liquid diet. Monitor her tolerance and advance diet at tolerated. Met with patient at bedside. She resides in a 2 story home but everything is on the main floor including bedroom, bathroom, and laundry. She lives with her , Willam Cyr. She reports she is independent with all aspects of care. No DME. She does use 2L NC at [facility]. Oxygen supplier is [facility]. PCP is Dr. Funmilayo Baum in [place]. Pharmacy is [facility] in [place]. No HHC or SHE history. Discharge plan is to return home with no needs.  to provide transport. CM/SW will follow. TESSIE Freedman  2/15/2023      Case Management Assessment  Initial Evaluation    Date/Time of Evaluation: 2/15/2023 12:37 PM  Assessment Completed by: TESSIE Freedman    If patient is discharged prior to next notation, then this note serves as note for discharge by case management. Patient Name: Mike Moya                   YOB: 1955  Diagnosis: SBO (small bowel obstruction) [facility]) [P53.006]                   Date / Time: 2/13/2023  6:47 PM    Patient Admission Status: Inpatient   Readmission Risk (Low < 19, Mod (19-27), High > 27): Readmission Risk Score: 18.5    Current PCP: Nuzhat Friend, DO  PCP verified by CM? Yes (Dr. Funmilayo Baum)    Chart Reviewed:       History Provided by: Patient  Patient Orientation: Alert and Oriented, Person, Place, Situation, Self    Patient Cognition: Alert    Hospitalization in the last 30 days (Readmission): If yes, Readmission Assessment in CM Navigator will be completed.     Advance Directives:      Code Status: Full Code   Patient's Primary Decision Maker is:      Primary Decision MakeSterlingcheyanne Bhaskar Spouse - [phone]    Secondary Decision Maker: Anna Issa - Other [facility] - [phone]    Discharge Planning:    Patient lives with: Spouse/Significant Other Type of Home: House  Primary Care Giver: Self  Patient Support Systems include: Spouse/Significant Other, Children   Current Financial resources:    Current community resources:    Current services prior to admission: None            Current DME:              Type of Home Care services:  None    ADLS  Prior functional level: Independent in ADLs/IADLs  Current functional level: Independent in ADLs/IADLs    PT AM-PAC:   /24  OT AM-PAC:   /24    Family can provide assistance at DC: Would you like Case Management to discuss the discharge plan with any other family members/significant others, and if so, who? No  Plans to Return to Present Housing: Yes  Other Identified Issues/Barriers to RETURNING to current housing:   Potential Assistance needed at discharge: N/A            Potential DME:    Patient expects to discharge to: [address] [place] [address] for transportation at discharge:      Financial    Payor: [address] [place] [address] / Plan: MEDICARE PART A AND B / Product Type: *No Product type* /     Does insurance require precert for SNF:     Potential assistance Purchasing Medications: No  Meds-to-Beds request: Yes      [facility] [address] [place] [address] [phone]  [address] [address]  Phone: [phone] Fax: [phone]    [address] [phone] [facility] [phone]  [place] [address]  Phone: [phone] Fax: [phone]      Notes:    Factors facilitating achievement of predicted outcomes:     Barriers to discharge: Additional Case Management Notes:      The Plan for Transition of Care is related to the following treatment goals of SBO (small bowel obstruction) ([facility] [X46.134]    IF APPLICABLE: The Patient and/or patient representative Bharti Mae and her family were provided with a choice of provider and agrees with the discharge plan. Freedom of choice list with basic dialogue that supports the patient's individualized plan of care/goals and shares the quality data associated with the providers was provided to:     Patient Representative Name:       The Patient and/or Patient Representative Agree with the Discharge Plan?       Holly Perez, [place]  Case Management Department  Ph: [phone]

## 2023-02-15 NOTE — PROGRESS NOTES
Pt was c/o nausea and had 1 bout of emesis, surgery notified and requested NG to be placed. NG in R nare, awaiting KUB to confirm placement.

## 2023-02-15 NOTE — ACP (ADVANCE CARE PLANNING)
Advance Care Planning   The patient has the following advanced directives on file:  Advance Directives       Power of  Living Will ACP-Advance Directive ACP-Power of     Not on File Filed on 11/12/12 Filed Not on File            The patient has appointed the following active healthcare agents:    Primary Decision Maker: Taco Ada Spouse - [phone]    Secondary Decision Maker: Anna Issa - Other Relative - [phone]    The Patient has the following current code status:    Code Status: Full Code      TESSIE Childress  2/15/2023

## 2023-02-15 NOTE — PLAN OF CARE
Problem: Pain  Goal: Verbalizes/displays adequate comfort level or baseline comfort level  Outcome: Progressing     Problem: Safety - Adult  Goal: Free from fall injury  2/14/2023 2330 by Ari Recinos RN  Outcome: Progressing  2/14/2023 1458 by Collette Ground, RN  Outcome: Progressing     Problem: Cardiovascular - Adult  Goal: Maintains optimal cardiac output and hemodynamic stability  Outcome: Progressing

## 2023-02-15 NOTE — PROGRESS NOTES
Physician Progress Note      Remi PLUNKETT #:                  [id number]  :                       1955  ADMIT DATE:       2023 6:47 PM  [address] [place] DATE:  RESPONDING  PROVIDER #:        Oanh Gale MD          QUERY TEXT:    Dear Provider,    Patient admitted with SBO. Noted Ct abd/pelvis with impression obstruction   point of the dilated bowel raises concern for possible internal hernia, with   right lower quadrant adhesions in the differential.  Please document in   progress notes and discharge summary the cause of the bowel obstruction: The medical record reflects the following:  Risk Factors: Internal hernia, 2 C-sections  Clinical Indicators: SBO. Ct abd/pelvis:High-grade small bowel obstruction, with a focal transition   point noted within the right lower  quadrant. There is a shorter segment of ileum seen which is collapsed noted   within the pelvis, with the distal  most portion of the ileum within this clustered component showing focal   compression as it exits this same  region of the obstruction point of the dilated bowel. This raises concern for   possible internal hernia, with right  lower quadrant adhesions in the differential as well. Treatment: NGT, Imaging, NPO advanced to clear liquid when medically ready    Thank you,  Nati Solomon RN  Clinical Documentation Improvement  [phone]  Options provided:  -- SBO related to adhesions associated with prior abdominal surgeries  -- SBO related to present internal hernia  -- SBO related to internal hernia and abdominal adhesions  -- Other - I will add my own diagnosis  -- Disagree - Not applicable / Not valid  -- Disagree - Clinically unable to determine / Unknown  -- Refer to Clinical Documentation Reviewer    PROVIDER RESPONSE TEXT:    This patient has an SBO related to internal hernia and abdominal adhesions.     Query created by: Anila Rebollar on 2/15/2023 11:47 AM      Electronically signed by: Jacob Mckeon MD 2/15/2023 11:54 AM

## 2023-02-15 NOTE — PROGRESS NOTES
GENERAL SURGERY  DAILY PROGRESS NOTE  2/15/2023    CHIEF COMPLAINT:  Chief Complaint   Patient presents with    Abdominal Pain     Bilateral upper quad pain greater in the left quad, NV x4 days, unable to eat, hx of pulmonary hypertension        SUBJECTIVE:  Patient had a bout of emesis overnight which prompted replacement of NG tube. After placement of NG tube later in the morning patient had multiple bouts of diarrhea and her abdominal pain significantly improved. OBJECTIVE:  /61   Pulse 69   Temp 97.7 °F (36.5 °C)   Resp 18   Ht 5' 3\" (1.6 m)   Wt 122 lb 4 oz (55.5 kg)   SpO2 92%   BMI 21.66 kg/m²     GENERAL:  NAD. A&Ox3. LUNGS:  No increased work of breathing. CARDIOVASCULAR: RR  ABDOMEN:  Soft, non-distended, non-tender. No guarding, rigidity, rebound.     ASSESSMENT/PLAN:  75 y.o. female with resolving small bowel obstruction    Remove NG tube  Contrast found to be in the colon  Follow-up with a.m. electrolytes and replace as needed  Small bowel follow-through appreciated with question of internal hernia within the right lower quadrant  Start clear liquid diet    Discussed with Dr. Judge Morse, DO  Surgery Resident PGY-1  2/15/2023  8:38 AM

## 2023-02-15 NOTE — PROGRESS NOTES
Hospitalist Progress Note      PCP: Fabricio Buitrago DO    Date of Admission: 2/13/2023    Chief Complaint: SBO    Hospital Course:    Patient presented to the emergency department with abdominal pain and vomiting. This is been going on for about 4 days. Pain is located in the upper quadrants, greater in the left quadrant. Denies fever, chills, cough, chest pain, shortness of breath. Vital signs within normal limits and stable. The patient is afebrile. Laboratory studies demonstrate sodium 128, BUN 34, troponin 54 with repeat of 59, WBC 14.4, hemoglobin 11.2. CT abdomen pelvis shows high-grade small bowel obstruction with a focal transition point noted within the right lower quadrant. There is a shorter segment of ileum seen which is collapse noted within the pelvis with the distal most portion of the ileum within this cluster component showing focal compression as it exits the same region of the obstruction point of the dilated bowel. This raises concern for possible internal hernia. General surgery was consulted. NG tube was placed to low intermittent suction. Medicine consulted for admission.     Subjective: Patient was seen at bedside this morning, mentions that her abdominal pain is better now, had an episode of vomiting overnight and the NG tube had to be placed, removed this AM by surgery      Medications:  Reviewed    Infusion Medications    sodium chloride      0.9% NaCl with KCl 20 mEq 100 mL/hr at 02/14/23 1645     Scheduled Medications    [Held by provider] apixaban  5 mg Oral BID    digoxin  125 mcg Oral Daily    flecainide  150 mg Oral BID    levothyroxine  75 mcg Oral Daily    macitentan  10 mg Oral Daily    magnesium oxide  400 mg Oral Daily    metoprolol succinate  12.5 mg Oral Daily    mycophenolate  1,500 mg Oral BID    pantoprazole  40 mg Oral Daily    rOPINIRole  0.5 mg Oral Nightly    sildenafil  60 mg Oral TID    atorvastatin  20 mg Oral Daily    spironolactone  25 mg Oral Daily vitamin B-12  1,000 mcg Oral Daily    sodium chloride flush  10 mL IntraVENous 2 times per day     PRN Meds: benzocaine-menthol, fentanNYL, sodium chloride flush, sodium chloride, polyethylene glycol, acetaminophen **OR** acetaminophen, potassium chloride **OR** potassium alternative oral replacement **OR** potassium chloride, trimethobenzamide, benzocaine      Intake/Output Summary (Last 24 hours) at 2/15/2023 1155  Last data filed at 2/15/2023 0655  Gross per 24 hour   Intake 50 ml   Output 150 ml   Net -100 ml       Exam:    /61   Pulse 79   Temp 97.7 °F (36.5 °C)   Resp 18   Ht 5' 3\" (1.6 m)   Wt 122 lb 4 oz (55.5 kg)   SpO2 92%   BMI 21.66 kg/m²     General appearance: Malnourished, appears stated age and cooperative. HEENT: Pupils equal, round, and reactive to light. Conjunctivae/corneas clear. Neck: Supple, with full range of motion. No jugular venous distention. Trachea midline. Respiratory:  Normal respiratory effort. Clear to auscultation, bilaterally without Rales/Wheezes/Rhonchi. Cardiovascular: Regular rate and rhythm with normal S1/S2 without murmurs, rubs or gallops. Abdomen: Soft, mild tenderness in the left upper quadrant, no bowel sounds heard  Musculoskeletal: No clubbing, cyanosis or edema bilaterally. Full range of motion without deformity. Skin: Skin color, texture, turgor normal.  No rashes or lesions. Neurologic: No focal deficits  Psychiatric: Alert and oriented, thought content appropriate, normal insight    Labs:   Recent Labs     02/13/23 1821 02/14/23  0200   WBC 14.4* 10.8   HGB 11.2* 10.9*   HCT 35.8 32.3*    448     Recent Labs     02/13/23 1821 02/14/23  0200   * 127*   K 4.3 3.7  3.7   CL 85* 85*   CO2 25 24   BUN 34* 38*   CREATININE 0.9 0.9   CALCIUM 10.3* 9.8     Recent Labs     02/13/23 1821 02/14/23  0200   AST 26 23   ALT 20 18   BILITOT 1.0 0.9   ALKPHOS 91 86     No results for input(s): INR in the last 72 hours.   No results for input(s): Jacque Olivares in the last 72 hours. Assessment/Plan:    Active Hospital Problems    Diagnosis Date Noted    SBO (small bowel obstruction) ([facility] [K56.609] 02/13/2023     Priority: Medium   -Small bowel obstruction  -Possible internal hernia  -Leukocytosis  -Hyponatremia  -Coronary artery disease  -Atrial fibrillation  -Hypertension  -Hyperlipidemia        PLAN:  -Consulted general surgery- NG removed, small bowel follow through showed possible question of internal hernia within the right lower quadrant  -Normal saline 75 mL/h  -Monitor sodium-127, pending labs today  -Pain management  -Antiemetics  -Monitor serum electrolytes  -Continue home medications      DVT Prophylaxis: Eliquis held at this time, started on Lovenox 1 mg/kg diet: ADULT DIET;  Clear Liquid  Code Status: Full Code    Dispo -monitor for nausea, initiate diet-liquid diet today and advance as tolerated    Justine Hernandez MD

## 2023-02-16 VITALS
WEIGHT: 131.9 LBS | HEIGHT: 63 IN | OXYGEN SATURATION: 94 % | RESPIRATION RATE: 18 BRPM | DIASTOLIC BLOOD PRESSURE: 58 MMHG | TEMPERATURE: 98 F | HEART RATE: 71 BPM | SYSTOLIC BLOOD PRESSURE: 100 MMHG | BODY MASS INDEX: 23.37 KG/M2

## 2023-02-16 LAB
ALBUMIN SERPL-MCNC: 3.5 G/DL (ref 3.5–5.2)
ALP BLD-CCNC: 68 U/L (ref 35–104)
ALT SERPL-CCNC: 12 U/L (ref 0–32)
ANION GAP SERPL CALCULATED.3IONS-SCNC: 9 MMOL/L (ref 7–16)
ANISOCYTOSIS: ABNORMAL
AST SERPL-CCNC: 18 U/L (ref 0–31)
BASOPHILS ABSOLUTE: 0 E9/L (ref 0–0.2)
BASOPHILS RELATIVE PERCENT: 0.6 % (ref 0–2)
BILIRUB SERPL-MCNC: 0.4 MG/DL (ref 0–1.2)
BUN BLDV-MCNC: 13 MG/DL (ref 6–23)
CALCIUM SERPL-MCNC: 8.8 MG/DL (ref 8.6–10.2)
CHLORIDE BLD-SCNC: 99 MMOL/L (ref 98–107)
CO2: 23 MMOL/L (ref 22–29)
CREAT SERPL-MCNC: 0.5 MG/DL (ref 0.5–1)
EOSINOPHILS ABSOLUTE: 0 E9/L (ref 0.05–0.5)
EOSINOPHILS RELATIVE PERCENT: 0.8 % (ref 0–6)
GFR SERPL CREATININE-BSD FRML MDRD: >60 ML/MIN/1.73
GLUCOSE BLD-MCNC: 130 MG/DL (ref 74–99)
HCT VFR BLD CALC: 29 % (ref 34–48)
HEMOGLOBIN: 8.8 G/DL (ref 11.5–15.5)
HYPOCHROMIA: ABNORMAL
LYMPHOCYTES ABSOLUTE: 0.48 E9/L (ref 1.5–4)
LYMPHOCYTES RELATIVE PERCENT: 8.7 % (ref 20–42)
MAGNESIUM: 2.3 MG/DL (ref 1.6–2.6)
MCH RBC QN AUTO: 26.8 PG (ref 26–35)
MCHC RBC AUTO-ENTMCNC: 30.3 % (ref 32–34.5)
MCV RBC AUTO: 88.4 FL (ref 80–99.9)
MONOCYTES ABSOLUTE: 0.37 E9/L (ref 0.1–0.95)
MONOCYTES RELATIVE PERCENT: 7 % (ref 2–12)
NEUTROPHILS ABSOLUTE: 4.45 E9/L (ref 1.8–7.3)
NEUTROPHILS RELATIVE PERCENT: 84.3 % (ref 43–80)
OVALOCYTES: ABNORMAL
PDW BLD-RTO: 16 FL (ref 11.5–15)
PLATELET # BLD: 295 E9/L (ref 130–450)
PMV BLD AUTO: 10.1 FL (ref 7–12)
POIKILOCYTES: ABNORMAL
POLYCHROMASIA: ABNORMAL
POTASSIUM REFLEX MAGNESIUM: 3.5 MMOL/L (ref 3.5–5)
RBC # BLD: 3.28 E12/L (ref 3.5–5.5)
SCHISTOCYTES: ABNORMAL
SODIUM BLD-SCNC: 131 MMOL/L (ref 132–146)
TARGET CELLS: ABNORMAL
TOTAL PROTEIN: 6.3 G/DL (ref 6.4–8.3)
WBC # BLD: 5.3 E9/L (ref 4.5–11.5)

## 2023-02-16 PROCEDURE — 6360000002 HC RX W HCPCS: Performed by: STUDENT IN AN ORGANIZED HEALTH CARE EDUCATION/TRAINING PROGRAM

## 2023-02-16 PROCEDURE — 6360000002 HC RX W HCPCS: Performed by: FAMILY MEDICINE

## 2023-02-16 PROCEDURE — 83735 ASSAY OF MAGNESIUM: CPT

## 2023-02-16 PROCEDURE — 85025 COMPLETE CBC W/AUTO DIFF WBC: CPT

## 2023-02-16 PROCEDURE — 2580000003 HC RX 258: Performed by: FAMILY MEDICINE

## 2023-02-16 PROCEDURE — 6370000000 HC RX 637 (ALT 250 FOR IP): Performed by: FAMILY MEDICINE

## 2023-02-16 PROCEDURE — 36415 COLL VENOUS BLD VENIPUNCTURE: CPT

## 2023-02-16 PROCEDURE — 80053 COMPREHEN METABOLIC PANEL: CPT

## 2023-02-16 RX ADMIN — ENOXAPARIN SODIUM 60 MG: 100 INJECTION SUBCUTANEOUS at 08:43

## 2023-02-16 RX ADMIN — PANTOPRAZOLE SODIUM 40 MG: 40 TABLET, DELAYED RELEASE ORAL at 08:45

## 2023-02-16 RX ADMIN — POTASSIUM CHLORIDE AND SODIUM CHLORIDE: 900; 150 INJECTION, SOLUTION INTRAVENOUS at 10:18

## 2023-02-16 RX ADMIN — POTASSIUM CHLORIDE AND SODIUM CHLORIDE: 900; 150 INJECTION, SOLUTION INTRAVENOUS at 00:02

## 2023-02-16 RX ADMIN — CYANOCOBALAMIN TAB 1000 MCG 1000 MCG: 1000 TAB at 08:43

## 2023-02-16 RX ADMIN — LEVOTHYROXINE SODIUM 75 MCG: 0.05 TABLET ORAL at 05:32

## 2023-02-16 RX ADMIN — MAGNESIUM OXIDE 400 MG (241.3 MG MAGNESIUM) TABLET 400 MG: TABLET at 08:45

## 2023-02-16 RX ADMIN — SPIRONOLACTONE 25 MG: 25 TABLET ORAL at 08:44

## 2023-02-16 RX ADMIN — SILDENAFIL CITRATE 60 MG: 20 TABLET ORAL at 08:43

## 2023-02-16 RX ADMIN — SODIUM CHLORIDE, PRESERVATIVE FREE 10 ML: 5 INJECTION INTRAVENOUS at 08:45

## 2023-02-16 RX ADMIN — ATORVASTATIN CALCIUM 20 MG: 20 TABLET, FILM COATED ORAL at 08:45

## 2023-02-16 RX ADMIN — MYCOPHENOLATE MOFETIL 1500 MG: 250 CAPSULE ORAL at 10:17

## 2023-02-16 ASSESSMENT — PAIN SCALES - GENERAL: PAINLEVEL_OUTOF10: 0

## 2023-02-16 NOTE — PLAN OF CARE
Problem: Pain  Goal: Verbalizes/displays adequate comfort level or baseline comfort level  Outcome: Progressing     Problem: Safety - Adult  Goal: Free from fall injury  Outcome: Progressing     Problem: Cardiovascular - Adult  Goal: Maintains optimal cardiac output and hemodynamic stability  Outcome: Progressing     Problem: Metabolic/Fluid and Electrolytes - Adult  Goal: Hemodynamic stability and optimal renal function maintained  Outcome: Progressing

## 2023-02-16 NOTE — PROGRESS NOTES
GENERAL SURGERY  DAILY PROGRESS NOTE  2/16/2023    Subjective:  Patients was in no acute distress. Last bm was last night.  No N/V    Objective:  /61   Pulse 70   Temp 98.5 °F (36.9 °C) (Temporal)   Resp 18   Ht 5' 3\" (1.6 m)   Wt 131 lb 14.4 oz (59.8 kg)   SpO2 94%   BMI 23.37 kg/m²     Gen: alert, oriented, no apparent distress  HEENT: NCAT, anicteric  CV: RR  Pulm: nonlabored breathing on room air  Abdomen: soft, nontender, nondistended  Extremities: moving all extremities, no peripheral edema  Skin: warm and dry    Assessment/Plan:  75 y.o. female with SBO    Plan:    -Small bowel follow through, contrast per oral   -found in colon   -possible internal hernia in RLQ  -waiting for KUB  -clear diet  -monitor electrolytes, replace as needed      Electronically signed by Saul Tabor on 2/16/2023 at 6:21 AM

## 2023-02-16 NOTE — DISCHARGE SUMMARY
Hospitalist Discharge Summary    Patient ID: Tyshawn Saab   Patient : 1955  Patient's PCP: Anastacia Bush DO    Admit Date: 2023   Admitting Physician: Lolis Veloz DO    Discharge Date:  2023   Discharge Physician: Aracely Galvan MD   Discharge Condition: Stable  Discharge Disposition: [facility] course in brief:  (Please refer to daily progress notes for a comprehensive review of the hospitalization by requesting medical records)   Patient presented to the emergency department with abdominal pain and vomiting. This is been going on for about 4 days. Pain is located in the upper quadrants, greater in the left quadrant. Denies fever, chills, cough, chest pain, shortness of breath. Vital signs within normal limits and stable. The patient is afebrile. Laboratory studies demonstrate sodium 128, BUN 34, troponin 54 with repeat of 59, WBC 14.4, hemoglobin 11.2. CT abdomen pelvis shows high-grade small bowel obstruction with a focal transition point noted within the right lower quadrant. There is a shorter segment of ileum seen which is collapse noted within the pelvis with the distal most portion of the ileum within this cluster component showing focal compression as it exits the same region of the obstruction point of the dilated bowel. This raises concern for possible internal hernia. General surgery was consulted. NG tube was placed to low intermittent suction. Medicine consulted for admission.  -Consulted general surgery- NG removed, small bowel follow through showed contrast in the colon, though showed possible question of internal hernia within the right lower quadrant, improved clinically with pain, nausea  -Normal saline 75 mL/h, improved with sodium from 126-127-131  -initiate diet-liquid diet and advanced to soft diet and able to tolerate well. Patient was deemed stable for DC with close f/u With PCP.     Consults:   IP CONSULT TO GENERAL SURGERY  IP CONSULT TO HOSPITALIST    Discharge Diagnoses:  -Small bowel obstruction- resolved  -Possible internal hernia  -Leukocytosis-resolved  -Hyponatremia- improved  -Coronary artery disease  -Atrial fibrillation  -Hypertension  -Hyperlipidemia      Discharge Instructions / Follow up: Follow-up with PCP within 1 week of discharge. Follow-up with consultants as indicated by them. Compliance with medications as prescribed on discharge. Future Appointments   Date Time Provider Husam Roche   5/17/2023  1:00 PM STEPH Rivas - CNP [facility]   5/17/2023  1:00 PM SCHEDULE, [facility]       The patient's condition is stable. At this time the patient is without objective evidence of an acute process requiring continuing hospitalization or inpatient management. They are stable for discharge with outpatient follow-up. I have spoken with the patient and discussed the results of the current hospitalization, in addition to providing specific details for the plan of care and counseling regarding the diagnosis and prognosis. The plan has been discussed in detail and they are aware of the specific conditions for emergent return, as well as the importance of follow-up. Their questions are answered at this time and they are agreeable with the plan for discharge to home. Continued appropriate risk factor modification of blood pressure, diabetes and serum lipids will remain essential to reducing risk of future atherosclerotic development    Activity: activity as tolerated    Physical exam:  General appearance: No apparent distress, appears stated age and cooperative. HEENT: Conjunctivae/corneas clear. Mucous membranes moist.  Neck: Supple. No JVD. Respiratory:  Clear to auscultation bilaterally. Normal respiratory effort. Cardiovascular:  RRR. S1, S2 without MRG. PV: Pulses palpable. No edema. Abdomen: Soft, non-tender, non-distended.  +BS  Musculoskeletal: No obvious deformities.   Skin: Normal skin color.  No rashes or lesions. Good turgor.   Neurologic:  Grossly non-focal. Awake, alert, following commands.   Psychiatric: Alert and oriented, thought content appropriate, normal insight and judgement    Significant labs:  CBC:   Recent Labs     02/13/23  1821 02/14/23  0200 02/15/23  1300   WBC 14.4* 10.8 8.5   RBC 4.10 3.80 3.49*   HGB 11.2* 10.9* 9.5*   HCT 35.8 32.3* 30.2*   MCV 87.3 85.0 86.5   RDW 16.5* 16.6* 16.4*    448 384     BMP:   Recent Labs     02/13/23  1617 02/13/23  1821 02/13/23  1821 02/14/23  0200 02/15/23  1133   NA  --  128*  --  127* 131*   K  --  4.3   < > 3.7  3.7 4.2   CL  --  85*  --  85* 94*   CO2  --  25  --  24 14*   BUN  --  34*  --  38* 43*   CREATININE  --  0.9  --  0.9 0.7   MG 2.4  --   --   --  2.7*   PHOS  --   --   --   --  3.2    < > = values in this interval not displayed.     LFT:  Recent Labs     02/13/23  1821 02/14/23  0200 02/15/23  1133   PROT 8.4* 7.9 7.8   ALKPHOS 91 86 84   ALT 20 18 19   AST 26 23 36*   BILITOT 1.0 0.9 0.7   LIPASE 10*  --   --      PT/INR: No results for input(s): INR, APTT in the last 72 hours.  BNP: No results for input(s): BNP in the last 72 hours.  Hgb A1C:   Lab Results   Component Value Date    LABA1C 5.2 08/26/2013     Folate and B12:   Lab Results   Component Value Date    TAAGTZEH82 341 07/23/2021   ,   Lab Results   Component Value Date    FOLATE 12.7 07/23/2021     Thyroid Studies:   Lab Results   Component Value Date    TSH 8.570 (H) 11/24/2022       Urinalysis:    Lab Results   Component Value Date/Time    NITRU Negative 02/13/2023 06:21 PM    WBCUA 0-1 02/13/2023 06:21 PM    BACTERIA FEW 02/13/2023 06:21 PM    RBCUA NONE 02/13/2023 06:21 PM    BLOODU Negative 02/13/2023 06:21 PM    SPECGRAV >=1.030 02/13/2023 06:21 PM    GLUCOSEU 250 02/13/2023 06:21 PM       Imaging:  XR ABDOMEN (KUB) (SINGLE AP VIEW)    Result Date: 2/14/2023  EXAMINATION: ONE SUPINE XRAY VIEW(S) OF THE ABDOMEN 2/14/2023  6:28 am COMPARISON: 13 February 2023 HISTORY: ORDERING SYSTEM PROVIDED HISTORY: gastric distension, NG out TECHNOLOGIST PROVIDED HISTORY: Reason for exam:->gastric distension, NG out What reading provider will be dictating this exam?->CRC FINDINGS: NG tube is no longer within the gastric lumen and may have been removed. No free air, bowel wall pneumatosis, dilated bowel or abnormal fecal accumulation within the lower GI tract. The bladder is opacified with contrast.  No abnormal calcifications. No active process. NG tube no longer present in the abdomen. CT ABDOMEN PELVIS W IV CONTRAST Additional Contrast? None    Result Date: 2/13/2023  EXAMINATION: CT OF THE ABDOMEN AND PELVIS WITH CONTRAST 2/13/2023 9:17 pm TECHNIQUE: CT of the abdomen and pelvis was performed with the administration of intravenous contrast. Multiplanar reformatted images are provided for review. Automated exposure control, iterative reconstruction, and/or weight based adjustment of the mA/kV was utilized to reduce the radiation dose to as low as reasonably achievable. COMPARISON: None. HISTORY: ORDERING SYSTEM PROVIDED HISTORY: vomiting, dehydration, diffuse lower abdominal ttp TECHNOLOGIST PROVIDED HISTORY: Additional Contrast?->None Reason for exam:->vomiting, dehydration, diffuse lower abdominal ttp Decision Support Exception - unselect if not a suspected or confirmed emergency medical condition->Emergency Medical Condition (MA) What reading provider will be dictating this exam?->CRC FINDINGS: Lower Chest: No significant pericardial effusion. Small volume of fluid seen within the distal thoracic esophagus suggestive of reflux. Cardiac leads are noted. Multilobar patchy nodular infiltrates with tree-in-bud opacities are seen. Organs: No enhancing mass in the liver or spleen. No adrenal mass. No pancreatic mass. No peripancreatic inflammatory process. No ductal dilation. No obstructive urinary tract calculi.   Nonobstructive stone noted in the lower pole the left kidney. No ureteral calculi are identified. GI/Bowel: There is an acute small-bowel obstruction, multiple dilated loops of small bowel are seen measuring up to approximately 4.9 cm in size. There is a high-grade small bowel obstruction with a transition point noted within the right lower quadrant, on and around axial image 119, as well as sagittal image 56. There is a 2nd loop of small bowel which is fluid-filled seen on axial image 112, to the low left of the scratch adjacent to the iliac vasculature, with a focal transition point at this same location, though this loop is not dilated. No pneumatosis is identified. No mesenteric venous gas is seen. Pelvis: No pelvic mass. No free fluid in the pelvis. Bladder is unremarkable. Peritoneum/Retroperitoneum: No abdominal aortic aneurysm. Diffuse aorto iliac atherosclerosis. No retroperitoneal or mesenteric lymphadenopathy. No bowel herniation is seen. Bones/Soft Tissues: No acute subcutaneous soft tissue abnormality. No acute osseous abnormality. Multilevel degenerative changes are seen within the spine. No osseous destructive changes are identified. Disc disease noted at the level of L4-L5, moderate to severe in amount. 1. High-grade small bowel obstruction, with a focal transition point noted within the right lower quadrant. There is a shorter segment of ileum seen which is collapsed noted within the pelvis, with the distal most portion of the ileum within this clustered component showing focal compression as it exits this same region of the obstruction point of the dilated bowel. This raises concern for possible internal hernia, with right lower quadrant adhesions in the differential as well. 2. Fluid seen in the distal thoracic esophagus felt related to reflux. 3. Nodular appearing bibasilar infiltrates with tree-in-bud opacities, concerning for bronchiolitis/bronchopneumonia or sequela of aspiration.      XR CHEST PORTABLE    Result Date: 1/24/2023  EXAMINATION: ONE XRAY VIEW OF THE CHEST 1/24/2023 1:06 pm COMPARISON: 29 November 2022 HISTORY: ORDERING SYSTEM PROVIDED HISTORY: pacemaker TECHNOLOGIST PROVIDED HISTORY: Reason for exam:->pacemaker What reading provider will be dictating this exam?->CRC FINDINGS: Left-sided pacemaker again noted. No evident pneumothorax. Normal heart and pulmonary vascularity. Lungs are clear. Neither costophrenic angle is blunted. Unremarkable chest.  Interval resolution of CHF since 29 November 2022. XR ABDOMEN FOR NG/OG/NE TUBE PLACEMENT    Result Date: 2/15/2023  EXAMINATION: ONE SUPINE XRAY VIEW(S) OF THE ABDOMEN 2/15/2023 4:39 am COMPARISON: 01/24/2023 HISTORY: ORDERING SYSTEM PROVIDED HISTORY: NG placement TECHNOLOGIST PROVIDED HISTORY: Reason for exam:->NG placement Portable? ->Yes What reading provider will be dictating this exam?->CRC FINDINGS: Enteric catheter side hole is seen at the level of the GE junction with the tip in the proximal gastric body. Cardiac and mediastinal silhouettes appear similar. No free air seen in the upper abdomen. Findings are otherwise similar. Enteric catheter tip terminates in the proximal gastric body, side hole at the level of the GE junction. XR ABDOMEN FOR NG/OG/NE TUBE PLACEMENT    Result Date: 2/13/2023  EXAMINATION: ONE SUPINE XRAY VIEW(S) OF THE ABDOMEN 2/13/2023 11:10 pm COMPARISON: None. HISTORY: ORDERING SYSTEM PROVIDED HISTORY: Confirmation of course of NG/OG/NE tube and location of tip of tube TECHNOLOGIST PROVIDED HISTORY: Reason for exam:->Confirmation of course of NG/OG/NE tube and location of tip of tube Portable? ->Yes What reading provider will be dictating this exam?->CRC FINDINGS: Tube is in the stomach side hole around GE junction level. Suggestion of vascular congestion in the chest.     Tube is in the stomach.      FL SMALL BOWEL FOLLOW THROUGH ONLY    Result Date: 2/15/2023  EXAMINATION: GASTROGRAFIN SMALL BOWEL FOLLOW THROUGH SERIES 2/14/2023 TECHNIQUE: Gastrografin small bowel follow through series was performed with serial imaging including portable imaging performed. COMPARISON: CT abdomen and pelvis 02/13/2023 HISTORY: ORDERING SYSTEM PROVIDED HISTORY: SBO TECHNOLOGIST PROVIDED HISTORY: Please administer gastrografin - via NG if patient has NG present, otherwise PO administration Reason for exam:->SBO What reading provider will be dictating this exam?->CRC FINDINGS: On preliminary  views, the stomach is nondilated. The colon is nondilated and shows moderate stool content. The small bowel is dilated and fluid-filled and these 2 findings are not evident until Gastrografin contrast is administered. The small bowel dilatation is occult on initial  views secondary to fluid filled small bowel. Gastrografin contrast was administered orally and serial imaging was obtained. Initial images demonstrate dilated and fluid-filled duodenum and jejunal loops. Small bowel transit of contrast is significantly delayed. The right janett colon is not opacified with contrast on 4.5 hour and 8.5 hour delayed images. A subsequent 18.5 hour delayed image shows Gastrografin contrast opacification of the colon which is nondilated. High-grade distal small-bowel obstruction. Details above. Please see comment below. COMMENT: As correlated with recent CT exam and also recently reported, a right lower quadrant pericecal internal hernia is suspected. The distal ileum shows an abrupt transition point at this locale. On coronal images 40-50, the right lower quadrant mesentery and associated distal ileum shows a clockwise twisting pattern at the suspected internal hernia site and this is the site of small-bowel transition.        Discharge Medications:      Medication List        CONTINUE taking these medications      Advair Diskus 250-50 MCG/ACT Aepb diskus inhaler  Generic drug: fluticasone-salmeterol     apixaban 5 MG Tabs tablet  Commonly known as: Eliquis  Take 1 tablet by mouth 2 times daily     calcium carbonate 500 MG chewable tablet  Commonly known as: TUMS     digoxin 125 MCG tablet  Commonly known as: LANOXIN  Take 1 tablet by mouth daily     empagliflozin 10 MG tablet  Commonly known as: JARDIANCE     flecainide 150 MG tablet  Commonly known as: TAMBOCOR  Take 1 tablet by mouth 2 times daily     hyoscyamine 125 MCG tablet  Commonly known as: ANASPAZ;LEVSIN     levothyroxine 75 MCG tablet  Commonly known as: SYNTHROID     magnesium oxide 400 MG tablet  Commonly known as: MAG-OX  Take 1 tablet by mouth daily     metoclopramide 5 MG tablet  Commonly known as: REGLAN     mycophenolate 500 MG tablet  Commonly known as: CELLCEPT     Opsumit 10 MG Tabs  Generic drug: macitentan     pantoprazole 40 MG tablet  Commonly known as: PROTONIX     sildenafil 20 MG tablet  Commonly known as: REVATIO     simvastatin 40 MG tablet  Commonly known as: ZOCOR  Take 1 tablet by mouth nightly     SPIRIVA RESPIMAT IN     spironolactone 25 MG tablet  Commonly known as: ALDACTONE     therapeutic multivitamin-minerals tablet     vitamin B-12 1000 MCG tablet  Commonly known as: CYANOCOBALAMIN     vitamin D 50 MCG (2000 UT) Caps capsule            STOP taking these medications      potassium chloride 10 MEQ extended release tablet  Commonly known as: KLOR-CON M     potassium chloride 10 MEQ extended release tablet  Commonly known as: KLOR-CON            ASK your doctor about these medications      metoprolol succinate 25 MG extended release tablet  Commonly known as: TOPROL XL  Take 0.5 tablets by mouth daily     rOPINIRole 0.25 MG tablet  Commonly known as: REQUIP  Take 1 tablet by mouth daily              Time Spent on discharge is more than 45 minutes in the examination, evaluation, counseling and review of medications and discharge plan.    +++++++++++++++++++++++++++++++++++++++++++++++++  Liza Roque MD  [employer] - [address], [place]  +++++++++++++++++++++++++++++++++++++++++++++++++  NOTE: This report was transcribed using voice recognition software. Every effort was made to ensure accuracy; however, inadvertent computerized transcription errors may be present.

## 2023-02-16 NOTE — CARE COORDINATION
Social Work/Case Management Transition of Care Planning [place] [phone]): Per report, patient's diet has been advanced from clear liquids to a dsyphagia diet - soft and bite sized. Per attending, patient has been tolerating diet and has not had any issues with nausea/vomiting overnight. Possible discharge this afternoon. Met with patient at bedside. Discharge plan is to return home with no needs.  will provide transport. TESSIE Billings  2/16/2023    Update:  Discharge order noted. Patient will discharge to home with no needs.  will transport.   TESSIE Billings  2/16/2023

## 2023-03-03 ENCOUNTER — TELEPHONE (OUTPATIENT)
Dept: NON INVASIVE DIAGNOSTICS | Age: 68
End: 2023-03-03

## 2023-03-03 NOTE — TELEPHONE ENCOUNTER
Patient called to report she stopped taking digoxin 125 mcg daily and she decreased flecainide from 150 MG BID to 100 Mg BID.  Patient states she adjusted her own medications due to \"overkill\" Patient is not having any cardiac issues at this time.       Electronically signed by Dian Amaral MA on 3/3/2023 at 1:40 PM

## 2023-03-28 ENCOUNTER — TELEPHONE (OUTPATIENT)
Dept: CARDIAC REHAB | Age: 68
End: 2023-03-28

## 2023-03-28 NOTE — TELEPHONE ENCOUNTER
Regarding Pulmonary Rehab. Spoke with the patient and informed her her PFT test doesn't qualify her under Medicare guidelines.  Offered phase 3.  Said she was going to talk to her Dr.  She never called back.  Notified the dr at the [facility].

## 2023-04-05 ENCOUNTER — APPOINTMENT (OUTPATIENT)
Dept: GENERAL RADIOLOGY | Age: 68
DRG: 378 | End: 2023-04-05
Payer: MEDICARE

## 2023-04-05 LAB
ABO + RH BLD: NORMAL
ALBUMIN SERPL-MCNC: 4.3 G/DL (ref 3.5–5.2)
ALP SERPL-CCNC: 64 U/L (ref 35–104)
ALT SERPL-CCNC: 12 U/L (ref 0–32)
ANION GAP SERPL CALCULATED.3IONS-SCNC: 14 MMOL/L (ref 7–16)
ANISOCYTOSIS: ABNORMAL
AST SERPL-CCNC: 13 U/L (ref 0–31)
BASOPHILS # BLD: 0.15 E9/L (ref 0–0.2)
BASOPHILS NFR BLD: 2.6 % (ref 0–2)
BILIRUB SERPL-MCNC: 0.4 MG/DL (ref 0–1.2)
BLD GP AB SCN SERPL QL: NORMAL
BNP BLD-MCNC: 2800 PG/ML (ref 0–125)
BUN SERPL-MCNC: 28 MG/DL (ref 6–23)
CALCIUM SERPL-MCNC: 9.8 MG/DL (ref 8.6–10.2)
CHLORIDE SERPL-SCNC: 95 MMOL/L (ref 98–107)
CO2 SERPL-SCNC: 25 MMOL/L (ref 22–29)
CREAT SERPL-MCNC: 0.9 MG/DL (ref 0.5–1)
EOSINOPHIL # BLD: 0 E9/L (ref 0.05–0.5)
EOSINOPHIL NFR BLD: 0.5 % (ref 0–6)
ERYTHROCYTE [DISTWIDTH] IN BLOOD BY AUTOMATED COUNT: 18.2 FL (ref 11.5–15)
GLUCOSE SERPL-MCNC: 127 MG/DL (ref 74–99)
HCT VFR BLD AUTO: 24.6 % (ref 34–48)
HGB BLD-MCNC: 7 G/DL (ref 11.5–15.5)
HYPOCHROMIA: ABNORMAL
LYMPHOCYTES # BLD: 0.52 E9/L (ref 1.5–4)
LYMPHOCYTES NFR BLD: 8.7 % (ref 20–42)
MCH RBC QN AUTO: 24.8 PG (ref 26–35)
MCHC RBC AUTO-ENTMCNC: 28.5 % (ref 32–34.5)
MCV RBC AUTO: 87.2 FL (ref 80–99.9)
MONOCYTES # BLD: 0.23 E9/L (ref 0.1–0.95)
MONOCYTES NFR BLD: 4.3 % (ref 2–12)
NEUTROPHILS # BLD: 4.87 E9/L (ref 1.8–7.3)
NEUTS SEG NFR BLD: 84.4 % (ref 43–80)
OVALOCYTES: ABNORMAL
PLATELET # BLD AUTO: 340 E9/L (ref 130–450)
PMV BLD AUTO: 10.2 FL (ref 7–12)
POIKILOCYTES: ABNORMAL
POLYCHROMASIA: ABNORMAL
POTASSIUM SERPL-SCNC: 3.8 MMOL/L (ref 3.5–5)
PROT SERPL-MCNC: 7.2 G/DL (ref 6.4–8.3)
RBC # BLD AUTO: 2.82 E12/L (ref 3.5–5.5)
SCHISTOCYTES: ABNORMAL
SODIUM SERPL-SCNC: 134 MMOL/L (ref 132–146)
TARGET CELLS: ABNORMAL
TEAR DROP CELLS: ABNORMAL
TROPONIN, HIGH SENSITIVITY: 34 NG/L (ref 0–9)
WBC # BLD: 5.8 E9/L (ref 4.5–11.5)

## 2023-04-05 PROCEDURE — 99285 EMERGENCY DEPT VISIT HI MDM: CPT

## 2023-04-05 PROCEDURE — 93005 ELECTROCARDIOGRAM TRACING: CPT | Performed by: PHYSICIAN ASSISTANT

## 2023-04-05 PROCEDURE — 86900 BLOOD TYPING SEROLOGIC ABO: CPT

## 2023-04-05 PROCEDURE — 71045 X-RAY EXAM CHEST 1 VIEW: CPT

## 2023-04-05 PROCEDURE — 86880 COOMBS TEST DIRECT: CPT

## 2023-04-05 PROCEDURE — 36415 COLL VENOUS BLD VENIPUNCTURE: CPT

## 2023-04-05 PROCEDURE — 86901 BLOOD TYPING SEROLOGIC RH(D): CPT

## 2023-04-05 PROCEDURE — 85025 COMPLETE CBC W/AUTO DIFF WBC: CPT

## 2023-04-05 PROCEDURE — P9016 RBC LEUKOCYTES REDUCED: HCPCS

## 2023-04-05 PROCEDURE — 83880 ASSAY OF NATRIURETIC PEPTIDE: CPT

## 2023-04-05 PROCEDURE — 80053 COMPREHEN METABOLIC PANEL: CPT

## 2023-04-05 PROCEDURE — 86923 COMPATIBILITY TEST ELECTRIC: CPT

## 2023-04-05 PROCEDURE — 86850 RBC ANTIBODY SCREEN: CPT

## 2023-04-05 PROCEDURE — 84484 ASSAY OF TROPONIN QUANT: CPT

## 2023-04-05 ASSESSMENT — LIFESTYLE VARIABLES
HOW MANY STANDARD DRINKS CONTAINING ALCOHOL DO YOU HAVE ON A TYPICAL DAY: PATIENT DOES NOT DRINK
HOW OFTEN DO YOU HAVE A DRINK CONTAINING ALCOHOL: NEVER

## 2023-04-06 ENCOUNTER — HOSPITAL ENCOUNTER (INPATIENT)
Age: 68
LOS: 6 days | Discharge: HOME | DRG: 378 | End: 2023-04-12
Attending: EMERGENCY MEDICINE | Admitting: FAMILY MEDICINE
Payer: MEDICARE

## 2023-04-06 DIAGNOSIS — D64.9 ANEMIA, UNSPECIFIED TYPE: Primary | ICD-10-CM

## 2023-04-06 LAB
ACANTHOCYTES: ABNORMAL
ANISOCYTOSIS: ABNORMAL
BASOPHILS # BLD: 0.05 E9/L (ref 0–0.2)
BASOPHILS # BLD: 0.05 E9/L (ref 0–0.2)
BASOPHILS NFR BLD: 0.8 % (ref 0–2)
BASOPHILS NFR BLD: 1 % (ref 0–2)
BLOOD BANK DISPENSE STATUS: NORMAL
BLOOD BANK PRODUCT CODE: NORMAL
BPU ID: NORMAL
DAT POLY-SP REAG RBC QL: NORMAL
DESCRIPTION BLOOD BANK: NORMAL
EOSINOPHIL # BLD: 0.12 E9/L (ref 0.05–0.5)
EOSINOPHIL # BLD: 0.12 E9/L (ref 0.05–0.5)
EOSINOPHIL NFR BLD: 2 % (ref 0–6)
EOSINOPHIL NFR BLD: 2.3 % (ref 0–6)
ERYTHROCYTE [DISTWIDTH] IN BLOOD BY AUTOMATED COUNT: 17.4 FL (ref 11.5–15)
ERYTHROCYTE [DISTWIDTH] IN BLOOD BY AUTOMATED COUNT: 18.1 FL (ref 11.5–15)
FERRITIN SERPL-MCNC: 15 NG/ML
HAPTOGLOB SERPL-MCNC: 93 MG/DL (ref 30–200)
HCT VFR BLD AUTO: 25.3 % (ref 34–48)
HCT VFR BLD AUTO: 28.1 % (ref 34–48)
HGB BLD-MCNC: 7.3 G/DL (ref 11.5–15.5)
HGB BLD-MCNC: 8.2 G/DL (ref 11.5–15.5)
HYPOCHROMIA: ABNORMAL
IMM GRANULOCYTES # BLD: 0.02 E9/L
IMM GRANULOCYTES # BLD: 0.02 E9/L
IMM GRANULOCYTES NFR BLD: 0.3 % (ref 0–5)
IMM GRANULOCYTES NFR BLD: 0.4 % (ref 0–5)
IRON SATN MFR SERPL: 19 % (ref 15–50)
IRON SERPL-MCNC: 80 MCG/DL (ref 37–145)
LDH SERPL-CCNC: 248 U/L (ref 135–214)
LYMPHOCYTES # BLD: 0.67 E9/L (ref 1.5–4)
LYMPHOCYTES # BLD: 1.09 E9/L (ref 1.5–4)
LYMPHOCYTES NFR BLD: 12.8 % (ref 20–42)
LYMPHOCYTES NFR BLD: 18.4 % (ref 20–42)
MCH RBC QN AUTO: 24.8 PG (ref 26–35)
MCH RBC QN AUTO: 25.4 PG (ref 26–35)
MCHC RBC AUTO-ENTMCNC: 28.9 % (ref 32–34.5)
MCHC RBC AUTO-ENTMCNC: 29.2 % (ref 32–34.5)
MCV RBC AUTO: 86.1 FL (ref 80–99.9)
MCV RBC AUTO: 87 FL (ref 80–99.9)
MONOCYTES # BLD: 0.54 E9/L (ref 0.1–0.95)
MONOCYTES # BLD: 0.63 E9/L (ref 0.1–0.95)
MONOCYTES NFR BLD: 10.3 % (ref 2–12)
MONOCYTES NFR BLD: 10.6 % (ref 2–12)
NEUTROPHILS # BLD: 3.83 E9/L (ref 1.8–7.3)
NEUTROPHILS # BLD: 4.02 E9/L (ref 1.8–7.3)
NEUTS SEG NFR BLD: 67.9 % (ref 43–80)
NEUTS SEG NFR BLD: 73.2 % (ref 43–80)
OVALOCYTES: ABNORMAL
PATHOLOGIST REVIEW: NORMAL
PLATELET # BLD AUTO: 307 E9/L (ref 130–450)
PLATELET # BLD AUTO: 329 E9/L (ref 130–450)
PMV BLD AUTO: 10.2 FL (ref 7–12)
PMV BLD AUTO: 9.8 FL (ref 7–12)
POIKILOCYTES: ABNORMAL
POLYCHROMASIA: ABNORMAL
RBC # BLD AUTO: 2.94 E12/L (ref 3.5–5.5)
RBC # BLD AUTO: 3.23 E12/L (ref 3.5–5.5)
SCHISTOCYTES: ABNORMAL
TARGET CELLS: ABNORMAL
TEAR DROP CELLS: ABNORMAL
TIBC SERPL-MCNC: 416 MCG/DL (ref 250–450)
TROPONIN, HIGH SENSITIVITY: 36 NG/L (ref 0–9)
TROPONIN, HIGH SENSITIVITY: 43 NG/L (ref 0–9)
WBC # BLD: 5.2 E9/L (ref 4.5–11.5)
WBC # BLD: 5.9 E9/L (ref 4.5–11.5)

## 2023-04-06 PROCEDURE — 83540 ASSAY OF IRON: CPT

## 2023-04-06 PROCEDURE — 82728 ASSAY OF FERRITIN: CPT

## 2023-04-06 PROCEDURE — 2580000003 HC RX 258: Performed by: FAMILY MEDICINE

## 2023-04-06 PROCEDURE — 99222 1ST HOSP IP/OBS MODERATE 55: CPT | Performed by: INTERNAL MEDICINE

## 2023-04-06 PROCEDURE — 6360000002 HC RX W HCPCS: Performed by: FAMILY MEDICINE

## 2023-04-06 PROCEDURE — 1200000000 HC SEMI PRIVATE

## 2023-04-06 PROCEDURE — 85025 COMPLETE CBC W/AUTO DIFF WBC: CPT

## 2023-04-06 PROCEDURE — 83010 ASSAY OF HAPTOGLOBIN QUANT: CPT

## 2023-04-06 PROCEDURE — 83550 IRON BINDING TEST: CPT

## 2023-04-06 PROCEDURE — 36415 COLL VENOUS BLD VENIPUNCTURE: CPT

## 2023-04-06 PROCEDURE — 30233N1 TRANSFUSION OF NONAUTOLOGOUS RED BLOOD CELLS INTO PERIPHERAL VEIN, PERCUTANEOUS APPROACH: ICD-10-PCS | Performed by: FAMILY MEDICINE

## 2023-04-06 PROCEDURE — 2700000000 HC OXYGEN THERAPY PER DAY

## 2023-04-06 PROCEDURE — 6370000000 HC RX 637 (ALT 250 FOR IP): Performed by: FAMILY MEDICINE

## 2023-04-06 PROCEDURE — 83615 LACTATE (LD) (LDH) ENZYME: CPT

## 2023-04-06 PROCEDURE — 84484 ASSAY OF TROPONIN QUANT: CPT

## 2023-04-06 RX ORDER — FLECAINIDE ACETATE 100 MG/1
100 TABLET ORAL 2 TIMES DAILY
Status: DISCONTINUED | OUTPATIENT
Start: 2023-04-06 | End: 2023-04-12 | Stop reason: HOSPADM

## 2023-04-06 RX ORDER — SILDENAFIL CITRATE 20 MG/1
60 TABLET ORAL 3 TIMES DAILY
Status: DISCONTINUED | OUTPATIENT
Start: 2023-04-06 | End: 2023-04-12 | Stop reason: HOSPADM

## 2023-04-06 RX ORDER — SPIRONOLACTONE 25 MG/1
25 TABLET ORAL DAILY
Status: DISCONTINUED | OUTPATIENT
Start: 2023-04-06 | End: 2023-04-12 | Stop reason: HOSPADM

## 2023-04-06 RX ORDER — ATORVASTATIN CALCIUM 20 MG/1
20 TABLET, FILM COATED ORAL DAILY
Status: DISCONTINUED | OUTPATIENT
Start: 2023-04-06 | End: 2023-04-12 | Stop reason: HOSPADM

## 2023-04-06 RX ORDER — MYCOPHENOLATE MOFETIL 250 MG/1
1500 CAPSULE ORAL 2 TIMES DAILY
Status: DISCONTINUED | OUTPATIENT
Start: 2023-04-06 | End: 2023-04-12 | Stop reason: HOSPADM

## 2023-04-06 RX ORDER — NEOMYCIN/POLYMYXIN B/PRAMOXINE 3.5-10K-1
1 CREAM (GRAM) TOPICAL DAILY
COMMUNITY

## 2023-04-06 RX ORDER — ACETAMINOPHEN 650 MG/1
650 SUPPOSITORY RECTAL EVERY 6 HOURS PRN
Status: DISCONTINUED | OUTPATIENT
Start: 2023-04-06 | End: 2023-04-12 | Stop reason: HOSPADM

## 2023-04-06 RX ORDER — ACETAMINOPHEN 325 MG/1
650 TABLET ORAL EVERY 6 HOURS PRN
Status: DISCONTINUED | OUTPATIENT
Start: 2023-04-06 | End: 2023-04-12 | Stop reason: HOSPADM

## 2023-04-06 RX ORDER — LANOLIN ALCOHOL/MO/W.PET/CERES
400 CREAM (GRAM) TOPICAL DAILY
Status: DISCONTINUED | OUTPATIENT
Start: 2023-04-06 | End: 2023-04-12 | Stop reason: HOSPADM

## 2023-04-06 RX ORDER — SODIUM CHLORIDE 9 MG/ML
INJECTION, SOLUTION INTRAVENOUS PRN
Status: DISCONTINUED | OUTPATIENT
Start: 2023-04-06 | End: 2023-04-12 | Stop reason: HOSPADM

## 2023-04-06 RX ORDER — ROPINIROLE 1 MG/1
1 TABLET, FILM COATED ORAL NIGHTLY
COMMUNITY

## 2023-04-06 RX ORDER — SODIUM CHLORIDE 0.9 % (FLUSH) 0.9 %
10 SYRINGE (ML) INJECTION EVERY 12 HOURS SCHEDULED
Status: DISCONTINUED | OUTPATIENT
Start: 2023-04-06 | End: 2023-04-12 | Stop reason: HOSPADM

## 2023-04-06 RX ORDER — ROPINIROLE 0.5 MG/1
0.5 TABLET, FILM COATED ORAL NIGHTLY
Status: DISCONTINUED | OUTPATIENT
Start: 2023-04-06 | End: 2023-04-12 | Stop reason: HOSPADM

## 2023-04-06 RX ORDER — THIAMINE HCL 100 MG
500 TABLET ORAL DAILY
COMMUNITY

## 2023-04-06 RX ORDER — TORSEMIDE 20 MG/1
20 TABLET ORAL SEE ADMIN INSTRUCTIONS
COMMUNITY

## 2023-04-06 RX ORDER — PROMETHAZINE HYDROCHLORIDE 12.5 MG/1
12.5 TABLET ORAL EVERY 6 HOURS PRN
Status: DISCONTINUED | OUTPATIENT
Start: 2023-04-06 | End: 2023-04-12 | Stop reason: HOSPADM

## 2023-04-06 RX ORDER — LEVOTHYROXINE SODIUM 0.07 MG/1
75 TABLET ORAL DAILY
Status: DISCONTINUED | OUTPATIENT
Start: 2023-04-06 | End: 2023-04-12 | Stop reason: HOSPADM

## 2023-04-06 RX ORDER — POLYETHYLENE GLYCOL 3350 17 G/17G
17 POWDER, FOR SOLUTION ORAL DAILY PRN
Status: DISCONTINUED | OUTPATIENT
Start: 2023-04-06 | End: 2023-04-12 | Stop reason: HOSPADM

## 2023-04-06 RX ORDER — POTASSIUM CHLORIDE 750 MG/1
20 TABLET, EXTENDED RELEASE ORAL NIGHTLY
COMMUNITY

## 2023-04-06 RX ORDER — PANTOPRAZOLE SODIUM 40 MG/1
40 TABLET, DELAYED RELEASE ORAL
Status: DISCONTINUED | OUTPATIENT
Start: 2023-04-06 | End: 2023-04-12 | Stop reason: HOSPADM

## 2023-04-06 RX ORDER — TORSEMIDE 20 MG/1
40 TABLET ORAL SEE ADMIN INSTRUCTIONS
COMMUNITY

## 2023-04-06 RX ORDER — SODIUM CHLORIDE 0.9 % (FLUSH) 0.9 %
10 SYRINGE (ML) INJECTION PRN
Status: DISCONTINUED | OUTPATIENT
Start: 2023-04-06 | End: 2023-04-12 | Stop reason: HOSPADM

## 2023-04-06 RX ORDER — FLECAINIDE ACETATE 100 MG/1
100 TABLET ORAL 2 TIMES DAILY
COMMUNITY

## 2023-04-06 RX ORDER — ONDANSETRON 2 MG/ML
4 INJECTION INTRAMUSCULAR; INTRAVENOUS EVERY 6 HOURS PRN
Status: DISCONTINUED | OUTPATIENT
Start: 2023-04-06 | End: 2023-04-12 | Stop reason: HOSPADM

## 2023-04-06 RX ADMIN — ROPINIROLE HYDROCHLORIDE 0.5 MG: 0.5 TABLET, FILM COATED ORAL at 22:06

## 2023-04-06 RX ADMIN — SILDENAFIL 60 MG: 20 TABLET, FILM COATED ORAL at 22:20

## 2023-04-06 RX ADMIN — FLECAINIDE ACETATE 100 MG: 100 TABLET ORAL at 22:06

## 2023-04-06 RX ADMIN — MYCOPHENOLATE MOFETIL 1500 MG: 250 CAPSULE ORAL at 10:33

## 2023-04-06 RX ADMIN — POLYETHYLENE GLYCOL 3350 17 G: 17 POWDER, FOR SOLUTION ORAL at 16:50

## 2023-04-06 RX ADMIN — SILDENAFIL 60 MG: 20 TABLET, FILM COATED ORAL at 16:49

## 2023-04-06 RX ADMIN — Medication 400 MG: at 10:32

## 2023-04-06 RX ADMIN — SPIRONOLACTONE 25 MG: 25 TABLET ORAL at 10:35

## 2023-04-06 RX ADMIN — SODIUM CHLORIDE, PRESERVATIVE FREE 10 ML: 5 INJECTION INTRAVENOUS at 22:07

## 2023-04-06 RX ADMIN — SILDENAFIL 60 MG: 20 TABLET, FILM COATED ORAL at 10:32

## 2023-04-06 RX ADMIN — PANTOPRAZOLE SODIUM 40 MG: 40 TABLET, DELAYED RELEASE ORAL at 10:33

## 2023-04-06 RX ADMIN — MYCOPHENOLATE MOFETIL 1500 MG: 250 CAPSULE ORAL at 22:04

## 2023-04-06 ASSESSMENT — PAIN - FUNCTIONAL ASSESSMENT
PAIN_FUNCTIONAL_ASSESSMENT: NONE - DENIES PAIN

## 2023-04-07 LAB
ALBUMIN SERPL-MCNC: 2.2 G/DL (ref 3.5–4.7)
ALBUMIN SERPL-MCNC: 4.1 G/DL (ref 3.5–5.2)
ALP SERPL-CCNC: 56 U/L (ref 35–104)
ALPHA1 GLOB SERPL ELPH-MCNC: 0.4 G/DL (ref 0.2–0.4)
ALPHA2 GLOB SERPL ELPH-MCNC: 0.7 G/DL (ref 0.5–1)
ALT SERPL-CCNC: 11 U/L (ref 0–32)
ANION GAP SERPL CALCULATED.3IONS-SCNC: 12 MMOL/L (ref 7–16)
ANISOCYTOSIS: ABNORMAL
AST SERPL-CCNC: 13 U/L (ref 0–31)
B-GLOBULIN SERPL ELPH-MCNC: 1.2 G/DL (ref 0.8–1.3)
BASOPHILS # BLD: 0.09 E9/L (ref 0–0.2)
BASOPHILS NFR BLD: 1.7 % (ref 0–2)
BILIRUB SERPL-MCNC: 0.5 MG/DL (ref 0–1.2)
BUN SERPL-MCNC: 18 MG/DL (ref 6–23)
CALCIUM SERPL-MCNC: 9.5 MG/DL (ref 8.6–10.2)
CHLORIDE SERPL-SCNC: 104 MMOL/L (ref 98–107)
CO2 SERPL-SCNC: 25 MMOL/L (ref 22–29)
CREAT SERPL-MCNC: 0.7 MG/DL (ref 0.5–1)
EKG ATRIAL RATE: 77 BPM
EKG P-R INTERVAL: 216 MS
EKG Q-T INTERVAL: 448 MS
EKG QRS DURATION: 148 MS
EKG QTC CALCULATION (BAZETT): 506 MS
EKG R AXIS: 0 DEGREES
EKG T AXIS: 81 DEGREES
EKG VENTRICULAR RATE: 77 BPM
ELECTROPHORESIS: ABNORMAL
EOSINOPHIL # BLD: 0.1 E9/L (ref 0.05–0.5)
EOSINOPHIL NFR BLD: 1.8 % (ref 0–6)
ERYTHROCYTE [DISTWIDTH] IN BLOOD BY AUTOMATED COUNT: 17.5 FL (ref 11.5–15)
GAMMA GLOB SERPL ELPH-MCNC: 1.3 G/DL (ref 0.7–1.6)
GLUCOSE SERPL-MCNC: 86 MG/DL (ref 74–99)
HCT VFR BLD AUTO: 28.3 % (ref 34–48)
HCT VFR BLD AUTO: 28.6 % (ref 34–48)
HGB BLD-MCNC: 8 G/DL (ref 11.5–15.5)
HYPOCHROMIA: ABNORMAL
IMMATURE RETIC FRACT: 26.4 % (ref 3–15.9)
IMMUNOFIXATION RESULT, SERUM: NORMAL
LYMPHOCYTES # BLD: 0.76 E9/L (ref 1.5–4)
LYMPHOCYTES NFR BLD: 13.9 % (ref 20–42)
MCH RBC QN AUTO: 25.1 PG (ref 26–35)
MCHC RBC AUTO-ENTMCNC: 28 % (ref 32–34.5)
MCV RBC AUTO: 89.7 FL (ref 80–99.9)
MONOCYTES # BLD: 0.16 E9/L (ref 0.1–0.95)
MONOCYTES NFR BLD: 2.6 % (ref 2–12)
NEUTROPHILS # BLD: 4.32 E9/L (ref 1.8–7.3)
NEUTS SEG NFR BLD: 80 % (ref 43–80)
OVALOCYTES: ABNORMAL
PLATELET # BLD AUTO: 290 E9/L (ref 130–450)
PMV BLD AUTO: 10.2 FL (ref 7–12)
POIKILOCYTES: ABNORMAL
POLYCHROMASIA: ABNORMAL
POTASSIUM SERPL-SCNC: 4.4 MMOL/L (ref 3.5–5)
PROT SERPL-MCNC: 5.8 G/DL (ref 6.4–8.3)
PROT SERPL-MCNC: 6.4 G/DL (ref 6.4–8.3)
RBC # BLD AUTO: 3.19 E12/L (ref 3.5–5.5)
RETIC HGB EQUIVALENT: 23.7 PG (ref 28.2–36.6)
RETICS/RBC NFR: 2.4 % (ref 0.4–1.9)
RETICULOCYTE ABSOLUTE COUNT: 0.08 E12/L
SODIUM SERPL-SCNC: 141 MMOL/L (ref 132–146)
TARGET CELLS: ABNORMAL
WBC # BLD: 5.4 E9/L (ref 4.5–11.5)

## 2023-04-07 PROCEDURE — 6360000002 HC RX W HCPCS: Performed by: FAMILY MEDICINE

## 2023-04-07 PROCEDURE — 84630 ASSAY OF ZINC: CPT

## 2023-04-07 PROCEDURE — 99232 SBSQ HOSP IP/OBS MODERATE 35: CPT | Performed by: STUDENT IN AN ORGANIZED HEALTH CARE EDUCATION/TRAINING PROGRAM

## 2023-04-07 PROCEDURE — 2580000003 HC RX 258: Performed by: FAMILY MEDICINE

## 2023-04-07 PROCEDURE — 82525 ASSAY OF COPPER: CPT

## 2023-04-07 PROCEDURE — 93010 ELECTROCARDIOGRAM REPORT: CPT | Performed by: INTERNAL MEDICINE

## 2023-04-07 PROCEDURE — 86334 IMMUNOFIX E-PHORESIS SERUM: CPT

## 2023-04-07 PROCEDURE — 2700000000 HC OXYGEN THERAPY PER DAY

## 2023-04-07 PROCEDURE — 80053 COMPREHEN METABOLIC PANEL: CPT

## 2023-04-07 PROCEDURE — 84207 ASSAY OF VITAMIN B-6: CPT

## 2023-04-07 PROCEDURE — 84165 PROTEIN E-PHORESIS SERUM: CPT

## 2023-04-07 PROCEDURE — 36415 COLL VENOUS BLD VENIPUNCTURE: CPT

## 2023-04-07 PROCEDURE — 82668 ASSAY OF ERYTHROPOIETIN: CPT

## 2023-04-07 PROCEDURE — 85025 COMPLETE CBC W/AUTO DIFF WBC: CPT

## 2023-04-07 PROCEDURE — 99222 1ST HOSP IP/OBS MODERATE 55: CPT | Performed by: STUDENT IN AN ORGANIZED HEALTH CARE EDUCATION/TRAINING PROGRAM

## 2023-04-07 PROCEDURE — 6370000000 HC RX 637 (ALT 250 FOR IP): Performed by: FAMILY MEDICINE

## 2023-04-07 PROCEDURE — 85045 AUTOMATED RETICULOCYTE COUNT: CPT

## 2023-04-07 PROCEDURE — 1200000000 HC SEMI PRIVATE

## 2023-04-07 RX ADMIN — SODIUM CHLORIDE, PRESERVATIVE FREE 10 ML: 5 INJECTION INTRAVENOUS at 21:42

## 2023-04-07 RX ADMIN — SODIUM CHLORIDE, PRESERVATIVE FREE 10 ML: 5 INJECTION INTRAVENOUS at 08:21

## 2023-04-07 RX ADMIN — LEVOTHYROXINE SODIUM 75 MCG: 0.07 TABLET ORAL at 06:05

## 2023-04-07 RX ADMIN — PANTOPRAZOLE SODIUM 40 MG: 40 TABLET, DELAYED RELEASE ORAL at 06:05

## 2023-04-07 RX ADMIN — Medication 400 MG: at 09:33

## 2023-04-07 RX ADMIN — ROPINIROLE HYDROCHLORIDE 0.5 MG: 0.5 TABLET, FILM COATED ORAL at 21:41

## 2023-04-07 RX ADMIN — FLECAINIDE ACETATE 100 MG: 100 TABLET ORAL at 21:41

## 2023-04-07 RX ADMIN — SILDENAFIL 60 MG: 20 TABLET, FILM COATED ORAL at 13:57

## 2023-04-07 RX ADMIN — SILDENAFIL 60 MG: 20 TABLET, FILM COATED ORAL at 21:39

## 2023-04-07 RX ADMIN — SPIRONOLACTONE 25 MG: 25 TABLET ORAL at 08:13

## 2023-04-07 RX ADMIN — MYCOPHENOLATE MOFETIL 1500 MG: 250 CAPSULE ORAL at 08:12

## 2023-04-07 RX ADMIN — ATORVASTATIN CALCIUM 20 MG: 20 TABLET, FILM COATED ORAL at 08:12

## 2023-04-07 RX ADMIN — MYCOPHENOLATE MOFETIL 1500 MG: 250 CAPSULE ORAL at 21:40

## 2023-04-07 RX ADMIN — SILDENAFIL 60 MG: 20 TABLET, FILM COATED ORAL at 08:13

## 2023-04-07 RX ADMIN — FLECAINIDE ACETATE 100 MG: 100 TABLET ORAL at 08:12

## 2023-04-08 LAB
EPO SERPL-ACNC: 343 MU/ML (ref 4–27)
FOLATE SERPL-MCNC: >20 NG/ML (ref 4.8–24.2)
VIT B12 SERPL-MCNC: 771 PG/ML (ref 211–946)

## 2023-04-08 PROCEDURE — 2700000000 HC OXYGEN THERAPY PER DAY

## 2023-04-08 PROCEDURE — 99232 SBSQ HOSP IP/OBS MODERATE 35: CPT | Performed by: STUDENT IN AN ORGANIZED HEALTH CARE EDUCATION/TRAINING PROGRAM

## 2023-04-08 PROCEDURE — 1200000000 HC SEMI PRIVATE

## 2023-04-08 PROCEDURE — 82607 VITAMIN B-12: CPT

## 2023-04-08 PROCEDURE — 2580000003 HC RX 258: Performed by: FAMILY MEDICINE

## 2023-04-08 PROCEDURE — 36415 COLL VENOUS BLD VENIPUNCTURE: CPT

## 2023-04-08 PROCEDURE — 6370000000 HC RX 637 (ALT 250 FOR IP): Performed by: FAMILY MEDICINE

## 2023-04-08 PROCEDURE — 6360000002 HC RX W HCPCS: Performed by: FAMILY MEDICINE

## 2023-04-08 PROCEDURE — 82746 ASSAY OF FOLIC ACID SERUM: CPT

## 2023-04-08 RX ADMIN — FLECAINIDE ACETATE 100 MG: 100 TABLET ORAL at 09:20

## 2023-04-08 RX ADMIN — FLECAINIDE ACETATE 100 MG: 100 TABLET ORAL at 21:51

## 2023-04-08 RX ADMIN — MYCOPHENOLATE MOFETIL 1500 MG: 250 CAPSULE ORAL at 09:18

## 2023-04-08 RX ADMIN — MYCOPHENOLATE MOFETIL 1500 MG: 250 CAPSULE ORAL at 21:52

## 2023-04-08 RX ADMIN — SILDENAFIL 60 MG: 20 TABLET, FILM COATED ORAL at 14:37

## 2023-04-08 RX ADMIN — SODIUM CHLORIDE, PRESERVATIVE FREE 10 ML: 5 INJECTION INTRAVENOUS at 09:23

## 2023-04-08 RX ADMIN — ROPINIROLE HYDROCHLORIDE 0.5 MG: 0.5 TABLET, FILM COATED ORAL at 21:51

## 2023-04-08 RX ADMIN — LEVOTHYROXINE SODIUM 75 MCG: 0.07 TABLET ORAL at 06:11

## 2023-04-08 RX ADMIN — Medication 400 MG: at 09:20

## 2023-04-08 RX ADMIN — ATORVASTATIN CALCIUM 20 MG: 20 TABLET, FILM COATED ORAL at 09:20

## 2023-04-08 RX ADMIN — SODIUM CHLORIDE, PRESERVATIVE FREE 10 ML: 5 INJECTION INTRAVENOUS at 21:54

## 2023-04-08 RX ADMIN — SPIRONOLACTONE 25 MG: 25 TABLET ORAL at 09:19

## 2023-04-08 RX ADMIN — PANTOPRAZOLE SODIUM 40 MG: 40 TABLET, DELAYED RELEASE ORAL at 06:11

## 2023-04-08 RX ADMIN — SILDENAFIL 60 MG: 20 TABLET, FILM COATED ORAL at 21:51

## 2023-04-08 RX ADMIN — SILDENAFIL 60 MG: 20 TABLET, FILM COATED ORAL at 09:18

## 2023-04-09 PROBLEM — Z79.01 ON CONTINUOUS ORAL ANTICOAGULATION: Status: ACTIVE | Noted: 2023-04-09

## 2023-04-09 LAB
ALBUMIN SERPL-MCNC: 3.7 G/DL (ref 3.5–5.2)
ALP SERPL-CCNC: 57 U/L (ref 35–104)
ALT SERPL-CCNC: 9 U/L (ref 0–32)
ANION GAP SERPL CALCULATED.3IONS-SCNC: 8 MMOL/L (ref 7–16)
ANISOCYTOSIS: ABNORMAL
AST SERPL-CCNC: 12 U/L (ref 0–31)
BASOPHILS # BLD: 0.06 E9/L (ref 0–0.2)
BASOPHILS NFR BLD: 1.3 % (ref 0–2)
BILIRUB SERPL-MCNC: 0.6 MG/DL (ref 0–1.2)
BUN SERPL-MCNC: 10 MG/DL (ref 6–23)
CALCIUM SERPL-MCNC: 9.5 MG/DL (ref 8.6–10.2)
CHLORIDE SERPL-SCNC: 103 MMOL/L (ref 98–107)
CO2 SERPL-SCNC: 26 MMOL/L (ref 22–29)
CREAT SERPL-MCNC: 0.6 MG/DL (ref 0.5–1)
EOSINOPHIL # BLD: 0.16 E9/L (ref 0.05–0.5)
EOSINOPHIL NFR BLD: 3.4 % (ref 0–6)
ERYTHROCYTE [DISTWIDTH] IN BLOOD BY AUTOMATED COUNT: 18 FL (ref 11.5–15)
GLUCOSE SERPL-MCNC: 83 MG/DL (ref 74–99)
HCT VFR BLD AUTO: 26.7 % (ref 34–48)
HGB BLD-MCNC: 7.7 G/DL (ref 11.5–15.5)
HYPOCHROMIA: ABNORMAL
IMM GRANULOCYTES # BLD: 0.02 E9/L
IMM GRANULOCYTES NFR BLD: 0.4 % (ref 0–5)
LYMPHOCYTES # BLD: 0.88 E9/L (ref 1.5–4)
LYMPHOCYTES NFR BLD: 18.7 % (ref 20–42)
MCH RBC QN AUTO: 25.8 PG (ref 26–35)
MCHC RBC AUTO-ENTMCNC: 28.8 % (ref 32–34.5)
MCV RBC AUTO: 89.6 FL (ref 80–99.9)
MONOCYTES # BLD: 0.57 E9/L (ref 0.1–0.95)
MONOCYTES NFR BLD: 12.1 % (ref 2–12)
NEUTROPHILS # BLD: 3.01 E9/L (ref 1.8–7.3)
NEUTS SEG NFR BLD: 64.1 % (ref 43–80)
OVALOCYTES: ABNORMAL
PLATELET # BLD AUTO: 275 E9/L (ref 130–450)
PMV BLD AUTO: 10.7 FL (ref 7–12)
POIKILOCYTES: ABNORMAL
POLYCHROMASIA: ABNORMAL
POTASSIUM SERPL-SCNC: 4.2 MMOL/L (ref 3.5–5)
PROT SERPL-MCNC: 5.9 G/DL (ref 6.4–8.3)
RBC # BLD AUTO: 2.98 E12/L (ref 3.5–5.5)
SCHISTOCYTES: ABNORMAL
SODIUM SERPL-SCNC: 137 MMOL/L (ref 132–146)
TEAR DROP CELLS: ABNORMAL
WBC # BLD: 4.7 E9/L (ref 4.5–11.5)

## 2023-04-09 PROCEDURE — 2580000003 HC RX 258: Performed by: FAMILY MEDICINE

## 2023-04-09 PROCEDURE — 36415 COLL VENOUS BLD VENIPUNCTURE: CPT

## 2023-04-09 PROCEDURE — 80053 COMPREHEN METABOLIC PANEL: CPT

## 2023-04-09 PROCEDURE — 2700000000 HC OXYGEN THERAPY PER DAY

## 2023-04-09 PROCEDURE — 6360000002 HC RX W HCPCS: Performed by: FAMILY MEDICINE

## 2023-04-09 PROCEDURE — 6370000000 HC RX 637 (ALT 250 FOR IP): Performed by: STUDENT IN AN ORGANIZED HEALTH CARE EDUCATION/TRAINING PROGRAM

## 2023-04-09 PROCEDURE — 6370000000 HC RX 637 (ALT 250 FOR IP): Performed by: FAMILY MEDICINE

## 2023-04-09 PROCEDURE — 1200000000 HC SEMI PRIVATE

## 2023-04-09 PROCEDURE — 85025 COMPLETE CBC W/AUTO DIFF WBC: CPT

## 2023-04-09 PROCEDURE — 84238 ASSAY NONENDOCRINE RECEPTOR: CPT

## 2023-04-09 RX ADMIN — LEVOTHYROXINE SODIUM 75 MCG: 0.07 TABLET ORAL at 06:00

## 2023-04-09 RX ADMIN — MYCOPHENOLATE MOFETIL 1500 MG: 250 CAPSULE ORAL at 21:08

## 2023-04-09 RX ADMIN — MYCOPHENOLATE MOFETIL 1500 MG: 250 CAPSULE ORAL at 09:11

## 2023-04-09 RX ADMIN — PANTOPRAZOLE SODIUM 40 MG: 40 TABLET, DELAYED RELEASE ORAL at 06:00

## 2023-04-09 RX ADMIN — Medication 400 MG: at 09:10

## 2023-04-09 RX ADMIN — FLECAINIDE ACETATE 100 MG: 100 TABLET ORAL at 21:07

## 2023-04-09 RX ADMIN — SILDENAFIL 60 MG: 20 TABLET, FILM COATED ORAL at 14:19

## 2023-04-09 RX ADMIN — SPIRONOLACTONE 25 MG: 25 TABLET ORAL at 09:11

## 2023-04-09 RX ADMIN — POLYETHYLENE GLYCOL-3350 AND ELECTROLYTES 4000 ML: 236; 6.74; 5.86; 2.97; 22.74 POWDER, FOR SOLUTION ORAL at 17:07

## 2023-04-09 RX ADMIN — ROPINIROLE HYDROCHLORIDE 0.5 MG: 0.5 TABLET, FILM COATED ORAL at 21:07

## 2023-04-09 RX ADMIN — SODIUM CHLORIDE, PRESERVATIVE FREE 10 ML: 5 INJECTION INTRAVENOUS at 21:08

## 2023-04-09 RX ADMIN — SILDENAFIL 60 MG: 20 TABLET, FILM COATED ORAL at 09:13

## 2023-04-09 RX ADMIN — SILDENAFIL 60 MG: 20 TABLET, FILM COATED ORAL at 21:07

## 2023-04-09 RX ADMIN — SODIUM CHLORIDE, PRESERVATIVE FREE 10 ML: 5 INJECTION INTRAVENOUS at 09:14

## 2023-04-09 RX ADMIN — FLECAINIDE ACETATE 100 MG: 100 TABLET ORAL at 09:16

## 2023-04-09 RX ADMIN — ATORVASTATIN CALCIUM 20 MG: 20 TABLET, FILM COATED ORAL at 09:11

## 2023-04-10 LAB
ANION GAP SERPL CALCULATED.3IONS-SCNC: 7 MMOL/L (ref 7–16)
ANISOCYTOSIS: ABNORMAL
BASOPHILS # BLD: 0.05 E9/L (ref 0–0.2)
BASOPHILS NFR BLD: 1.2 % (ref 0–2)
BUN SERPL-MCNC: 6 MG/DL (ref 6–23)
CALCIUM SERPL-MCNC: 9.3 MG/DL (ref 8.6–10.2)
CHLORIDE SERPL-SCNC: 106 MMOL/L (ref 98–107)
CO2 SERPL-SCNC: 26 MMOL/L (ref 22–29)
COPPER SERPL-MCNC: 111.8 UG/DL (ref 80–155)
CREAT SERPL-MCNC: 0.5 MG/DL (ref 0.5–1)
EOSINOPHIL # BLD: 0.13 E9/L (ref 0.05–0.5)
EOSINOPHIL NFR BLD: 3 % (ref 0–6)
ERYTHROCYTE [DISTWIDTH] IN BLOOD BY AUTOMATED COUNT: 18.3 FL (ref 11.5–15)
GLUCOSE SERPL-MCNC: 89 MG/DL (ref 74–99)
HCT VFR BLD AUTO: 27.1 % (ref 34–48)
HCT VFR BLD AUTO: 32.3 % (ref 34–48)
HGB BLD-MCNC: 7.6 G/DL (ref 11.5–15.5)
HGB BLD-MCNC: 9 G/DL (ref 11.5–15.5)
HYPOCHROMIA: ABNORMAL
IMM GRANULOCYTES # BLD: 0.02 E9/L
IMM GRANULOCYTES NFR BLD: 0.5 % (ref 0–5)
LYMPHOCYTES # BLD: 0.71 E9/L (ref 1.5–4)
LYMPHOCYTES NFR BLD: 16.6 % (ref 20–42)
MCH RBC QN AUTO: 25.2 PG (ref 26–35)
MCHC RBC AUTO-ENTMCNC: 28 % (ref 32–34.5)
MCV RBC AUTO: 89.7 FL (ref 80–99.9)
MONOCYTES # BLD: 0.61 E9/L (ref 0.1–0.95)
MONOCYTES NFR BLD: 14.3 % (ref 2–12)
NEUTROPHILS # BLD: 2.76 E9/L (ref 1.8–7.3)
NEUTS SEG NFR BLD: 64.4 % (ref 43–80)
OVALOCYTES: ABNORMAL
PLATELET # BLD AUTO: 254 E9/L (ref 130–450)
PMV BLD AUTO: 10.2 FL (ref 7–12)
POIKILOCYTES: ABNORMAL
POLYCHROMASIA: ABNORMAL
POTASSIUM SERPL-SCNC: 4 MMOL/L (ref 3.5–5)
RBC # BLD AUTO: 3.02 E12/L (ref 3.5–5.5)
SCHISTOCYTES: ABNORMAL
SODIUM SERPL-SCNC: 139 MMOL/L (ref 132–146)
TEAR DROP CELLS: ABNORMAL
WBC # BLD: 4.3 E9/L (ref 4.5–11.5)
ZINC SERPL-MCNC: 54.9 UG/DL (ref 60–120)

## 2023-04-10 PROCEDURE — 0DJ08ZZ INSPECTION OF UPPER INTESTINAL TRACT, VIA NATURAL OR ARTIFICIAL OPENING ENDOSCOPIC: ICD-10-PCS | Performed by: STUDENT IN AN ORGANIZED HEALTH CARE EDUCATION/TRAINING PROGRAM

## 2023-04-10 PROCEDURE — 36415 COLL VENOUS BLD VENIPUNCTURE: CPT

## 2023-04-10 PROCEDURE — 3700000000 HC ANESTHESIA ATTENDED CARE: Performed by: STUDENT IN AN ORGANIZED HEALTH CARE EDUCATION/TRAINING PROGRAM

## 2023-04-10 PROCEDURE — 3609027000 HC COLONOSCOPY: Performed by: STUDENT IN AN ORGANIZED HEALTH CARE EDUCATION/TRAINING PROGRAM

## 2023-04-10 PROCEDURE — 85025 COMPLETE CBC W/AUTO DIFF WBC: CPT

## 2023-04-10 PROCEDURE — 85018 HEMOGLOBIN: CPT

## 2023-04-10 PROCEDURE — 3700000001 HC ADD 15 MINUTES (ANESTHESIA): Performed by: STUDENT IN AN ORGANIZED HEALTH CARE EDUCATION/TRAINING PROGRAM

## 2023-04-10 PROCEDURE — 2709999900 HC NON-CHARGEABLE SUPPLY: Performed by: STUDENT IN AN ORGANIZED HEALTH CARE EDUCATION/TRAINING PROGRAM

## 2023-04-10 PROCEDURE — 3609013000 HC EGD TRANSORAL CONTROL BLEEDING ANY METHOD: Performed by: STUDENT IN AN ORGANIZED HEALTH CARE EDUCATION/TRAINING PROGRAM

## 2023-04-10 PROCEDURE — 45378 DIAGNOSTIC COLONOSCOPY: CPT | Performed by: STUDENT IN AN ORGANIZED HEALTH CARE EDUCATION/TRAINING PROGRAM

## 2023-04-10 PROCEDURE — 0W3P8ZZ CONTROL BLEEDING IN GASTROINTESTINAL TRACT, VIA NATURAL OR ARTIFICIAL OPENING ENDOSCOPIC: ICD-10-PCS | Performed by: STUDENT IN AN ORGANIZED HEALTH CARE EDUCATION/TRAINING PROGRAM

## 2023-04-10 PROCEDURE — 7100000000 HC PACU RECOVERY - FIRST 15 MIN: Performed by: STUDENT IN AN ORGANIZED HEALTH CARE EDUCATION/TRAINING PROGRAM

## 2023-04-10 PROCEDURE — 80048 BASIC METABOLIC PNL TOTAL CA: CPT

## 2023-04-10 PROCEDURE — 44366 SMALL BOWEL ENDOSCOPY: CPT | Performed by: STUDENT IN AN ORGANIZED HEALTH CARE EDUCATION/TRAINING PROGRAM

## 2023-04-10 PROCEDURE — 2580000003 HC RX 258: Performed by: FAMILY MEDICINE

## 2023-04-10 PROCEDURE — 7100000001 HC PACU RECOVERY - ADDTL 15 MIN: Performed by: STUDENT IN AN ORGANIZED HEALTH CARE EDUCATION/TRAINING PROGRAM

## 2023-04-10 PROCEDURE — 6370000000 HC RX 637 (ALT 250 FOR IP): Performed by: FAMILY MEDICINE

## 2023-04-10 PROCEDURE — 2700000000 HC OXYGEN THERAPY PER DAY

## 2023-04-10 PROCEDURE — 2720000010 HC SURG SUPPLY STERILE: Performed by: STUDENT IN AN ORGANIZED HEALTH CARE EDUCATION/TRAINING PROGRAM

## 2023-04-10 PROCEDURE — C1889 IMPLANT/INSERT DEVICE, NOC: HCPCS | Performed by: STUDENT IN AN ORGANIZED HEALTH CARE EDUCATION/TRAINING PROGRAM

## 2023-04-10 PROCEDURE — 6360000002 HC RX W HCPCS: Performed by: FAMILY MEDICINE

## 2023-04-10 PROCEDURE — 0DJD8ZZ INSPECTION OF LOWER INTESTINAL TRACT, VIA NATURAL OR ARTIFICIAL OPENING ENDOSCOPIC: ICD-10-PCS | Performed by: STUDENT IN AN ORGANIZED HEALTH CARE EDUCATION/TRAINING PROGRAM

## 2023-04-10 PROCEDURE — 85014 HEMATOCRIT: CPT

## 2023-04-10 PROCEDURE — 1200000000 HC SEMI PRIVATE

## 2023-04-10 RX ORDER — ZINC SULFATE 50(220)MG
50 CAPSULE ORAL DAILY
Status: DISCONTINUED | OUTPATIENT
Start: 2023-04-10 | End: 2023-04-12 | Stop reason: HOSPADM

## 2023-04-10 RX ADMIN — SODIUM CHLORIDE, PRESERVATIVE FREE 10 ML: 5 INJECTION INTRAVENOUS at 21:09

## 2023-04-10 RX ADMIN — MYCOPHENOLATE MOFETIL 1500 MG: 250 CAPSULE ORAL at 09:21

## 2023-04-10 RX ADMIN — MYCOPHENOLATE MOFETIL 1500 MG: 250 CAPSULE ORAL at 21:07

## 2023-04-10 RX ADMIN — FLECAINIDE ACETATE 100 MG: 100 TABLET ORAL at 09:21

## 2023-04-10 RX ADMIN — SILDENAFIL 60 MG: 20 TABLET, FILM COATED ORAL at 09:21

## 2023-04-10 RX ADMIN — FLECAINIDE ACETATE 100 MG: 100 TABLET ORAL at 21:12

## 2023-04-10 RX ADMIN — ROPINIROLE HYDROCHLORIDE 0.5 MG: 0.5 TABLET, FILM COATED ORAL at 21:07

## 2023-04-10 RX ADMIN — SILDENAFIL 60 MG: 20 TABLET, FILM COATED ORAL at 21:08

## 2023-04-10 RX ADMIN — SODIUM CHLORIDE, PRESERVATIVE FREE 10 ML: 5 INJECTION INTRAVENOUS at 09:23

## 2023-04-10 RX ADMIN — LEVOTHYROXINE SODIUM 75 MCG: 0.07 TABLET ORAL at 06:06

## 2023-04-10 RX ADMIN — ATORVASTATIN CALCIUM 20 MG: 20 TABLET, FILM COATED ORAL at 21:07

## 2023-04-10 ASSESSMENT — PAIN SCALES - GENERAL
PAINLEVEL_OUTOF10: 0

## 2023-04-10 NOTE — BRIEF OP NOTE
Brief Postoperative Note      Patient: Tawana Savage  YOB: 1955  MRN: [id number]    Date of Procedure: 4/10/2023    Pre-Op Diagnosis: Anemia [D64.9]    Post-Op Diagnosis: AVMs       Procedure(s):  EGD CONTROL HEMORRHAGE  COLONOSCOPY DIAGNOSTIC    Surgeon(s):  Lacie Boast, MD    Assistant:  * No surgical staff found *    Anesthesia: Monitor Anesthesia Care    Estimated Blood Loss (mL): less than 50     Complications: None    Specimens:   * No specimens in log *    Implants:  * No implants in log *      Drains: * No LDAs found *    Findings:     EGD:  Normal esophagus. Normal Z line at 40cm. Normal stomach. Normal duodenal mucosa. Non-bleeding AVMs in the duodenum s/p treatment with Gold probe. Colonoscopy:  Normal colon. No polyps or masses. Mild sigmoid diverticulosis. Red spots/low-risk AVMs appreciated. Internal/external hemorrhoids. Recommendations:  - Okay to resume diet. - Continue PPI BID.  - If absolute need to continue anticoagulation, will need to keep INR as close to 2 as possible and monitor Hgb closely.      Electronically signed by Lacie Boast, MD on 4/10/2023 at 3:55 PM

## 2023-04-10 NOTE — PLAN OF CARE
Problem: Safety - Adult  Goal: Free from fall injury  4/10/2023 1119 by Raul Viera RN  Outcome: Progressing  4/9/2023 2243 by Kedar Shaikh RN  Outcome: Progressing     Problem: Pain  Goal: Verbalizes/displays adequate comfort level or baseline comfort level  4/10/2023 1119 by Raul Viera RN  Outcome: Progressing  4/9/2023 2243 by Kedar Shaikh RN  Outcome: Progressing     Problem: Hematologic - Adult  Goal: Maintains hematologic stability  4/10/2023 1119 by Raul Viera RN  Outcome: Progressing  4/9/2023 2243 by Kedar Shaikh RN  Outcome: Progressing     Problem: Chronic Conditions and Co-morbidities  Goal: Patient's chronic conditions and co-morbidity symptoms are monitored and maintained or improved  Outcome: Progressing

## 2023-04-10 NOTE — CARE COORDINATION
Social Work/Case Management Transition of Care Planning (Ernesto [place] [phone]): Per report and chart review, patient is scheduled for EGD and colonoscopy today. Trending Hgb. It is noted to be 7.6 today. Patient is on 2L NC at 97%. Wean oxygen as able. Oncology is following. Discharge plan is to return home with no anticipated needs.  to provide transport. CM/SW will follow.     TESSIE Pruitt  4/10/2023

## 2023-04-11 LAB
ANION GAP SERPL CALCULATED.3IONS-SCNC: 12 MMOL/L (ref 7–16)
ANISOCYTOSIS: ABNORMAL
BASOPHILS # BLD: 0.1 E9/L (ref 0–0.2)
BASOPHILS NFR BLD: 1.8 % (ref 0–2)
BUN SERPL-MCNC: 9 MG/DL (ref 6–23)
CALCIUM SERPL-MCNC: 9.1 MG/DL (ref 8.6–10.2)
CHLORIDE SERPL-SCNC: 102 MMOL/L (ref 98–107)
CO2 SERPL-SCNC: 22 MMOL/L (ref 22–29)
CREAT SERPL-MCNC: 0.6 MG/DL (ref 0.5–1)
EOSINOPHIL # BLD: 0.05 E9/L (ref 0.05–0.5)
EOSINOPHIL NFR BLD: 0.9 % (ref 0–6)
ERYTHROCYTE [DISTWIDTH] IN BLOOD BY AUTOMATED COUNT: 18.6 FL (ref 11.5–15)
GLUCOSE SERPL-MCNC: 101 MG/DL (ref 74–99)
HCT VFR BLD AUTO: 26.7 % (ref 34–48)
HCT VFR BLD AUTO: 29.1 % (ref 34–48)
HGB BLD-MCNC: 7.5 G/DL (ref 11.5–15.5)
HGB BLD-MCNC: 8 G/DL (ref 11.5–15.5)
HYPOCHROMIA: ABNORMAL
INR BLD: 1.5
LYMPHOCYTES # BLD: 0.8 E9/L (ref 1.5–4)
LYMPHOCYTES NFR BLD: 14.9 % (ref 20–42)
MCH RBC QN AUTO: 25.5 PG (ref 26–35)
MCHC RBC AUTO-ENTMCNC: 28.1 % (ref 32–34.5)
MCV RBC AUTO: 90.8 FL (ref 80–99.9)
MONOCYTES # BLD: 0.21 E9/L (ref 0.1–0.95)
MONOCYTES NFR BLD: 3.5 % (ref 2–12)
NEUTROPHILS # BLD: 4.19 E9/L (ref 1.8–7.3)
NEUTS SEG NFR BLD: 78.9 % (ref 43–80)
OVALOCYTES: ABNORMAL
PLATELET # BLD AUTO: 262 E9/L (ref 130–450)
PMV BLD AUTO: 10.7 FL (ref 7–12)
POIKILOCYTES: ABNORMAL
POLYCHROMASIA: ABNORMAL
POTASSIUM SERPL-SCNC: 3.8 MMOL/L (ref 3.5–5)
PROTHROMBIN TIME: 16.2 SEC (ref 9.3–12.4)
RBC # BLD AUTO: 2.94 E12/L (ref 3.5–5.5)
SCHISTOCYTES: ABNORMAL
SODIUM SERPL-SCNC: 136 MMOL/L (ref 132–146)
STFR SERPL-SCNC: 7.1 MG/L (ref 1.9–4.4)
TARGET CELLS: ABNORMAL
VIT B6 SERPL-MCNC: 37.2 NMOL/L (ref 20–125)
WBC # BLD: 5.3 E9/L (ref 4.5–11.5)

## 2023-04-11 PROCEDURE — 85014 HEMATOCRIT: CPT

## 2023-04-11 PROCEDURE — 1200000000 HC SEMI PRIVATE

## 2023-04-11 PROCEDURE — 99232 SBSQ HOSP IP/OBS MODERATE 35: CPT | Performed by: STUDENT IN AN ORGANIZED HEALTH CARE EDUCATION/TRAINING PROGRAM

## 2023-04-11 PROCEDURE — 2580000003 HC RX 258: Performed by: FAMILY MEDICINE

## 2023-04-11 PROCEDURE — 6360000002 HC RX W HCPCS: Performed by: FAMILY MEDICINE

## 2023-04-11 PROCEDURE — 85610 PROTHROMBIN TIME: CPT

## 2023-04-11 PROCEDURE — 85025 COMPLETE CBC W/AUTO DIFF WBC: CPT

## 2023-04-11 PROCEDURE — 80048 BASIC METABOLIC PNL TOTAL CA: CPT

## 2023-04-11 PROCEDURE — 6370000000 HC RX 637 (ALT 250 FOR IP): Performed by: STUDENT IN AN ORGANIZED HEALTH CARE EDUCATION/TRAINING PROGRAM

## 2023-04-11 PROCEDURE — 99222 1ST HOSP IP/OBS MODERATE 55: CPT | Performed by: INTERNAL MEDICINE

## 2023-04-11 PROCEDURE — 85018 HEMOGLOBIN: CPT

## 2023-04-11 PROCEDURE — 2700000000 HC OXYGEN THERAPY PER DAY

## 2023-04-11 PROCEDURE — 36415 COLL VENOUS BLD VENIPUNCTURE: CPT

## 2023-04-11 PROCEDURE — 6370000000 HC RX 637 (ALT 250 FOR IP): Performed by: FAMILY MEDICINE

## 2023-04-11 RX ORDER — LANOLIN ALCOHOL/MO/W.PET/CERES
325 CREAM (GRAM) TOPICAL
Status: DISCONTINUED | OUTPATIENT
Start: 2023-04-12 | End: 2023-04-11

## 2023-04-11 RX ORDER — WARFARIN SODIUM 4 MG/1
4 TABLET ORAL
Status: COMPLETED | OUTPATIENT
Start: 2023-04-11 | End: 2023-04-11

## 2023-04-11 RX ORDER — FERROUS SULFATE 325(65) MG
325 TABLET ORAL
Qty: 30 TABLET | Refills: 1 | Status: SHIPPED | OUTPATIENT
Start: 2023-04-11

## 2023-04-11 RX ADMIN — MYCOPHENOLATE MOFETIL 1500 MG: 250 CAPSULE ORAL at 19:40

## 2023-04-11 RX ADMIN — ROPINIROLE HYDROCHLORIDE 0.5 MG: 0.5 TABLET, FILM COATED ORAL at 19:39

## 2023-04-11 RX ADMIN — LEVOTHYROXINE SODIUM 75 MCG: 0.07 TABLET ORAL at 06:11

## 2023-04-11 RX ADMIN — Medication 400 MG: at 07:35

## 2023-04-11 RX ADMIN — ATORVASTATIN CALCIUM 20 MG: 20 TABLET, FILM COATED ORAL at 19:40

## 2023-04-11 RX ADMIN — WARFARIN SODIUM 4 MG: 4 TABLET ORAL at 17:41

## 2023-04-11 RX ADMIN — FLECAINIDE ACETATE 100 MG: 100 TABLET ORAL at 07:36

## 2023-04-11 RX ADMIN — SODIUM CHLORIDE, PRESERVATIVE FREE 10 ML: 5 INJECTION INTRAVENOUS at 07:38

## 2023-04-11 RX ADMIN — SODIUM CHLORIDE, PRESERVATIVE FREE 10 ML: 5 INJECTION INTRAVENOUS at 19:41

## 2023-04-11 RX ADMIN — SPIRONOLACTONE 25 MG: 25 TABLET ORAL at 07:34

## 2023-04-11 RX ADMIN — SILDENAFIL 60 MG: 20 TABLET, FILM COATED ORAL at 14:31

## 2023-04-11 RX ADMIN — SILDENAFIL 60 MG: 20 TABLET, FILM COATED ORAL at 07:36

## 2023-04-11 RX ADMIN — FLECAINIDE ACETATE 100 MG: 100 TABLET ORAL at 19:39

## 2023-04-11 RX ADMIN — PANTOPRAZOLE SODIUM 40 MG: 40 TABLET, DELAYED RELEASE ORAL at 06:11

## 2023-04-11 RX ADMIN — SILDENAFIL 60 MG: 20 TABLET, FILM COATED ORAL at 21:18

## 2023-04-11 RX ADMIN — MYCOPHENOLATE MOFETIL 1500 MG: 250 CAPSULE ORAL at 07:36

## 2023-04-11 NOTE — PLAN OF CARE
Patient's chart updated to reflect:      . - HF care plan, HF education points and HF discharge instructions.  -Orders: 2 gram sodium diet, daily weights, I/O.  -PCP and/or Cardiologist appointment to be scheduled within 7 days of hospital discharge.  -History of HF, not primary admission Dx. Patient admitted for treatment of IMPRESSION  1.  Anemia, unspecified type      Harsha Pinedo RN RN, BSN  Heart Failure Navigator

## 2023-04-11 NOTE — OP NOTE
Operative Note      Patient: Nadeem George  YOB: 1955  MRN: [id number]    Date of Procedure: 4/10/2023    Pre-Op Diagnosis: Anemia [D64.9]    Post-Op Diagnosis: AVMs of duodenum and colon       Procedure(s):  EGD CONTROL HEMORRHAGE  COLONOSCOPY DIAGNOSTIC    Surgeon(s):  Yoshi Carias MD    Assistant:   * No surgical staff found *    Anesthesia: Monitor Anesthesia Care    Estimated Blood Loss (mL): less than 50     Complications: None    Specimens:   * No specimens in log *    Implants:  * No implants in log *      Drains: * No LDAs found *    Indications:  68y/F w/ history of Afib on anticoagulation presents for endoscopic evaluation of new onset symptomatic anemia. Findings:     SBE:  Normal esophagus. Normal stomach. Normal duodenal mucosa. Non-bleeding AVMs in the mid-duodenum s/p treatment with Gold probe electrocautery and placement of one endoclip. Colonoscopy:  Significantly tortuous sigmoid colon. Normal colon mucosa. No polyps or masses. Mild sigmoid diverticulosis. Red spots/low-risk AVMs appreciated and not treated given significant tortuosity and unstable scope position. Internal and external non-bleeding hemorrhoids. Detailed Description of Procedure:   Pre-Anesthesia Assessment:  Prior to the procedure, a history and physical was performed and patient medications and allergies were reviewed. The risks, benefits, complications, treatment options and expected outcomes were discussed with the patient. The possibilities of reaction to medication, pulmonary aspiration, perforation of the gastrointestinal tract, bleeding requiring transfusion or operation, respiratory failure requiring placement on a ventilator, and failure to diagnose a condition or identify polyps/lesions were discussed with the patient. All questions were answered and informed consent was obtained.   Patient identification and proposed procedure were verified by the physician, the nurse, the

## 2023-04-11 NOTE — CARE COORDINATION
Social Work/Case Management Transition of Care Planning ([place]ina [place] [phone]): Per report and chart review, patient did have EGD and colonoscopy on 4/10. Per GI, in the second/third portion of the duodenum, non-bleeding AVMs were noted. The lesions were treated with electrocautery using Gold probe with adequate effect. One was treated with deeper thermal impact and active bleeding during treatment was further treated with successful placement of one endoclip. Patient is was cleared to resume diet. Monitor tolerance. Cardiology has now been consulted for anticoagulation recommendations-Afib. Per GI, patient needs to go back on Coumadin and INR needs to be closer to 2.0 due to risk of bleeding from AVM's  Discharge plan is to return home with no anticipated needs.  to provide transport. CM/SW will follow.     TESSIE Farah  4/11/2023

## 2023-04-11 NOTE — PLAN OF CARE
Problem: Safety - Adult  Goal: Free from fall injury  4/10/2023 2325 by Ramone Valdez RN  Outcome: Progressing  Flowsheets (Taken 4/10/2023 1123 by Graham Guevara RN)  Free From Fall Injury: Instruct family/caregiver on patient safety  4/10/2023 1119 by Graham Guevara RN  Outcome: Progressing     Problem: Pain  Goal: Verbalizes/displays adequate comfort level or baseline comfort level  4/10/2023 2325 by Ramone Valdez RN  Outcome: Progressing  4/10/2023 1119 by Graham Guevara RN  Outcome: Progressing     Problem: Hematologic - Adult  Goal: Maintains hematologic stability  4/10/2023 2325 by Ramone Valdez RN  Outcome: Progressing  4/10/2023 1119 by Graham Guevara RN  Outcome: Progressing     Problem: Chronic Conditions and Co-morbidities  Goal: Patient's chronic conditions and co-morbidity symptoms are monitored and maintained or improved  4/10/2023 2325 by Ramone Valdez RN  Outcome: Progressing  4/10/2023 1119 by Graham Guevara RN  Outcome: Progressing

## 2023-04-12 VITALS
BODY MASS INDEX: 25.78 KG/M2 | DIASTOLIC BLOOD PRESSURE: 56 MMHG | WEIGHT: 145.5 LBS | HEIGHT: 63 IN | RESPIRATION RATE: 16 BRPM | OXYGEN SATURATION: 94 % | SYSTOLIC BLOOD PRESSURE: 111 MMHG | HEART RATE: 95 BPM | TEMPERATURE: 98.7 F

## 2023-04-12 LAB
ANION GAP SERPL CALCULATED.3IONS-SCNC: 10 MMOL/L (ref 7–16)
ANISOCYTOSIS: ABNORMAL
BASOPHILS # BLD: 0.05 E9/L (ref 0–0.2)
BASOPHILS NFR BLD: 0.9 % (ref 0–2)
BUN SERPL-MCNC: 11 MG/DL (ref 6–23)
CALCIUM SERPL-MCNC: 9.2 MG/DL (ref 8.6–10.2)
CHLORIDE SERPL-SCNC: 103 MMOL/L (ref 98–107)
CO2 SERPL-SCNC: 25 MMOL/L (ref 22–29)
CREAT SERPL-MCNC: 0.7 MG/DL (ref 0.5–1)
EOSINOPHIL # BLD: 0.13 E9/L (ref 0.05–0.5)
EOSINOPHIL NFR BLD: 2.2 % (ref 0–6)
ERYTHROCYTE [DISTWIDTH] IN BLOOD BY AUTOMATED COUNT: 18.6 FL (ref 11.5–15)
GLUCOSE SERPL-MCNC: 111 MG/DL (ref 74–99)
HCT VFR BLD AUTO: 27.3 % (ref 34–48)
HGB BLD-MCNC: 7.5 G/DL (ref 11.5–15.5)
HYPOCHROMIA: ABNORMAL
IMM GRANULOCYTES # BLD: 0.02 E9/L
IMM GRANULOCYTES NFR BLD: 0.3 % (ref 0–5)
INR BLD: 1.5
LYMPHOCYTES # BLD: 0.85 E9/L (ref 1.5–4)
LYMPHOCYTES NFR BLD: 14.7 % (ref 20–42)
MCH RBC QN AUTO: 24.9 PG (ref 26–35)
MCHC RBC AUTO-ENTMCNC: 27.5 % (ref 32–34.5)
MCV RBC AUTO: 90.7 FL (ref 80–99.9)
MONOCYTES # BLD: 0.64 E9/L (ref 0.1–0.95)
MONOCYTES NFR BLD: 11 % (ref 2–12)
NEUTROPHILS # BLD: 4.11 E9/L (ref 1.8–7.3)
NEUTS SEG NFR BLD: 70.9 % (ref 43–80)
OVALOCYTES: ABNORMAL
PLATELET # BLD AUTO: 264 E9/L (ref 130–450)
PMV BLD AUTO: 10.3 FL (ref 7–12)
POIKILOCYTES: ABNORMAL
POLYCHROMASIA: ABNORMAL
POTASSIUM SERPL-SCNC: 4.1 MMOL/L (ref 3.5–5)
PROTHROMBIN TIME: 16.2 SEC (ref 9.3–12.4)
RBC # BLD AUTO: 3.01 E12/L (ref 3.5–5.5)
SODIUM SERPL-SCNC: 138 MMOL/L (ref 132–146)
TEAR DROP CELLS: ABNORMAL
WBC # BLD: 5.8 E9/L (ref 4.5–11.5)

## 2023-04-12 PROCEDURE — 6360000002 HC RX W HCPCS: Performed by: FAMILY MEDICINE

## 2023-04-12 PROCEDURE — 36415 COLL VENOUS BLD VENIPUNCTURE: CPT

## 2023-04-12 PROCEDURE — 6370000000 HC RX 637 (ALT 250 FOR IP): Performed by: STUDENT IN AN ORGANIZED HEALTH CARE EDUCATION/TRAINING PROGRAM

## 2023-04-12 PROCEDURE — 85025 COMPLETE CBC W/AUTO DIFF WBC: CPT

## 2023-04-12 PROCEDURE — 85610 PROTHROMBIN TIME: CPT

## 2023-04-12 PROCEDURE — 6370000000 HC RX 637 (ALT 250 FOR IP): Performed by: FAMILY MEDICINE

## 2023-04-12 PROCEDURE — 80048 BASIC METABOLIC PNL TOTAL CA: CPT

## 2023-04-12 PROCEDURE — 2580000003 HC RX 258: Performed by: FAMILY MEDICINE

## 2023-04-12 PROCEDURE — 2700000000 HC OXYGEN THERAPY PER DAY

## 2023-04-12 RX ORDER — WARFARIN SODIUM 4 MG/1
4 TABLET ORAL
Status: COMPLETED | OUTPATIENT
Start: 2023-04-12 | End: 2023-04-12

## 2023-04-12 RX ORDER — WARFARIN SODIUM 4 MG/1
4 TABLET ORAL DAILY
Qty: 30 TABLET | Refills: 0 | Status: SHIPPED | OUTPATIENT
Start: 2023-04-12 | End: 2023-04-12 | Stop reason: SDUPTHER

## 2023-04-12 RX ORDER — PANTOPRAZOLE SODIUM 40 MG/1
40 TABLET, DELAYED RELEASE ORAL 2 TIMES DAILY
Qty: 30 TABLET | Refills: 3 | Status: SHIPPED | OUTPATIENT
Start: 2023-04-12

## 2023-04-12 RX ORDER — WARFARIN SODIUM 4 MG/1
4 TABLET ORAL DAILY
Qty: 30 TABLET | Refills: 0 | Status: SHIPPED | OUTPATIENT
Start: 2023-04-12 | End: 2023-04-19 | Stop reason: SDUPTHER

## 2023-04-12 RX ORDER — ZINC SULFATE 50(220)MG
50 CAPSULE ORAL DAILY
Qty: 30 CAPSULE | Refills: 0 | COMMUNITY
Start: 2023-04-13

## 2023-04-12 RX ADMIN — PANTOPRAZOLE SODIUM 40 MG: 40 TABLET, DELAYED RELEASE ORAL at 05:22

## 2023-04-12 RX ADMIN — Medication 400 MG: at 09:11

## 2023-04-12 RX ADMIN — MYCOPHENOLATE MOFETIL 1500 MG: 250 CAPSULE ORAL at 09:11

## 2023-04-12 RX ADMIN — SILDENAFIL 60 MG: 20 TABLET, FILM COATED ORAL at 09:11

## 2023-04-12 RX ADMIN — SPIRONOLACTONE 25 MG: 25 TABLET ORAL at 09:11

## 2023-04-12 RX ADMIN — SILDENAFIL 60 MG: 20 TABLET, FILM COATED ORAL at 12:26

## 2023-04-12 RX ADMIN — FLECAINIDE ACETATE 100 MG: 100 TABLET ORAL at 09:11

## 2023-04-12 RX ADMIN — LEVOTHYROXINE SODIUM 75 MCG: 0.07 TABLET ORAL at 05:22

## 2023-04-12 RX ADMIN — SODIUM CHLORIDE, PRESERVATIVE FREE 10 ML: 5 INJECTION INTRAVENOUS at 09:12

## 2023-04-12 RX ADMIN — WARFARIN SODIUM 4 MG: 4 TABLET ORAL at 12:24

## 2023-04-12 NOTE — PLAN OF CARE
Problem: Safety - Adult  Goal: Free from fall injury  4/12/2023 1020 by Brandie Thacker RN  Outcome: Progressing  4/11/2023 2125 by Rimma Rebolledo RN  Outcome: Progressing     Problem: Pain  Goal: Verbalizes/displays adequate comfort level or baseline comfort level  4/12/2023 1020 by Brandie Thacker RN  Outcome: Progressing  4/11/2023 2125 by Rimma Rebolledo RN  Outcome: Progressing     Problem: Hematologic - Adult  Goal: Maintains hematologic stability  4/12/2023 1020 by Brandie Thacker RN  Outcome: Progressing  4/11/2023 2125 by Rimma Rebolledo RN  Outcome: Progressing

## 2023-04-12 NOTE — PLAN OF CARE
Problem: Safety - Adult  Goal: Free from fall injury  Outcome: Progressing     Problem: Pain  Goal: Verbalizes/displays adequate comfort level or baseline comfort level  Outcome: Progressing     Problem: Hematologic - Adult  Goal: Maintains hematologic stability  Outcome: Progressing     Problem: Cardiovascular - Adult  Goal: Maintains optimal cardiac output and hemodynamic stability  Outcome: Progressing     Problem: Metabolic/Fluid and Electrolytes - Adult  Goal: Hemodynamic stability and optimal renal function maintained  Outcome: Progressing     Problem: Chronic Conditions and Co-morbidities  Goal: Patient's chronic conditions and co-morbidity symptoms are monitored and maintained or improved  Outcome: Progressing

## 2023-04-12 NOTE — CONSULTS
[address] and [address] Electrophysiology  Consultation Report  PATIENT: Roseann Burkett. RECORD NUMBER: [id number]  DATE OF SERVICE:  4/11/2023  ATTENDING ELECTROPHYSIOLOGIST: Mary Salazar MD  PRIMARY ELECTROPHYSIOLOGIST:  Dr Jamie Pacheco  at [facility] and Dr. Luz Lyons: No ref. provider found and Triny Tran DO  CHIEF COMPLAINT: Change in anticoagulant therapy per gastroenterology    HPI: This is a 70 y.o. female with a history of  nonvalvular persistent AF sp DCCV x3 (3/2022 at [facility]; 8/11/2022 at [facility]; 1/24/2023), AT sp DCCV (8/11/2022 at [facility]), high grade AV block sp MDT dc PPM (DOI: 11/25/22-Dr Jamie Pacheco with RV lead as LBB area; RA lead revision 2/2 RA lead dislodgement: 11/28/22-Dr Adithya Ramos), CVA, carotid artery stenosis sp right CEA (?),  HTN, JANNETH-noncompliant with CPAP, COPD/emphysema, severe pulmonary HTN-combination of group 1 (CTD) + group 2 + group 3 (emphysema, JANNETH), scleroderma/Raynaud's/esophageal dysmotility, hypothyroidism, and cataracts sp bilateral removal (2019). The patient has been maintained on flecainide 100 mg twice a day, apixaban 5 mg twice a day, digoxin, simvastatin ,Aldactone. Presented to the emergency room on 4/5/2023 with complaints of lightheadedness and dyspnea for about 2 days. She was found to be markedly anemic. After 1 unit of PRBC transfusion, GI and oncology consults were obtained. Upper and lower endoscopies were undertaken and this revealed nonbleeding AVMs in the duodenum treated with gold probe electrocautery. Low risk AVMs were also noted in the colon    Cardiac electrophysiology service is consulted for switching anticoagulation from Eliquis to Coumadin with close monitoring to keep INR around 2 per GI recommendations. .    Patient Active Problem List    Diagnosis Date Noted    SBO (small bowel obstruction) ([facility] [address] 02/13/2023     Priority: Medium    Persistent atrial fibrillation ([address] 01/24/2023

## 2023-04-12 NOTE — DISCHARGE SUMMARY
of scleroderma on mycophenolate  History of hypertension  History of hypothyroidism  History of hyperlipidemia  CAD      Discharge Instructions / Follow up: Follow-up with PCP within 1 week of discharge. Follow-up for INR check on Friday-4/14/2023 and continue with the biweekly checks of INR at the Coumadin clinic  Follow-up with consultants as indicated by them. Compliance with medications as prescribed on discharge. Future Appointments   Date Time Provider Husam Roche   5/17/2023  1:00 PM STEPH Blue CNP ELECTRO [facility]YS [facility]   5/17/2023  1:00 PM SCHEDULE, DEVICE CLINIC 1 MARJORIE ELECTRO PHYS [facility]       The patient's condition is stable. At this time the patient is without objective evidence of an acute process requiring continuing hospitalization or inpatient management. They are stable for discharge with outpatient follow-up. I have spoken with the patient and discussed the results of the current hospitalization, in addition to providing specific details for the plan of care and counseling regarding the diagnosis and prognosis. The plan has been discussed in detail and they are aware of the specific conditions for emergent return, as well as the importance of follow-up. Their questions are answered at this time and they are agreeable with the plan for discharge to home. Continued appropriate risk factor modification of blood pressure, diabetes and serum lipids will remain essential to reducing risk of future atherosclerotic development    Activity: activity as tolerated    Physical exam:  General appearance: No apparent distress, appears stated age and cooperative. HEENT: Conjunctivae/corneas clear. Mucous membranes moist.  Neck: Supple. No JVD. Respiratory:  Clear to auscultation bilaterally. Normal respiratory effort. Cardiovascular:  RRR. S1, S2 without MRG. PV: Pulses palpable. No edema. Abdomen: Soft, non-tender, non-distended.  +BS  Musculoskeletal: No obvious

## 2023-04-12 NOTE — CARE COORDINATION
Social Work/Case Management Transition of Care Planning (Cadence Beard [place] [phone]): Discharge order noted. Patient to discharge to home with no needs.  to transport.   TESSIE Ellsworth  4/12/2023

## 2023-04-12 NOTE — PLAN OF CARE
Problem: Safety - Adult  Goal: Free from fall injury  4/12/2023 1034 by Marilynn Vitale RN  Outcome: Progressing  4/12/2023 1020 by Jocelin Lawson RN  Outcome: Progressing  4/11/2023 2125 by Krissy Ferrari RN  Outcome: Progressing     Problem: Pain  Goal: Verbalizes/displays adequate comfort level or baseline comfort level  4/12/2023 1034 by Marilynn Vitale RN  Outcome: Progressing  4/12/2023 1020 by Jocelin Lawson RN  Outcome: Progressing  4/11/2023 2125 by Krissy Ferrari RN  Outcome: Progressing     Problem: Hematologic - Adult  Goal: Maintains hematologic stability  4/12/2023 1034 by Marilynn Vitale RN  Outcome: Progressing  4/12/2023 1020 by Jocelin Lawson RN  Outcome: Progressing  4/11/2023 2125 by Krissy Ferrari RN  Outcome: Progressing     Problem: Chronic Conditions and Co-morbidities  Goal: Patient's chronic conditions and co-morbidity symptoms are monitored and maintained or improved  4/12/2023 1034 by Marilynn Vitale RN  Outcome: Progressing  4/11/2023 2125 by Krissy Ferrari RN  Outcome: Progressing     Problem: Cardiovascular - Adult  Goal: Maintains optimal cardiac output and hemodynamic stability  4/12/2023 1034 by Marilynn Vitale RN  Outcome: Progressing  4/11/2023 2125 by Krissy Ferrari RN  Outcome: Progressing     Problem: Metabolic/Fluid and Electrolytes - Adult  Goal: Hemodynamic stability and optimal renal function maintained  4/11/2023 2125 by Krissy Ferrari RN  Outcome: Progressing

## 2023-04-12 NOTE — PROGRESS NOTES
Hospitalist Progress Note      SYNOPSIS: Patient admitted on 2023 for acute anemia status post 1 unit of blood. Occult stool negative, oncology and GI consulted      SUBJECTIVE:    Patient seen and examined. She denies any acute complaints. No episodes of vomiting or blood in stools. Discussed about the plan regarding EGD/colonoscopy. Has tolerated the bowel prep well. Records reviewed. Stable overnight. No other overnight issues reported. Temp (24hrs), Av.8 °F (37.1 °C), Min:98.7 °F (37.1 °C), Max:98.8 °F (37.1 °C)    DIET: Diet NPO Exceptions are: Sips of Water with Meds  CODE: Full Code    Intake/Output Summary (Last 24 hours) at 4/10/2023 1109  Last data filed at 4/10/2023 1601  Gross per 24 hour   Intake 2000 ml   Output --   Net 2000 ml       OBJECTIVE:    BP (!) 104/57   Pulse 92   Temp 98.8 °F (37.1 °C) (Temporal)   Resp 18   Ht 5' 3\" (1.6 m)   Wt 130 lb (59 kg)   SpO2 97%   BMI 23.03 kg/m²     General appearance: No apparent distress, appears stated age and cooperative. HEENT:  Conjunctivae/corneas clear. Neck: Supple. No jugular venous distention. Respiratory: Clear to auscultation bilaterally, normal respiratory effort  Cardiovascular: Regular rate rhythm, normal S1-S2  Abdomen: Soft, nontender, nondistended  Musculoskeletal: No clubbing, cyanosis, no bilateral lower extremity edema. Brisk capillary refill. Skin:  No rashes  on visible skin  Neurologic: awake, alert and following commands     ASSESSMENT:  Acute on chronic anemia  History of A-fib on warfarin  History of scleroderma on mycophenolate  History of hypertension  History of hypothyroidism  History of hyperlipidemia  CAD       PLAN:  -Appreciate oncology and GI input. plan on endoscopic evaluation with EGD/Colonoscopy and possible push enteroscopy 4/10/23.   -monitor hb---8.0--->7.7-->7.6  -Neurology consulted-appreciate recommendations-vitamin B6, erythropoietin, copper level, zinc level, soluble transfer
Inpatient Hematology/Oncology Progress Note    Reason for Visit:   \"Acute on chronic anemia\"    Referring Physician:  Francesco Weber DO    PCP:  Merissa Red DO    Subjective:  Patient overall doing well. She denies of major events. She denies of bleeding. Overall patient appears comfortable. HPI from Initial Inpatient Consultation (2023)   71 y/o female with hx HTN, hyperlipidemia, hypothyroidism, CAD, of A. Fib on Eliquis, who presented to the ED for SOB and lightheadedness, found to be anemic. No melena. Hemoccult negative. Transfused  1 unit prbc and admitted for further evaluation    Review of Systems; ROS NEGATIVE except as noted above.     Past Medical History:      Diagnosis Date    CAD in native artery 2021    Cerebral artery occlusion with cerebral infarction ([facility] [address]     \"mini stroke\" 10/2014    H/O cardiovascular stress test 2020    Lexiscan    Hyperlipidemia     Hypertension     Hypothyroidism 1/10/2023    Sleep apnea     doesnt use cpap     Past Surgical History:      Procedure Laterality Date    CARDIAC CATHETERIZATION  2012    Right heart cath by Dr Kindra López Left 2023    Successful (Dr. Petty Barfield)    CAROTID ENDARTERECTOMY Right     CATARACT REMOVAL WITH IMPLANT Right 2019    intraocular lens    CATARACT REMOVAL WITH IMPLANT Left 10/15/2019     SECTION      COLONOSCOPY      COLONOSCOPY N/A 4/10/2023    COLONOSCOPY DIAGNOSTIC performed by Livia Carson MD at [facility] Right 2019    RIGHT EYE CATARACT EMULSIFICATION IOL IMPLANT performed by Debbi Beaulieu MD at [address] [place] [address] Left 10/15/2019    LEFT EYE CATARACT EMULSIFICATION IOL IMPLANT performed by Debbi Beaulieu MD at [address] [place] [address]  2022    Successful removal of old right atrial pacing lead and insertion of the new right atrial pacing lead (Dr. Aracely Luna)
Pharmacy Consultation Note  (Warfarin Dosing and Monitoring)    Initial consult date: 04/11/23  Consulting Provider: Dr. Elizabeth Garland is a 70 y.o. female for whom pharmacy has been asked to manage warfarin therapy. Weight:   Wt Readings from Last 1 Encounters:   04/12/23 145 lb 8.1 oz (66 kg)       TSH:    Lab Results   Component Value Date/Time    TSH 8.570 11/24/2022 05:41 AM       Hepatic Function Panel:                            Lab Results   Component Value Date/Time    ALKPHOS 57 04/09/2023 05:45 AM    ALT 9 04/09/2023 05:45 AM    AST 12 04/09/2023 05:45 AM    PROT 5.9 04/09/2023 05:45 AM    BILITOT 0.6 04/09/2023 05:45 AM    BILIDIR <0.2 09/28/2021 02:50 PM    IBILI see below 09/28/2021 02:50 PM    LABALBU 3.7 04/09/2023 05:45 AM    LABALBU 4.3 01/26/2012 11:02 AM       Current warfarin drug-drug interactions include: No significant interactions    Recent Labs     04/10/23  0834 04/10/23  1921 04/11/23  0539 04/11/23  1533 04/12/23  0459   HGB 7.6*   < > 7.5* 8.0* 7.5*     --  262  --  264    < > = values in this interval not displayed. Date Warfarin Dose INR Heparin or LMWH Comment   4/11 4mg -- --    4/12 4 mg 1.5                            Assessment:  Patient is a 70 y.o. female being initiated on warfarin for Atrial Fibrillation. Goal INR 2 - 2.5 due to history of GI bleed.       Plan:  Warfarin 4mg tonight  Daily PT/INR until the INR is stable within the therapeutic range  Pharmacist will follow and monitor/adjust dosing as necessary    Thank you for this consult,    Manasa Jose, [facility] 4/12/2023 9:50 AM
Pharmacy Consultation Note  (Warfarin Dosing and Monitoring)    Initial consult date: 04/11/23  Consulting Provider: Dr. Jonnie Hogan is a 70 y.o. female for whom pharmacy has been asked to manage warfarin therapy. Weight:   Wt Readings from Last 1 Encounters:   04/06/23 130 lb (59 kg)       TSH:    Lab Results   Component Value Date/Time    TSH 8.570 11/24/2022 05:41 AM       Hepatic Function Panel:                            Lab Results   Component Value Date/Time    ALKPHOS 57 04/09/2023 05:45 AM    ALT 9 04/09/2023 05:45 AM    AST 12 04/09/2023 05:45 AM    PROT 5.9 04/09/2023 05:45 AM    BILITOT 0.6 04/09/2023 05:45 AM    BILIDIR <0.2 09/28/2021 02:50 PM    IBILI see below 09/28/2021 02:50 PM    LABALBU 3.7 04/09/2023 05:45 AM    LABALBU 4.3 01/26/2012 11:02 AM       Current warfarin drug-drug interactions include: No significant interactions    Recent Labs     04/09/23  0545 04/10/23  0834 04/10/23  1921 04/11/23  0539   HGB 7.7* 7.6* 9.0* 7.5*    254  --  262     Date Warfarin Dose INR Heparin or LMWH Comment   4/11 4mg -- --                                  Assessment:  Patient is a 70 y.o. female being initiated on warfarin for Atrial Fibrillation. Goal INR 2 - 2.5 due to history of GI bleed.       Plan:  Warfarin 4mg tonight  Daily PT/INR until the INR is stable within the therapeutic range  Pharmacist will follow and monitor/adjust dosing as necessary    Thank you for this consult,    Luis Daniel Dela Cruz, [facility] 4/11/2023 1:27 PM
STEPH Hernandez notified of cardiology consult. Pt added to census.
hb---8.0--->7.7-->7.6-->9.0-->7.5  -Neurology consulted-appreciate recommendations-vitamin B6, erythropoietin, copper level, zinc level, soluble transfer receptor ordered  -Continue chronic meds  - Continue PPI BID.  - Per GI If absolute need to continue anticoagulation, will need to keep INR as close to 2 as possible and monitor Hgb closely. This will likely require the assistance of Pharmacy for dosing.   - Reached out to GI regarding pt being on eliquis since 8/2022 per chart review. DISPOSITION:  monitor hemoglobin, possible DC later today if Hb remains stable and final GI recs. Medications:  REVIEWED DAILY    Infusion Medications    sodium chloride      sodium chloride       Scheduled Medications    zinc sulfate  50 mg Oral Daily    flecainide  100 mg Oral BID    levothyroxine  75 mcg Oral Daily    macitentan  10 mg Oral Daily    magnesium oxide  400 mg Oral Daily    mycophenolate  1,500 mg Oral BID    pantoprazole  40 mg Oral QAM AC    rOPINIRole  0.5 mg Oral Nightly    sildenafil  60 mg Oral TID    atorvastatin  20 mg Oral Daily    spironolactone  25 mg Oral Daily    sodium chloride flush  10 mL IntraVENous 2 times per day     PRN Meds: polyethylene glycol, sodium chloride, sodium chloride flush, sodium chloride, promethazine **OR** ondansetron, polyethylene glycol, acetaminophen **OR** acetaminophen    Labs:     Recent Labs     04/09/23  0545 04/10/23  0834 04/10/23  1921 04/11/23  0539   WBC 4.7 4.3*  --  5.3   HGB 7.7* 7.6* 9.0* 7.5*   HCT 26.7* 27.1* 32.3* 26.7*    254  --  262       Recent Labs     04/09/23  0545 04/10/23  0834 04/11/23  0539    139 136   K 4.2 4.0 3.8    106 102   CO2 26 26 22   BUN 10 6 9   CREATININE 0.6 0.5 0.6   CALCIUM 9.5 9.3 9.1       Recent Labs     04/09/23  0545   PROT 5.9*   ALKPHOS 57   ALT 9   AST 12   BILITOT 0.6       No results for input(s): INR in the last 72 hours. No results for input(s): Nidia Souza in the last 72 hours.     Chronic

## 2023-04-18 ENCOUNTER — ANTI-COAG VISIT (OUTPATIENT)
Dept: CARDIOLOGY CLINIC | Age: 68
End: 2023-04-18
Payer: MEDICARE

## 2023-04-18 DIAGNOSIS — I48.91 ATRIAL FIBRILLATION, UNSPECIFIED TYPE (HCC): ICD-10-CM

## 2023-04-18 DIAGNOSIS — Z79.01 ON CONTINUOUS ORAL ANTICOAGULATION: Primary | ICD-10-CM

## 2023-04-18 PROCEDURE — 93793 ANTICOAG MGMT PT WARFARIN: CPT | Performed by: INTERNAL MEDICINE

## 2023-04-18 NOTE — PROGRESS NOTES
INR today is 2.9. Per Dr. Leopoldo Putty, patient is to start taking 0 mg on Fridays, 4 mg the rest of the days and recheck in 2 weeks.

## 2023-04-19 RX ORDER — WARFARIN SODIUM 4 MG/1
4 TABLET ORAL DAILY
Qty: 90 TABLET | Refills: 0 | Status: SHIPPED | OUTPATIENT
Start: 2023-04-19

## 2023-04-20 DIAGNOSIS — D64.9 ANEMIA, UNSPECIFIED TYPE: Primary | ICD-10-CM

## 2023-04-21 ENCOUNTER — HOSPITAL ENCOUNTER (OUTPATIENT)
Dept: INFUSION THERAPY | Age: 68
Discharge: HOME | End: 2023-04-21
Payer: MEDICARE

## 2023-04-21 DIAGNOSIS — D64.9 ANEMIA, UNSPECIFIED TYPE: ICD-10-CM

## 2023-04-21 PROCEDURE — 36415 COLL VENOUS BLD VENIPUNCTURE: CPT

## 2023-04-22 LAB
Lab: NORMAL
THIS TEST SENT TO: NORMAL

## 2023-04-27 LAB
Lab: NORMAL
REPORT: NORMAL
THIS TEST SENT TO: NORMAL

## 2023-05-02 ENCOUNTER — OFFICE VISIT (OUTPATIENT)
Dept: ONCOLOGY | Age: 68
End: 2023-05-02
Payer: MEDICARE

## 2023-05-02 ENCOUNTER — HOSPITAL ENCOUNTER (OUTPATIENT)
Dept: INFUSION THERAPY | Age: 68
Discharge: HOME | End: 2023-05-02
Payer: MEDICARE

## 2023-05-02 ENCOUNTER — ANTI-COAG VISIT (OUTPATIENT)
Dept: CARDIOLOGY CLINIC | Age: 68
End: 2023-05-02
Payer: MEDICARE

## 2023-05-02 ENCOUNTER — TELEPHONE (OUTPATIENT)
Dept: INFUSION THERAPY | Age: 68
End: 2023-05-02

## 2023-05-02 VITALS
HEART RATE: 89 BPM | TEMPERATURE: 99 F | WEIGHT: 133.8 LBS | OXYGEN SATURATION: 91 % | SYSTOLIC BLOOD PRESSURE: 103 MMHG | DIASTOLIC BLOOD PRESSURE: 64 MMHG | BODY MASS INDEX: 23.71 KG/M2 | HEIGHT: 63 IN

## 2023-05-02 DIAGNOSIS — D50.9 IRON DEFICIENCY ANEMIA, UNSPECIFIED IRON DEFICIENCY ANEMIA TYPE: ICD-10-CM

## 2023-05-02 DIAGNOSIS — I48.91 ATRIAL FIBRILLATION, UNSPECIFIED TYPE (HCC): Primary | ICD-10-CM

## 2023-05-02 DIAGNOSIS — D50.9 IRON DEFICIENCY ANEMIA, UNSPECIFIED IRON DEFICIENCY ANEMIA TYPE: Primary | ICD-10-CM

## 2023-05-02 LAB
ALBUMIN SERPL-MCNC: 4.5 G/DL (ref 3.5–5.2)
ALP SERPL-CCNC: 73 U/L (ref 35–104)
ALT SERPL-CCNC: 13 U/L (ref 0–32)
ANION GAP SERPL CALCULATED.3IONS-SCNC: 11 MMOL/L (ref 7–16)
ANISOCYTOSIS: ABNORMAL
AST SERPL-CCNC: 17 U/L (ref 0–31)
BASOPHILS # BLD: 0.05 E9/L (ref 0–0.2)
BASOPHILS NFR BLD: 0.9 % (ref 0–2)
BILIRUB SERPL-MCNC: 0.5 MG/DL (ref 0–1.2)
BUN SERPL-MCNC: 17 MG/DL (ref 6–23)
BURR CELLS: ABNORMAL
CALCIUM SERPL-MCNC: 10.1 MG/DL (ref 8.6–10.2)
CHLORIDE SERPL-SCNC: 97 MMOL/L (ref 98–107)
CO2 SERPL-SCNC: 29 MMOL/L (ref 22–29)
CREAT SERPL-MCNC: 0.6 MG/DL (ref 0.5–1)
EOSINOPHIL # BLD: 0.1 E9/L (ref 0.05–0.5)
EOSINOPHIL NFR BLD: 1.7 % (ref 0–6)
ERYTHROCYTE [DISTWIDTH] IN BLOOD BY AUTOMATED COUNT: 22.3 FL (ref 11.5–15)
FERRITIN SERPL-MCNC: 41 NG/ML
GLUCOSE SERPL-MCNC: 96 MG/DL (ref 74–99)
HCT VFR BLD AUTO: 32.1 % (ref 34–48)
HGB BLD-MCNC: 9.2 G/DL (ref 11.5–15.5)
INTERNATIONAL NORMALIZATION RATIO, POC: 1.6
IRON SATN MFR SERPL: 8 % (ref 15–50)
IRON SERPL-MCNC: 34 MCG/DL (ref 37–145)
LDH SERPL-CCNC: 187 U/L (ref 135–214)
LYMPHOCYTES # BLD: 0.4 E9/L (ref 1.5–4)
LYMPHOCYTES NFR BLD: 6.9 % (ref 20–42)
Lab: NORMAL
Lab: NORMAL
MCH RBC QN AUTO: 26.5 PG (ref 26–35)
MCHC RBC AUTO-ENTMCNC: 28.7 % (ref 32–34.5)
MCV RBC AUTO: 92.5 FL (ref 80–99.9)
MONOCYTES # BLD: 0.57 E9/L (ref 0.1–0.95)
MONOCYTES NFR BLD: 9.6 % (ref 2–12)
NEUTROPHILS # BLD: 4.62 E9/L (ref 1.8–7.3)
NEUTS SEG NFR BLD: 80.9 % (ref 43–80)
OVALOCYTES: ABNORMAL
PLATELET # BLD AUTO: 359 E9/L (ref 130–450)
PMV BLD AUTO: 9.9 FL (ref 7–12)
POIKILOCYTES: ABNORMAL
POLYCHROMASIA: ABNORMAL
POTASSIUM SERPL-SCNC: 4.2 MMOL/L (ref 3.5–5)
PROT SERPL-MCNC: 7.6 G/DL (ref 6.4–8.3)
PROTHROMBIN TIME, POC: 0
RBC # BLD AUTO: 3.47 E12/L (ref 3.5–5.5)
SODIUM SERPL-SCNC: 137 MMOL/L (ref 132–146)
TEAR DROP CELLS: ABNORMAL
THIS TEST SENT TO: NORMAL
THIS TEST SENT TO: NORMAL
TIBC SERPL-MCNC: 453 MCG/DL (ref 250–450)
WBC # BLD: 5.7 E9/L (ref 4.5–11.5)

## 2023-05-02 PROCEDURE — 3017F COLORECTAL CA SCREEN DOC REV: CPT | Performed by: INTERNAL MEDICINE

## 2023-05-02 PROCEDURE — 1123F ACP DISCUSS/DSCN MKR DOCD: CPT | Performed by: INTERNAL MEDICINE

## 2023-05-02 PROCEDURE — 83540 ASSAY OF IRON: CPT

## 2023-05-02 PROCEDURE — 36415 COLL VENOUS BLD VENIPUNCTURE: CPT

## 2023-05-02 PROCEDURE — 93793 ANTICOAG MGMT PT WARFARIN: CPT | Performed by: INTERNAL MEDICINE

## 2023-05-02 PROCEDURE — 3074F SYST BP LT 130 MM HG: CPT | Performed by: INTERNAL MEDICINE

## 2023-05-02 PROCEDURE — 80053 COMPREHEN METABOLIC PANEL: CPT

## 2023-05-02 PROCEDURE — G8427 DOCREV CUR MEDS BY ELIG CLIN: HCPCS | Performed by: INTERNAL MEDICINE

## 2023-05-02 PROCEDURE — 83550 IRON BINDING TEST: CPT

## 2023-05-02 PROCEDURE — 99204 OFFICE O/P NEW MOD 45 MIN: CPT | Performed by: INTERNAL MEDICINE

## 2023-05-02 PROCEDURE — 1036F TOBACCO NON-USER: CPT | Performed by: INTERNAL MEDICINE

## 2023-05-02 PROCEDURE — G8400 PT W/DXA NO RESULTS DOC: HCPCS | Performed by: INTERNAL MEDICINE

## 2023-05-02 PROCEDURE — 1090F PRES/ABSN URINE INCON ASSESS: CPT | Performed by: INTERNAL MEDICINE

## 2023-05-02 PROCEDURE — 99214 OFFICE O/P EST MOD 30 MIN: CPT

## 2023-05-02 PROCEDURE — 81270 JAK2 GENE: CPT

## 2023-05-02 PROCEDURE — 3078F DIAST BP <80 MM HG: CPT | Performed by: INTERNAL MEDICINE

## 2023-05-02 PROCEDURE — 82728 ASSAY OF FERRITIN: CPT

## 2023-05-02 PROCEDURE — 83615 LACTATE (LD) (LDH) ENZYME: CPT

## 2023-05-02 PROCEDURE — 1111F DSCHRG MED/CURRENT MED MERGE: CPT | Performed by: INTERNAL MEDICINE

## 2023-05-02 PROCEDURE — 85025 COMPLETE CBC W/AUTO DIFF WBC: CPT

## 2023-05-02 PROCEDURE — G8420 CALC BMI NORM PARAMETERS: HCPCS | Performed by: INTERNAL MEDICINE

## 2023-05-02 NOTE — TELEPHONE ENCOUNTER
Earl Marroquin  5/2/2023  Wt Readings from Last 10 Encounters:   05/02/23 133 lb 12.8 oz (60.7 kg)   04/12/23 145 lb 8.1 oz (66 kg)   02/16/23 131 lb 14.4 oz (59.8 kg)   02/08/23 139 lb 3.2 oz (63.1 kg)   01/24/23 140 lb (63.5 kg)   01/10/23 140 lb (63.5 kg)   11/29/22 149 lb 8 oz (67.8 kg)   11/23/22 148 lb (67.1 kg)   08/17/22 154 lb (69.9 kg)   05/10/22 148 lb 6.4 oz (67.3 kg)     Ht Readings from Last 1 Encounters:   05/02/23 5' 3\" (1.6 m)     BMI=23.7    Assessment: Met with Vidya Morse while in for new patient visit with Dr. Evangelist Shrestha for anemia. Was notified of weight loss. Vidya Morse reports she has been losing weight. She has been had a phone visit with dietitian from [facility] transplant team. She was recommended to follow a low sodium diet and also to increase protein/start an ONS. Vidya Morse has not tried any of the ONS. Provided her with list of \"High Calorie/Protein Snack Ideas\" and samples/coupons of Ensure Complete and Ensure Clear. Differences explained between supplements. Encouraged increase in calories for weight maintenance. Weight change: 8.04% significant weight loss x 1 month, 10.8% significant weight loss x 6 months  Appetite: Fair appetite  Nutritional Side Effects: anorexia  Calculated Needs: 28-30kcal/kg/FCG=1528-7151ixpxm, 1-1.2gm/kg/CBW=60-75gm protein, 1ml/kcal=1700-1800ml fluids    Recommendations: Continue to follow low sodium diet of 2000mg/day or less. Eat 3 meals per day and 3 snacks per day. Add ONS of choice per day.      Carlos Woo RD

## 2023-05-02 NOTE — PROGRESS NOTES
aMlathi Mohit  1955 70 y.o. Referring Physician:     PCP: Jocelin Salinas, DO    Vitals:    23 0937   BP: 103/64   Pulse: 89   Temp: 99 °F (37.2 °C)   SpO2: 91%        Wt Readings from Last 3 Encounters:   23 133 lb 12.8 oz (60.7 kg)   23 145 lb 8.1 oz (66 kg)   23 131 lb 14.4 oz (59.8 kg)        Body mass index is 23.7 kg/m². Chief Complaint:   Chief Complaint   Patient presents with    New Patient          Cancer Staging   No matching staging information was found for the patient. Prior Radiation Therapy? NO    Concurrent Chemo/radiation? NO    Prior Chemotherapy? NO    Prior Hormonal Therapy? NO    Head and Neck Cancer? No, patient does NOT have HN cancer.       LMP: menopause    Age at first Menses: 14    : 2    Para: 2          Current Outpatient Medications:     warfarin (COUMADIN) 4 MG tablet, Take 1 tablet by mouth daily, Disp: 90 tablet, Rfl: 0    pantoprazole (PROTONIX) 40 MG tablet, Take 1 tablet by mouth 2 times daily, Disp: 30 tablet, Rfl: 3    ferrous sulfate (IRON 325) 325 (65 Fe) MG tablet, Take 1 tablet by mouth daily (with breakfast), Disp: 30 tablet, Rfl: 1    flecainide (TAMBOCOR) 100 MG tablet, Take 1 tablet by mouth 2 times daily, Disp: , Rfl:     Magnesium 500 MG TABS, Take 500 mg by mouth daily, Disp: , Rfl:     Multiple Vitamins-Minerals (MULTI-VITAMIN GUMMIES) CHEW, Take 1 each by mouth daily, Disp: , Rfl:     rOPINIRole (REQUIP) 1 MG tablet, Take 1 tablet by mouth nightly, Disp: , Rfl:     tiotropium (SPIRIVA) 18 MCG inhalation capsule, Inhale 1 capsule into the lungs daily, Disp: , Rfl:     potassium chloride (KLOR-CON M) 10 MEQ extended release tablet, Take 2 tablets by mouth at bedtime, Disp: , Rfl:     torsemide (DEMADEX) 20 MG tablet, Take 2 tablets by mouth See Admin Instructions Given Monday,Wednesday,Friday, Disp: , Rfl:     torsemide (DEMADEX) 20 MG tablet, Take 1 tablet by mouth See Admin Instructions Given
04/09/2023    ALKPHOS 57 04/09/2023    AST 12 04/09/2023    ALT 9 04/09/2023    LABGLOM >60 04/12/2023    GFRAA >60 08/17/2022     Lab Results   Component Value Date    IRON 80 04/06/2023    TIBC 416 04/06/2023    FERRITIN 15 04/06/2023     Impression/Plan:  71 y/o female with hx HTN, hyperlipidemia, hypothyroidism, CAD, of A. Fib on coumadin, Connective tissue disorder Raynaud's, polymyositis on CellCept follows rheumatology at [facility] (Dr. Leeanna Sam), who presented to the ED for SOB and lightheadedness, found to be anemic. No melena. Hemoccult negative. Transfused 1 unit prbc and admitted for further evaluation    Fe 80 TIBC 416 FeSat 19% Ferritin 15  Epo 343     Retic 2.4%   slightly hemolyzed   Haptoglobin 93  Dwaine Negative    Folate >20  VitB12 771    VitB6, Copper WNL  Zinc 54.9 ();    SPEP: No monoclonal protein identified  SIFE: Normal. No monoclonal protein identified    Peripheral blood smear noted severe normocytic hypochromic anemia, appearing hyperproliferative. Absolute lymphopenia is present, favoring reactive. Peripheral blood flow cytometry noted no immunophenotypic evidence of a lymphoproliferative disorder, acute leukemia or circulating blasts is identified. EGD and colonoscopy on 4/10/2023. EGD found nonbleeding AVMs of the duodenum status post electrocautery and endoclip. Colonoscopy noted red spots/low risk AVMs, not treated due to significant tortuosity and unstable scope position. Referred to our clinic for further evaluation and treatment  Zinc deficiency prescribed zinc 50 mg p.o. daily  Iron deficiency anemia, repeat labs including Iron studies ordered; recommend Feraheme x2 doses. Side effects reviewed. She agreed to proceed. Authorization to be obtained  RTC 2 weeks to review test results and possible Dose # 1 Feraheme.  Continue to f/u with GI team.    Thank you for allowing us to participate in the care of . Bernice Blake MD

## 2023-05-02 NOTE — PROGRESS NOTES
INR today is 1.6. Per Dr. Gomez Cochran, patient is to take 8 mg today and 8 mg tomorrow and then start taking 4 mg everyday. Recheck next Friday.

## 2023-05-04 LAB
Lab: NORMAL
REPORT: NORMAL
THIS TEST SENT TO: NORMAL

## 2023-05-09 LAB
JAK2 P.V617F BLD/T QL: NOT DETECTED
SPECIMEN SOURCE: NORMAL

## 2023-05-10 LAB
Lab: NORMAL
REPORT: NORMAL
THIS TEST SENT TO: NORMAL

## 2023-05-11 LAB
Lab: NORMAL
REPORT: NORMAL
THIS TEST SENT TO: NORMAL

## 2023-05-12 ENCOUNTER — ANTI-COAG VISIT (OUTPATIENT)
Dept: CARDIOLOGY CLINIC | Age: 68
End: 2023-05-12
Payer: MEDICARE

## 2023-05-12 DIAGNOSIS — Z79.01 ON CONTINUOUS ORAL ANTICOAGULATION: Primary | ICD-10-CM

## 2023-05-12 DIAGNOSIS — I48.91 ATRIAL FIBRILLATION, UNSPECIFIED TYPE (HCC): ICD-10-CM

## 2023-05-12 LAB
INTERNATIONAL NORMALIZATION RATIO, POC: 1.6
PROTHROMBIN TIME, POC: 0

## 2023-05-12 PROCEDURE — 93793 ANTICOAG MGMT PT WARFARIN: CPT | Performed by: INTERNAL MEDICINE

## 2023-05-16 ENCOUNTER — HOSPITAL ENCOUNTER (OUTPATIENT)
Dept: INFUSION THERAPY | Age: 68
Discharge: HOME | End: 2023-05-16
Payer: MEDICARE

## 2023-05-16 ENCOUNTER — OFFICE VISIT (OUTPATIENT)
Dept: ONCOLOGY | Age: 68
End: 2023-05-16
Payer: MEDICARE

## 2023-05-16 VITALS
HEIGHT: 63 IN | TEMPERATURE: 99.4 F | WEIGHT: 130.8 LBS | DIASTOLIC BLOOD PRESSURE: 62 MMHG | BODY MASS INDEX: 23.18 KG/M2 | SYSTOLIC BLOOD PRESSURE: 130 MMHG

## 2023-05-16 DIAGNOSIS — D50.9 IRON DEFICIENCY ANEMIA, UNSPECIFIED IRON DEFICIENCY ANEMIA TYPE: Primary | ICD-10-CM

## 2023-05-16 DIAGNOSIS — E60 ZINC DEFICIENCY: ICD-10-CM

## 2023-05-16 PROCEDURE — G8427 DOCREV CUR MEDS BY ELIG CLIN: HCPCS | Performed by: INTERNAL MEDICINE

## 2023-05-16 PROCEDURE — 1036F TOBACCO NON-USER: CPT | Performed by: INTERNAL MEDICINE

## 2023-05-16 PROCEDURE — 1123F ACP DISCUSS/DSCN MKR DOCD: CPT | Performed by: INTERNAL MEDICINE

## 2023-05-16 PROCEDURE — 99212 OFFICE O/P EST SF 10 MIN: CPT

## 2023-05-16 PROCEDURE — G8420 CALC BMI NORM PARAMETERS: HCPCS | Performed by: INTERNAL MEDICINE

## 2023-05-16 PROCEDURE — 3078F DIAST BP <80 MM HG: CPT | Performed by: INTERNAL MEDICINE

## 2023-05-16 PROCEDURE — 1090F PRES/ABSN URINE INCON ASSESS: CPT | Performed by: INTERNAL MEDICINE

## 2023-05-16 PROCEDURE — 99213 OFFICE O/P EST LOW 20 MIN: CPT | Performed by: INTERNAL MEDICINE

## 2023-05-16 PROCEDURE — 3074F SYST BP LT 130 MM HG: CPT | Performed by: INTERNAL MEDICINE

## 2023-05-16 PROCEDURE — 3017F COLORECTAL CA SCREEN DOC REV: CPT | Performed by: INTERNAL MEDICINE

## 2023-05-16 PROCEDURE — G8400 PT W/DXA NO RESULTS DOC: HCPCS | Performed by: INTERNAL MEDICINE

## 2023-05-16 RX ORDER — SODIUM CHLORIDE 9 MG/ML
5-250 INJECTION, SOLUTION INTRAVENOUS PRN
OUTPATIENT
Start: 2023-05-16

## 2023-05-16 RX ORDER — ACETAMINOPHEN 325 MG/1
650 TABLET ORAL
OUTPATIENT
Start: 2023-05-16

## 2023-05-16 RX ORDER — SODIUM CHLORIDE 0.9 % (FLUSH) 0.9 %
5-40 SYRINGE (ML) INJECTION PRN
OUTPATIENT
Start: 2023-05-16

## 2023-05-16 RX ORDER — ALBUTEROL SULFATE 90 UG/1
4 AEROSOL, METERED RESPIRATORY (INHALATION) PRN
OUTPATIENT
Start: 2023-05-16

## 2023-05-16 RX ORDER — DIPHENHYDRAMINE HYDROCHLORIDE 50 MG/ML
50 INJECTION INTRAMUSCULAR; INTRAVENOUS
OUTPATIENT
Start: 2023-05-16

## 2023-05-16 RX ORDER — HEPARIN SODIUM (PORCINE) LOCK FLUSH IV SOLN 100 UNIT/ML 100 UNIT/ML
500 SOLUTION INTRAVENOUS PRN
OUTPATIENT
Start: 2023-05-16

## 2023-05-16 RX ORDER — EPINEPHRINE 1 MG/ML
0.3 INJECTION, SOLUTION, CONCENTRATE INTRAVENOUS PRN
OUTPATIENT
Start: 2023-05-16

## 2023-05-16 RX ORDER — SODIUM CHLORIDE 9 MG/ML
INJECTION, SOLUTION INTRAVENOUS CONTINUOUS
OUTPATIENT
Start: 2023-05-16

## 2023-05-16 RX ORDER — ONDANSETRON 2 MG/ML
8 INJECTION INTRAMUSCULAR; INTRAVENOUS
OUTPATIENT
Start: 2023-05-16

## 2023-05-16 NOTE — PROGRESS NOTES
2.4%   slightly hemolyzed   Haptoglobin 93  Dwaine Negative    Folate >20  VitB12 771    VitB6, Copper WNL  Zinc 54.9 ();    SPEP: No monoclonal protein identified  SIFE: Normal. No monoclonal protein identified    Peripheral blood smear noted severe normocytic hypochromic anemia, appearing hyperproliferative. Absolute lymphopenia is present, favoring reactive. Peripheral blood flow cytometry noted no immunophenotypic evidence of a lymphoproliferative disorder, acute leukemia or circulating blasts is identified. EGD and colonoscopy on 4/10/2023. EGD found nonbleeding AVMs of the duodenum status post electrocautery and endoclip. Colonoscopy noted red spots/low risk AVMs, not treated due to significant tortuosity and unstable scope position. Referred to our clinic for further evaluation and treatment  Zinc deficiency prescribed zinc 50 mg p.o. daily    On 05/02/2023:  Hb 9.2 Hct 32.1  MCV 92.5  WBC 5.7    BUN 17 Creat 0.6     Fe 34 TIBC 453 FeSat 8% Ferritin   Peripheral blood flow cytometry noted no immunophenotypic evidence of lymphoproliferative disorder, acute leukemia or circulating blasts is identified. Peripheral blood MDS FISH: Negative Study  JAK2 Mutation: Not detected   Labs reviewed. Pathology reviewed. Iron deficiency anemia; recommended 2 doses of Feraheme. Side effects reviewed. She agreed to proceed. Dose # 1 Feraheme to be given when authorized. RTC Dose # 2 Feraheme.  Continue to f/u with GI team. Continue Zinc hx of zinc deficiency    Ron Bernal MD   5/16/2023

## 2023-05-17 ENCOUNTER — OFFICE VISIT (OUTPATIENT)
Dept: NON INVASIVE DIAGNOSTICS | Age: 68
End: 2023-05-17

## 2023-05-17 VITALS
HEART RATE: 81 BPM | HEIGHT: 63 IN | WEIGHT: 135.4 LBS | SYSTOLIC BLOOD PRESSURE: 116 MMHG | BODY MASS INDEX: 23.99 KG/M2 | DIASTOLIC BLOOD PRESSURE: 62 MMHG

## 2023-05-17 DIAGNOSIS — Z95.0 PACEMAKER: ICD-10-CM

## 2023-05-17 DIAGNOSIS — I48.19 PERSISTENT ATRIAL FIBRILLATION (HCC): Primary | ICD-10-CM

## 2023-05-17 RX ORDER — FLECAINIDE ACETATE 100 MG/1
100 TABLET ORAL 2 TIMES DAILY
Qty: 180 TABLET | Refills: 1 | Status: SHIPPED | OUTPATIENT
Start: 2023-05-17

## 2023-05-17 NOTE — PROGRESS NOTES
[facility] CARDIOLOGY  CARDIAC ELECTROPHYSIOLOGY DEPARTMENT/DIVISION OF CARDIOLOGY  Outpatient progress report  PATIENT: Edilia Heard RECORD NUMBER: [id number]  DATE OF SERVICE:  5/17/2023  Primary EP: Dr Ruth Ponce  at [id number] and Dr. Lit Beck: No ref. provider found and Mikey Oden DO  CHIEF COMPLAINT: AV block    HPI: : John Guthrie is a 75 y.o. female with a history of nonvalvular persistent AF sp DCCV x3 (3/2022 at [facility]; 8/11/2022 at [facility]; 1/24/2023), AT sp DCCV (8/11/2022 at [facility]), high grade AV block sp MDT dc PPM (DOI: 11/25/22-Dr Ruth Ponce with RV lead as LBB area; RA lead revision 2/2 RA lead dislodgement: 11/28/22-Dr Kayleen Cardona), CVA, carotid artery stenosis sp right CEA (?),  HTN, JANNETH-noncompliant with CPAP, COPD/emphysema, severe pulmonary HTN-combination of group 1 (CTD) + group 2 + group 3 (emphysema, JANNETH), scleroderma/Raynaud's/esophageal dysmotility, hypothyroidism, and cataracts sp bilateral removal (2019). She is managed by Dr. Scarlett Seaman with Flecainide 100mg BID, VKA,  Demadex, Aldactone, Sildenafil, cellcept, Mag, Synthroid, hyoscyamine, synthroid, Advair Diskus, Oxygen. In 2020, she was diagnosed with pulmonary hypertension, which was treated with sildenafil. She was referred to rheumatology, who diagnosed the patient with scleroderma and initiated treatment with Norvasc for Raynaud's and mycophenolate for cutaneous manifestations. In 2021, she was diagnosed with nonvalvular paroxysmal AF, which was treated with [facility] and beta-blocker. In 12/2021, she was reportedly on a brief course of amiodarone, but details of this are unavailable. In 3/2022, she was admitted to [facility] for acute right-sided heart failure and AF with RVR, which was treated with diuresis, thoracentesis, and ELMER/DCCV. In 6/2022, patient was referred to [facility] EP (Dr. Liam Mack) for management of AF.   Patient was noted to be in AT with 2-1 AV conduction at 110 bpm.  Metoprolol was

## 2023-05-23 ENCOUNTER — HOSPITAL ENCOUNTER (OUTPATIENT)
Dept: INFUSION THERAPY | Age: 68
Discharge: HOME | End: 2023-05-23
Payer: MEDICARE

## 2023-05-23 ENCOUNTER — OFFICE VISIT (OUTPATIENT)
Dept: ONCOLOGY | Age: 68
End: 2023-05-23
Payer: MEDICARE

## 2023-05-23 VITALS
HEART RATE: 86 BPM | SYSTOLIC BLOOD PRESSURE: 94 MMHG | BODY MASS INDEX: 22.98 KG/M2 | WEIGHT: 129.7 LBS | DIASTOLIC BLOOD PRESSURE: 63 MMHG | OXYGEN SATURATION: 97 % | HEIGHT: 63 IN | TEMPERATURE: 97.3 F

## 2023-05-23 VITALS
DIASTOLIC BLOOD PRESSURE: 65 MMHG | HEART RATE: 72 BPM | TEMPERATURE: 99.5 F | RESPIRATION RATE: 20 BRPM | SYSTOLIC BLOOD PRESSURE: 102 MMHG

## 2023-05-23 DIAGNOSIS — D50.9 IRON DEFICIENCY ANEMIA, UNSPECIFIED IRON DEFICIENCY ANEMIA TYPE: Primary | ICD-10-CM

## 2023-05-23 DIAGNOSIS — E60 ZINC DEFICIENCY: ICD-10-CM

## 2023-05-23 PROCEDURE — 1123F ACP DISCUSS/DSCN MKR DOCD: CPT | Performed by: INTERNAL MEDICINE

## 2023-05-23 PROCEDURE — 1090F PRES/ABSN URINE INCON ASSESS: CPT | Performed by: INTERNAL MEDICINE

## 2023-05-23 PROCEDURE — 99213 OFFICE O/P EST LOW 20 MIN: CPT | Performed by: INTERNAL MEDICINE

## 2023-05-23 PROCEDURE — 3078F DIAST BP <80 MM HG: CPT | Performed by: INTERNAL MEDICINE

## 2023-05-23 PROCEDURE — 6360000002 HC RX W HCPCS: Performed by: INTERNAL MEDICINE

## 2023-05-23 PROCEDURE — G8420 CALC BMI NORM PARAMETERS: HCPCS | Performed by: INTERNAL MEDICINE

## 2023-05-23 PROCEDURE — G8400 PT W/DXA NO RESULTS DOC: HCPCS | Performed by: INTERNAL MEDICINE

## 2023-05-23 PROCEDURE — 3074F SYST BP LT 130 MM HG: CPT | Performed by: INTERNAL MEDICINE

## 2023-05-23 PROCEDURE — 3017F COLORECTAL CA SCREEN DOC REV: CPT | Performed by: INTERNAL MEDICINE

## 2023-05-23 PROCEDURE — G8427 DOCREV CUR MEDS BY ELIG CLIN: HCPCS | Performed by: INTERNAL MEDICINE

## 2023-05-23 PROCEDURE — 96365 THER/PROPH/DIAG IV INF INIT: CPT

## 2023-05-23 PROCEDURE — 2580000003 HC RX 258: Performed by: INTERNAL MEDICINE

## 2023-05-23 PROCEDURE — 1036F TOBACCO NON-USER: CPT | Performed by: INTERNAL MEDICINE

## 2023-05-23 RX ORDER — SODIUM CHLORIDE 0.9 % (FLUSH) 0.9 %
5-40 SYRINGE (ML) INJECTION PRN
OUTPATIENT
Start: 2023-05-30

## 2023-05-23 RX ORDER — FAMOTIDINE 10 MG/ML
20 INJECTION, SOLUTION INTRAVENOUS
OUTPATIENT
Start: 2023-05-30

## 2023-05-23 RX ORDER — ONDANSETRON 2 MG/ML
8 INJECTION INTRAMUSCULAR; INTRAVENOUS
OUTPATIENT
Start: 2023-05-30

## 2023-05-23 RX ORDER — SODIUM CHLORIDE 9 MG/ML
5-250 INJECTION, SOLUTION INTRAVENOUS PRN
Status: DISCONTINUED | OUTPATIENT
Start: 2023-05-23 | End: 2023-05-24 | Stop reason: HOSPADM

## 2023-05-23 RX ORDER — HEPARIN SODIUM (PORCINE) LOCK FLUSH IV SOLN 100 UNIT/ML 100 UNIT/ML
500 SOLUTION INTRAVENOUS PRN
OUTPATIENT
Start: 2023-05-30

## 2023-05-23 RX ORDER — SODIUM CHLORIDE 9 MG/ML
5-250 INJECTION, SOLUTION INTRAVENOUS PRN
OUTPATIENT
Start: 2023-05-30

## 2023-05-23 RX ORDER — ACETAMINOPHEN 325 MG/1
650 TABLET ORAL
OUTPATIENT
Start: 2023-05-30

## 2023-05-23 RX ORDER — EPINEPHRINE 1 MG/ML
0.3 INJECTION, SOLUTION, CONCENTRATE INTRAVENOUS PRN
OUTPATIENT
Start: 2023-05-30

## 2023-05-23 RX ORDER — DIPHENHYDRAMINE HYDROCHLORIDE 50 MG/ML
50 INJECTION INTRAMUSCULAR; INTRAVENOUS
OUTPATIENT
Start: 2023-05-30

## 2023-05-23 RX ORDER — ALBUTEROL SULFATE 90 UG/1
4 AEROSOL, METERED RESPIRATORY (INHALATION) PRN
OUTPATIENT
Start: 2023-05-30

## 2023-05-23 RX ORDER — SODIUM CHLORIDE 9 MG/ML
INJECTION, SOLUTION INTRAVENOUS CONTINUOUS
OUTPATIENT
Start: 2023-05-30

## 2023-05-23 RX ADMIN — FERUMOXYTOL 510 MG: 510 INJECTION INTRAVENOUS at 14:39

## 2023-05-23 RX ADMIN — SODIUM CHLORIDE 20 ML/HR: 9 INJECTION, SOLUTION INTRAVENOUS at 14:30

## 2023-05-26 ENCOUNTER — ANTI-COAG VISIT (OUTPATIENT)
Dept: CARDIOLOGY CLINIC | Age: 68
End: 2023-05-26
Payer: MEDICARE

## 2023-05-26 DIAGNOSIS — I48.91 ATRIAL FIBRILLATION, UNSPECIFIED TYPE (HCC): ICD-10-CM

## 2023-05-26 LAB
INTERNATIONAL NORMALIZATION RATIO, POC: 1.8
PROTHROMBIN TIME, POC: 0

## 2023-05-26 PROCEDURE — 93793 ANTICOAG MGMT PT WARFARIN: CPT | Performed by: INTERNAL MEDICINE

## 2023-05-30 ENCOUNTER — HOSPITAL ENCOUNTER (OUTPATIENT)
Dept: INFUSION THERAPY | Age: 68
End: 2023-05-30
Payer: MEDICARE

## 2023-06-06 ENCOUNTER — OFFICE VISIT (OUTPATIENT)
Dept: ONCOLOGY | Age: 68
End: 2023-06-06
Payer: MEDICARE

## 2023-06-06 ENCOUNTER — HOSPITAL ENCOUNTER (OUTPATIENT)
Dept: INFUSION THERAPY | Age: 68
Discharge: HOME | End: 2023-06-06
Payer: MEDICARE

## 2023-06-06 VITALS
SYSTOLIC BLOOD PRESSURE: 96 MMHG | HEART RATE: 71 BPM | RESPIRATION RATE: 18 BRPM | DIASTOLIC BLOOD PRESSURE: 58 MMHG | TEMPERATURE: 99 F

## 2023-06-06 VITALS
SYSTOLIC BLOOD PRESSURE: 126 MMHG | OXYGEN SATURATION: 98 % | BODY MASS INDEX: 23.21 KG/M2 | DIASTOLIC BLOOD PRESSURE: 68 MMHG | TEMPERATURE: 98.7 F | HEART RATE: 83 BPM | WEIGHT: 131 LBS | HEIGHT: 63 IN

## 2023-06-06 DIAGNOSIS — E60 ZINC DEFICIENCY: ICD-10-CM

## 2023-06-06 DIAGNOSIS — D50.9 IRON DEFICIENCY ANEMIA, UNSPECIFIED IRON DEFICIENCY ANEMIA TYPE: Primary | ICD-10-CM

## 2023-06-06 PROCEDURE — G8420 CALC BMI NORM PARAMETERS: HCPCS | Performed by: INTERNAL MEDICINE

## 2023-06-06 PROCEDURE — 1123F ACP DISCUSS/DSCN MKR DOCD: CPT | Performed by: INTERNAL MEDICINE

## 2023-06-06 PROCEDURE — 1036F TOBACCO NON-USER: CPT | Performed by: INTERNAL MEDICINE

## 2023-06-06 PROCEDURE — 6360000002 HC RX W HCPCS: Performed by: INTERNAL MEDICINE

## 2023-06-06 PROCEDURE — G8400 PT W/DXA NO RESULTS DOC: HCPCS | Performed by: INTERNAL MEDICINE

## 2023-06-06 PROCEDURE — 3074F SYST BP LT 130 MM HG: CPT | Performed by: INTERNAL MEDICINE

## 2023-06-06 PROCEDURE — 99213 OFFICE O/P EST LOW 20 MIN: CPT | Performed by: INTERNAL MEDICINE

## 2023-06-06 PROCEDURE — 3017F COLORECTAL CA SCREEN DOC REV: CPT | Performed by: INTERNAL MEDICINE

## 2023-06-06 PROCEDURE — 3078F DIAST BP <80 MM HG: CPT | Performed by: INTERNAL MEDICINE

## 2023-06-06 PROCEDURE — G8427 DOCREV CUR MEDS BY ELIG CLIN: HCPCS | Performed by: INTERNAL MEDICINE

## 2023-06-06 PROCEDURE — 2580000003 HC RX 258: Performed by: INTERNAL MEDICINE

## 2023-06-06 PROCEDURE — 96365 THER/PROPH/DIAG IV INF INIT: CPT

## 2023-06-06 PROCEDURE — 1090F PRES/ABSN URINE INCON ASSESS: CPT | Performed by: INTERNAL MEDICINE

## 2023-06-06 RX ORDER — SODIUM CHLORIDE 9 MG/ML
5-250 INJECTION, SOLUTION INTRAVENOUS PRN
Status: CANCELLED | OUTPATIENT
Start: 2023-06-06

## 2023-06-06 RX ORDER — SODIUM CHLORIDE 9 MG/ML
INJECTION, SOLUTION INTRAVENOUS CONTINUOUS
OUTPATIENT
Start: 2023-06-06

## 2023-06-06 RX ORDER — HEPARIN SODIUM (PORCINE) LOCK FLUSH IV SOLN 100 UNIT/ML 100 UNIT/ML
500 SOLUTION INTRAVENOUS PRN
Status: CANCELLED | OUTPATIENT
Start: 2023-06-06

## 2023-06-06 RX ORDER — DIPHENHYDRAMINE HYDROCHLORIDE 50 MG/ML
50 INJECTION INTRAMUSCULAR; INTRAVENOUS
OUTPATIENT
Start: 2023-06-06

## 2023-06-06 RX ORDER — SODIUM CHLORIDE 9 MG/ML
5-250 INJECTION, SOLUTION INTRAVENOUS PRN
OUTPATIENT
Start: 2023-06-06

## 2023-06-06 RX ORDER — SODIUM CHLORIDE 0.9 % (FLUSH) 0.9 %
5-40 SYRINGE (ML) INJECTION PRN
Status: DISCONTINUED | OUTPATIENT
Start: 2023-06-06 | End: 2023-06-07 | Stop reason: HOSPADM

## 2023-06-06 RX ORDER — ONDANSETRON 2 MG/ML
8 INJECTION INTRAMUSCULAR; INTRAVENOUS
OUTPATIENT
Start: 2023-06-06

## 2023-06-06 RX ORDER — SODIUM CHLORIDE 0.9 % (FLUSH) 0.9 %
5-40 SYRINGE (ML) INJECTION PRN
OUTPATIENT
Start: 2023-06-06

## 2023-06-06 RX ORDER — SODIUM CHLORIDE 9 MG/ML
5-250 INJECTION, SOLUTION INTRAVENOUS PRN
Status: DISCONTINUED | OUTPATIENT
Start: 2023-06-06 | End: 2023-06-07 | Stop reason: HOSPADM

## 2023-06-06 RX ORDER — EPINEPHRINE 1 MG/ML
0.3 INJECTION, SOLUTION, CONCENTRATE INTRAVENOUS PRN
OUTPATIENT
Start: 2023-06-06

## 2023-06-06 RX ORDER — FAMOTIDINE 10 MG/ML
20 INJECTION, SOLUTION INTRAVENOUS
OUTPATIENT
Start: 2023-06-06

## 2023-06-06 RX ORDER — ALBUTEROL SULFATE 90 UG/1
4 AEROSOL, METERED RESPIRATORY (INHALATION) PRN
OUTPATIENT
Start: 2023-06-06

## 2023-06-06 RX ORDER — ACETAMINOPHEN 325 MG/1
650 TABLET ORAL
OUTPATIENT
Start: 2023-06-06

## 2023-06-06 RX ADMIN — FERUMOXYTOL 510 MG: 510 INJECTION INTRAVENOUS at 14:30

## 2023-06-06 RX ADMIN — SODIUM CHLORIDE 20 ML/HR: 9 INJECTION, SOLUTION INTRAVENOUS at 14:26

## 2023-06-06 NOTE — PROGRESS NOTES
given today 06/05/2023    RTC 1 month with prior labs and possible Feraheme.  Continue to f/u with GI team. Continue Zinc for hx of Zinc deficiency    Palmira Quezada MD   6/6/2023

## 2023-06-28 RX ORDER — WARFARIN SODIUM 4 MG/1
4 TABLET ORAL DAILY
Qty: 180 TABLET | Refills: 3 | Status: SHIPPED
Start: 2023-06-28 | End: 2023-06-29 | Stop reason: SDUPTHER

## 2023-06-29 RX ORDER — WARFARIN SODIUM 6 MG/1
6 TABLET ORAL DAILY
Qty: 90 TABLET | Refills: 3 | Status: SHIPPED | OUTPATIENT
Start: 2023-06-29

## 2023-06-29 RX ORDER — WARFARIN SODIUM 4 MG/1
4 TABLET ORAL DAILY
Qty: 90 TABLET | Refills: 3 | Status: SHIPPED | OUTPATIENT
Start: 2023-06-29

## 2023-07-07 ENCOUNTER — HOSPITAL ENCOUNTER (OUTPATIENT)
Dept: INFUSION THERAPY | Age: 68
Discharge: HOME | End: 2023-07-07
Payer: MEDICARE

## 2023-07-07 DIAGNOSIS — D50.9 IRON DEFICIENCY ANEMIA, UNSPECIFIED IRON DEFICIENCY ANEMIA TYPE: Primary | ICD-10-CM

## 2023-07-07 DIAGNOSIS — D50.9 IRON DEFICIENCY ANEMIA, UNSPECIFIED IRON DEFICIENCY ANEMIA TYPE: ICD-10-CM

## 2023-07-07 LAB
ALBUMIN SERPL-MCNC: 4.9 G/DL (ref 3.5–5.2)
ALP SERPL-CCNC: 79 U/L (ref 35–104)
ALT SERPL-CCNC: 22 U/L (ref 0–32)
ANION GAP SERPL CALCULATED.3IONS-SCNC: 10 MMOL/L (ref 7–16)
AST SERPL-CCNC: 25 U/L (ref 0–31)
BASOPHILS # BLD: 0.03 E9/L (ref 0–0.2)
BASOPHILS NFR BLD: 0.7 % (ref 0–2)
BILIRUB SERPL-MCNC: 0.4 MG/DL (ref 0–1.2)
BUN SERPL-MCNC: 28 MG/DL (ref 6–23)
CALCIUM SERPL-MCNC: 10.2 MG/DL (ref 8.6–10.2)
CHLORIDE SERPL-SCNC: 97 MMOL/L (ref 98–107)
CO2 SERPL-SCNC: 29 MMOL/L (ref 22–29)
CREAT SERPL-MCNC: 0.7 MG/DL (ref 0.5–1)
EOSINOPHIL # BLD: 0.07 E9/L (ref 0.05–0.5)
EOSINOPHIL NFR BLD: 1.6 % (ref 0–6)
ERYTHROCYTE [DISTWIDTH] IN BLOOD BY AUTOMATED COUNT: 18.2 FL (ref 11.5–15)
FERRITIN SERPL-MCNC: 658 NG/ML
GLUCOSE SERPL-MCNC: 111 MG/DL (ref 74–99)
HCT VFR BLD AUTO: 40.4 % (ref 34–48)
HGB BLD-MCNC: 12.4 G/DL (ref 11.5–15.5)
IMM GRANULOCYTES # BLD: 0.03 E9/L
IMM GRANULOCYTES NFR BLD: 0.7 % (ref 0–5)
IRON SATN MFR SERPL: 31 % (ref 15–50)
IRON SERPL-MCNC: 91 MCG/DL (ref 37–145)
LDH SERPL-CCNC: 307 U/L (ref 135–214)
LYMPHOCYTES # BLD: 0.66 E9/L (ref 1.5–4)
LYMPHOCYTES NFR BLD: 15.2 % (ref 20–42)
MCH RBC QN AUTO: 28.4 PG (ref 26–35)
MCHC RBC AUTO-ENTMCNC: 30.7 % (ref 32–34.5)
MCV RBC AUTO: 92.7 FL (ref 80–99.9)
MONOCYTES # BLD: 0.31 E9/L (ref 0.1–0.95)
MONOCYTES NFR BLD: 7.2 % (ref 2–12)
NEUTROPHILS # BLD: 3.23 E9/L (ref 1.8–7.3)
NEUTS SEG NFR BLD: 74.6 % (ref 43–80)
PLATELET # BLD AUTO: 237 E9/L (ref 130–450)
PMV BLD AUTO: 10.2 FL (ref 7–12)
POTASSIUM SERPL-SCNC: 3.8 MMOL/L (ref 3.5–5)
PROT SERPL-MCNC: 8.1 G/DL (ref 6.4–8.3)
RBC # BLD AUTO: 4.36 E12/L (ref 3.5–5.5)
SODIUM SERPL-SCNC: 136 MMOL/L (ref 132–146)
TIBC SERPL-MCNC: 296 MCG/DL (ref 250–450)
WBC # BLD: 4.3 E9/L (ref 4.5–11.5)

## 2023-07-07 PROCEDURE — 83615 LACTATE (LD) (LDH) ENZYME: CPT

## 2023-07-07 PROCEDURE — 82728 ASSAY OF FERRITIN: CPT

## 2023-07-07 PROCEDURE — 85025 COMPLETE CBC W/AUTO DIFF WBC: CPT

## 2023-07-07 PROCEDURE — 83550 IRON BINDING TEST: CPT

## 2023-07-07 PROCEDURE — 80053 COMPREHEN METABOLIC PANEL: CPT

## 2023-07-07 PROCEDURE — 83540 ASSAY OF IRON: CPT

## 2023-07-07 PROCEDURE — 36415 COLL VENOUS BLD VENIPUNCTURE: CPT

## 2023-07-10 ENCOUNTER — ANTI-COAG VISIT (OUTPATIENT)
Dept: CARDIOLOGY CLINIC | Age: 68
End: 2023-07-10
Payer: MEDICARE

## 2023-07-10 DIAGNOSIS — I48.91 ATRIAL FIBRILLATION, UNSPECIFIED TYPE (HCC): ICD-10-CM

## 2023-07-10 LAB
INTERNATIONAL NORMALIZATION RATIO, POC: 2.8
PROTHROMBIN TIME, POC: 0

## 2023-07-10 PROCEDURE — 85610 PROTHROMBIN TIME: CPT | Performed by: INTERNAL MEDICINE

## 2023-07-11 ENCOUNTER — OFFICE VISIT (OUTPATIENT)
Dept: ONCOLOGY | Age: 68
End: 2023-07-11
Payer: MEDICARE

## 2023-07-11 VITALS
WEIGHT: 124.2 LBS | HEART RATE: 87 BPM | OXYGEN SATURATION: 95 % | TEMPERATURE: 98.6 F | HEIGHT: 63 IN | DIASTOLIC BLOOD PRESSURE: 77 MMHG | BODY MASS INDEX: 22.01 KG/M2 | SYSTOLIC BLOOD PRESSURE: 145 MMHG

## 2023-07-11 DIAGNOSIS — E60 ZINC DEFICIENCY: ICD-10-CM

## 2023-07-11 DIAGNOSIS — D50.9 IRON DEFICIENCY ANEMIA, UNSPECIFIED IRON DEFICIENCY ANEMIA TYPE: Primary | ICD-10-CM

## 2023-07-11 PROCEDURE — 99212 OFFICE O/P EST SF 10 MIN: CPT

## 2023-07-11 PROCEDURE — 3077F SYST BP >= 140 MM HG: CPT | Performed by: INTERNAL MEDICINE

## 2023-07-11 PROCEDURE — 99213 OFFICE O/P EST LOW 20 MIN: CPT | Performed by: INTERNAL MEDICINE

## 2023-07-11 PROCEDURE — 1036F TOBACCO NON-USER: CPT | Performed by: INTERNAL MEDICINE

## 2023-07-11 PROCEDURE — 1090F PRES/ABSN URINE INCON ASSESS: CPT | Performed by: INTERNAL MEDICINE

## 2023-07-11 PROCEDURE — 3017F COLORECTAL CA SCREEN DOC REV: CPT | Performed by: INTERNAL MEDICINE

## 2023-07-11 PROCEDURE — 1123F ACP DISCUSS/DSCN MKR DOCD: CPT | Performed by: INTERNAL MEDICINE

## 2023-07-11 PROCEDURE — G8420 CALC BMI NORM PARAMETERS: HCPCS | Performed by: INTERNAL MEDICINE

## 2023-07-11 PROCEDURE — 3078F DIAST BP <80 MM HG: CPT | Performed by: INTERNAL MEDICINE

## 2023-07-11 PROCEDURE — G8400 PT W/DXA NO RESULTS DOC: HCPCS | Performed by: INTERNAL MEDICINE

## 2023-07-11 PROCEDURE — G8427 DOCREV CUR MEDS BY ELIG CLIN: HCPCS | Performed by: INTERNAL MEDICINE

## 2023-08-08 ENCOUNTER — ANTI-COAG VISIT (OUTPATIENT)
Dept: CARDIOLOGY CLINIC | Age: 68
End: 2023-08-08
Payer: MEDICARE

## 2023-08-08 DIAGNOSIS — Z79.01 ON CONTINUOUS ORAL ANTICOAGULATION: Primary | ICD-10-CM

## 2023-08-08 DIAGNOSIS — I48.91 ATRIAL FIBRILLATION, UNSPECIFIED TYPE (HCC): ICD-10-CM

## 2023-08-08 LAB
INTERNATIONAL NORMALIZATION RATIO, POC: 1.8
PROTHROMBIN TIME, POC: 0

## 2023-08-08 PROCEDURE — 93793 ANTICOAG MGMT PT WARFARIN: CPT | Performed by: INTERNAL MEDICINE

## 2023-08-08 NOTE — PROGRESS NOTES
INR today is 1.8. Per Dr. Mary Garcia, patient is to take an extra 6 mg today and and extra 6 mg tomorrow and then recheck in 2 weeks.

## 2023-08-18 ENCOUNTER — HOSPITAL ENCOUNTER (OUTPATIENT)
Dept: INFUSION THERAPY | Age: 68
Discharge: HOME | End: 2023-08-18
Payer: MEDICARE

## 2023-08-18 DIAGNOSIS — D50.9 IRON DEFICIENCY ANEMIA, UNSPECIFIED IRON DEFICIENCY ANEMIA TYPE: Primary | ICD-10-CM

## 2023-08-18 LAB
ALBUMIN SERPL-MCNC: 4.7 G/DL (ref 3.5–5.2)
ALP SERPL-CCNC: 89 U/L (ref 35–104)
ALT SERPL-CCNC: 16 U/L (ref 0–32)
ANION GAP SERPL CALCULATED.3IONS-SCNC: 12 MMOL/L (ref 7–16)
AST SERPL-CCNC: 21 U/L (ref 0–31)
BASOPHILS # BLD: 0.07 K/UL (ref 0–0.2)
BASOPHILS NFR BLD: 1 % (ref 0–2)
BILIRUB SERPL-MCNC: 0.5 MG/DL (ref 0–1.2)
BUN SERPL-MCNC: 33 MG/DL (ref 6–23)
CALCIUM SERPL-MCNC: 10.5 MG/DL (ref 8.6–10.2)
CHLORIDE SERPL-SCNC: 98 MMOL/L (ref 98–107)
CO2 SERPL-SCNC: 27 MMOL/L (ref 22–29)
CREAT SERPL-MCNC: 0.8 MG/DL (ref 0.5–1)
EOSINOPHIL # BLD: 0.04 K/UL (ref 0.05–0.5)
EOSINOPHILS RELATIVE PERCENT: 1 % (ref 0–6)
ERYTHROCYTE [DISTWIDTH] IN BLOOD BY AUTOMATED COUNT: 15.1 % (ref 11.5–15)
FERRITIN SERPL-MCNC: 344 NG/ML
GFR SERPL CREATININE-BSD FRML MDRD: >60 ML/MIN/1.73M2
GLUCOSE SERPL-MCNC: 106 MG/DL (ref 74–99)
HCT VFR BLD AUTO: 37.4 % (ref 34–48)
HGB BLD-MCNC: 11.9 G/DL (ref 11.5–15.5)
IMM GRANULOCYTES # BLD AUTO: <0.03 K/UL (ref 0–0.58)
IMM GRANULOCYTES NFR BLD: 0 % (ref 0–5)
IRON SATN MFR SERPL: 18 % (ref 15–50)
IRON SERPL-MCNC: 58 UG/DL (ref 37–145)
LDH SERPL-CCNC: 258 U/L (ref 135–214)
LYMPHOCYTES NFR BLD: 0.82 K/UL (ref 1.5–4)
LYMPHOCYTES RELATIVE PERCENT: 14 % (ref 20–42)
MCH RBC QN AUTO: 30.1 PG (ref 26–35)
MCHC RBC AUTO-ENTMCNC: 31.8 G/DL (ref 32–34.5)
MCV RBC AUTO: 94.7 FL (ref 80–99.9)
MONOCYTES NFR BLD: 0.45 K/UL (ref 0.1–0.95)
MONOCYTES NFR BLD: 8 % (ref 2–12)
NEUTROPHILS NFR BLD: 76 % (ref 43–80)
NEUTS SEG NFR BLD: 4.53 K/UL (ref 1.8–7.3)
PLATELET # BLD AUTO: 268 K/UL (ref 130–450)
PMV BLD AUTO: 9.3 FL (ref 7–12)
POTASSIUM SERPL-SCNC: 4 MMOL/L (ref 3.5–5)
PROT SERPL-MCNC: 7.8 G/DL (ref 6.4–8.3)
RBC # BLD AUTO: 3.95 M/UL (ref 3.5–5.5)
SODIUM SERPL-SCNC: 137 MMOL/L (ref 132–146)
TIBC SERPL-MCNC: 320 UG/DL (ref 250–450)
WBC OTHER # BLD: 5.9 K/UL (ref 4.5–11.5)

## 2023-08-18 PROCEDURE — 85025 COMPLETE CBC W/AUTO DIFF WBC: CPT

## 2023-08-18 PROCEDURE — 83550 IRON BINDING TEST: CPT

## 2023-08-18 PROCEDURE — 82728 ASSAY OF FERRITIN: CPT

## 2023-08-18 PROCEDURE — 83615 LACTATE (LD) (LDH) ENZYME: CPT

## 2023-08-18 PROCEDURE — 80053 COMPREHEN METABOLIC PANEL: CPT

## 2023-08-18 PROCEDURE — 83540 ASSAY OF IRON: CPT

## 2023-08-18 PROCEDURE — 36415 COLL VENOUS BLD VENIPUNCTURE: CPT

## 2023-08-21 ENCOUNTER — CLINICAL DOCUMENTATION (OUTPATIENT)
Facility: HOSPITAL | Age: 68
End: 2023-08-21

## 2023-08-24 ENCOUNTER — ANTI-COAG VISIT (OUTPATIENT)
Dept: CARDIOLOGY CLINIC | Age: 68
End: 2023-08-24
Payer: MEDICARE

## 2023-08-24 ENCOUNTER — NURSE ONLY (OUTPATIENT)
Dept: CARDIOLOGY CLINIC | Age: 68
End: 2023-08-24

## 2023-08-24 DIAGNOSIS — Z79.01 ON CONTINUOUS ORAL ANTICOAGULATION: Primary | ICD-10-CM

## 2023-08-24 DIAGNOSIS — Z95.0 CARDIAC PACEMAKER IN SITU: ICD-10-CM

## 2023-08-24 DIAGNOSIS — I44.39 HIGH DEGREE ATRIOVENTRICULAR BLOCK: Primary | ICD-10-CM

## 2023-08-24 DIAGNOSIS — I48.91 ATRIAL FIBRILLATION, UNSPECIFIED TYPE (HCC): ICD-10-CM

## 2023-08-24 LAB
INTERNATIONAL NORMALIZATION RATIO, POC: 1.9
PROTHROMBIN TIME, POC: 0

## 2023-08-24 PROCEDURE — 93793 ANTICOAG MGMT PT WARFARIN: CPT | Performed by: INTERNAL MEDICINE

## 2023-09-07 ENCOUNTER — ANTI-COAG VISIT (OUTPATIENT)
Dept: CARDIOLOGY CLINIC | Age: 68
End: 2023-09-07
Payer: MEDICARE

## 2023-09-07 DIAGNOSIS — Z79.01 ON CONTINUOUS ORAL ANTICOAGULATION: Primary | ICD-10-CM

## 2023-09-07 DIAGNOSIS — I48.91 ATRIAL FIBRILLATION, UNSPECIFIED TYPE (HCC): ICD-10-CM

## 2023-09-07 LAB
INTERNATIONAL NORMALIZATION RATIO, POC: 2.4
PROTHROMBIN TIME, POC: 0

## 2023-09-07 PROCEDURE — 93793 ANTICOAG MGMT PT WARFARIN: CPT | Performed by: INTERNAL MEDICINE

## 2023-09-07 NOTE — PROGRESS NOTES
INR today is 2.4. Per Dr. Amber Wang, patient is to continue taking 4 mg every Mon and Fri, 6 mg the rest of the days.  Recheck in 1 month

## 2023-09-08 NOTE — PROGRESS NOTES
Department of [address]  Attending Clinic Note    Reason for Visit: Follow-up on a patient with Iron deficiency Anemia     PCP:  Tatiana Roe DO    History of Present Illness:  69 y/o female with hx HTN, hyperlipidemia, hypothyroidism, CAD, of A. Fib on coumadin, Connective tissue disorder Raynaud's, polymyositis on CellCept follows rheumatology at [facility] (Dr. Marisela Concepcion), who presented to the ED for SOB and lightheadedness, found to be anemic. No melena. Hemoccult negative. Transfused 1 unit prbc and admitted for further evaluation    Fe 80 TIBC 416 FeSat 19% Ferritin 15  Epo 343     Retic 2.4%   slightly hemolyzed   Haptoglobin 93  Dwaine Negative    Folate >20  VitB12 771    VitB6, Copper WNL  Zinc 54.9 ();     SPEP: No monoclonal protein identified  SIFE: Normal. No monoclonal protein identified    Peripheral blood smear noted severe normocytic hypochromic anemia, appearing hyperproliferative. Absolute lymphopenia is present, favoring reactive. Peripheral blood flow cytometry noted no immunophenotypic evidence of a lymphoproliferative disorder, acute leukemia or circulating blasts is identified. EGD and colonoscopy on 4/10/2023. EGD found nonbleeding AVMs of the duodenum status post electrocautery and endoclip. Colonoscopy noted red spots/low risk AVMs, not treated due to significant tortuosity and unstable scope position. Referred to our clinic for further evaluation and treatment  Zinc deficiency prescribed zinc 50 mg p.o. daily    On 05/02/2023:  Hb 9.2 Hct 32.1  MCV 92.5  WBC 5.7    BUN 17 Creat 0.6     Fe 34 TIBC 453 FeSat 8% Ferritin   Peripheral blood flow cytometry noted no immunophenotypic evidence of lymphoproliferative disorder, acute leukemia or circulating blasts is identified. Peripheral blood MDS FISH: Negative Study  JAK2 Mutation: Not detected     Iron deficiency anemia; recommended 2 doses of Feraheme.    Dose # 1 Feraheme was on

## 2023-09-12 ENCOUNTER — OFFICE VISIT (OUTPATIENT)
Dept: ONCOLOGY | Age: 68
End: 2023-09-12
Payer: MEDICARE

## 2023-09-12 VITALS
BODY MASS INDEX: 23.11 KG/M2 | SYSTOLIC BLOOD PRESSURE: 119 MMHG | DIASTOLIC BLOOD PRESSURE: 65 MMHG | TEMPERATURE: 98.5 F | HEIGHT: 63 IN | OXYGEN SATURATION: 97 % | WEIGHT: 130.4 LBS | HEART RATE: 75 BPM

## 2023-09-12 DIAGNOSIS — D50.9 IRON DEFICIENCY ANEMIA, UNSPECIFIED IRON DEFICIENCY ANEMIA TYPE: Primary | ICD-10-CM

## 2023-09-12 DIAGNOSIS — E60 ZINC DEFICIENCY: ICD-10-CM

## 2023-09-12 PROCEDURE — 3017F COLORECTAL CA SCREEN DOC REV: CPT | Performed by: CLINICAL NURSE SPECIALIST

## 2023-09-12 PROCEDURE — 3078F DIAST BP <80 MM HG: CPT | Performed by: CLINICAL NURSE SPECIALIST

## 2023-09-12 PROCEDURE — 99213 OFFICE O/P EST LOW 20 MIN: CPT | Performed by: CLINICAL NURSE SPECIALIST

## 2023-09-12 PROCEDURE — G8427 DOCREV CUR MEDS BY ELIG CLIN: HCPCS | Performed by: CLINICAL NURSE SPECIALIST

## 2023-09-12 PROCEDURE — 99212 OFFICE O/P EST SF 10 MIN: CPT

## 2023-09-12 PROCEDURE — 3074F SYST BP LT 130 MM HG: CPT | Performed by: CLINICAL NURSE SPECIALIST

## 2023-09-12 PROCEDURE — G8420 CALC BMI NORM PARAMETERS: HCPCS | Performed by: CLINICAL NURSE SPECIALIST

## 2023-09-12 PROCEDURE — 1090F PRES/ABSN URINE INCON ASSESS: CPT | Performed by: CLINICAL NURSE SPECIALIST

## 2023-09-12 PROCEDURE — G8400 PT W/DXA NO RESULTS DOC: HCPCS | Performed by: CLINICAL NURSE SPECIALIST

## 2023-09-12 PROCEDURE — 1036F TOBACCO NON-USER: CPT | Performed by: CLINICAL NURSE SPECIALIST

## 2023-09-12 PROCEDURE — 1123F ACP DISCUSS/DSCN MKR DOCD: CPT | Performed by: CLINICAL NURSE SPECIALIST

## 2023-10-04 DIAGNOSIS — D50.9 IRON DEFICIENCY ANEMIA, UNSPECIFIED IRON DEFICIENCY ANEMIA TYPE: Primary | ICD-10-CM

## 2023-10-05 ENCOUNTER — ANTI-COAG VISIT (OUTPATIENT)
Dept: CARDIOLOGY CLINIC | Age: 68
End: 2023-10-05
Payer: MEDICARE

## 2023-10-05 ENCOUNTER — HOSPITAL ENCOUNTER (OUTPATIENT)
Dept: INFUSION THERAPY | Age: 68
Discharge: HOME | End: 2023-10-05
Payer: MEDICARE

## 2023-10-05 DIAGNOSIS — D50.9 IRON DEFICIENCY ANEMIA, UNSPECIFIED IRON DEFICIENCY ANEMIA TYPE: ICD-10-CM

## 2023-10-05 DIAGNOSIS — I48.91 ATRIAL FIBRILLATION, UNSPECIFIED TYPE (HCC): ICD-10-CM

## 2023-10-05 LAB
ALBUMIN SERPL-MCNC: 4.3 G/DL (ref 3.5–5.2)
ALP SERPL-CCNC: 85 U/L (ref 35–104)
ALT SERPL-CCNC: 17 U/L (ref 0–32)
ANION GAP SERPL CALCULATED.3IONS-SCNC: 9 MMOL/L (ref 7–16)
AST SERPL-CCNC: 18 U/L (ref 0–31)
BASOPHILS # BLD: 0.03 K/UL (ref 0–0.2)
BASOPHILS NFR BLD: 1 % (ref 0–2)
BILIRUB SERPL-MCNC: 0.3 MG/DL (ref 0–1.2)
BUN SERPL-MCNC: 28 MG/DL (ref 6–23)
CALCIUM SERPL-MCNC: 10.2 MG/DL (ref 8.6–10.2)
CHLORIDE SERPL-SCNC: 95 MMOL/L (ref 98–107)
CO2 SERPL-SCNC: 30 MMOL/L (ref 22–29)
CREAT SERPL-MCNC: 0.8 MG/DL (ref 0.5–1)
EOSINOPHIL # BLD: 0.04 K/UL (ref 0.05–0.5)
EOSINOPHILS RELATIVE PERCENT: 1 % (ref 0–6)
ERYTHROCYTE [DISTWIDTH] IN BLOOD BY AUTOMATED COUNT: 13.6 % (ref 11.5–15)
FERRITIN SERPL-MCNC: 321 NG/ML
GFR SERPL CREATININE-BSD FRML MDRD: >60 ML/MIN/1.73M2
GLUCOSE SERPL-MCNC: 94 MG/DL (ref 74–99)
HCT VFR BLD AUTO: 34.8 % (ref 34–48)
HGB BLD-MCNC: 10.7 G/DL (ref 11.5–15.5)
IMM GRANULOCYTES # BLD AUTO: <0.03 K/UL (ref 0–0.58)
IMM GRANULOCYTES NFR BLD: 0 % (ref 0–5)
INTERNATIONAL NORMALIZATION RATIO, POC: 2.3
IRON SATN MFR SERPL: 24 % (ref 15–50)
IRON SERPL-MCNC: 71 UG/DL (ref 37–145)
LDH SERPL-CCNC: 170 U/L (ref 135–214)
LYMPHOCYTES NFR BLD: 0.64 K/UL (ref 1.5–4)
LYMPHOCYTES RELATIVE PERCENT: 15 % (ref 20–42)
MCH RBC QN AUTO: 29.8 PG (ref 26–35)
MCHC RBC AUTO-ENTMCNC: 30.7 G/DL (ref 32–34.5)
MCV RBC AUTO: 96.9 FL (ref 80–99.9)
MONOCYTES NFR BLD: 0.37 K/UL (ref 0.1–0.95)
MONOCYTES NFR BLD: 9 % (ref 2–12)
NEUTROPHILS NFR BLD: 74 % (ref 43–80)
NEUTS SEG NFR BLD: 3.08 K/UL (ref 1.8–7.3)
PLATELET # BLD AUTO: 257 K/UL (ref 130–450)
PMV BLD AUTO: 9.4 FL (ref 7–12)
POTASSIUM SERPL-SCNC: 4.2 MMOL/L (ref 3.5–5)
PROT SERPL-MCNC: 7.6 G/DL (ref 6.4–8.3)
PROTHROMBIN TIME, POC: 0
RBC # BLD AUTO: 3.59 M/UL (ref 3.5–5.5)
SODIUM SERPL-SCNC: 134 MMOL/L (ref 132–146)
TIBC SERPL-MCNC: 296 UG/DL (ref 250–450)
WBC OTHER # BLD: 4.2 K/UL (ref 4.5–11.5)

## 2023-10-05 PROCEDURE — 83550 IRON BINDING TEST: CPT

## 2023-10-05 PROCEDURE — 85025 COMPLETE CBC W/AUTO DIFF WBC: CPT

## 2023-10-05 PROCEDURE — 93793 ANTICOAG MGMT PT WARFARIN: CPT | Performed by: INTERNAL MEDICINE

## 2023-10-05 PROCEDURE — 83540 ASSAY OF IRON: CPT

## 2023-10-05 PROCEDURE — 80053 COMPREHEN METABOLIC PANEL: CPT

## 2023-10-05 PROCEDURE — 83615 LACTATE (LD) (LDH) ENZYME: CPT

## 2023-10-05 PROCEDURE — 82728 ASSAY OF FERRITIN: CPT

## 2023-10-05 PROCEDURE — 36415 COLL VENOUS BLD VENIPUNCTURE: CPT

## 2023-10-05 PROCEDURE — 85610 PROTHROMBIN TIME: CPT | Performed by: INTERNAL MEDICINE

## 2023-10-05 NOTE — PROGRESS NOTES
INR today is 2.3 Per Dr. Krista Boyd, patient is to continue taking 4 mg every Mon and Fri, 6 mg the rest of the days.  Recheck in 1 month

## 2023-10-10 ENCOUNTER — OFFICE VISIT (OUTPATIENT)
Dept: ONCOLOGY | Age: 68
End: 2023-10-10
Payer: MEDICARE

## 2023-10-10 VITALS
HEIGHT: 63 IN | WEIGHT: 133.4 LBS | DIASTOLIC BLOOD PRESSURE: 74 MMHG | HEART RATE: 75 BPM | BODY MASS INDEX: 23.64 KG/M2 | SYSTOLIC BLOOD PRESSURE: 123 MMHG | TEMPERATURE: 97.8 F

## 2023-10-10 DIAGNOSIS — D50.9 IRON DEFICIENCY ANEMIA, UNSPECIFIED IRON DEFICIENCY ANEMIA TYPE: Primary | ICD-10-CM

## 2023-10-10 DIAGNOSIS — E60 ZINC DEFICIENCY: ICD-10-CM

## 2023-10-10 PROCEDURE — G8420 CALC BMI NORM PARAMETERS: HCPCS | Performed by: CLINICAL NURSE SPECIALIST

## 2023-10-10 PROCEDURE — 3017F COLORECTAL CA SCREEN DOC REV: CPT | Performed by: CLINICAL NURSE SPECIALIST

## 2023-10-10 PROCEDURE — 3074F SYST BP LT 130 MM HG: CPT | Performed by: CLINICAL NURSE SPECIALIST

## 2023-10-10 PROCEDURE — 1036F TOBACCO NON-USER: CPT | Performed by: CLINICAL NURSE SPECIALIST

## 2023-10-10 PROCEDURE — G8484 FLU IMMUNIZE NO ADMIN: HCPCS | Performed by: CLINICAL NURSE SPECIALIST

## 2023-10-10 PROCEDURE — 99213 OFFICE O/P EST LOW 20 MIN: CPT | Performed by: CLINICAL NURSE SPECIALIST

## 2023-10-10 PROCEDURE — 99212 OFFICE O/P EST SF 10 MIN: CPT

## 2023-10-10 PROCEDURE — 3078F DIAST BP <80 MM HG: CPT | Performed by: CLINICAL NURSE SPECIALIST

## 2023-10-10 PROCEDURE — G8427 DOCREV CUR MEDS BY ELIG CLIN: HCPCS | Performed by: CLINICAL NURSE SPECIALIST

## 2023-10-10 PROCEDURE — 1123F ACP DISCUSS/DSCN MKR DOCD: CPT | Performed by: CLINICAL NURSE SPECIALIST

## 2023-10-10 PROCEDURE — G8400 PT W/DXA NO RESULTS DOC: HCPCS | Performed by: CLINICAL NURSE SPECIALIST

## 2023-10-10 PROCEDURE — 1090F PRES/ABSN URINE INCON ASSESS: CPT | Performed by: CLINICAL NURSE SPECIALIST

## 2023-10-10 NOTE — PROGRESS NOTES
Department of [address]  Attending Clinic Note    Reason for Visit: Follow-up on a patient with Iron deficiency Anemia     PCP:  Viola Alonzo DO    History of Present Illness:  71 y/o female with hx HTN, hyperlipidemia, hypothyroidism, CAD, of A. Fib on coumadin, Connective tissue disorder Raynaud's, polymyositis on CellCept follows rheumatology at [facility] (Dr. Keeley Steele), who presented to the ED for SOB and lightheadedness, found to be anemic. No melena. Hemoccult negative. Transfused 1 unit prbc and admitted for further evaluation    Fe 80 TIBC 416 FeSat 19% Ferritin 15  Epo 343     Retic 2.4%   slightly hemolyzed   Haptoglobin 93  Dwaine Negative    Folate >20  VitB12 771    VitB6, Copper WNL  Zinc 54.9 ();     SPEP: No monoclonal protein identified  SIFE: Normal. No monoclonal protein identified    Peripheral blood smear noted severe normocytic hypochromic anemia, appearing hyperproliferative. Absolute lymphopenia is present, favoring reactive. Peripheral blood flow cytometry noted no immunophenotypic evidence of a lymphoproliferative disorder, acute leukemia or circulating blasts is identified. EGD and colonoscopy on 4/10/2023. EGD found nonbleeding AVMs of the duodenum status post electrocautery and endoclip. Colonoscopy noted red spots/low risk AVMs, not treated due to significant tortuosity and unstable scope position. Referred to our clinic for further evaluation and treatment  Zinc deficiency prescribed zinc 50 mg p.o. daily    On 05/02/2023:  Hb 9.2 Hct 32.1  MCV 92.5  WBC 5.7    BUN 17 Creat 0.6     Fe 34 TIBC 453 FeSat 8% Ferritin   Peripheral blood flow cytometry noted no immunophenotypic evidence of lymphoproliferative disorder, acute leukemia or circulating blasts is identified. Peripheral blood MDS FISH: Negative Study  JAK2 Mutation: Not detected     Iron deficiency anemia; recommended 2 doses of Feraheme.    Dose # 1 Feraheme was on

## 2023-11-02 ENCOUNTER — ANTI-COAG VISIT (OUTPATIENT)
Dept: CARDIOLOGY CLINIC | Age: 68
End: 2023-11-02
Payer: MEDICARE

## 2023-11-02 DIAGNOSIS — I48.91 ATRIAL FIBRILLATION, UNSPECIFIED TYPE (HCC): ICD-10-CM

## 2023-11-02 DIAGNOSIS — I48.20 CHRONIC ATRIAL FIBRILLATION (HCC): Primary | ICD-10-CM

## 2023-11-02 LAB
INTERNATIONAL NORMALIZATION RATIO, POC: 3.7
PROTHROMBIN TIME, POC: 0

## 2023-11-02 PROCEDURE — 93793 ANTICOAG MGMT PT WARFARIN: CPT | Performed by: INTERNAL MEDICINE

## 2023-11-02 NOTE — PROGRESS NOTES
INR today is 3.7. Per Dr. Karolina Wilkinson, patient is to hold for 2 days and then resume 4 mg Mon and Fri, 6 mg the rest of the days. Recheck in 2 weeks.

## 2023-11-27 ENCOUNTER — ANTI-COAG VISIT (OUTPATIENT)
Dept: CARDIOLOGY CLINIC | Age: 68
End: 2023-11-27

## 2023-11-27 DIAGNOSIS — I48.20 CHRONIC ATRIAL FIBRILLATION (HCC): Primary | ICD-10-CM

## 2023-11-27 DIAGNOSIS — I48.91 ATRIAL FIBRILLATION, UNSPECIFIED TYPE (HCC): ICD-10-CM

## 2023-11-27 LAB
INTERNATIONAL NORMALIZATION RATIO, POC: 2.5
PROTHROMBIN TIME, POC: 0

## 2023-11-27 NOTE — PROGRESS NOTES
INR today is 2.5. Per Dr. Stratton Head patient is to continue taking 4 mg Mon and Fri, 6 mg the rest of the days. Recheck in 1 month.

## 2023-12-01 RX ORDER — FLECAINIDE ACETATE 100 MG/1
100 TABLET ORAL 2 TIMES DAILY
Qty: 180 TABLET | Refills: 3 | Status: SHIPPED | OUTPATIENT
Start: 2023-12-01

## 2023-12-29 ENCOUNTER — ANTI-COAG VISIT (OUTPATIENT)
Dept: CARDIOLOGY CLINIC | Age: 68
End: 2023-12-29
Payer: MEDICARE

## 2023-12-29 DIAGNOSIS — I48.91 ATRIAL FIBRILLATION, UNSPECIFIED TYPE (HCC): ICD-10-CM

## 2023-12-29 LAB
INTERNATIONAL NORMALIZATION RATIO, POC: 3.9
PROTHROMBIN TIME, POC: 0

## 2023-12-29 PROCEDURE — 85610 PROTHROMBIN TIME: CPT | Performed by: INTERNAL MEDICINE

## 2023-12-29 NOTE — PROGRESS NOTES
INR today is 3.9. Per Dr. Mark Dewey patient is to hold for 2 days and then alternate 4 mg then 6 mg repeating every 2 days. Recheck in 2 weeks.

## 2024-01-09 ENCOUNTER — ANTI-COAG VISIT (OUTPATIENT)
Dept: CARDIOLOGY CLINIC | Age: 69
End: 2024-01-09

## 2024-01-09 ENCOUNTER — OFFICE VISIT (OUTPATIENT)
Dept: CARDIOLOGY CLINIC | Age: 69
End: 2024-01-09

## 2024-01-09 VITALS
HEIGHT: 63 IN | WEIGHT: 133 LBS | RESPIRATION RATE: 18 BRPM | SYSTOLIC BLOOD PRESSURE: 98 MMHG | DIASTOLIC BLOOD PRESSURE: 58 MMHG | BODY MASS INDEX: 23.57 KG/M2 | HEART RATE: 76 BPM

## 2024-01-09 DIAGNOSIS — I48.92 PAROXYSMAL ATRIAL FLUTTER (HCC): Primary | ICD-10-CM

## 2024-01-09 LAB
INTERNATIONAL NORMALIZATION RATIO, POC: 1.4
PROTHROMBIN TIME, POC: 0

## 2024-01-09 NOTE — PROGRESS NOTES
artery occlusion with cerebral infarction (HCC)     \"mini stroke\" 10/2014    H/O cardiovascular stress test 2020    Lexiscan    Hyperlipidemia     Hypertension     Sleep apnea     doesnt use cpap         Past Surgical History:   Procedure Laterality Date    CARDIAC CATHETERIZATION  2012    Right heart cath by Dr Leonard    CAROTID ENDARTERECTOMY Right     CATARACT REMOVAL WITH IMPLANT Right 2019    intraocular lens    CATARACT REMOVAL WITH IMPLANT Left 10/15/2019     SECTION      COLONOSCOPY      INTRACAPSULAR CATARACT EXTRACTION Right 2019    RIGHT EYE CATARACT EMULSIFICATION IOL IMPLANT performed by Lon RUIZ MD at [facility] OR    INTRACAPSULAR CATARACT EXTRACTION Left 10/15/2019    LEFT EYE CATARACT EMULSIFICATION IOL IMPLANT performed by Lon RUIZ MD at [facility] OR         Current Outpatient Medications   Medication Sig Dispense Refill    pantoprazole (PROTONIX) 40 MG tablet Take 40 mg by mouth daily      sildenafil (REVATIO) 20 MG tablet Take 20 mg by mouth in the morning and at bedtime      fluticasone (FLOVENT HFA) 110 MCG/ACT inhaler Inhale 1 puff into the lungs 2 times daily      Tiotropium Bromide Monohydrate (SPIRIVA RESPIMAT IN) Inhale into the lungs daily      NONFORMULARY TREVASO      Multiple Vitamins-Minerals (THERAPEUTIC MULTIVITAMIN-MINERALS) tablet Take 1 tablet by mouth daily      vitamin B-12 (CYANOCOBALAMIN) 1000 MCG tablet Take 1,000 mcg by mouth daily      calcium carbonate (TUMS) 500 MG chewable tablet Take 1 tablet by mouth daily      torsemide (DEMADEX) 20 MG tablet TAKE ONE TABLET BY MOUTH DAILY      spironolactone (ALDACTONE) 25 MG tablet TAKE ONE TABLET BY MOUTH DAILY      warfarin (COUMADIN) 5 MG tablet Take 1 tablet by mouth daily 90 tablet 3    metoprolol tartrate (LOPRESSOR) 50 MG tablet Take 1 tablet by mouth 2 times daily 180 tablet 2    levothyroxine (SYNTHROID) 75 MCG tablet 75 mcg Daily       mycophenolate (CELLCEPT) 500

## 2024-01-09 NOTE — PROGRESS NOTES
INR today is 1.4 per , Patient is to take 4mg Mondays and Fridays and 6mg other days. Recheck in 1 week.

## 2024-01-22 ENCOUNTER — ANTI-COAG VISIT (OUTPATIENT)
Dept: CARDIOLOGY CLINIC | Age: 69
End: 2024-01-22
Payer: MEDICARE

## 2024-01-22 DIAGNOSIS — I48.92 PAROXYSMAL ATRIAL FLUTTER (HCC): ICD-10-CM

## 2024-01-22 LAB
INTERNATIONAL NORMALIZATION RATIO, POC: 2.3
PROTHROMBIN TIME, POC: 0

## 2024-01-22 PROCEDURE — 85610 PROTHROMBIN TIME: CPT | Performed by: INTERNAL MEDICINE

## 2024-01-22 PROCEDURE — 93793 ANTICOAG MGMT PT WARFARIN: CPT | Performed by: INTERNAL MEDICINE

## 2024-01-22 NOTE — PROGRESS NOTES
INR today is 2.3 per , Patient is to continue to take 4mg Mondays and Fridays and 6mg other days. Recheck in 4 week.

## 2024-01-29 ENCOUNTER — HOSPITAL ENCOUNTER (OUTPATIENT)
Dept: INFUSION THERAPY | Age: 69
Discharge: HOME | End: 2024-01-29

## 2024-01-29 DIAGNOSIS — D50.9 IRON DEFICIENCY ANEMIA, UNSPECIFIED IRON DEFICIENCY ANEMIA TYPE: ICD-10-CM

## 2024-01-29 DIAGNOSIS — I27.23 PULMONARY HYPERTENSION DUE TO COPD (HCC): Primary | ICD-10-CM

## 2024-01-29 DIAGNOSIS — J44.9 PULMONARY HYPERTENSION DUE TO COPD (HCC): Primary | ICD-10-CM

## 2024-02-21 ENCOUNTER — ANTI-COAG VISIT (OUTPATIENT)
Dept: CARDIOLOGY CLINIC | Age: 69
End: 2024-02-21
Payer: MEDICARE

## 2024-02-21 DIAGNOSIS — I48.92 PAROXYSMAL ATRIAL FLUTTER (HCC): ICD-10-CM

## 2024-02-21 LAB
INTERNATIONAL NORMALIZATION RATIO, POC: 1.8
PROTHROMBIN TIME, POC: 0

## 2024-02-21 PROCEDURE — 85610 PROTHROMBIN TIME: CPT | Performed by: INTERNAL MEDICINE

## 2024-02-21 PROCEDURE — 93793 ANTICOAG MGMT PT WARFARIN: CPT | Performed by: INTERNAL MEDICINE

## 2024-02-21 NOTE — PROGRESS NOTES
INR today is 1.8 per , Patient is to take extra dose of  4mg today then continue to take 4mg Mondays and Fridays and 6mg other days. Recheck in 2 weeks

## 2024-03-06 ENCOUNTER — ANTI-COAG VISIT (OUTPATIENT)
Dept: CARDIOLOGY CLINIC | Age: 69
End: 2024-03-06
Payer: MEDICARE

## 2024-03-06 DIAGNOSIS — I48.92 PAROXYSMAL ATRIAL FLUTTER (HCC): ICD-10-CM

## 2024-03-06 LAB
INTERNATIONAL NORMALIZATION RATIO, POC: 1.9
PROTHROMBIN TIME, POC: 0

## 2024-03-06 PROCEDURE — 85610 PROTHROMBIN TIME: CPT | Performed by: INTERNAL MEDICINE

## 2024-03-06 PROCEDURE — 93793 ANTICOAG MGMT PT WARFARIN: CPT | Performed by: INTERNAL MEDICINE

## 2024-03-20 ENCOUNTER — ANTI-COAG VISIT (OUTPATIENT)
Dept: CARDIOLOGY CLINIC | Age: 69
End: 2024-03-20
Payer: MEDICARE

## 2024-03-20 DIAGNOSIS — I48.92 PAROXYSMAL ATRIAL FLUTTER (HCC): ICD-10-CM

## 2024-03-20 LAB
INTERNATIONAL NORMALIZATION RATIO, POC: 2.1
PROTHROMBIN TIME, POC: 0

## 2024-03-20 PROCEDURE — 93793 ANTICOAG MGMT PT WARFARIN: CPT | Performed by: INTERNAL MEDICINE

## 2024-03-28 PROCEDURE — 93294 REM INTERROG EVL PM/LDLS PM: CPT | Performed by: INTERNAL MEDICINE

## 2024-03-28 PROCEDURE — 93296 REM INTERROG EVL PM/IDS: CPT | Performed by: INTERNAL MEDICINE

## 2024-04-17 ENCOUNTER — ANTI-COAG VISIT (OUTPATIENT)
Dept: CARDIOLOGY CLINIC | Age: 69
End: 2024-04-17
Payer: MEDICARE

## 2024-04-17 DIAGNOSIS — I48.92 PAROXYSMAL ATRIAL FLUTTER (HCC): ICD-10-CM

## 2024-04-17 LAB
INTERNATIONAL NORMALIZATION RATIO, POC: 2.4
PROTHROMBIN TIME, POC: 0

## 2024-04-17 PROCEDURE — 93793 ANTICOAG MGMT PT WARFARIN: CPT | Performed by: INTERNAL MEDICINE

## 2024-05-15 ENCOUNTER — ANTI-COAG VISIT (OUTPATIENT)
Dept: CARDIOLOGY CLINIC | Age: 69
End: 2024-05-15
Payer: MEDICARE

## 2024-05-15 DIAGNOSIS — I48.92 PAROXYSMAL ATRIAL FLUTTER (HCC): ICD-10-CM

## 2024-05-15 LAB
INTERNATIONAL NORMALIZATION RATIO, POC: 2.2
PROTHROMBIN TIME, POC: 0

## 2024-05-15 PROCEDURE — 93793 ANTICOAG MGMT PT WARFARIN: CPT | Performed by: INTERNAL MEDICINE

## 2024-05-15 PROCEDURE — 85610 PROTHROMBIN TIME: CPT | Performed by: INTERNAL MEDICINE

## 2024-06-12 ENCOUNTER — ANTI-COAG VISIT (OUTPATIENT)
Dept: CARDIOLOGY CLINIC | Age: 69
End: 2024-06-12
Payer: MEDICARE

## 2024-06-12 DIAGNOSIS — I48.92 PAROXYSMAL ATRIAL FLUTTER (HCC): ICD-10-CM

## 2024-06-12 LAB
INTERNATIONAL NORMALIZATION RATIO, POC: 2.5
PROTHROMBIN TIME, POC: 0

## 2024-06-12 PROCEDURE — 93793 ANTICOAG MGMT PT WARFARIN: CPT | Performed by: INTERNAL MEDICINE

## 2024-07-10 ENCOUNTER — ANTI-COAG VISIT (OUTPATIENT)
Dept: CARDIOLOGY CLINIC | Age: 69
End: 2024-07-10
Payer: MEDICARE

## 2024-07-10 DIAGNOSIS — I48.20 CHRONIC ATRIAL FIBRILLATION (HCC): Primary | ICD-10-CM

## 2024-07-10 DIAGNOSIS — I48.92 PAROXYSMAL ATRIAL FLUTTER (HCC): ICD-10-CM

## 2024-07-10 LAB
INTERNATIONAL NORMALIZATION RATIO, POC: 1.5
PROTHROMBIN TIME, POC: 0

## 2024-07-10 PROCEDURE — 93793 ANTICOAG MGMT PT WARFARIN: CPT | Performed by: INTERNAL MEDICINE

## 2024-07-10 NOTE — PROGRESS NOTES
INR today is 1.5. Per Dr. Gutierrez, patient is to take 9 mg today and 9 mg tomorrow and recheck in 2 weeks.

## 2024-07-24 ENCOUNTER — NURSE ONLY (OUTPATIENT)
Dept: CARDIOLOGY CLINIC | Age: 69
End: 2024-07-24
Payer: MEDICARE

## 2024-07-24 DIAGNOSIS — I48.92 PAROXYSMAL ATRIAL FLUTTER (HCC): ICD-10-CM

## 2024-07-24 LAB
INTERNATIONAL NORMALIZATION RATIO, POC: 2
PROTHROMBIN TIME, POC: 0

## 2024-07-24 PROCEDURE — 93793 ANTICOAG MGMT PT WARFARIN: CPT | Performed by: INTERNAL MEDICINE

## 2024-07-24 NOTE — PROGRESS NOTES
INR today is 2.0 Per Dr. Zendejas, patient is to continue taking 6 mg everyday and recheck in 4 weeks.

## 2024-08-21 ENCOUNTER — NURSE ONLY (OUTPATIENT)
Dept: CARDIOLOGY CLINIC | Age: 69
End: 2024-08-21

## 2024-08-21 DIAGNOSIS — I48.92 PAROXYSMAL ATRIAL FLUTTER (HCC): ICD-10-CM

## 2024-08-21 LAB
INTERNATIONAL NORMALIZATION RATIO, POC: 3.8
PROTHROMBIN TIME, POC: 0

## 2024-08-21 NOTE — PROGRESS NOTES
INR today is 3.8 Per Dr. Zendejas, patient is to hold for today then start taking 3 mg Monday, Wednesday, and Friday and 6 mg on the other days recheck in 2 weeks.

## 2024-09-04 ENCOUNTER — NURSE ONLY (OUTPATIENT)
Dept: CARDIOLOGY CLINIC | Age: 69
End: 2024-09-04
Payer: MEDICARE

## 2024-09-04 DIAGNOSIS — I48.92 PAROXYSMAL ATRIAL FLUTTER (HCC): ICD-10-CM

## 2024-09-04 LAB
INTERNATIONAL NORMALIZATION RATIO, POC: 1.9
PROTHROMBIN TIME, POC: 0

## 2024-09-04 PROCEDURE — 85610 PROTHROMBIN TIME: CPT | Performed by: INTERNAL MEDICINE

## 2024-09-04 PROCEDURE — 93793 ANTICOAG MGMT PT WARFARIN: CPT | Performed by: INTERNAL MEDICINE

## 2024-09-13 ENCOUNTER — NURSE ONLY (OUTPATIENT)
Dept: CARDIOLOGY CLINIC | Age: 69
End: 2024-09-13

## 2024-09-13 ENCOUNTER — OFFICE VISIT (OUTPATIENT)
Dept: CARDIOLOGY CLINIC | Age: 69
End: 2024-09-13

## 2024-09-13 VITALS
SYSTOLIC BLOOD PRESSURE: 118 MMHG | DIASTOLIC BLOOD PRESSURE: 62 MMHG | WEIGHT: 132.8 LBS | RESPIRATION RATE: 18 BRPM | HEART RATE: 82 BPM | BODY MASS INDEX: 23.53 KG/M2 | HEIGHT: 63 IN

## 2024-09-13 DIAGNOSIS — I48.92 PAROXYSMAL ATRIAL FLUTTER (HCC): Primary | ICD-10-CM

## 2024-09-13 DIAGNOSIS — I44.39 HIGH DEGREE ATRIOVENTRICULAR BLOCK: ICD-10-CM

## 2024-09-13 DIAGNOSIS — Z95.0 CARDIAC PACEMAKER IN SITU: ICD-10-CM

## 2024-09-13 LAB
INTERNATIONAL NORMALIZATION RATIO, POC: 2.4
PROTHROMBIN TIME, POC: 0

## 2024-09-13 RX ORDER — WARFARIN SODIUM 6 MG/1
6 TABLET ORAL DAILY
Qty: 90 TABLET | Refills: 3 | Status: SHIPPED | OUTPATIENT
Start: 2024-09-13

## 2024-10-16 ENCOUNTER — NURSE ONLY (OUTPATIENT)
Dept: CARDIOLOGY CLINIC | Age: 69
End: 2024-10-16
Payer: MEDICARE

## 2024-10-16 DIAGNOSIS — I48.92 PAROXYSMAL ATRIAL FLUTTER (HCC): ICD-10-CM

## 2024-10-16 LAB
INTERNATIONAL NORMALIZATION RATIO, POC: 2
PROTHROMBIN TIME, POC: 0

## 2024-10-16 PROCEDURE — 93793 ANTICOAG MGMT PT WARFARIN: CPT | Performed by: INTERNAL MEDICINE

## 2024-10-16 PROCEDURE — 85610 PROTHROMBIN TIME: CPT | Performed by: INTERNAL MEDICINE

## 2024-10-16 NOTE — PROGRESS NOTES
INR today is 2.0. Per Dr. Zendejas patient is to continue taking 3 mg every M and F and 6 mg the rest of the days. Recheck in 1 month

## 2024-11-13 ENCOUNTER — HOSPITAL ENCOUNTER (EMERGENCY)
Age: 69
Discharge: HOME | End: 2024-11-13
Attending: EMERGENCY MEDICINE
Payer: MEDICARE

## 2024-11-13 ENCOUNTER — NURSE ONLY (OUTPATIENT)
Dept: CARDIOLOGY CLINIC | Age: 69
End: 2024-11-13

## 2024-11-13 ENCOUNTER — APPOINTMENT (OUTPATIENT)
Dept: GENERAL RADIOLOGY | Age: 69
End: 2024-11-13
Payer: MEDICARE

## 2024-11-13 VITALS
SYSTOLIC BLOOD PRESSURE: 116 MMHG | HEART RATE: 73 BPM | HEIGHT: 63 IN | WEIGHT: 143 LBS | OXYGEN SATURATION: 97 % | RESPIRATION RATE: 20 BRPM | TEMPERATURE: 97.5 F | DIASTOLIC BLOOD PRESSURE: 72 MMHG | BODY MASS INDEX: 25.34 KG/M2

## 2024-11-13 DIAGNOSIS — I48.92 PAROXYSMAL ATRIAL FLUTTER (HCC): ICD-10-CM

## 2024-11-13 DIAGNOSIS — S93.402A SPRAIN OF LEFT ANKLE, UNSPECIFIED LIGAMENT, INITIAL ENCOUNTER: Primary | ICD-10-CM

## 2024-11-13 LAB
INTERNATIONAL NORMALIZATION RATIO, POC: 2
PROTHROMBIN TIME, POC: 0

## 2024-11-13 PROCEDURE — 99283 EMERGENCY DEPT VISIT LOW MDM: CPT

## 2024-11-13 PROCEDURE — 73610 X-RAY EXAM OF ANKLE: CPT

## 2024-11-13 PROCEDURE — 73630 X-RAY EXAM OF FOOT: CPT

## 2024-11-13 ASSESSMENT — PAIN - FUNCTIONAL ASSESSMENT: PAIN_FUNCTIONAL_ASSESSMENT: 0-10

## 2024-11-13 ASSESSMENT — ENCOUNTER SYMPTOMS
SHORTNESS OF BREATH: 0
CHEST TIGHTNESS: 0

## 2024-11-13 ASSESSMENT — PAIN SCALES - GENERAL: PAINLEVEL_OUTOF10: 8

## 2024-11-13 NOTE — ED PROVIDER NOTES
69-year-old female presenting with concern about ankle pain.  Is been ongoing for about a week.  She fell 2 weeks ago but it ankle was not bothering her then.  She describes the pain in between the heel and the ankle, as she describes it.  Her tenderness is below the malleoli.  No obvious ecchymosis or injury or swelling or redness or tenderness pulses intact         Family History   Problem Relation Age of Onset    Other Mother         carotid artery-complication of surgery    Heart Disease Brother      Past Surgical History:   Procedure Laterality Date    CARDIAC CATHETERIZATION  2012    Right heart cath by Dr Leonard    CARDIOVERSION Left 2023    Successful (Dr. Plasencia)    CAROTID ENDARTERECTOMY Right     CATARACT REMOVAL WITH IMPLANT Right 2019    intraocular lens    CATARACT REMOVAL WITH IMPLANT Left 10/15/2019     SECTION      COLONOSCOPY      COLONOSCOPY N/A 4/10/2023    COLONOSCOPY DIAGNOSTIC performed by Yahir Powell MD at [facility] ENDOSCOPY    INTRACAPSULAR CATARACT EXTRACTION Right 2019    RIGHT EYE CATARACT EMULSIFICATION IOL IMPLANT performed by Lon RUIZ MD at [facility] OR    INTRACAPSULAR CATARACT EXTRACTION Left 10/15/2019    LEFT EYE CATARACT EMULSIFICATION IOL IMPLANT performed by Lon RUIZ MD at [facility] OR    OTHER SURGICAL HISTORY  2022    Successful removal of old right atrial pacing lead and insertion of the new right atrial pacing lead (Dr. Clark)    PACEMAKER PLACEMENT Left 2022    [employer]tronic dual chamber (Dr Plasencia)    UPPER GASTROINTESTINAL ENDOSCOPY N/A 4/10/2023    EGD CONTROL HEMORRHAGE performed by Yahir Powell MD at [facility] ENDOSCOPY       Review of Systems   Constitutional:  Negative for chills and fever.   Respiratory:  Negative for chest tightness and shortness of breath.    Musculoskeletal:         Left ankle pain        Physical Exam  Constitutional:       General: She is not in acute distress.     Appearance:

## 2024-12-11 ENCOUNTER — NURSE ONLY (OUTPATIENT)
Dept: CARDIOLOGY CLINIC | Age: 69
End: 2024-12-11

## 2024-12-11 DIAGNOSIS — I48.92 PAROXYSMAL ATRIAL FLUTTER (HCC): ICD-10-CM

## 2024-12-11 LAB
INTERNATIONAL NORMALIZATION RATIO, POC: 1.9
PROTHROMBIN TIME, POC: 0

## 2024-12-11 NOTE — PROGRESS NOTES
INR today is 1.9. Per Dr. Zendejas patient is to take an extra 3 mg today and then start taking 3 mg on Fridays and 6 mg the rest of the days. Recheck in 2 weeks.

## 2024-12-30 ENCOUNTER — NURSE ONLY (OUTPATIENT)
Dept: CARDIOLOGY CLINIC | Age: 69
End: 2024-12-30
Payer: MEDICARE

## 2024-12-30 DIAGNOSIS — I48.92 PAROXYSMAL ATRIAL FLUTTER (HCC): ICD-10-CM

## 2024-12-30 LAB
INTERNATIONAL NORMALIZATION RATIO, POC: 1.7
PROTHROMBIN TIME, POC: 0

## 2024-12-30 PROCEDURE — 36415 COLL VENOUS BLD VENIPUNCTURE: CPT | Performed by: INTERNAL MEDICINE

## 2024-12-30 PROCEDURE — 85610 PROTHROMBIN TIME: CPT | Performed by: INTERNAL MEDICINE

## 2024-12-30 PROCEDURE — 93793 ANTICOAG MGMT PT WARFARIN: CPT | Performed by: INTERNAL MEDICINE

## 2024-12-30 NOTE — PROGRESS NOTES
INR today is 1.7. Per Dr. Zendejas patient is to take an extra 6 mg today and then start taking 6 mg daily. Recheck in 1 week.

## 2025-01-06 ENCOUNTER — NURSE ONLY (OUTPATIENT)
Dept: CARDIOLOGY CLINIC | Age: 70
End: 2025-01-06

## 2025-01-06 DIAGNOSIS — I48.92 PAROXYSMAL ATRIAL FLUTTER (HCC): ICD-10-CM

## 2025-01-06 LAB
INTERNATIONAL NORMALIZATION RATIO, POC: 2.7
PROTHROMBIN TIME, POC: 0

## 2025-01-09 RX ORDER — WARFARIN SODIUM 6 MG/1
6 TABLET ORAL DAILY
Qty: 90 TABLET | Refills: 3 | Status: SHIPPED | OUTPATIENT
Start: 2025-01-09

## 2025-01-09 RX ORDER — FLECAINIDE ACETATE 100 MG/1
100 TABLET ORAL 2 TIMES DAILY
Qty: 180 TABLET | Refills: 3 | Status: SHIPPED | OUTPATIENT
Start: 2025-01-09

## 2025-02-07 ENCOUNTER — TELEPHONE (OUTPATIENT)
Dept: CARDIOLOGY CLINIC | Age: 70
End: 2025-02-07

## 2025-02-19 ENCOUNTER — TELEPHONE (OUTPATIENT)
Dept: CARDIOLOGY CLINIC | Age: 70
End: 2025-02-19

## 2025-03-03 ENCOUNTER — NURSE ONLY (OUTPATIENT)
Dept: CARDIOLOGY CLINIC | Age: 70
End: 2025-03-03
Payer: MEDICARE

## 2025-03-03 DIAGNOSIS — I48.92 PAROXYSMAL ATRIAL FLUTTER (HCC): ICD-10-CM

## 2025-03-03 LAB
INTERNATIONAL NORMALIZATION RATIO, POC: 2.5
PROTHROMBIN TIME, POC: 0

## 2025-03-03 PROCEDURE — 93793 ANTICOAG MGMT PT WARFARIN: CPT | Performed by: INTERNAL MEDICINE

## 2025-03-03 PROCEDURE — 85610 PROTHROMBIN TIME: CPT | Performed by: INTERNAL MEDICINE

## 2025-03-03 PROCEDURE — 36415 COLL VENOUS BLD VENIPUNCTURE: CPT | Performed by: INTERNAL MEDICINE

## 2025-04-07 ENCOUNTER — TELEPHONE (OUTPATIENT)
Dept: CARDIOLOGY CLINIC | Age: 70
End: 2025-04-07

## 2025-04-08 ENCOUNTER — CLINICAL SUPPORT (OUTPATIENT)
Dept: CARDIOLOGY CLINIC | Age: 70
End: 2025-04-08

## 2025-04-08 ENCOUNTER — OFFICE VISIT (OUTPATIENT)
Dept: CARDIOLOGY CLINIC | Age: 70
End: 2025-04-08
Payer: MEDICARE

## 2025-04-08 VITALS
RESPIRATION RATE: 18 BRPM | WEIGHT: 149 LBS | SYSTOLIC BLOOD PRESSURE: 118 MMHG | HEIGHT: 63 IN | HEART RATE: 98 BPM | BODY MASS INDEX: 26.4 KG/M2 | DIASTOLIC BLOOD PRESSURE: 76 MMHG

## 2025-04-08 DIAGNOSIS — I48.92 PAROXYSMAL ATRIAL FLUTTER (HCC): Primary | ICD-10-CM

## 2025-04-08 DIAGNOSIS — I48.92 PAROXYSMAL ATRIAL FLUTTER (HCC): ICD-10-CM

## 2025-04-08 LAB
INTERNATIONAL NORMALIZATION RATIO, POC: 3.7
PROTHROMBIN TIME, POC: 0

## 2025-04-08 PROCEDURE — 1036F TOBACCO NON-USER: CPT | Performed by: INTERNAL MEDICINE

## 2025-04-08 PROCEDURE — G8427 DOCREV CUR MEDS BY ELIG CLIN: HCPCS | Performed by: INTERNAL MEDICINE

## 2025-04-08 PROCEDURE — 1123F ACP DISCUSS/DSCN MKR DOCD: CPT | Performed by: INTERNAL MEDICINE

## 2025-04-08 PROCEDURE — 1160F RVW MEDS BY RX/DR IN RCRD: CPT | Performed by: INTERNAL MEDICINE

## 2025-04-08 PROCEDURE — 93000 ELECTROCARDIOGRAM COMPLETE: CPT | Performed by: INTERNAL MEDICINE

## 2025-04-08 PROCEDURE — 1159F MED LIST DOCD IN RCRD: CPT | Performed by: INTERNAL MEDICINE

## 2025-04-08 PROCEDURE — 3017F COLORECTAL CA SCREEN DOC REV: CPT | Performed by: INTERNAL MEDICINE

## 2025-04-08 PROCEDURE — 1090F PRES/ABSN URINE INCON ASSESS: CPT | Performed by: INTERNAL MEDICINE

## 2025-04-08 PROCEDURE — 99214 OFFICE O/P EST MOD 30 MIN: CPT | Performed by: INTERNAL MEDICINE

## 2025-04-08 PROCEDURE — 3078F DIAST BP <80 MM HG: CPT | Performed by: INTERNAL MEDICINE

## 2025-04-08 PROCEDURE — G8400 PT W/DXA NO RESULTS DOC: HCPCS | Performed by: INTERNAL MEDICINE

## 2025-04-08 PROCEDURE — 3074F SYST BP LT 130 MM HG: CPT | Performed by: INTERNAL MEDICINE

## 2025-04-08 PROCEDURE — G8419 CALC BMI OUT NRM PARAM NOF/U: HCPCS | Performed by: INTERNAL MEDICINE

## 2025-04-08 NOTE — PROGRESS NOTES
INR today is 3.7 Per Dr. Zendejas patient is to hold for 2 days then continue taking 6 mg daily. Recheck in 4 weeks

## 2025-04-08 NOTE — PROGRESS NOTES
for: \"BNP\"  FASTING LIPID PANEL:    Lab Results   Component Value Date/Time    CHOL 217 09/10/2015 11:17 AM    HDL 73 09/10/2015 11:17 AM    TRIG 117 09/10/2015 11:17 AM     No orders to display     I have personally reviewed the laboratory, cardiac diagnostic and radiographic testing as outlined above:  Records from F reviewed and summarized as above    ASSESSMENT:  1.  Atrial fibrillation/flutter: Paroxysmal, in paced rhythm, will continue current treatment, and chronic anticoagulation with Coumadin [id number] score of 4, will benefit from chronic anticoagulation for primary prevention against a thromboembolic event, will start on Eliquis  2.  Pacemaker in situ to treat intermittent third-degree AV block: Follows with electrophysiology  3.  Chronic diastolic congestive heart failure: Compensated, will continue current treatment  4.  Pulmonary hypertension: secondary to connective tissue disease and complicated by comorbid COPD and diastolic dysfunction (limited cutaneous scleroderma), Currently with evidence of right heart failure Moderate by recent echo done at the [facility], will continue current treatment  5.  Moderate tricuspid valve regurgitation  6.  Hypertension: Controlled  7.  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  Will continue current treatment  2.  Compliance with the Coumadin dose, PT and INR check appointments was strongly advised  3.  Increased risk of bleeding due to being on anti-coagulation, symptoms and signs of bleeding discussed with patient, patient was advised to seek medical attention at the earliest symptoms or signs of bleeding.  4.  CHF: Daily weight, take an extra Lasix for weight gain of more than 2-3 pounds in 24 hours, compliance with diuretics, low-salt diet were all advised.  5.  Follow-up both Dr. Ware as scheduled  6.  Follow-up with the [facility]

## 2025-05-07 ENCOUNTER — LAB (OUTPATIENT)
Dept: CARDIOLOGY CLINIC | Age: 70
End: 2025-05-07
Payer: MEDICARE

## 2025-05-07 DIAGNOSIS — I48.92 PAROXYSMAL ATRIAL FLUTTER (HCC): ICD-10-CM

## 2025-05-07 LAB
INTERNATIONAL NORMALIZATION RATIO, POC: 5.1
PROTHROMBIN TIME, POC: 0

## 2025-05-07 PROCEDURE — 85610 PROTHROMBIN TIME: CPT | Performed by: INTERNAL MEDICINE

## 2025-05-07 PROCEDURE — 36415 COLL VENOUS BLD VENIPUNCTURE: CPT | Performed by: INTERNAL MEDICINE

## 2025-05-07 PROCEDURE — 93793 ANTICOAG MGMT PT WARFARIN: CPT | Performed by: INTERNAL MEDICINE

## 2025-05-07 NOTE — PROGRESS NOTES
INR today is 3.7 Per Dr. Zendejas patient is to hold for 3 days then continue taking 6 mg daily. Recheck next Friday 05/16/2025

## 2025-05-27 ENCOUNTER — HOSPITAL ENCOUNTER (INPATIENT)
Age: 70
LOS: 3 days | Discharge: HOME | DRG: 190 | End: 2025-05-30
Attending: EMERGENCY MEDICINE | Admitting: INTERNAL MEDICINE
Payer: MEDICARE

## 2025-05-27 ENCOUNTER — APPOINTMENT (OUTPATIENT)
Dept: GENERAL RADIOLOGY | Age: 70
DRG: 190 | End: 2025-05-27
Payer: MEDICARE

## 2025-05-27 DIAGNOSIS — R09.02 HYPOXEMIA: ICD-10-CM

## 2025-05-27 DIAGNOSIS — R06.02 SHORTNESS OF BREATH: Primary | ICD-10-CM

## 2025-05-27 DIAGNOSIS — R00.0 TACHYCARDIA: ICD-10-CM

## 2025-05-27 DIAGNOSIS — R94.31 PROLONGED Q-T INTERVAL ON ECG: ICD-10-CM

## 2025-05-27 PROBLEM — J96.21 ACUTE ON CHRONIC HYPOXIC RESPIRATORY FAILURE (HCC): Status: ACTIVE | Noted: 2025-05-27

## 2025-05-27 LAB
ALBUMIN SERPL-MCNC: 4.1 G/DL (ref 3.5–5.2)
ALP SERPL-CCNC: 73 U/L (ref 35–104)
ALT SERPL-CCNC: 17 U/L (ref 0–32)
ANION GAP SERPL CALCULATED.3IONS-SCNC: 15 MMOL/L (ref 7–16)
AST SERPL-CCNC: 15 U/L (ref 0–31)
BASOPHILS # BLD: 0.03 K/UL (ref 0–0.2)
BASOPHILS NFR BLD: 1 % (ref 0–2)
BILIRUB SERPL-MCNC: 0.5 MG/DL (ref 0–1.2)
BNP SERPL-MCNC: 897 PG/ML (ref 0–450)
BUN SERPL-MCNC: 16 MG/DL (ref 6–23)
CALCIUM SERPL-MCNC: 9.5 MG/DL (ref 8.6–10.2)
CHLORIDE SERPL-SCNC: 101 MMOL/L (ref 98–107)
CO2 SERPL-SCNC: 24 MMOL/L (ref 22–29)
CREAT SERPL-MCNC: 0.7 MG/DL (ref 0.5–1)
EOSINOPHIL # BLD: 0.04 K/UL (ref 0.05–0.5)
EOSINOPHILS RELATIVE PERCENT: 1 % (ref 0–6)
ERYTHROCYTE [DISTWIDTH] IN BLOOD BY AUTOMATED COUNT: 15.4 % (ref 11.5–15)
FLUAV RNA RESP QL NAA+PROBE: NOT DETECTED
FLUBV RNA RESP QL NAA+PROBE: NOT DETECTED
GFR, ESTIMATED: >90 ML/MIN/1.73M2
GLUCOSE SERPL-MCNC: 93 MG/DL (ref 74–99)
HCT VFR BLD AUTO: 34.5 % (ref 34–48)
HGB BLD-MCNC: 10.8 G/DL (ref 11.5–15.5)
IMM GRANULOCYTES # BLD AUTO: <0.03 K/UL (ref 0–0.58)
IMM GRANULOCYTES NFR BLD: 0 % (ref 0–5)
INR PPP: 3.2
LYMPHOCYTES NFR BLD: 0.65 K/UL (ref 1.5–4)
LYMPHOCYTES RELATIVE PERCENT: 13 % (ref 20–42)
MCH RBC QN AUTO: 29.3 PG (ref 26–35)
MCHC RBC AUTO-ENTMCNC: 31.3 G/DL (ref 32–34.5)
MCV RBC AUTO: 93.5 FL (ref 80–99.9)
MONOCYTES NFR BLD: 0.48 K/UL (ref 0.1–0.95)
MONOCYTES NFR BLD: 10 % (ref 2–12)
NEUTROPHILS NFR BLD: 75 % (ref 43–80)
NEUTS SEG NFR BLD: 3.63 K/UL (ref 1.8–7.3)
PLATELET # BLD AUTO: 221 K/UL (ref 130–450)
PMV BLD AUTO: 10.3 FL (ref 7–12)
POTASSIUM SERPL-SCNC: 2.9 MMOL/L (ref 3.5–5)
PROT SERPL-MCNC: 6.7 G/DL (ref 6.4–8.3)
PROTHROMBIN TIME: 35.3 SEC (ref 9.3–12.4)
RBC # BLD AUTO: 3.69 M/UL (ref 3.5–5.5)
SARS-COV-2 RNA RESP QL NAA+PROBE: NOT DETECTED
SODIUM SERPL-SCNC: 140 MMOL/L (ref 132–146)
SOURCE: NORMAL
SPECIMEN DESCRIPTION: NORMAL
TROPONIN I SERPL HS-MCNC: 14 NG/L (ref 0–14)
TROPONIN I SERPL HS-MCNC: 16 NG/L (ref 0–14)
WBC OTHER # BLD: 4.8 K/UL (ref 4.5–11.5)

## 2025-05-27 PROCEDURE — 85025 COMPLETE CBC W/AUTO DIFF WBC: CPT

## 2025-05-27 PROCEDURE — 80053 COMPREHEN METABOLIC PANEL: CPT

## 2025-05-27 PROCEDURE — 94640 AIRWAY INHALATION TREATMENT: CPT

## 2025-05-27 PROCEDURE — 83880 ASSAY OF NATRIURETIC PEPTIDE: CPT

## 2025-05-27 PROCEDURE — 71046 X-RAY EXAM CHEST 2 VIEWS: CPT

## 2025-05-27 PROCEDURE — 2500000003 HC RX 250 WO HCPCS

## 2025-05-27 PROCEDURE — 99285 EMERGENCY DEPT VISIT HI MDM: CPT

## 2025-05-27 PROCEDURE — 6360000002 HC RX W HCPCS

## 2025-05-27 PROCEDURE — 85610 PROTHROMBIN TIME: CPT

## 2025-05-27 PROCEDURE — 1200000000 HC SEMI PRIVATE

## 2025-05-27 PROCEDURE — 6360000002 HC RX W HCPCS: Performed by: INTERNAL MEDICINE

## 2025-05-27 PROCEDURE — 87636 SARSCOV2 & INF A&B AMP PRB: CPT

## 2025-05-27 PROCEDURE — 84484 ASSAY OF TROPONIN QUANT: CPT

## 2025-05-27 PROCEDURE — 93005 ELECTROCARDIOGRAM TRACING: CPT

## 2025-05-27 PROCEDURE — 6370000000 HC RX 637 (ALT 250 FOR IP)

## 2025-05-27 RX ORDER — POTASSIUM CHLORIDE 1500 MG/1
40 TABLET, EXTENDED RELEASE ORAL ONCE
Status: COMPLETED | OUTPATIENT
Start: 2025-05-27 | End: 2025-05-27

## 2025-05-27 RX ORDER — TORSEMIDE 20 MG/1
20 TABLET ORAL
Status: DISCONTINUED | OUTPATIENT
Start: 2025-05-29 | End: 2025-05-27

## 2025-05-27 RX ORDER — WARFARIN SODIUM 2 MG/1
6 TABLET ORAL DAILY
Status: DISCONTINUED | OUTPATIENT
Start: 2025-05-27 | End: 2025-05-28

## 2025-05-27 RX ORDER — ROPINIROLE 1 MG/1
1 TABLET, FILM COATED ORAL NIGHTLY
Status: DISCONTINUED | OUTPATIENT
Start: 2025-05-27 | End: 2025-05-30 | Stop reason: HOSPADM

## 2025-05-27 RX ORDER — MAGNESIUM SULFATE IN WATER 40 MG/ML
2000 INJECTION, SOLUTION INTRAVENOUS PRN
Status: DISCONTINUED | OUTPATIENT
Start: 2025-05-27 | End: 2025-05-30 | Stop reason: HOSPADM

## 2025-05-27 RX ORDER — POTASSIUM CHLORIDE 1500 MG/1
40 TABLET, EXTENDED RELEASE ORAL PRN
Status: DISCONTINUED | OUTPATIENT
Start: 2025-05-27 | End: 2025-05-30 | Stop reason: HOSPADM

## 2025-05-27 RX ORDER — LEVOTHYROXINE SODIUM 88 UG/1
88 TABLET ORAL DAILY
Status: DISCONTINUED | OUTPATIENT
Start: 2025-05-28 | End: 2025-05-30 | Stop reason: HOSPADM

## 2025-05-27 RX ORDER — LANOLIN ALCOHOL/MO/W.PET/CERES
500 CREAM (GRAM) TOPICAL DAILY
Status: DISCONTINUED | OUTPATIENT
Start: 2025-05-28 | End: 2025-05-30 | Stop reason: HOSPADM

## 2025-05-27 RX ORDER — SPIRONOLACTONE 25 MG/1
50 TABLET ORAL DAILY
Status: DISCONTINUED | OUTPATIENT
Start: 2025-05-28 | End: 2025-05-30 | Stop reason: HOSPADM

## 2025-05-27 RX ORDER — SODIUM CHLORIDE 9 MG/ML
INJECTION, SOLUTION INTRAVENOUS PRN
Status: DISCONTINUED | OUTPATIENT
Start: 2025-05-27 | End: 2025-05-30 | Stop reason: HOSPADM

## 2025-05-27 RX ORDER — ACETAMINOPHEN 325 MG/1
650 TABLET ORAL EVERY 6 HOURS PRN
Status: DISCONTINUED | OUTPATIENT
Start: 2025-05-27 | End: 2025-05-30 | Stop reason: HOSPADM

## 2025-05-27 RX ORDER — BUDESONIDE 0.25 MG/2ML
500 INHALANT ORAL 2 TIMES DAILY
Status: DISCONTINUED | OUTPATIENT
Start: 2025-05-27 | End: 2025-05-30 | Stop reason: HOSPADM

## 2025-05-27 RX ORDER — MYCOPHENOLATE MOFETIL 250 MG/1
1500 CAPSULE ORAL 2 TIMES DAILY
Status: DISCONTINUED | OUTPATIENT
Start: 2025-05-27 | End: 2025-05-30 | Stop reason: HOSPADM

## 2025-05-27 RX ORDER — ENOXAPARIN SODIUM 100 MG/ML
40 INJECTION SUBCUTANEOUS DAILY
Status: DISCONTINUED | OUTPATIENT
Start: 2025-05-27 | End: 2025-05-27

## 2025-05-27 RX ORDER — WARFARIN SODIUM 6 MG/1
9 TABLET ORAL
Status: ON HOLD | COMMUNITY
End: 2025-05-30 | Stop reason: HOSPADM

## 2025-05-27 RX ORDER — TORSEMIDE 20 MG/1
40 TABLET ORAL
Status: DISCONTINUED | OUTPATIENT
Start: 2025-05-28 | End: 2025-05-27

## 2025-05-27 RX ORDER — GLUCAGON 1 MG/ML
1 KIT INJECTION PRN
Status: DISCONTINUED | OUTPATIENT
Start: 2025-05-27 | End: 2025-05-30 | Stop reason: HOSPADM

## 2025-05-27 RX ORDER — SODIUM CHLORIDE 0.9 % (FLUSH) 0.9 %
5-40 SYRINGE (ML) INJECTION EVERY 12 HOURS SCHEDULED
Status: DISCONTINUED | OUTPATIENT
Start: 2025-05-27 | End: 2025-05-30 | Stop reason: HOSPADM

## 2025-05-27 RX ORDER — SILDENAFIL CITRATE 20 MG/1
60 TABLET ORAL 3 TIMES DAILY
Status: DISCONTINUED | OUTPATIENT
Start: 2025-05-27 | End: 2025-05-30 | Stop reason: HOSPADM

## 2025-05-27 RX ORDER — SODIUM CHLORIDE 0.9 % (FLUSH) 0.9 %
5-40 SYRINGE (ML) INJECTION PRN
Status: DISCONTINUED | OUTPATIENT
Start: 2025-05-27 | End: 2025-05-30 | Stop reason: HOSPADM

## 2025-05-27 RX ORDER — IPRATROPIUM BROMIDE AND ALBUTEROL SULFATE 2.5; .5 MG/3ML; MG/3ML
1 SOLUTION RESPIRATORY (INHALATION)
Status: DISCONTINUED | OUTPATIENT
Start: 2025-05-27 | End: 2025-05-27

## 2025-05-27 RX ORDER — POLYETHYLENE GLYCOL 3350 17 G/17G
17 POWDER, FOR SOLUTION ORAL DAILY PRN
Status: DISCONTINUED | OUTPATIENT
Start: 2025-05-27 | End: 2025-05-30 | Stop reason: HOSPADM

## 2025-05-27 RX ORDER — IPRATROPIUM BROMIDE AND ALBUTEROL SULFATE 2.5; .5 MG/3ML; MG/3ML
1 SOLUTION RESPIRATORY (INHALATION)
Status: DISCONTINUED | OUTPATIENT
Start: 2025-05-27 | End: 2025-05-28

## 2025-05-27 RX ORDER — DEXTROSE MONOHYDRATE 100 MG/ML
INJECTION, SOLUTION INTRAVENOUS CONTINUOUS PRN
Status: DISCONTINUED | OUTPATIENT
Start: 2025-05-27 | End: 2025-05-30 | Stop reason: HOSPADM

## 2025-05-27 RX ORDER — TORSEMIDE 20 MG/1
20 TABLET ORAL DAILY
Status: DISCONTINUED | OUTPATIENT
Start: 2025-05-28 | End: 2025-05-30 | Stop reason: HOSPADM

## 2025-05-27 RX ORDER — ACETAMINOPHEN 650 MG/1
650 SUPPOSITORY RECTAL EVERY 6 HOURS PRN
Status: DISCONTINUED | OUTPATIENT
Start: 2025-05-27 | End: 2025-05-30 | Stop reason: HOSPADM

## 2025-05-27 RX ORDER — PANTOPRAZOLE SODIUM 40 MG/1
40 TABLET, DELAYED RELEASE ORAL 2 TIMES DAILY
Status: DISCONTINUED | OUTPATIENT
Start: 2025-05-28 | End: 2025-05-30 | Stop reason: HOSPADM

## 2025-05-27 RX ORDER — FLECAINIDE ACETATE 100 MG/1
100 TABLET ORAL 2 TIMES DAILY
Status: DISCONTINUED | OUTPATIENT
Start: 2025-05-27 | End: 2025-05-30 | Stop reason: HOSPADM

## 2025-05-27 RX ORDER — POTASSIUM CHLORIDE 7.45 MG/ML
10 INJECTION INTRAVENOUS PRN
Status: DISCONTINUED | OUTPATIENT
Start: 2025-05-27 | End: 2025-05-30 | Stop reason: HOSPADM

## 2025-05-27 RX ORDER — PANTOPRAZOLE SODIUM 40 MG/1
40 TABLET, DELAYED RELEASE ORAL 2 TIMES DAILY
Status: DISCONTINUED | OUTPATIENT
Start: 2025-05-27 | End: 2025-05-27 | Stop reason: CLARIF

## 2025-05-27 RX ORDER — IPRATROPIUM BROMIDE AND ALBUTEROL SULFATE 2.5; .5 MG/3ML; MG/3ML
3 SOLUTION RESPIRATORY (INHALATION) ONCE
Status: COMPLETED | OUTPATIENT
Start: 2025-05-27 | End: 2025-05-27

## 2025-05-27 RX ADMIN — POTASSIUM CHLORIDE 40 MEQ: 1500 TABLET, EXTENDED RELEASE ORAL at 17:03

## 2025-05-27 RX ADMIN — METHYLPREDNISOLONE SODIUM SUCCINATE 125 MG: 125 INJECTION INTRAMUSCULAR; INTRAVENOUS at 23:14

## 2025-05-27 RX ADMIN — IPRATROPIUM BROMIDE AND ALBUTEROL SULFATE 3 DOSE: .5; 2.5 SOLUTION RESPIRATORY (INHALATION) at 20:28

## 2025-05-27 RX ADMIN — BUDESONIDE 500 MCG: 0.25 SUSPENSION RESPIRATORY (INHALATION) at 20:29

## 2025-05-27 NOTE — ED NOTES
Patient on oxygen at home daily, decided to leave oxygen at home, placed on oxygen in waiting room, SP02 went up to 93% in waiting room

## 2025-05-27 NOTE — H&P
Department of Internal Medicine  History and Physical    PCP: Timi Ware DO  Admitting Physician: Dr. Balderas/Flaco  Consultants:   Date of Service: 2025    CHIEF COMPLAINT:  sob    HISTORY OF PRESENT ILLNESS:    Patient is a 70-year-old female who presented to the ED due to shortness of breath.  Patient states that she has been feeling short of breath for the last week.  It is worse with exertion.  She is normally on 2 L supplemental oxygen at nighttime however for the last week she has been using it throughout the day.  She does have some chest tightness and orthopnea.  She denies any subjective fever or chills.  Denies any increased lower extremity edema.  Denies any nausea emesis.  She has been experiencing diarrhea for the last month.    PAST MEDICAL Hx:  Past Medical History:   Diagnosis Date    Bowel obstruction (HCC)     CAD in native artery 2021    Cerebral artery occlusion with cerebral infarction (HCC)     \"mini stroke\" 10/2014    Gastroparesis     H/O cardiovascular stress test 2020    Lexiscan    Hyperlipidemia     Hypertension     Hypothyroidism 01/10/2023    Pulmonary hypertension (HCC)     Scleroderma (HCC)     Sleep apnea     doesnt use cpap       PAST SURGICAL Hx:   Past Surgical History:   Procedure Laterality Date    CARDIAC CATHETERIZATION  2012    Right heart cath by Dr Leonard    CARDIOVERSION Left 2023    Successful (Dr. Plasencia)    CAROTID ENDARTERECTOMY Right     CATARACT REMOVAL WITH IMPLANT Right 2019    intraocular lens    CATARACT REMOVAL WITH IMPLANT Left 10/15/2019     SECTION      COLONOSCOPY      COLONOSCOPY N/A 4/10/2023    COLONOSCOPY DIAGNOSTIC performed by Yahir Powell MD at [facility] ENDOSCOPY    INTRACAPSULAR CATARACT EXTRACTION Right 2019    RIGHT EYE CATARACT EMULSIFICATION IOL IMPLANT performed by Lon RUIZ MD at [facility] OR    INTRACAPSULAR CATARACT EXTRACTION Left 10/15/2019    LEFT EYE CATARACT EMULSIFICATION  DO Pieter on 5/27/25 at 7:45 PM EDT

## 2025-05-27 NOTE — ED NOTES
Pt ambulated with a steady gait from intake 1 to internal waiting. Pt SPO2 was 96% on 2L NC (which is pt baseline O2), and HR was 77-81 during ambulation. No complaints of SOB or dizziness. Pt SPO2 is 98% on 2L and HR is 83 at rest.

## 2025-05-27 NOTE — ED PROVIDER NOTES
Department of Emergency Medicine   ED Provider Note  Admit Date/RoomTime: 2025  2:09 PM  ED Room: 1818          History of Present Illness:  25, Time: 2:22 PM EDT      Patient is a 70 y.o. female presents with a chief complaint of SOB  This has been occurring for the past one week.  Patient states that it gets better with nothing.  Patient states that it gets worse with exertion.  Patient states that it is severe in severity.  Patient states it was acute in onset.  She notes for the past 1 week she has been having shortness of breath worse with exertion, still does have shortness of breath at rest no.  Notes she is on 2 L oxygen supposed to be at bedtime however for the past 1 week she has had to use it all the time.  Notes she is seen her oxygen get to as low as 77% in the past 1 week.  She is on torsemide which she stopped taking as she has had diarrhea for the past month she reports.  Notes she has chest tightness when she lays down and does have some worsening shortness of breath with this.  No leg swelling.  She is on warfarin.    Review of Systems:     Pertinent positives and negatives are stated within HPI.      --------------------------------------------- PAST HISTORY ---------------------------------------------  Past Medical History:  has a past medical history of Bowel obstruction (HCC), CAD in native artery, Cerebral artery occlusion with cerebral infarction (HCC), Gastroparesis, H/O cardiovascular stress test, Hyperlipidemia, Hypertension, Hypothyroidism, Pulmonary hypertension (HCC), Scleroderma (HCC), and Sleep apnea.    Past Surgical History:  has a past surgical history that includes Carotid endarterectomy (Right);  section; Colonoscopy; Cataract removal with implant (Right, 2019); Intracapsular cataract extraction (Right, 2019); Cataract removal with implant (Left, 10/15/2019); Intracapsular cataract extraction (Left, 10/15/2019); Cardiac catheterization  5.0 mmol/L    Chloride 101 98 - 107 mmol/L    CO2 24 22 - 29 mmol/L    Anion Gap 15 7 - 16 mmol/L    Glucose 93 74 - 99 mg/dL    BUN 16 6 - 23 mg/dL    Creatinine 0.7 0.50 - 1.00 mg/dL    Est, Glom Filt Rate >90 >60 mL/min/1.73m2    Calcium 9.5 8.6 - 10.2 mg/dL    Total Protein 6.7 6.4 - 8.3 g/dL    Albumin 4.1 3.5 - 5.2 g/dL    Total Bilirubin 0.5 0.0 - 1.2 mg/dL    Alkaline Phosphatase 73 35 - 104 U/L    ALT 17 0 - 32 U/L    AST 15 0 - 31 U/L   Protime-INR   Result Value Ref Range    Protime 35.3 (H) 9.3 - 12.4 sec    INR 3.2    Troponin   Result Value Ref Range    Troponin, High Sensitivity 16 (H) 0 - 14 ng/L   Brain Natriuretic Peptide   Result Value Ref Range    NT Pro- (H) 0 - 450 pg/mL   Troponin   Result Value Ref Range    Troponin, High Sensitivity 14 0 - 14 ng/L       RADIOLOGY:  Imaging Interpreted by Radiologist, initial wet read of imaging interpreted by myself  XR CHEST (2 VW)   Final Result   No acute process.  Specifically, no definitive evidence for pneumonia.               EKG:    See ED Course for EKG documentation    Is this patient to be included in the SEP-1 core measure? No   Exclusion criteria - the patient is NOT to be included for SEP-1 Core Measure due to:  Infection is not suspected     ------------------------- NURSING NOTES AND VITALS REVIEWED ---------------------------   The nursing notes within the ED encounter and vital signs as below have been reviewed by myself.  BP (!) 109/58   Pulse (!) 110   Temp 98.4 °F (36.9 °C)   Resp 20   SpO2 98%   Oxygen Saturation Interpretation: Normal    The patient’s available past medical records and past encounters were reviewed, see MDM for details.        ------------------------------ ED COURSE/MEDICAL DECISION MAKING----------------------  Medications   ipratropium 0.5 mg-albuterol 2.5 mg (DUONEB) nebulizer solution 3 Dose (has no administration in time range)   methylPREDNISolone sodium succ (SOLU-MEDROL) 125 mg in sterile water 2 mL

## 2025-05-28 ENCOUNTER — APPOINTMENT (OUTPATIENT)
Dept: CT IMAGING | Age: 70
DRG: 190 | End: 2025-05-28
Payer: MEDICARE

## 2025-05-28 LAB
ALBUMIN SERPL-MCNC: 4.1 G/DL (ref 3.5–5.2)
ALP SERPL-CCNC: 73 U/L (ref 35–104)
ALT SERPL-CCNC: 19 U/L (ref 0–32)
ANION GAP SERPL CALCULATED.3IONS-SCNC: 16 MMOL/L (ref 7–16)
AST SERPL-CCNC: 22 U/L (ref 0–31)
B PARAP IS1001 DNA NPH QL NAA+NON-PROBE: NOT DETECTED
B PERT DNA SPEC QL NAA+PROBE: NOT DETECTED
BASOPHILS # BLD: 0.07 K/UL (ref 0–0.2)
BASOPHILS NFR BLD: 2 % (ref 0–2)
BILIRUB SERPL-MCNC: 0.3 MG/DL (ref 0–1.2)
BUN SERPL-MCNC: 16 MG/DL (ref 6–23)
C PNEUM DNA NPH QL NAA+NON-PROBE: NOT DETECTED
CALCIUM SERPL-MCNC: 9.7 MG/DL (ref 8.6–10.2)
CHLORIDE SERPL-SCNC: 100 MMOL/L (ref 98–107)
CO2 SERPL-SCNC: 22 MMOL/L (ref 22–29)
CREAT SERPL-MCNC: 0.5 MG/DL (ref 0.5–1)
EOSINOPHIL # BLD: 0 K/UL (ref 0.05–0.5)
EOSINOPHILS RELATIVE PERCENT: 0 % (ref 0–6)
ERYTHROCYTE [DISTWIDTH] IN BLOOD BY AUTOMATED COUNT: 15.8 % (ref 11.5–15)
FLUAV RNA NPH QL NAA+NON-PROBE: NOT DETECTED
FLUBV RNA NPH QL NAA+NON-PROBE: NOT DETECTED
GFR, ESTIMATED: >90 ML/MIN/1.73M2
GLUCOSE SERPL-MCNC: 151 MG/DL (ref 74–99)
HADV DNA NPH QL NAA+NON-PROBE: NOT DETECTED
HCOV 229E RNA NPH QL NAA+NON-PROBE: NOT DETECTED
HCOV HKU1 RNA NPH QL NAA+NON-PROBE: NOT DETECTED
HCOV NL63 RNA NPH QL NAA+NON-PROBE: NOT DETECTED
HCOV OC43 RNA NPH QL NAA+NON-PROBE: NOT DETECTED
HCT VFR BLD AUTO: 36.4 % (ref 34–48)
HGB BLD-MCNC: 11.3 G/DL (ref 11.5–15.5)
HMPV RNA NPH QL NAA+NON-PROBE: NOT DETECTED
HPIV1 RNA NPH QL NAA+NON-PROBE: NOT DETECTED
HPIV2 RNA NPH QL NAA+NON-PROBE: NOT DETECTED
HPIV3 RNA NPH QL NAA+NON-PROBE: NOT DETECTED
HPIV4 RNA NPH QL NAA+NON-PROBE: NOT DETECTED
INR PPP: 3.6
LYMPHOCYTES NFR BLD: 0.14 K/UL (ref 1.5–4)
LYMPHOCYTES RELATIVE PERCENT: 4 % (ref 20–42)
M PNEUMO DNA NPH QL NAA+NON-PROBE: NOT DETECTED
MAGNESIUM SERPL-MCNC: 1.4 MG/DL (ref 1.6–2.6)
MAGNESIUM SERPL-MCNC: 1.6 MG/DL (ref 1.6–2.6)
MCH RBC QN AUTO: 29.4 PG (ref 26–35)
MCHC RBC AUTO-ENTMCNC: 31 G/DL (ref 32–34.5)
MCV RBC AUTO: 94.8 FL (ref 80–99.9)
MONOCYTES NFR BLD: 0 % (ref 2–12)
MONOCYTES NFR BLD: 0 K/UL (ref 0.1–0.95)
NEUTROPHILS NFR BLD: 95 % (ref 43–80)
NEUTS SEG NFR BLD: 3.79 K/UL (ref 1.8–7.3)
PHOSPHATE SERPL-MCNC: 3 MG/DL (ref 2.5–4.5)
PLATELET # BLD AUTO: 218 K/UL (ref 130–450)
PMV BLD AUTO: 9.9 FL (ref 7–12)
POTASSIUM SERPL-SCNC: 4 MMOL/L (ref 3.5–5)
PROT SERPL-MCNC: 7 G/DL (ref 6.4–8.3)
PROTHROMBIN TIME: 39.1 SEC (ref 9.3–12.4)
RBC # BLD AUTO: 3.84 M/UL (ref 3.5–5.5)
RBC # BLD: ABNORMAL 10*6/UL
RSV RNA NPH QL NAA+NON-PROBE: NOT DETECTED
RV+EV RNA NPH QL NAA+NON-PROBE: NOT DETECTED
SARS-COV-2 RNA NPH QL NAA+NON-PROBE: NOT DETECTED
SODIUM SERPL-SCNC: 138 MMOL/L (ref 132–146)
SPECIMEN DESCRIPTION: NORMAL
T4 FREE SERPL-MCNC: 1.6 NG/DL (ref 0.9–1.7)
TSH SERPL DL<=0.05 MIU/L-ACNC: 1.21 UIU/ML (ref 0.27–4.2)
WBC OTHER # BLD: 4 K/UL (ref 4.5–11.5)

## 2025-05-28 PROCEDURE — 94640 AIRWAY INHALATION TREATMENT: CPT

## 2025-05-28 PROCEDURE — 71275 CT ANGIOGRAPHY CHEST: CPT

## 2025-05-28 PROCEDURE — 85610 PROTHROMBIN TIME: CPT

## 2025-05-28 PROCEDURE — 85025 COMPLETE CBC W/AUTO DIFF WBC: CPT

## 2025-05-28 PROCEDURE — 84100 ASSAY OF PHOSPHORUS: CPT

## 2025-05-28 PROCEDURE — 2500000003 HC RX 250 WO HCPCS: Performed by: INTERNAL MEDICINE

## 2025-05-28 PROCEDURE — 1200000000 HC SEMI PRIVATE

## 2025-05-28 PROCEDURE — 6360000004 HC RX CONTRAST MEDICATION: Performed by: RADIOLOGY

## 2025-05-28 PROCEDURE — 99222 1ST HOSP IP/OBS MODERATE 55: CPT | Performed by: INTERNAL MEDICINE

## 2025-05-28 PROCEDURE — 6370000000 HC RX 637 (ALT 250 FOR IP): Performed by: INTERNAL MEDICINE

## 2025-05-28 PROCEDURE — 2700000000 HC OXYGEN THERAPY PER DAY

## 2025-05-28 PROCEDURE — 87081 CULTURE SCREEN ONLY: CPT

## 2025-05-28 PROCEDURE — 83735 ASSAY OF MAGNESIUM: CPT

## 2025-05-28 PROCEDURE — 0202U NFCT DS 22 TRGT SARS-COV-2: CPT

## 2025-05-28 PROCEDURE — 6360000002 HC RX W HCPCS: Performed by: INTERNAL MEDICINE

## 2025-05-28 PROCEDURE — 84443 ASSAY THYROID STIM HORMONE: CPT

## 2025-05-28 PROCEDURE — 84439 ASSAY OF FREE THYROXINE: CPT

## 2025-05-28 PROCEDURE — 80053 COMPREHEN METABOLIC PANEL: CPT

## 2025-05-28 RX ORDER — IOPAMIDOL 755 MG/ML
75 INJECTION, SOLUTION INTRAVASCULAR
Status: COMPLETED | OUTPATIENT
Start: 2025-05-28 | End: 2025-05-28

## 2025-05-28 RX ORDER — MAGNESIUM SULFATE IN WATER 40 MG/ML
2000 INJECTION, SOLUTION INTRAVENOUS ONCE
Status: COMPLETED | OUTPATIENT
Start: 2025-05-28 | End: 2025-05-28

## 2025-05-28 RX ORDER — IPRATROPIUM BROMIDE AND ALBUTEROL SULFATE 2.5; .5 MG/3ML; MG/3ML
1 SOLUTION RESPIRATORY (INHALATION)
Status: DISCONTINUED | OUTPATIENT
Start: 2025-05-28 | End: 2025-05-30 | Stop reason: HOSPADM

## 2025-05-28 RX ADMIN — BUDESONIDE 500 MCG: 0.25 SUSPENSION RESPIRATORY (INHALATION) at 16:00

## 2025-05-28 RX ADMIN — MYCOPHENOLATE MOFETIL 1500 MG: 250 CAPSULE ORAL at 12:16

## 2025-05-28 RX ADMIN — FLECAINIDE ACETATE 100 MG: 100 TABLET ORAL at 12:17

## 2025-05-28 RX ADMIN — SILDENAFIL 60 MG: 20 TABLET ORAL at 20:00

## 2025-05-28 RX ADMIN — MAGNESIUM SULFATE HEPTAHYDRATE 2000 MG: 40 INJECTION, SOLUTION INTRAVENOUS at 06:08

## 2025-05-28 RX ADMIN — ROPINIROLE HYDROCHLORIDE 1 MG: 1 TABLET, FILM COATED ORAL at 20:00

## 2025-05-28 RX ADMIN — IPRATROPIUM BROMIDE AND ALBUTEROL SULFATE 1 DOSE: 2.5; .5 SOLUTION RESPIRATORY (INHALATION) at 16:00

## 2025-05-28 RX ADMIN — FLECAINIDE ACETATE 100 MG: 100 TABLET ORAL at 20:30

## 2025-05-28 RX ADMIN — IOPAMIDOL 75 ML: 755 INJECTION, SOLUTION INTRAVENOUS at 10:37

## 2025-05-28 RX ADMIN — IPRATROPIUM BROMIDE AND ALBUTEROL SULFATE 1 DOSE: 2.5; .5 SOLUTION RESPIRATORY (INHALATION) at 09:25

## 2025-05-28 RX ADMIN — Medication 10 ML: at 20:00

## 2025-05-28 RX ADMIN — MYCOPHENOLATE MOFETIL 1500 MG: 250 CAPSULE ORAL at 20:31

## 2025-05-28 RX ADMIN — METHYLPREDNISOLONE SODIUM SUCCINATE 40 MG: 40 INJECTION, POWDER, LYOPHILIZED, FOR SOLUTION INTRAMUSCULAR; INTRAVENOUS at 22:19

## 2025-05-28 RX ADMIN — IPRATROPIUM BROMIDE AND ALBUTEROL SULFATE 1 DOSE: 2.5; .5 SOLUTION RESPIRATORY (INHALATION) at 13:18

## 2025-05-28 RX ADMIN — MAGNESIUM OXIDE 400 MG (241.3 MG MAGNESIUM) TABLET 500 MG: TABLET at 12:14

## 2025-05-28 RX ADMIN — ACETAMINOPHEN 650 MG: 325 TABLET ORAL at 12:13

## 2025-05-28 RX ADMIN — LEVOTHYROXINE SODIUM 88 MCG: 0.09 TABLET ORAL at 12:15

## 2025-05-28 RX ADMIN — Medication 40 ML: at 01:03

## 2025-05-28 RX ADMIN — SILDENAFIL 60 MG: 20 TABLET ORAL at 12:16

## 2025-05-28 RX ADMIN — METHYLPREDNISOLONE SODIUM SUCCINATE 40 MG: 40 INJECTION, POWDER, LYOPHILIZED, FOR SOLUTION INTRAMUSCULAR; INTRAVENOUS at 15:37

## 2025-05-28 RX ADMIN — PANTOPRAZOLE SODIUM 40 MG: 40 TABLET, DELAYED RELEASE ORAL at 12:15

## 2025-05-28 ASSESSMENT — PAIN SCALES - GENERAL: PAINLEVEL_OUTOF10: 5

## 2025-05-28 ASSESSMENT — PAIN DESCRIPTION - LOCATION: LOCATION: HEAD

## 2025-05-28 ASSESSMENT — PAIN DESCRIPTION - DESCRIPTORS: DESCRIPTORS: ACHING

## 2025-05-28 NOTE — CONSULTS
[facility]  Department of Internal Medicine  Division of Pulmonary, Critical Care and Sleep Medicine  Consult Note    STEPH Mccollum MD, MD, MD, MD, MD    Patient: Nadeen Issa  MRN: [id number]  : 1955    Encounter Time: 3:20 PM     Date of Admission: 2025  2:09 PM    Primary Care Physician: Timi Ware DO    Primary Pulmonologist:     Reason for Consultation: shortness of breath     HISTORY OF PRESENT ILLNESS :     Nadeen Issa 70 y.o. female was seen in consultation regarding the above chief compliant.  Ms. Issa has a past medical history of COPD, coronary artery disease, atrial fibrillation s/p pacemaker, congestive heart failure, scleroderma, pulmonary hypertension, pulmonary fibrosis, hypertension, hyperlipidemia presented after concerns of progressive worsening of shortness of breath over the last 1 week and nonproductive cough.  As per the patient, she was in her baseline health of 2 L nasal cannula when she started experiencing progressive worsening of shortness of breath which was exacerbated on minimal exertion and increasing her oxygen requirement at home.  She reached out to her pulmonary doctor in [facility] and was advised to visit the emergency.  Patient does not complain of chest pain, fever, chills, hemoptysis, dizziness, syncopal episode.    In the ER patient is placed on 2 L nasal cannula.  She has been hemodynamically stable.  CTA chest was done which was negative for pulmonary embolism with was positive for small bilateral pleural effusions.    Patient was admitted for further acute respiratory insufficiency    PAST MEDICAL HISTORY:  has a past medical history of Bowel obstruction (HCC), CAD in native artery, Cerebral artery occlusion with cerebral      PRO-BNP:   Lab Results   Component Value Date    PROBNP 897 (H) 05/27/2025    PROBNP 2,800 (H) 04/05/2023      ABGs: No results found for: \"PH\", \"PO2\", \"PCO2\"  Hemoglobin A1C: No components found for: \"HGBA1C\"    IMAGING:  Imaging tests were completed and reviewed and discussed radiology and care team involved and reveals   CTA CHEST W CONTRAST  Result Date: 5/28/2025  EXAMINATION: CTA OF THE CHEST5/28/2025 10:37 am CTA CHEST WITH CONTRAST TECHNIQUE: CTA of the chest was performed after the administration of intravenous contrast.  Multiplanar reformatted images are provided for review.  MIP images are provided for review. Automated exposure control, iterative reconstruction, and/or weight based adjustment of the mA/kV was utilized to reduce the radiation dose to as low as reasonably achievable. CTA of the thorax was acquired in the axial plane. Coronal and sagittal reformatted images were reviewed. Three dimensional reconstructions were created on a separate workstation. COMPARISON: CTA of the chest 08/17/2022.  Two-view chest 05/27/2025. HISTORY: ORDERING SYSTEM PROVIDED HISTORY: sob TECHNOLOGIST PROVIDED HISTORY: Reason for exam:->sob Additional Contrast?->1 FINDINGS: There is satisfactory pulmonary arterial contrast opacification with no evidence of pulmonary embolic disease.  There are small bilateral pleural effusions.  Thoracic aortic caliber is normal.  There are no enlarged mediastinal or hilar lymph nodes.  There are coronary artery calcifications. There is right and left atrial enlargement. No acute abnormality in the visualized upper abdomen. Lung window images: Right lung: Advanced upper lobe centrilobular type emphysema.  No evidence of pneumonia. No bronchiectasis.  No pneumothorax.  Minor right basilar atelectasis. Left lung: Advanced upper lobe centrilobular type emphysema.  No evidence of pneumonia. No bronchiectasis or pneumothorax.  Minor left basilar atelectasis. Bones: There is healing

## 2025-05-28 NOTE — PROGRESS NOTES
ADDENDUM: INR 3.6; will hold dosing syeda Mcnulty, WalterD, BCEMP 5/28/2025 3:50 PM         Pharmacy Consultation Note  (Warfarin Dosing and Monitoring)    Initial consult date: 5/28/2025  Consulting Provider: STEPH Hernadez-NILTON    Nadeen Issa is a 70 y.o. female for whom pharmacy has been asked to manage warfarin therapy.     Weight:   Wt Readings from Last 1 Encounters:   05/27/25 67.6 kg (149 lb 0.5 oz)       TSH:    Lab Results   Component Value Date/Time    TSH 1.21 05/28/2025 06:02 AM       Hepatic Function Panel:                            Lab Results   Component Value Date/Time    ALKPHOS 73 05/28/2025 06:02 AM    ALT 19 05/28/2025 06:02 AM    AST 22 05/28/2025 06:02 AM    BILITOT 0.3 05/28/2025 06:02 AM    BILIDIR <0.2 09/28/2021 02:50 PM    IBILI see below 09/28/2021 02:50 PM       Current significant warfarin drug-drug interactions include:  ropinirole    Recent Labs     05/27/25  1439 05/28/25  0602   HGB 10.8* 11.3*    218     Date Warfarin Dose INR Heparin or LMWH Comment   5/28   --                                  Assessment:  Patient is a 70 y.o. female on warfarin for Atrial Fibrillation.  Patient's home warfarin dosing regimen is 9 mg MWF, 6 mg all other days.   Goal INR 2 - 3  INR today is pending    Plan:  Await INR from today and re-order warfarin as appropriate  Daily PT/INR until the INR is stable within the therapeutic range  Pharmacist will follow and monitor/adjust dosing as necessary    Thank you for this consult,    Italia Pineda, WalterD, BCPS 5/28/2025 2:54 PM   Ext: [phone]    [facility]: [phone]

## 2025-05-28 NOTE — RT PROTOCOL NOTE
bronchodilator orders with one order with TID Frequency and one order with Frequency of every 4 hours PRN wheezing or increased work of breathing using Per Protocol order mode.       11-13 - enter or revise RT Bronchodilator order(s) to one equivalent RT bronchodilator order with QID Frequency and an Albuterol order with Frequency of every 4 hours PRN wheezing or increased work of breathing using Per Protocol order mode.      Greater than 13 - enter or revise RT Bronchodilator order(s) to one equivalent RT bronchodilator order with every 4 hours Frequency and an Albuterol order with Frequency of every 2 hours PRN wheezing or increased work of breathing using Per Protocol order mode.     RT to enter RT Home Evaluation for COPD & MDI Assessment order using Per Protocol order mode.    Electronically signed by Marcello Middleton RCP on 5/28/2025 at 7:46 AM

## 2025-05-28 NOTE — ED NOTES
Patient took at home medications includihng warfarin, simvastatin, ropinerol, flecainide, sildenafil, and magnesium per at home doses without the nurses knowledge. Pt educated on risks of taking medications without RN aware. Provider called and notified at this time.

## 2025-05-28 NOTE — PROGRESS NOTES
Internal Medicine Consult Note    [facility]    Cr Balderas D.O., DAVID.                    Lance Sam D.O., AKASH Owusu D.O.     Lew Teixeira, MSN, APRN-CNP  Lon Jones, MSN, APRN-CNP  Klaudia Pearson, MSN APRN-CNP  Eve Pritchett, MSN. APRN-NP-C     Primary Care Physician: Timi Ware DO   Admitting Physician:  Cr Balderas DO  Admission date and time: 5/27/2025  2:09 PM    Room:  [place] [address]  Admitting diagnosis: Acute on chronic hypoxic respiratory failure (HCC) [J96.21]    Patient Name: Nadeen Issa  MRN: [id number]    Date of Service: 5/28/2025     Subjective:  Nadeen is a 70 y.o. female who was seen and examined today,5/28/2025, at the bedside.  Patient seen and examined in the emergency department.  She is resting comfortably.  She is currently back to her baseline oxygen use of 2 L.  She continues to report becoming dyspneic very easily with exertion.  Will check molecular panel, CTA and echocardiogram.    No family present during my examination.    Review of System:   Constitutional:   Denies fever or chills, weight loss or gain, reports fatigue  HEENT:   Denies ear pain, sore throat, sinus or eye problems.  Cardiovascular:   Denies any chest pain, irregular heartbeats, or palpitations.   Respiratory:   Denies  coughing, sputum production, hemoptysis, or wheezing.  Reports increasing exertional dyspnea  Gastrointestinal:   Denies nausea, vomiting, diarrhea, or constipation.  Denies any abdominal pain.  Genitourinary:    Denies any urgency, frequency, hematuria. Voiding  without difficulty.  Extremities:   Denies lower extremity swelling, edema or cyanosis.   Neurology:    Denies any headache or focal neurological deficits, Denies generalized weakness or memory difficulty.   Psch:   Denies being anxious or depressed.  Musculoskeletal:    Denies  myalgias, joint complaints or back pain.   Integumentary:   Denies any  Oral Daily    magnesium oxide  500 mg Oral Daily    mycophenolate  1,500 mg Oral BID    rOPINIRole  1 mg Oral Nightly    sildenafil  60 mg Oral TID    spironolactone  50 mg Oral Daily    budesonide  500 mcg Nebulization BID    torsemide  20 mg Oral Daily    pantoprazole  40 mg Oral BID     Continuous Infusions:   dextrose      sodium chloride         Objective Data:  Recent Labs     05/27/25  1439 05/28/25  0602   WBC 4.8 4.0*   RBC 3.69 3.84   HGB 10.8* 11.3*   HCT 34.5 36.4   MCV 93.5 94.8   MCH 29.3 29.4   MCHC 31.3* 31.0*   RDW 15.4* 15.8*    218   MPV 10.3 9.9     Recent Labs     05/27/25  1439 05/28/25  0602    138   K 2.9* 4.0    100   CO2 24 22   BUN 16 16   CREATININE 0.7 0.5   GLUCOSE 93 151*   CALCIUM 9.5 9.7   BILITOT 0.5 0.3   ALKPHOS 73 73   AST 15 22   ALT 17 19   ALBUMIN 4.1 4.1     No results found for: \"TROPONINI\"     Assessment:  Acute on chronic hypoxic respiratory failure on 2 L vat baseline  Acute COPD exacerbation possibly secondary to Pulmonary hypertension  Hypokalemia most likely secondary to prolonged diarrhea  Prolonged QTc  Sleep apnea  Coronary artery disease  Pacemaker dual-chamber  Atrial fibrillation  Chronic diastolic congestive heart failure  Scleroderma  Hypothyroidism   Gastroparesis  History of CVA with carotid endarterectomy  hypertension  Hyperlipidemia    Plan:  Supplemental oxygen currently and back at baseline home oxygen  Continue aerosolized bronchodilators and ICS  Continue IV steroids  Consult pulmonology  Obtain echocardiogram, history of pulmonary hypertension  CTA of the chest-mild effusion  Diuretic therapy with Aldactone and Demadex    Continue current therapy.  See orders for further plan of care.    More than 50% of my  time was spent at the bedside counseling/coordinating care with the patient and/or family with face to face contact.  This time was spent reviewing notes and laboratory data as well as instructing and counseling the patient.

## 2025-05-29 ENCOUNTER — APPOINTMENT (OUTPATIENT)
Age: 70
DRG: 190 | End: 2025-05-29
Payer: MEDICARE

## 2025-05-29 PROBLEM — R06.02 SHORTNESS OF BREATH: Status: ACTIVE | Noted: 2023-01-10

## 2025-05-29 LAB
ALBUMIN SERPL-MCNC: 4.1 G/DL (ref 3.5–5.2)
ALP SERPL-CCNC: 68 U/L (ref 35–104)
ALT SERPL-CCNC: 30 U/L (ref 0–32)
ANION GAP SERPL CALCULATED.3IONS-SCNC: 13 MMOL/L (ref 7–16)
AST SERPL-CCNC: 27 U/L (ref 0–31)
BASOPHILS # BLD: 0 K/UL (ref 0–0.2)
BASOPHILS NFR BLD: 0 % (ref 0–2)
BILIRUB SERPL-MCNC: 0.4 MG/DL (ref 0–1.2)
BUN SERPL-MCNC: 19 MG/DL (ref 6–23)
CALCIUM SERPL-MCNC: 10 MG/DL (ref 8.6–10.2)
CHLORIDE SERPL-SCNC: 102 MMOL/L (ref 98–107)
CO2 SERPL-SCNC: 23 MMOL/L (ref 22–29)
CREAT SERPL-MCNC: 0.7 MG/DL (ref 0.5–1)
ECHO AO ASC DIAM: 2.2 CM
ECHO AO ASCENDING AORTA INDEX: 1.29 CM/M2
ECHO AV AREA PEAK VELOCITY: 2.4 CM2
ECHO AV AREA VTI: 2.4 CM2
ECHO AV AREA/BSA PEAK VELOCITY: 1.4 CM2/M2
ECHO AV AREA/BSA VTI: 1.4 CM2/M2
ECHO AV CUSP MM: 1.8 CM
ECHO AV MEAN GRADIENT: 7 MMHG
ECHO AV MEAN VELOCITY: 1.2 M/S
ECHO AV PEAK GRADIENT: 13 MMHG
ECHO AV PEAK VELOCITY: 1.8 M/S
ECHO AV VELOCITY RATIO: 0.83
ECHO AV VTI: 34.7 CM
ECHO BSA: 1.73 M2
ECHO EST RA PRESSURE: 3 MMHG
ECHO LA DIAMETER INDEX: 2.46 CM/M2
ECHO LA DIAMETER: 4.2 CM
ECHO LA VOL A-L A2C: 105 ML (ref 22–52)
ECHO LA VOL A-L A4C: 50 ML (ref 22–52)
ECHO LA VOL BP: 69 ML (ref 22–52)
ECHO LA VOL MOD A2C: 100 ML (ref 22–52)
ECHO LA VOL MOD A4C: 47 ML (ref 22–52)
ECHO LA VOL/BSA BIPLANE: 40 ML/M2 (ref 16–34)
ECHO LA VOLUME AREA LENGTH: 73 ML
ECHO LA VOLUME INDEX A-L A2C: 61 ML/M2 (ref 16–34)
ECHO LA VOLUME INDEX A-L A4C: 29 ML/M2 (ref 16–34)
ECHO LA VOLUME INDEX AREA LENGTH: 43 ML/M2 (ref 16–34)
ECHO LA VOLUME INDEX MOD A2C: 58 ML/M2 (ref 16–34)
ECHO LA VOLUME INDEX MOD A4C: 27 ML/M2 (ref 16–34)
ECHO LV EDV A2C: 69 ML
ECHO LV EDV A4C: 60 ML
ECHO LV EDV BP: 66 ML (ref 56–104)
ECHO LV EDV INDEX A4C: 35 ML/M2
ECHO LV EDV INDEX BP: 39 ML/M2
ECHO LV EDV NDEX A2C: 40 ML/M2
ECHO LV EF PHYSICIAN: 62 %
ECHO LV EJECTION FRACTION A2C: 65 %
ECHO LV EJECTION FRACTION A4C: 62 %
ECHO LV EJECTION FRACTION BIPLANE: 62 % (ref 55–100)
ECHO LV ESV A2C: 24 ML
ECHO LV ESV A4C: 23 ML
ECHO LV ESV BP: 26 ML (ref 19–49)
ECHO LV ESV INDEX A2C: 14 ML/M2
ECHO LV ESV INDEX A4C: 13 ML/M2
ECHO LV ESV INDEX BP: 15 ML/M2
ECHO LV FRACTIONAL SHORTENING: 40 % (ref 28–44)
ECHO LV INTERNAL DIMENSION DIASTOLE INDEX: 2.46 CM/M2
ECHO LV INTERNAL DIMENSION DIASTOLIC: 4.2 CM (ref 3.9–5.3)
ECHO LV INTERNAL DIMENSION SYSTOLIC INDEX: 1.46 CM/M2
ECHO LV INTERNAL DIMENSION SYSTOLIC: 2.5 CM
ECHO LV ISOVOLUMETRIC RELAXATION TIME (IVRT): 114.2 MS
ECHO LV IVSD: 1.3 CM (ref 0.6–0.9)
ECHO LV MASS 2D: 178.2 G (ref 67–162)
ECHO LV MASS INDEX 2D: 104.2 G/M2 (ref 43–95)
ECHO LV POSTERIOR WALL DIASTOLIC: 1.1 CM (ref 0.6–0.9)
ECHO LV RELATIVE WALL THICKNESS RATIO: 0.52
ECHO LVOT AREA: 2.8 CM2
ECHO LVOT AV VTI INDEX: 0.84
ECHO LVOT DIAM: 1.9 CM
ECHO LVOT MEAN GRADIENT: 5 MMHG
ECHO LVOT PEAK GRADIENT: 9 MMHG
ECHO LVOT PEAK VELOCITY: 1.5 M/S
ECHO LVOT STROKE VOLUME INDEX: 48.2 ML/M2
ECHO LVOT SV: 82.5 ML
ECHO LVOT VTI: 29.1 CM
ECHO MV "A" WAVE DURATION: 133.2 MSEC
ECHO MV A VELOCITY: 0.32 M/S
ECHO MV AREA PHT: 4.1 CM2
ECHO MV AREA VTI: 2.4 CM2
ECHO MV E DECELERATION TIME (DT): 199.5 MS
ECHO MV E VELOCITY: 1.69 M/S
ECHO MV E/A RATIO: 5.28
ECHO MV LVOT VTI INDEX: 1.19
ECHO MV MAX VELOCITY: 1.8 M/S
ECHO MV MEAN GRADIENT: 4 MMHG
ECHO MV MEAN VELOCITY: 0.9 M/S
ECHO MV PEAK GRADIENT: 13 MMHG
ECHO MV PRESSURE HALF TIME (PHT): 53.7 MS
ECHO MV VTI: 34.7 CM
ECHO PULMONARY ARTERY END DIASTOLIC PRESSURE: 0 MMHG
ECHO PV MAX VELOCITY: 0.9 M/S
ECHO PV MEAN GRADIENT: 2 MMHG
ECHO PV MEAN VELOCITY: 0.6 M/S
ECHO PV PEAK GRADIENT: 3 MMHG
ECHO PV REGURGITANT MAX VELOCITY: 0.3 M/S
ECHO PV VTI: 17.7 CM
ECHO RIGHT VENTRICULAR SYSTOLIC PRESSURE (RVSP): 39 MMHG
ECHO RV INTERNAL DIMENSION: 3.3 CM
ECHO RV LONGITUDINAL DIMENSION: 7.7 CM
ECHO RV MID DIMENSION: 2.8 CM
ECHO TV REGURGITANT MAX VELOCITY: 3.01 M/S
ECHO TV REGURGITANT PEAK GRADIENT: 36 MMHG
EOSINOPHIL # BLD: 0 K/UL (ref 0.05–0.5)
EOSINOPHILS RELATIVE PERCENT: 0 % (ref 0–6)
ERYTHROCYTE [DISTWIDTH] IN BLOOD BY AUTOMATED COUNT: 15.9 % (ref 11.5–15)
GFR, ESTIMATED: >90 ML/MIN/1.73M2
GLUCOSE SERPL-MCNC: 159 MG/DL (ref 74–99)
HCT VFR BLD AUTO: 32.4 % (ref 34–48)
HGB BLD-MCNC: 10.3 G/DL (ref 11.5–15.5)
INR PPP: 3.9
LYMPHOCYTES NFR BLD: 0.16 K/UL (ref 1.5–4)
LYMPHOCYTES RELATIVE PERCENT: 4 % (ref 20–42)
MAGNESIUM SERPL-MCNC: 2 MG/DL (ref 1.6–2.6)
MCH RBC QN AUTO: 29.8 PG (ref 26–35)
MCHC RBC AUTO-ENTMCNC: 31.8 G/DL (ref 32–34.5)
MCV RBC AUTO: 93.6 FL (ref 80–99.9)
MONOCYTES NFR BLD: 0.08 K/UL (ref 0.1–0.95)
MONOCYTES NFR BLD: 2 % (ref 2–12)
NEUTROPHILS NFR BLD: 95 % (ref 43–80)
NEUTS SEG NFR BLD: 4.45 K/UL (ref 1.8–7.3)
PHOSPHATE SERPL-MCNC: 3.6 MG/DL (ref 2.5–4.5)
PLATELET # BLD AUTO: 217 K/UL (ref 130–450)
PMV BLD AUTO: 10.2 FL (ref 7–12)
POTASSIUM SERPL-SCNC: 3.8 MMOL/L (ref 3.5–5)
PROT SERPL-MCNC: 6.4 G/DL (ref 6.4–8.3)
PROTHROMBIN TIME: 42.6 SEC (ref 9.3–12.4)
RBC # BLD AUTO: 3.46 M/UL (ref 3.5–5.5)
RBC # BLD: NORMAL 10*6/UL
SODIUM SERPL-SCNC: 138 MMOL/L (ref 132–146)
WBC OTHER # BLD: 4.7 K/UL (ref 4.5–11.5)

## 2025-05-29 PROCEDURE — 1200000000 HC SEMI PRIVATE

## 2025-05-29 PROCEDURE — 2700000000 HC OXYGEN THERAPY PER DAY

## 2025-05-29 PROCEDURE — 6360000002 HC RX W HCPCS: Performed by: INTERNAL MEDICINE

## 2025-05-29 PROCEDURE — 99233 SBSQ HOSP IP/OBS HIGH 50: CPT | Performed by: INTERNAL MEDICINE

## 2025-05-29 PROCEDURE — 83735 ASSAY OF MAGNESIUM: CPT

## 2025-05-29 PROCEDURE — 6370000000 HC RX 637 (ALT 250 FOR IP): Performed by: INTERNAL MEDICINE

## 2025-05-29 PROCEDURE — 86331 IMMUNODIFFUSION OUCHTERLONY: CPT

## 2025-05-29 PROCEDURE — 2500000003 HC RX 250 WO HCPCS: Performed by: INTERNAL MEDICINE

## 2025-05-29 PROCEDURE — 99222 1ST HOSP IP/OBS MODERATE 55: CPT | Performed by: INTERNAL MEDICINE

## 2025-05-29 PROCEDURE — APPSS180 APP SPLIT SHARED TIME > 60 MINUTES: Performed by: NURSE PRACTITIONER

## 2025-05-29 PROCEDURE — 97161 PT EVAL LOW COMPLEX 20 MIN: CPT | Performed by: PHYSICAL THERAPIST

## 2025-05-29 PROCEDURE — 93306 TTE W/DOPPLER COMPLETE: CPT | Performed by: INTERNAL MEDICINE

## 2025-05-29 PROCEDURE — 86606 ASPERGILLUS ANTIBODY: CPT

## 2025-05-29 PROCEDURE — 80053 COMPREHEN METABOLIC PANEL: CPT

## 2025-05-29 PROCEDURE — 84100 ASSAY OF PHOSPHORUS: CPT

## 2025-05-29 PROCEDURE — 97530 THERAPEUTIC ACTIVITIES: CPT | Performed by: PHYSICAL THERAPIST

## 2025-05-29 PROCEDURE — 85610 PROTHROMBIN TIME: CPT

## 2025-05-29 PROCEDURE — 93306 TTE W/DOPPLER COMPLETE: CPT

## 2025-05-29 PROCEDURE — 94640 AIRWAY INHALATION TREATMENT: CPT

## 2025-05-29 PROCEDURE — 85025 COMPLETE CBC W/AUTO DIFF WBC: CPT

## 2025-05-29 PROCEDURE — 6370000000 HC RX 637 (ALT 250 FOR IP)

## 2025-05-29 PROCEDURE — 36415 COLL VENOUS BLD VENIPUNCTURE: CPT

## 2025-05-29 RX ORDER — HYDROXYZINE PAMOATE 25 MG/1
25 CAPSULE ORAL 3 TIMES DAILY PRN
Status: DISCONTINUED | OUTPATIENT
Start: 2025-05-29 | End: 2025-05-30 | Stop reason: HOSPADM

## 2025-05-29 RX ORDER — LORAZEPAM 0.5 MG/1
0.5 TABLET ORAL EVERY 8 HOURS PRN
Status: DISCONTINUED | OUTPATIENT
Start: 2025-05-29 | End: 2025-05-30 | Stop reason: HOSPADM

## 2025-05-29 RX ADMIN — MYCOPHENOLATE MOFETIL 1500 MG: 250 CAPSULE ORAL at 08:20

## 2025-05-29 RX ADMIN — LORAZEPAM 0.5 MG: 0.5 TABLET ORAL at 21:02

## 2025-05-29 RX ADMIN — ACETAMINOPHEN 650 MG: 325 TABLET ORAL at 08:16

## 2025-05-29 RX ADMIN — LEVOTHYROXINE SODIUM 88 MCG: 0.09 TABLET ORAL at 05:44

## 2025-05-29 RX ADMIN — DIPHENHYDRAMINE HCL 5 ML: 12.5 LIQUID ORAL at 08:24

## 2025-05-29 RX ADMIN — METHYLPREDNISOLONE SODIUM SUCCINATE 40 MG: 40 INJECTION, POWDER, LYOPHILIZED, FOR SOLUTION INTRAMUSCULAR; INTRAVENOUS at 08:21

## 2025-05-29 RX ADMIN — MAGNESIUM OXIDE 400 MG (241.3 MG MAGNESIUM) TABLET 500 MG: TABLET at 08:17

## 2025-05-29 RX ADMIN — SILDENAFIL 60 MG: 20 TABLET ORAL at 15:07

## 2025-05-29 RX ADMIN — SPIRONOLACTONE 50 MG: 25 TABLET ORAL at 08:16

## 2025-05-29 RX ADMIN — SILDENAFIL 60 MG: 20 TABLET ORAL at 21:02

## 2025-05-29 RX ADMIN — ROPINIROLE HYDROCHLORIDE 1 MG: 1 TABLET, FILM COATED ORAL at 21:02

## 2025-05-29 RX ADMIN — TORSEMIDE 20 MG: 20 TABLET ORAL at 08:18

## 2025-05-29 RX ADMIN — BUDESONIDE 500 MCG: 0.25 SUSPENSION RESPIRATORY (INHALATION) at 05:19

## 2025-05-29 RX ADMIN — PANTOPRAZOLE SODIUM 40 MG: 40 TABLET, DELAYED RELEASE ORAL at 08:16

## 2025-05-29 RX ADMIN — POLYETHYLENE GLYCOL 3350 17 G: 17 POWDER, FOR SOLUTION ORAL at 08:22

## 2025-05-29 RX ADMIN — IPRATROPIUM BROMIDE AND ALBUTEROL SULFATE 1 DOSE: 2.5; .5 SOLUTION RESPIRATORY (INHALATION) at 05:19

## 2025-05-29 RX ADMIN — FLECAINIDE ACETATE 100 MG: 100 TABLET ORAL at 21:02

## 2025-05-29 RX ADMIN — IPRATROPIUM BROMIDE AND ALBUTEROL SULFATE 1 DOSE: 2.5; .5 SOLUTION RESPIRATORY (INHALATION) at 14:37

## 2025-05-29 RX ADMIN — MYCOPHENOLATE MOFETIL 1500 MG: 250 CAPSULE ORAL at 21:02

## 2025-05-29 RX ADMIN — IPRATROPIUM BROMIDE AND ALBUTEROL SULFATE 1 DOSE: 2.5; .5 SOLUTION RESPIRATORY (INHALATION) at 10:29

## 2025-05-29 RX ADMIN — Medication 10 ML: at 08:26

## 2025-05-29 RX ADMIN — Medication 10 ML: at 21:03

## 2025-05-29 RX ADMIN — FLECAINIDE ACETATE 100 MG: 100 TABLET ORAL at 08:19

## 2025-05-29 RX ADMIN — SILDENAFIL 60 MG: 20 TABLET ORAL at 08:15

## 2025-05-29 ASSESSMENT — PAIN SCALES - GENERAL: PAINLEVEL_OUTOF10: 4

## 2025-05-29 ASSESSMENT — PAIN DESCRIPTION - PAIN TYPE: TYPE: ACUTE PAIN

## 2025-05-29 ASSESSMENT — PAIN DESCRIPTION - LOCATION: LOCATION: HEAD

## 2025-05-29 ASSESSMENT — PAIN DESCRIPTION - DESCRIPTORS: DESCRIPTORS: ACHING;DISCOMFORT

## 2025-05-29 NOTE — CONSULTS
Inpatient Cardiology Consultation      Reason for Consult:  A fib    Consulting Physician: Dr. Seals    Requesting Physician:  Lon Jones, STEPH - CNP     Date of Consultation: 5/29/2025    HISTORY OF PRESENT ILLNESS: 70 y.o.  female, known to Dr. Zendejas, last seen 4/8/2025 as outpatient follow-up for paroxysmal atrial fibrillation and atrial flutter, chronic diastolic heart failure, pulmonary hypertension, VHD, and HTN advised to continue current treatment, daily weights, take extra Lasix if weight gain more than 2-3 pounds in 24 hours, and follow-up with an office visit in 6 months, or sooner if needed.    5/27/2025: Presented to the ED for evaluation of 1 week history of shortness of breath, generally uses supplemental O2 2L NC at bedtime, however for the last week she has had to use it constantly, reportedly SpO2 is low 77%, and notes chest tightness when laying down and shortness of breath.  Of note, she reports discontinued torsemide on her own, due to diarrhea for the last month.    The patient went to bed, alert and oriented x 3, no obvious signs of distress.  Patient reports that she had a bad fall, at the end of April, and has been feeling poorly since then.  Generally feels good throughout the day, but said shortness of breath at night, and she is compliant with supplemental O2 at night.  She started to have bad taste every other day, or every 3 days, then over the last 10 days, has had significantly worsening shortness of breath, to the point where she could not walk short distances.  She has been using her supplemental O2 all the time now, and notes that her SpO2 was down into the high 70s/low 80s, which prompted her to report to the ED for further evaluation.  She notes that when she got to the ED, and was given steroids, and breathing treatments, symptoms started to improve.  She notes that she was up walking around today, with less shortness of breath.  She reports she did have  testing/procedures, and current problem list  Reviewing the above documentation completed by the RACHNA  Ordering medications, tests, and/or procedures as required  Formulating the assessment/plan.  Counseling/educating the patient by me/our RACHNA.  Coordinating care with other healthcare professionals by me/our RACHNA.  Communicating results to the patient's family/caregiver as available by me/our RACHNA.  Documenting clinical information in the patient's electronic health record.     HISTORY OF PRESENT ILLNESS:  70 year old with history of CAD, PAF, JANNETH, Pulmonary HTN,  home O2.     Admitted for SOB -  On home O2  Consulted for CHF, A Fib - Did not took her diuretics     REVIEW OF SYSTEMS:   Review of rest of 12 systems negative except as mentioned in HPI.        Please note: Past medical records were reviewed per electronic medical record (EMR) - see detailed reports under Past Medical/ Surgical History.         Past Medical History:         Past Medical History:   Diagnosis Date    Bowel obstruction (HCC)         2021    Cerebral artery occlusion with cerebral infarction (HCC)       \"mini stroke\" 10/2014    Gastroparesis      H/O cardiovascular stress test 2020     Lexiscan    Hyperlipidemia      Hypertension      Hypothyroidism 01/10/2023    Pulmonary hypertension (HCC)      Scleroderma (HCC)      Sleep apnea       doesnt use cpap   PAF        Past Surgical History:    Past Surgical History         Past Surgical History:   Procedure Laterality Date    CARDIAC CATHETERIZATION   2012     Right heart cath by Dr Leonard    CARDIOVERSION Left 2023     Successful (Dr. Plasencia)    CAROTID ENDARTERECTOMY Right      CATARACT REMOVAL WITH IMPLANT Right 2019     intraocular lens    CATARACT REMOVAL WITH IMPLANT Left 10/15/2019     SECTION        COLONOSCOPY        COLONOSCOPY N/A 4/10/2023     COLONOSCOPY DIAGNOSTIC performed by Yahir Powell MD at [facility]    INTRACAPSULAR CATARACT

## 2025-05-29 NOTE — PROGRESS NOTES
Spiritual Health History and Assessment/Progress Note  [facility]    Initial Encounter, Spiritual/Emotional Needs, Rituals, Rites and Sacraments,  Encounter (Fr. Paige),  ,  ,      Name: Nadeen Issa MRN: [id number]    Age: 70 y.o.     Sex: female   Language: English   Spiritism: Latter-day   Acute on chronic hypoxic respiratory failure (HCC)     Date: 5/29/2025                           Spiritual Assessment began in [facility]        Referral/Consult From: Rounding   Encounter Overview/Reason: Initial Encounter, Spiritual/Emotional Needs, Rituals, Rites and Sacraments,  Encounter (Fr. Paige)  Service Provided For: Patient    Mayela, Belief, Meaning:   Patient identifies as spiritual and is connected with a mayela tradition or spiritual practice  Family/Friends No family/friends present      Importance and Influence:  Patient unable to assess at this time  Family/Friends No family/friends present    Community:  Patient Other: not currently active in a mayela community  Family/Friends No family/friends present    Assessment and Plan of Care:     Patient Interventions include: Facilitated expression of thoughts and feelings, Explored spiritual coping/struggle/distress, Affirmed coping skills/support systems, and Provided sacramental/Restorationism ritual  Family/Friends Interventions include: No family/friends present    Patient Plan of Care: No spiritual needs identified for follow-up and Spiritual Care available upon further referral  Family/Friends Plan of Care: No family/friends present    Electronically signed by Chaplain Daniel on 5/29/2025 at 3:23 PM    adequate on exam

## 2025-05-29 NOTE — CONSULTS
[facility]  Department of Internal Medicine  Division of Pulmonary, Critical Care and Sleep Medicine  Consult Note    STEPH Mccollum MD, MD, MD, MD, MD    Patient: Nadeen Issa  MRN: [id number]  : 1955    Encounter Time: 9:58 AM     Date of Admission: 2025  2:09 PM    Primary Care Physician: Timi Ware DO    Primary Pulmonologist:     Reason for Consultation: shortness of breath     HISTORY OF PRESENT ILLNESS :     Nadeen Issa 70 y.o. female was seen in consultation regarding the above chief compliant.  Ms. Issa has a past medical history of COPD, coronary artery disease, atrial fibrillation s/p pacemaker, congestive heart failure, scleroderma, pulmonary hypertension, pulmonary fibrosis, hypertension, hyperlipidemia presented after concerns of progressive worsening of shortness of breath over the last 1 week and nonproductive cough.  As per the patient, she was in her baseline health of 2 L nasal cannula when she started experiencing progressive worsening of shortness of breath which was exacerbated on minimal exertion and increasing her oxygen requirement at home.  She reached out to her pulmonary doctor in [facility] and was advised to visit the emergency.  Patient does not complain of chest pain, fever, chills, hemoptysis, dizziness, syncopal episode.    In the ER patient is placed on 2 L nasal cannula.  She has been hemodynamically stable.  CTA chest was done which was negative for pulmonary embolism with was positive for small bilateral pleural effusions.    Patient was admitted for further acute respiratory insufficiency    PAST MEDICAL HISTORY:  has a past medical history of Bowel obstruction (HCC), CAD in native artery, Cerebral artery occlusion with cerebral  MD   ferrous sulfate (IRON 325) 325 (65 Fe) MG tablet Take 1 tablet by mouth daily (with breakfast)  Patient not taking: Reported on 1/9/2024 4/11/23   Lon Lepe MD   Multiple Vitamins-Minerals (MULTI-VITAMIN GUMMIES) CHEW Take 1 each by mouth daily  Patient not taking: Reported on 1/9/2024    Mike Peppre MD   torsemide (DEMADEX) 20 MG tablet Take 2 tablets by mouth See Admin Instructions Given Monday,Wednesday,Friday  Patient not taking: Reported on 5/27/2025    Mike Pepper MD   ADVAIR DISKUS 250-50 MCG/ACT AEPB diskus inhaler Inhale 1 puff into the lungs 2 times daily  Patient not taking: Reported on 1/9/2024 12/15/22   Mike Pepper MD   hyoscyamine (ANASPAZ;LEVSIN) 125 MCG tablet Take 1 tablet by mouth every 4 hours as needed for Cramping    Mike Pepper MD       ALLERGIES: Biaxin [clarithromycin] and Naproxen       REVIEW OF SYSTEMS:  Otherwise negative if not reported or listed below  Constitutional:  No unanticipated weight loss.      No change in sleep pattern or activity.      No fevers, chills or rigors.    Eyes:    No visual changes or diplopia.      No scleral icterus.  ENT:    No Headaches, hearing loss or vertigo.      No mouth sores or sore throat.   Cardiovascular:  No chest pain, palpitations.  Respiratory:  + cough, chest tightness present, dyspnea on exertion present       No sputum production.      No hemoptysis, pleuritic pain.  Gastrointestinal:  No abdominal pain, appetite loss, blood in stools.      No hematemesis  Genitourinary:  No dysuria, trouble voiding or hematuria.      No nocturia.  Musculoskeletal:   No weakness or joint complaints.  Integumentary: No rashes or pruritis.  Neurological:  No headache, numbness or tingling.     Psychiatric:   No effect on mood, memory, mentation, or behavior.     No anxiety or depression.  Endocrine:   No excessive thirst, fluid intake, or urination.      No tremor.  Hematologic: No abnormal bruising or  above  Mary Bennett, DO

## 2025-05-29 NOTE — PLAN OF CARE
Problem: Chronic Conditions and Co-morbidities  Goal: Patient's chronic conditions and co-morbidity symptoms are monitored and maintained or improved  Outcome: Progressing     Problem: Discharge Planning  Goal: Discharge to home or other facility with appropriate resources  Outcome: Progressing  Flowsheets (Taken 5/28/2025 1934)  Discharge to home or other facility with appropriate resources: Identify barriers to discharge with patient and caregiver     Problem: Safety - Adult  Goal: Free from fall injury  Outcome: Progressing     Problem: ABCDS Injury Assessment  Goal: Absence of physical injury  Outcome: Progressing

## 2025-05-29 NOTE — PROGRESS NOTES
Internal Medicine Consult Note    [facility]    Cr Balderas D.O., GEORGIAI.                    Lance Sam D.O., GEORGIAIMarisela Owusu D.O.     Lew Teixeira, MSN, APRN-CNP  Lon Jones, MSN, APRN-CNP  Klaudia Pearson, MSN APRN-CNP  Eve Pritchett, MSN. APRN-NP-C     Primary Care Physician: Timi Ware DO   Admitting Physician:  Cr Balderas DO  Admission date and time: 5/27/2025  2:09 PM    Room:  [address] [place] [address]  Admitting diagnosis: Shortness of breath [R06.02]  Hypoxemia [R09.02]  Tachycardia [R00.0]  Prolonged Q-T interval on ECG [R94.31]  Acute on chronic hypoxic respiratory failure (HCC) [J96.21]    Patient Name: Nadeen Issa  MRN: [id number]    Date of Service: 5/29/2025     Subjective:  Nadeen is a 70 y.o. female who was seen and examined today,5/29/2025, at the bedside.  Patient seems to be breathing somewhat better today.  Awaiting results of echocardiogram to exclude a cardiac component.  Would recommend stress test when breathing improved.  Patient states that she has been around some chicken lately is possibly might participate a whole acute flareup    No family present during my examination.    Review of System:   Constitutional:   Denies fever or chills, weight loss or gain, reports fatigue  HEENT:   Denies ear pain, sore throat, sinus or eye problems.  Cardiovascular:   Denies any chest pain, irregular heartbeats, or palpitations.   Respiratory:   Denies  coughing, sputum production, hemoptysis, or wheezing.  Reports increasing exertional dyspnea  Gastrointestinal:   Denies nausea, vomiting, diarrhea, or constipation.  Denies any abdominal pain.  Genitourinary:    Denies any urgency, frequency, hematuria. Voiding  without difficulty.  Extremities:   Denies lower extremity swelling, edema or cyanosis.   Neurology:    Denies any headache or focal neurological deficits, Denies generalized weakness or memory difficulty.   Psch:  and texture.  Genitalia/Breast:  Deferred    Medication:  Scheduled Meds:   ipratropium 0.5 mg-albuterol 2.5 mg  1 Dose Inhalation Q4H WA RT    warfarin placeholder: dosing by pharmacy   Oral RX Placeholder    methylPREDNISolone  40 mg IntraVENous Daily    sodium chloride flush  5-40 mL IntraVENous 2 times per day    flecainide  100 mg Oral BID    levothyroxine  88 mcg Oral Daily    macitentan  10 mg Oral Daily    magnesium oxide  500 mg Oral Daily    mycophenolate  1,500 mg Oral BID    rOPINIRole  1 mg Oral Nightly    sildenafil  60 mg Oral TID    spironolactone  50 mg Oral Daily    budesonide  500 mcg Nebulization BID    torsemide  20 mg Oral Daily    pantoprazole  40 mg Oral BID     Continuous Infusions:   dextrose      sodium chloride         Objective Data:  Recent Labs     05/27/25  1439 05/28/25  0602 05/29/25  0535   WBC 4.8 4.0* 4.7   RBC 3.69 3.84 3.46*   HGB 10.8* 11.3* 10.3*   HCT 34.5 36.4 32.4*   MCV 93.5 94.8 93.6   MCH 29.3 29.4 29.8   MCHC 31.3* 31.0* 31.8*   RDW 15.4* 15.8* 15.9*    218 217   MPV 10.3 9.9 10.2     Recent Labs     05/27/25  1439 05/28/25  0602 05/29/25  0535    138 138   K 2.9* 4.0 3.8    100 102   CO2 24 22 23   BUN 16 16 19   CREATININE 0.7 0.5 0.7   GLUCOSE 93 151* 159*   CALCIUM 9.5 9.7 10.0   BILITOT 0.5 0.3 0.4   ALKPHOS 73 73 68   AST 15 22 27   ALT 17 19 30   ALBUMIN 4.1 4.1 4.1     No results found for: \"TROPONINI\"     Assessment:    Acute on chronic hypoxic respiratory failure on 2 L vat baseline  Acute COPD exacerbation possibly secondary to Pulmonary hypertension  Hypokalemia most likely secondary to prolonged diarrhea  Prolonged QTc  Sleep apnea  Coronary artery disease  Pacemaker dual-chamber  Atrial fibrillation  Chronic diastolic congestive heart failure  Scleroderma  Hypothyroidism   Gastroparesis  History of CVA with carotid endarterectomy  hypertension  Hyperlipidemia    Plan:    Appreciate pulmonary input  Patient has been around a lot of birds  at home.  Will check hypersensitivity profile  Recommend risk of environmental changes  Wean steroids as tolerated  Recommend cardiac stress test once stable  Increase activity    Continue current therapy.  See orders for further plan of care.    More than 50% of my  time was spent at the bedside counseling/coordinating care with the patient and/or family with face to face contact.  This time was spent reviewing notes and laboratory data as well as instructing and counseling the patient. Time I spent with the family or surrogate(s) is included only if the patient was incapable of providing the necessary information or participating in medical decisions. I also discussed the differential diagnosis and all of the proposed management plans with the patient and individuals accompanying the patient.        Cr Balderas DO, FACOI  7:08 PM  5/29/2025

## 2025-05-29 NOTE — PROGRESS NOTES
4 Eyes Skin Assessment     NAME:  Nadeen Issa  YOB: 1955  MEDICAL RECORD NUMBER:  [id number]    The patient is being assessed for  Admission    I agree that at least one RN has performed a thorough Head to Toe Skin Assessment on the patient. ALL assessment sites listed below have been assessed.      Areas assessed by both nurses:    Head, Face, Ears, Shoulders, Back, Chest, Arms, Elbows, Hands, Sacrum. Buttock, Coccyx, Ischium, Legs. Feet and Heels, and Under Medical Devices         Does the Patient have a Wound? No noted wound(s)       Waylon Prevention initiated by RN: No  Wound Care Orders initiated by RN: No    Pressure Injury (Stage 3,4, Unstageable, DTI, NWPT, and Complex wounds) if present, place Wound referral order by RN under : No    New Ostomies, if present place, Ostomy referral order under : No     Nurse 1 eSignature: Electronically signed by Alida Das RN on 5/29/25 at 2:25 AM EDT    **SHARE this note so that the co-signing nurse can place an eSignature**    Nurse 2 eSignature: Electronically signed by Isela Rivera RN on 5/29/25 at 4:47 AM EDT

## 2025-05-29 NOTE — PROGRESS NOTES
Physical Therapy Initial Evaluation/Plan of Care    Room #:  0331/0331-01  Patient Name: Nadeen Issa  YOB: 1955  MRN: [id number]    Date of Service: 5/29/2025     Tentative placement recommendation: Home with Home Health Physical Therapy  Equipment recommendation: None at this time      Evaluating Physical Therapist: Maryan Chen, PT #[id number]      Specific Provider Orders/Date/Referring Provider :  05/28/25 1615    PT eval and treat  Start:  05/28/25 1615,   End:  05/28/25 1615,   ONE TIME,   Standing Count:  1 Occurrences,   R       Minnie Leung MD    Admitting Diagnosis:   Shortness of breath [R06.02]  Hypoxemia [R09.02]  Tachycardia [R00.0]  Prolonged Q-T interval on ECG [R94.31]  Acute on chronic hypoxic respiratory failure (HCC) [J96.21]     SOB-normally on 2 L supplemental oxygen at nighttime however for the last week she has been using it throughout the day.   Surgery: none  Visit Diagnoses         Codes      Shortness of breath    -  Primary R06.02      Hypoxemia     R09.02      Tachycardia     R00.0      Prolonged Q-T interval on ECG     R94.31            Patient Active Problem List   Diagnosis    Memory loss    Cerebral atherosclerosis    Hypercholesteremia improved    Right cataract    Left cataract    CAD in native artery    Symptomatic bradycardia    High-grade atrioventricular block    Paroxysmal atrial flutter (HCC)    Pacemaker lead malfunction    Centrilobular emphysema (HCC)    Chronic atrial fibrillation (HCC)    Chronic heart failure with preserved ejection fraction (HCC)    Diastolic dysfunction    Disorder of connective tissue    Functional dyspnea    Hypothyroidism    Lung nodule    Pleural effusion    Primary hypertension    Pulmonary artery hypertension associated with connective tissue disease (HCC)    Pulmonary hypertension (HCC)    Pulmonary hypertension due to COPD (HCC)    Raynaud's disease without gangrene    Scleroderma (HCC)    Weakness    Persistent atrial  with cerebral infarction (HCC)     \"mini stroke\" 10/2014    Gastroparesis     H/O cardiovascular stress test 2020    Lexiscan    Hyperlipidemia     Hypertension     Hypothyroidism 01/10/2023    Pulmonary hypertension (HCC)     Scleroderma (HCC)     Sleep apnea     doesnt use cpap     Past Surgical History:   Procedure Laterality Date    CARDIAC CATHETERIZATION  2012    Right heart cath by Dr Leonard    CARDIOVERSION Left 2023    Successful (Dr. Plasencia)    CAROTID ENDARTERECTOMY Right     CATARACT REMOVAL WITH IMPLANT Right 2019    intraocular lens    CATARACT REMOVAL WITH IMPLANT Left 10/15/2019     SECTION      COLONOSCOPY      COLONOSCOPY N/A 4/10/2023    COLONOSCOPY DIAGNOSTIC [place] [facility] [place]    INTRACAPSULAR CATARACT EXTRACTION Right 2019    RIGHT EYE CATARACT EMULSIFICATION IOL IMPLANT performed by Lon RUIZ MD at [facility] OR    INTRACAPSULAR CATARACT EXTRACTION Left 10/15/2019    LEFT EYE CATARACT EMULSIFICATION IOL IMPLANT performed by Lon RUIZ MD at [facility] OR    OTHER SURGICAL HISTORY  2022    Successful removal of old right atrial pacing lead and insertion of the new right atrial pacing lead (Dr. Clark)    PACEMAKER PLACEMENT Left 2022    [employer] dual chamber (Dr Plasencia)    UPPER GASTROINTESTINAL ENDOSCOPY N/A 4/10/2023    EGD CONTROL HEMORRHAGE [place]ir [place] [facility] [place]       SUBJECTIVE:    Precautions: Up as tolerated, Check Pulse Oximetry while ambulating , and incentive spirometer, falls and O2 ,      Imaging results: CTA CHEST W CONTRAST  Result Date: 2025  EXAMINATION: CTA OF THE CHEST2025 10:37 am      1. No evidence of pulmonary embolic disease. 2. Small bilateral pleural effusions with minor bibasilar atelectasis. 3. No evidence of pneumonia. 4. Healing fracture of the anterior aspect of the left 7th rib.     XR CHEST (2 VW)  Result Date:

## 2025-05-29 NOTE — PROGRESS NOTES
Pharmacy Consultation Note  (Warfarin Dosing and Monitoring)    Initial consult date: 5/28/2025  Consulting Provider: STEPH Hernadez-NILTON    Nadeen Issa is a 70 y.o. female for whom pharmacy has been asked to manage warfarin therapy.     Weight:   Wt Readings from Last 1 Encounters:   05/29/25 67.8 kg (149 lb 6.4 oz)       TSH:    Lab Results   Component Value Date/Time    TSH 1.21 05/28/2025 06:02 AM       Hepatic Function Panel:                            Lab Results   Component Value Date/Time    ALKPHOS 68 05/29/2025 05:35 AM    ALT 30 05/29/2025 05:35 AM    AST 27 05/29/2025 05:35 AM    BILITOT 0.4 05/29/2025 05:35 AM    BILIDIR <0.2 09/28/2021 02:50 PM    IBILI see below 09/28/2021 02:50 PM       Current significant warfarin drug-drug interactions include:  ropinirole    Recent Labs     05/27/25  1439 05/28/25  0602 05/28/25  1525 05/29/25  0535    218  --  217   INR 3.2  --  3.6 3.9   HGB 10.8* 11.3*  --  10.3*        Date Warfarin Dose INR Heparin or LMWH Comment   5/28 HOLD 3.6 --    5/29 HOLD 3.9 --                           Assessment:  Patient is a 70 y.o. female on warfarin for Atrial Fibrillation.  Patient's home warfarin dosing regimen is 9 mg MWF, 6 mg all other days.   Goal INR 2 - 3  INR 3.9 today    Plan:  HOLD Warfarin tonight  Daily PT/INR until the INR is stable within the therapeutic range  Pharmacist will follow and monitor/adjust dosing as necessary    Thank you for this consult,    Yany Moore, PharmD.  5/29/2025 3:03 PM    SJW: [phone]

## 2025-05-30 VITALS
HEIGHT: 63 IN | WEIGHT: 149.4 LBS | DIASTOLIC BLOOD PRESSURE: 60 MMHG | BODY MASS INDEX: 26.47 KG/M2 | RESPIRATION RATE: 16 BRPM | HEART RATE: 71 BPM | SYSTOLIC BLOOD PRESSURE: 107 MMHG | OXYGEN SATURATION: 97 % | TEMPERATURE: 98.4 F

## 2025-05-30 LAB
ALBUMIN SERPL-MCNC: 4.1 G/DL (ref 3.5–5.2)
ALP SERPL-CCNC: 67 U/L (ref 35–104)
ALT SERPL-CCNC: 52 U/L (ref 0–32)
ANION GAP SERPL CALCULATED.3IONS-SCNC: 14 MMOL/L (ref 7–16)
AST SERPL-CCNC: 35 U/L (ref 0–31)
BASOPHILS # BLD: 0 K/UL (ref 0–0.2)
BASOPHILS NFR BLD: 0 % (ref 0–2)
BILIRUB SERPL-MCNC: 0.4 MG/DL (ref 0–1.2)
BUN SERPL-MCNC: 23 MG/DL (ref 6–23)
CALCIUM SERPL-MCNC: 10.1 MG/DL (ref 8.6–10.2)
CHLORIDE SERPL-SCNC: 99 MMOL/L (ref 98–107)
CO2 SERPL-SCNC: 23 MMOL/L (ref 22–29)
CREAT SERPL-MCNC: 0.7 MG/DL (ref 0.5–1)
EOSINOPHIL # BLD: 0 K/UL (ref 0.05–0.5)
EOSINOPHILS RELATIVE PERCENT: 0 % (ref 0–6)
ERYTHROCYTE [DISTWIDTH] IN BLOOD BY AUTOMATED COUNT: 16.3 % (ref 11.5–15)
GFR, ESTIMATED: >90 ML/MIN/1.73M2
GLUCOSE SERPL-MCNC: 98 MG/DL (ref 74–99)
HCT VFR BLD AUTO: 31.3 % (ref 34–48)
HGB BLD-MCNC: 10 G/DL (ref 11.5–15.5)
IMM GRANULOCYTES # BLD AUTO: <0.03 K/UL (ref 0–0.58)
IMM GRANULOCYTES NFR BLD: 0 % (ref 0–5)
INR PPP: 3.5
LYMPHOCYTES NFR BLD: 0.75 K/UL (ref 1.5–4)
LYMPHOCYTES RELATIVE PERCENT: 11 % (ref 20–42)
MAGNESIUM SERPL-MCNC: 2.1 MG/DL (ref 1.6–2.6)
MCH RBC QN AUTO: 30.2 PG (ref 26–35)
MCHC RBC AUTO-ENTMCNC: 31.9 G/DL (ref 32–34.5)
MCV RBC AUTO: 94.6 FL (ref 80–99.9)
MICROORGANISM SPEC CULT: NORMAL
MONOCYTES NFR BLD: 0.56 K/UL (ref 0.1–0.95)
MONOCYTES NFR BLD: 8 % (ref 2–12)
NEUTROPHILS NFR BLD: 80 % (ref 43–80)
NEUTS SEG NFR BLD: 5.44 K/UL (ref 1.8–7.3)
PHOSPHATE SERPL-MCNC: 2.8 MG/DL (ref 2.5–4.5)
PLATELET # BLD AUTO: 224 K/UL (ref 130–450)
PMV BLD AUTO: 10.2 FL (ref 7–12)
POTASSIUM SERPL-SCNC: 3.4 MMOL/L (ref 3.5–5)
PROT SERPL-MCNC: 6.7 G/DL (ref 6.4–8.3)
PROTHROMBIN TIME: 38.4 SEC (ref 9.3–12.4)
RBC # BLD AUTO: 3.31 M/UL (ref 3.5–5.5)
SODIUM SERPL-SCNC: 136 MMOL/L (ref 132–146)
SPECIMEN DESCRIPTION: NORMAL
WBC OTHER # BLD: 6.8 K/UL (ref 4.5–11.5)

## 2025-05-30 PROCEDURE — 6360000002 HC RX W HCPCS: Performed by: INTERNAL MEDICINE

## 2025-05-30 PROCEDURE — 85610 PROTHROMBIN TIME: CPT

## 2025-05-30 PROCEDURE — 6370000000 HC RX 637 (ALT 250 FOR IP): Performed by: INTERNAL MEDICINE

## 2025-05-30 PROCEDURE — 85025 COMPLETE CBC W/AUTO DIFF WBC: CPT

## 2025-05-30 PROCEDURE — 2700000000 HC OXYGEN THERAPY PER DAY

## 2025-05-30 PROCEDURE — 2500000003 HC RX 250 WO HCPCS: Performed by: INTERNAL MEDICINE

## 2025-05-30 PROCEDURE — 97530 THERAPEUTIC ACTIVITIES: CPT

## 2025-05-30 PROCEDURE — 36415 COLL VENOUS BLD VENIPUNCTURE: CPT

## 2025-05-30 PROCEDURE — 97165 OT EVAL LOW COMPLEX 30 MIN: CPT

## 2025-05-30 PROCEDURE — 94640 AIRWAY INHALATION TREATMENT: CPT

## 2025-05-30 PROCEDURE — 83735 ASSAY OF MAGNESIUM: CPT

## 2025-05-30 PROCEDURE — 80053 COMPREHEN METABOLIC PANEL: CPT

## 2025-05-30 PROCEDURE — 84100 ASSAY OF PHOSPHORUS: CPT

## 2025-05-30 RX ORDER — METHYLPREDNISOLONE 4 MG/1
20 TABLET ORAL DAILY
Status: DISCONTINUED | OUTPATIENT
Start: 2025-05-31 | End: 2025-05-30 | Stop reason: HOSPADM

## 2025-05-30 RX ORDER — WARFARIN SODIUM 2.5 MG/1
TABLET ORAL
Qty: 30 TABLET | Refills: 0 | Status: SHIPPED | OUTPATIENT
Start: 2025-05-30

## 2025-05-30 RX ORDER — METHYLPREDNISOLONE 4 MG/1
4 TABLET ORAL DAILY
Status: DISCONTINUED | OUTPATIENT
Start: 2025-06-03 | End: 2025-05-30 | Stop reason: HOSPADM

## 2025-05-30 RX ORDER — PANTOPRAZOLE SODIUM 40 MG/1
40 TABLET, DELAYED RELEASE ORAL DAILY
COMMUNITY
Start: 2025-05-30

## 2025-05-30 RX ORDER — METHYLPREDNISOLONE 4 MG/1
TABLET ORAL
Qty: 43 TABLET | Refills: 0 | Status: SHIPPED | OUTPATIENT
Start: 2025-05-31 | End: 2025-06-05

## 2025-05-30 RX ORDER — METHYLPREDNISOLONE 4 MG/1
10 TABLET ORAL DAILY
Status: DISCONTINUED | OUTPATIENT
Start: 2025-06-01 | End: 2025-05-30 | Stop reason: HOSPADM

## 2025-05-30 RX ADMIN — SPIRONOLACTONE 50 MG: 25 TABLET ORAL at 08:50

## 2025-05-30 RX ADMIN — IPRATROPIUM BROMIDE AND ALBUTEROL SULFATE 1 DOSE: 2.5; .5 SOLUTION RESPIRATORY (INHALATION) at 06:49

## 2025-05-30 RX ADMIN — POTASSIUM BICARBONATE 40 MEQ: 782 TABLET, EFFERVESCENT ORAL at 07:08

## 2025-05-30 RX ADMIN — TORSEMIDE 20 MG: 20 TABLET ORAL at 08:51

## 2025-05-30 RX ADMIN — Medication 10 ML: at 08:54

## 2025-05-30 RX ADMIN — PANTOPRAZOLE SODIUM 40 MG: 40 TABLET, DELAYED RELEASE ORAL at 06:36

## 2025-05-30 RX ADMIN — DIPHENHYDRAMINE HCL 5 ML: 12.5 LIQUID ORAL at 08:55

## 2025-05-30 RX ADMIN — MAGNESIUM OXIDE 400 MG (241.3 MG MAGNESIUM) TABLET 500 MG: TABLET at 08:50

## 2025-05-30 RX ADMIN — FLECAINIDE ACETATE 100 MG: 100 TABLET ORAL at 08:51

## 2025-05-30 RX ADMIN — SILDENAFIL 60 MG: 20 TABLET ORAL at 08:50

## 2025-05-30 RX ADMIN — MYCOPHENOLATE MOFETIL 1500 MG: 250 CAPSULE ORAL at 08:50

## 2025-05-30 RX ADMIN — SILDENAFIL 60 MG: 20 TABLET ORAL at 13:20

## 2025-05-30 RX ADMIN — BUDESONIDE 500 MCG: 0.25 SUSPENSION RESPIRATORY (INHALATION) at 06:50

## 2025-05-30 RX ADMIN — LEVOTHYROXINE SODIUM 88 MCG: 0.09 TABLET ORAL at 06:36

## 2025-05-30 RX ADMIN — METHYLPREDNISOLONE SODIUM SUCCINATE 40 MG: 40 INJECTION, POWDER, LYOPHILIZED, FOR SOLUTION INTRAMUSCULAR; INTRAVENOUS at 08:51

## 2025-05-30 NOTE — PROGRESS NOTES
[facility]  Department of Internal Medicine  Division of Pulmonary, Critical Care and Sleep Medicine  Consult Note    STEPH Mccollum MD, MD, MD, MD, MD    Patient: Nadeen Issa  MRN: [id number]  : 1955    Encounter Time: 10:52 AM     Date of Admission: 2025  2:09 PM    Primary Care Physician: Timi Ware DO    Primary Pulmonologist:     Reason for Consultation: shortness of breath     HISTORY OF PRESENT ILLNESS :     Nadeen Issa 70 y.o. female was seen in consultation regarding the above chief compliant.  Ms. Issa has a past medical history of COPD, coronary artery disease, atrial fibrillation s/p pacemaker, congestive heart failure, scleroderma, pulmonary hypertension, pulmonary fibrosis, hypertension, hyperlipidemia presented after concerns of progressive worsening of shortness of breath over the last 1 week and nonproductive cough.  As per the patient, she was in her baseline health of 2 L nasal cannula when she started experiencing progressive worsening of shortness of breath which was exacerbated on minimal exertion and increasing her oxygen requirement at home.  She reached out to her pulmonary doctor in [facility] and was advised to visit the emergency.  Patient does not complain of chest pain, fever, chills, hemoptysis, dizziness, syncopal episode.    In the ER patient is placed on 2 L nasal cannula.  She has been hemodynamically stable.  CTA chest was done which was negative for pulmonary embolism with was positive for small bilateral pleural effusions.    Patient was admitted for further acute respiratory insufficiency      Subjective: Patient was seen today.  She has no complaints of worsening shortness of breath.  She is at her baseline of 2 L.  Plans for    warfarin (COUMADIN) 6 MG tablet Take 1 tablet by mouth daily  Patient taking differently: Take 1 tablet by mouth four times a week Sunday, Tuesday, Thursday, Saturday 1/9/25  Yes Nikolay Zendejas MD   pantoprazole (PROTONIX) 40 MG tablet Take 1 tablet by mouth 2 times daily  Patient taking differently: Take 1 tablet by mouth daily 4/12/23  Yes Zane Lance MD   Magnesium 500 MG TABS Take 500 mg by mouth nightly   Yes ProviderMike MD   rOPINIRole (REQUIP) 1 MG tablet Take 1 tablet by mouth nightly   Yes Provider, MD Mike   tiotropium (SPIRIVA) 18 MCG inhalation capsule Inhale 1 capsule into the lungs daily   Yes Provider, MD Mike   potassium chloride (KLOR-CON M) 10 MEQ extended release tablet Take 1 tablet by mouth at bedtime   Yes Provider, MD Mike   torsemide (DEMADEX) 20 MG tablet Take 1 tablet by mouth daily   Yes ProviderMike MD   macitentan (OPSUMIT) 10 MG TABS Take 1 tablet by mouth daily   Yes Provider, MD Mike   sildenafil (REVATIO) 20 MG tablet Take 3 tablets by mouth 3 times daily   Yes Provider, Historical, MD   vitamin B-12 (CYANOCOBALAMIN) 1000 MCG tablet Take 1 tablet by mouth daily   Yes ProviderMike MD   spironolactone (ALDACTONE) 50 MG tablet Take 1 tablet by mouth daily 4/4/22  Yes ProviderMike MD   levothyroxine (SYNTHROID) 88 MCG tablet Take 1 tablet by mouth Daily   Yes ProviderMike MD   mycophenolate (CELLCEPT) 500 MG tablet Take 3 tablets by mouth 2 times daily 11/12/21  Yes Mike Pepper MD   simvastatin (ZOCOR) 40 MG tablet Take 1 tablet by mouth nightly 3/29/21  Yes Nikolay Zendejas MD   vitamin D (CHOLECALCIFEROL) 50 MCG (2000 UT) TABS tablet Take 1 tablet by mouth daily   Yes ProviderMike MD   dapagliflozin (FARXIGA) 10 MG tablet Take 1 tablet by mouth daily (with breakfast)  Patient not taking: Reported on 5/27/2025 10/24/23   Mike Pepper MD   zinc 50 MG CAPS Take 50 mg by mouth  daily 7/19/23 1/9/24  Villa Shields MD   warfarin (COUMADIN) 4 MG tablet Take 1 tablet by mouth daily  Patient not taking: Reported on 9/13/2024 6/29/23   Nikolay Zendejas MD   ferrous sulfate (IRON 325) 325 (65 Fe) MG tablet Take 1 tablet by mouth daily (with breakfast)  Patient not taking: Reported on 1/9/2024 4/11/23   Lon Lepe MD   Multiple Vitamins-Minerals (MULTI-VITAMIN GUMMIES) CHEW Take 1 each by mouth daily  Patient not taking: Reported on 1/9/2024    Mike Pepper MD   torsemide (DEMADEX) 20 MG tablet Take 2 tablets by mouth See Admin Instructions Given Monday,Wednesday,Friday  Patient not taking: Reported on 5/27/2025    Mike Pepper MD   ADVAIR DISKUS 250-50 MCG/ACT AEPB diskus inhaler Inhale 1 puff into the lungs 2 times daily  Patient not taking: Reported on 1/9/2024 12/15/22   Mike Pepper MD   hyoscyamine (ANASPAZ;LEVSIN) 125 MCG tablet Take 1 tablet by mouth every 4 hours as needed for Cramping    Mike Pepper MD       ALLERGIES: Biaxin [clarithromycin] and Naproxen       REVIEW OF SYSTEMS:  Otherwise negative if not reported or listed below  Constitutional:  No unanticipated weight loss.      No change in sleep pattern or activity.      No fevers, chills or rigors.    Eyes:    No visual changes or diplopia.      No scleral icterus.  ENT:    No Headaches, hearing loss or vertigo.      No mouth sores or sore throat.   Cardiovascular:  No chest pain, palpitations.  Respiratory:  + cough, chest tightness present, dyspnea on exertion present       No sputum production.      No hemoptysis, pleuritic pain.  Gastrointestinal:  No abdominal pain, appetite loss, blood in stools.      No hematemesis  Genitourinary:  No dysuria, trouble voiding or hematuria.      No nocturia.  Musculoskeletal:   No weakness or joint complaints.  Integumentary: No rashes or pruritis.  Neurological:  No headache, numbness or tingling.     Psychiatric:   No effect on mood, memory,

## 2025-05-30 NOTE — PROGRESS NOTES
CLINICAL PHARMACY NOTE: MEDS TO BEDS    Total # of Prescriptions Filled: 1   The following medications were delivered to the patient:  Methylprednisolone 4 mg #5    Additional Documentation:   Transferring remaining rx and warfarin to

## 2025-05-30 NOTE — DISCHARGE INSTRUCTIONS
Your information:  Name: Nadeen Issa  : 1955    Your instructions:  Discharge Home  Follow-up with their pulmonologist in [facility].     What to do after you leave the hospital:    Recommended diet: regular diet    Recommended activity: activity as tolerated    The following personal items were collected during your admission and were returned to you:    Belongings  Dental Appliances: Uppers, Lowers  Vision - Corrective Lenses: Eyeglasses  Hearing Aid: None  Clothing: Pants, Shirt, Jacket/Coat, Socks, Footwear  Jewelry: Ring  Electronic Devices: Cell Phone, Tablet  Weapons (Notify Protective Services/Security): None  Other Valuables: Other (Comment) (x2 bags with puzzles/personal shower items)  Home Medications: None  Valuables Given To: Patient  Provide Name(s) of Who Valuable(s) Were Given To: travon    Information obtained by:  By signing below, I understand that if any problems occur once I leave the hospital I am to contact PCP.  I understand and acknowledge receipt of the instructions indicated above.   Shortness of Breath: Care Instructions  Your Care Instructions     Shortness of breath has many causes. Sometimes conditions such as anxiety can lead to shortness of breath. Some people get mild shortness of breath when they exercise. Trouble breathing also can be a symptom of a serious problem, such as asthma, lung disease, emphysema, heart problems, and pneumonia.  If your shortness of breath continues, you may need tests and treatment. Watch for any changes in your breathing and other symptoms.  Follow-up care is a key part of your treatment and safety. Be sure to make and go to all appointments, and call your doctor if you are having problems. It's also a good idea to know your test results and keep a list of the medicines you take.  How can you care for yourself at home?  Do not smoke or allow others to smoke around you. If you need help quitting, talk to your doctor about stop-smoking  use of this information.

## 2025-05-30 NOTE — PROGRESS NOTES
Pharmacy Consultation Note  (Warfarin Dosing and Monitoring)    Initial consult date: 5/28/2025  Consulting Provider: STEPH Hernadez-NILTON    Nadeen Issa is a 70 y.o. female for whom pharmacy has been asked to manage warfarin therapy.     Weight:   Wt Readings from Last 1 Encounters:   05/29/25 67.8 kg (149 lb 6.4 oz)       TSH:    Lab Results   Component Value Date/Time    TSH 1.21 05/28/2025 06:02 AM       Hepatic Function Panel:                            Lab Results   Component Value Date/Time    ALKPHOS 67 05/30/2025 05:28 AM    ALT 52 05/30/2025 05:28 AM    AST 35 05/30/2025 05:28 AM    BILITOT 0.4 05/30/2025 05:28 AM    BILIDIR <0.2 09/28/2021 02:50 PM    IBILI see below 09/28/2021 02:50 PM       Current significant warfarin drug-drug interactions include:  ropinirole    Recent Labs     05/28/25  0602 05/28/25  1525 05/29/25  0535 05/30/25  0528     --  217 224   INR  --  3.6 3.9 3.5   HGB 11.3*  --  10.3* 10.0*        Date Warfarin Dose INR Heparin or LMWH Comment   5/28 HOLD 3.6 --    5/29 HOLD 3.9 --    5/30 HOLD 3.5 --                    Assessment:  Patient is a 70 y.o. female on warfarin for Atrial Fibrillation.  Patient's home warfarin dosing regimen is 9 mg MWF, 6 mg all other days.   Goal INR 2 - 3  INR 3.5 today    Plan:  HOLD Warfarin tonight  Daily PT/INR until the INR is stable within the therapeutic range  Pharmacist will follow and monitor/adjust dosing as necessary    Thank you for this consult,    Yany Moore, PharmD.  5/30/2025 11:52 AM    SJW: [phone]

## 2025-05-30 NOTE — PLAN OF CARE
Problem: Chronic Conditions and Co-morbidities  Goal: Patient's chronic conditions and co-morbidity symptoms are monitored and maintained or improved  Outcome: Progressing     Problem: Discharge Planning  Goal: Discharge to home or other facility with appropriate resources  Outcome: Progressing     Problem: Safety - Adult  Goal: Free from fall injury  5/29/2025 2106 by Alida Das RN  Outcome: Progressing     Problem: ABCDS Injury Assessment  Goal: Absence of physical injury  Outcome: Progressing     Problem: Pain  Goal: Verbalizes/displays adequate comfort level or baseline comfort level  5/29/2025 2106 by Alida Das RN  Outcome: Progressing

## 2025-05-30 NOTE — PROGRESS NOTES
OCCUPATIONAL THERAPY INITIAL EVALUATION    [facility]  [address] [place]. [place]        Date:2025                                                  Patient Name: Nadeen Issa    MRN: [id number]    : 1955    Room: [address] [place] [address]      Evaluating OT: Luisa Rodriguez OTR/L; [id number]     Referring Provider and Specific Provider Orders/Date:      25 161  OT eval and treat  Start:  25,   End:  25 161,   ONE TIME,   Standing Count:  1 Occurrences,   R         Minnie Leung MD      Placement Recommendation: Home with no skilled occupational therapy needed after discharge from inpatient.       Diagnosis:   1. Shortness of breath    2. Hypoxemia    3. Tachycardia    4. Prolonged Q-T interval on ECG         Surgery: none       Pertinent Medical History:       Past Medical History:   Diagnosis Date    Bowel obstruction (HCC)     CAD in native artery 2021    Cerebral artery occlusion with cerebral infarction (HCC)     \"mini stroke\" 10/2014    Gastroparesis     H/O cardiovascular stress test 2020    Lexiscan    Hyperlipidemia     Hypertension     Hypothyroidism 01/10/2023    Pulmonary hypertension (HCC)     Scleroderma (HCC)     Sleep apnea     doesnt use cpap         Past Surgical History:   Procedure Laterality Date    CARDIAC CATHETERIZATION  2012    Right heart cath by Dr Leonard    CARDIOVERSION Left 2023    Successful (Dr. Plasencia)    CAROTID ENDARTERECTOMY Right     CATARACT REMOVAL WITH IMPLANT Right 2019    intraocular lens    CATARACT REMOVAL WITH IMPLANT Left 10/15/2019     SECTION      COLONOSCOPY      COLONOSCOPY N/A 4/10/2023    COLONOSCOPY DIAGNOSTIC performed by Yahir Powell MD at [facility] [place]COPY    INTRACAPSULAR CATARACT EXTRACTION Right 2019    RIGHT EYE CATARACT EMULSIFICATION IOL IMPLANT performed by Lon RUIZ MD at [facility] OR    INTRACAPSULAR CATARACT EXTRACTION  care and goals.        Evaluating OT: Luisa Rodriguez OTR/L; [id number]

## 2025-05-30 NOTE — CARE COORDINATION
5/30/2025: SS NOTE:  Notified of pt being discharged home, pt presents with Acute hypoxic respiratory failure, pt at her home 02 baseline at 2 lpm, nasal canula, pt's  will transport her home, PT/OT evals completed, pt ambulated 3 X 80 ft with no device, d/c plan for pt to return home with no home therapy needs, Nursing informed.Electronically signed by TESSIE Shields on 5/30/2025 at 4:14 PM

## 2025-05-30 NOTE — PROGRESS NOTES
Spiritual Health History and Assessment/Progress Note  [facility]    (P) Spiritual/Emotional Needs,  ,  ,      Name: Nadeen Issa MRN: [id number]    Age: 70 y.o.     Sex: female   Language: English   Adventism: Denominational   Acute on chronic hypoxic respiratory failure (HCC)     Date: 5/30/2025                           Spiritual Assessment began in [facility] MED SURG        Referral/Consult From: Rounding   Encounter Overview/Reason: (P) Spiritual/Emotional Needs  Service Provided For: (P) Patient    Mayela, Belief, Meaning:   Patient identifies as spiritual and is connected with a mayela tradition or spiritual practice  Family/Friends No family/friends present      Importance and Influence:  Patient has spiritual/personal beliefs that influence decisions regarding their health  Family/Friends No family/friends present    Community:  Patient is connected with a spiritual community  Family/Friends No family/friends present    Assessment and Plan of Care:     Patient Interventions include: Facilitated expression of thoughts and feelings, Explored spiritual coping/struggle/distress, and Engaged in theological reflection  Family/Friends Interventions include: No family/friends present    Patient Plan of Care: Spiritual Care available upon further referral  Family/Friends Plan of Care: No family/friends present    Electronically signed by Chaplain Mcpherson Do on 5/30/2025 at 4:57 PM

## 2025-05-31 NOTE — DISCHARGE SUMMARY
Dr. Sam.    PAST MEDICAL HISTORY:  Positive for usual childhood diseases, history of coronary artery disease, history of stroke, gastroparesis, hyperlipidemia, hypertension, hypothyroidism, pulmonary hypertension with associated scleroderma, sleep apnea for which she does not use a CPAP.    PHYSICAL EXAMINATION:  VITAL SIGNS:  Revealed blood pressure of 109/58, pulse 110, respirations 25, O2 saturation 98%.  GENERAL APPEARANCE:  This is a 70-year-old white female who is alert, responsive, oriented to person, place, and time.  Slightly dyspneic.  LUNGS:  Reveal fibrotic welts.  HEART:  Fairly regular.  Grade 1/6 murmur.  No S3, no S4.  ABDOMEN:  Soft.  EXTREMITIES:  There is some edema.    LABORATORY DATA:  While the patient was in the hospital, had the following laboratory studies performed.  Hemoglobin and hematocrit 10.8 and 34.5 respectively, white count 4000, platelet count adequate.  BUN and creatinine were 16 and 0.7 respectively.  Lipase was satisfactory with potassium low at 2.9.    HOSPITAL STAY:  The patient was admitted to the hospital to the general medical floor.  The patient was admitted under the service of Dr. Balderas and Dr. Sam.  Cardiology consult was carried out along with [facility], Dr. Seals.  Pulmonary Care consult was obtained with Dr. Bennett.  The patient was continued on medication.  The patient did have some wheezing noted for which she was placed on low-dose Solu-Medrol, the script was written very cautiously because of the history of scleroderma to prevent critical renal syndrome.  The patient at this time was continued on treatment.  The patient did clinically improve.  The patient was felt stable enough to be improved at home with a very short course of steroids as prescribed by Dr. Bennett.  At the time of discharge, reviewing her labs showed the following,   the MRI study of the  was negative.  Phosphorus and magnesium were 2.8 and 2.1 respectively.  BUN and creatinine  creatinine were 23 and 0.7 respectively.  Lipase was stable.  CBC showed hemoglobin and hematocrit 10.0 and 31.3 respectively, white count 6000, platelet count adequate.  INR was 3.5.  The patient's remaining lab work was satisfactory.  The patient was discharged home with blood pressure 108/61, pulse 76, respirations 16, O2 saturation 94%.  Height 5 feet 3 inches and weight 149 pounds.  The patient was discharged home on Medrol 20 mg daily for 2 days, then 2.5 daily for 2 days, then 1 daily.  The patient will continue torsemide 20 mg daily, warfarin 2.5 daily.  The patient will continue flecainide 100 q.12, hyoscyamine as needed q.6 p.r.n., Jardiance 10 daily, Synthroid 88 mcg daily, magnesium oxide 500 daily, Opsumit 10 mg daily, oxygen at 3 L, Protonix 40 daily, potassium 10 daily, sildenafil 20 mg 3 times a day, simvastatin 40 mg daily, spironolactone 50 daily, Spiriva 18 mcg 1 puff daily, vitamin D, and vitamin C.  The patient otherwise will continue Medrol tapering.  The patient will follow up with her pulmonologist up in [place].  The patient will follow up with Timi Ware, her primary care doctor in 1 week along with Cardiology.  The patient will have her INR monitored accordingly.  More than 40 minutes was spent on the day of discharge, more than 50% of the time spent face-to-face with the patient discussing plan of care and treatment.  We have discussed the possibility of environmental allergen and possibly worsening her pulmonary status.          YASMANI NETTLES DO      D:  05/30/2025 15:05:06     T:  05/31/2025 05:26:59     [facility]/EMMANUELLES  Job #:  [id number]     Doc#:  [id number]

## 2025-06-01 LAB
EKG ATRIAL RATE: 72 BPM
EKG P-R INTERVAL: 206 MS
EKG Q-T INTERVAL: 466 MS
EKG QRS DURATION: 128 MS
EKG QTC CALCULATION (BAZETT): 510 MS
EKG R AXIS: -7 DEGREES
EKG T AXIS: 81 DEGREES
EKG VENTRICULAR RATE: 72 BPM

## 2025-06-03 NOTE — PROGRESS NOTES
CLINICAL PHARMACY NOTE: MEDS TO BEDS    Total # of Prescriptions Filled: 1   The following medications were delivered to the patient:  Medrol dosepak 4 mg      Additional Documentation:  Warfarin 2.5 sent to [facility] [address] [facility]

## 2025-06-04 LAB
A FUMIGATUS1 AB SER QL ID: NORMAL
A FUMIGATUS6 AB SER QL ID: NORMAL
A PULLULANS AB SER QL ID: NORMAL
PIGEON SERUM AB QL ID: NORMAL
S RECTIVIRGULA AB SER QL ID: NORMAL

## 2025-06-05 NOTE — PROGRESS NOTES
Physician Progress Note      PATIENT:               FABIOLA ISSA  [facility] #:                  [id number]  :                       1955  ADMIT DATE:       2025 2:09 PM  DISCH DATE:        2025 5:09 PM  RESPONDING  PROVIDER #:        Cr Balderas DO          QUERY TEXT:    Acute-on-chronic hypoxemic respiratory failure is documented in the Internal   Medicine Progress Notes by Cr Balderas DO at 2025 with no increased   work of breathing, good air exchange, clear to auscultation bilaterally, no   wheezes, rhonchi, or rales . Please provide additional clinical indicators   supportive of your documentation. Or please document if the diagnosis of acute   respiratory failure has been ruled out after study.    The clinical indicators include:  - COPD exacerbation, CHF, Pulmonary fibrosis, Secondary pulmonary arterial   hypertension, hx of smoking    - \"Ms. Issa has a past medical history of COPD, coronary artery disease,   atrial fibrillation s/p pacemaker, congestive heart failure, scleroderma,   pulmonary hypertension, pulmonary fibrosis,  hypertension, hyperlipidemia   presented after concerns of progressive worsening of shortness of breath over   the last 1 week and nonproductive cough. In the ER patient is placed on 2 L   nasal cannula.  She has been hemodynamically stable.  CTA chest was done which   was negative for pulmonary embolism with was positive for small bilateral   pleural effusions.  Patient was admitted for further acute respiratory insufficiency.  She has no   complaints of worsening shortness of breath.  She is at her baseline of 2 L.   \"( Pulmonology Progress Notes by Minnie Leung MD at 2025)    - \"Acute-on-chronic hypoxemic respiratory failure, on 2 L at baseline; acute   chronic obstructive pulmonary disease, possibly secondary to pulmonary   hypertension.\" ( Internal Medicine Discharge  Summary by Cr Balderas DO   at 2025)    - \" No increased work of

## 2025-07-10 RX ORDER — WARFARIN SODIUM 5 MG/1
5 TABLET ORAL DAILY
Qty: 90 TABLET | Refills: 3 | Status: SHIPPED | OUTPATIENT
Start: 2025-07-10

## (undated) DEVICE — CLIP LIG L235CM RESOL 360 BX/20

## (undated) DEVICE — SOLUTION IV IRRIG WATER 1000ML POUR BRL 2F7114

## (undated) DEVICE — SUTURE ETHLN 10-0 L12IN NONABSORBABLE BLK CS140-8 L6.5MM 9033G

## (undated) DEVICE — STERILE POLYISOPRENE POWDER-FREE SURGICAL GLOVES: Brand: PROTEXIS

## (undated) DEVICE — 3 ML SYRINGE LUER-LOCK TIP: Brand: MONOJECT

## (undated) DEVICE — Z DISCONTINUED NO SUB IDED TUBING ETCO2 AD L6.5FT NSL ORAL CVD PRNG NONFLARED TIP OVR

## (undated) DEVICE — ENCORE® LATEX MICRO SIZE 8.5, STERILE LATEX POWDER-FREE SURGICAL GLOVE: Brand: ENCORE

## (undated) DEVICE — Device

## (undated) DEVICE — BITEBLOCK 54FR W/ DENT RIM BLOX

## (undated) DEVICE — SOLUTION SCRB 32OZ 7.5% POVIDONE IOD BTL GENTLE EFFECTIVE

## (undated) DEVICE — CONNECTOR IRRIGATION AUXILIARY H2O JET W/ PRT MTL THRD HYDR

## (undated) DEVICE — DEFENDO AIR WATER SUCTION AND BIOPSY VALVE KIT FOR  OLYMPUS: Brand: DEFENDO AIR/WATER/SUCTION AND BIOPSY VALVE

## (undated) DEVICE — SURGICAL PROCEDURE PACK CATRCT LT EYE BASIC CUST ST JOS LF

## (undated) DEVICE — BIPOLAR ELECTROHEMOSTASIS CATHETER: Brand: INJECTION GOLD PROBE

## (undated) DEVICE — GAUZE,SPONGE,4"X4",8PLY,STRL,LF,10/TRAY: Brand: MEDLINE

## (undated) DEVICE — SOLUTION IV IRRIG POUR BRL 0.9% SODIUM CHL 2F7124